# Patient Record
Sex: FEMALE | Race: WHITE | NOT HISPANIC OR LATINO | Employment: FULL TIME | URBAN - METROPOLITAN AREA
[De-identification: names, ages, dates, MRNs, and addresses within clinical notes are randomized per-mention and may not be internally consistent; named-entity substitution may affect disease eponyms.]

---

## 2017-07-19 ENCOUNTER — OFFICE VISIT (OUTPATIENT)
Dept: FAMILY MEDICINE | Facility: CLINIC | Age: 32
End: 2017-07-19
Payer: COMMERCIAL

## 2017-07-19 VITALS
SYSTOLIC BLOOD PRESSURE: 120 MMHG | HEIGHT: 65 IN | WEIGHT: 196.2 LBS | BODY MASS INDEX: 32.69 KG/M2 | DIASTOLIC BLOOD PRESSURE: 72 MMHG | HEART RATE: 72 BPM | TEMPERATURE: 98.9 F

## 2017-07-19 DIAGNOSIS — R87.810 CERVICAL HIGH RISK HPV (HUMAN PAPILLOMAVIRUS) TEST POSITIVE: ICD-10-CM

## 2017-07-19 DIAGNOSIS — R19.7 DIARRHEA, UNSPECIFIED TYPE: ICD-10-CM

## 2017-07-19 DIAGNOSIS — N89.8 VAGINAL DISCHARGE: ICD-10-CM

## 2017-07-19 DIAGNOSIS — F41.8 DEPRESSION WITH ANXIETY: ICD-10-CM

## 2017-07-19 DIAGNOSIS — Z12.4 SCREENING FOR CERVICAL CANCER: ICD-10-CM

## 2017-07-19 DIAGNOSIS — Z00.00 ROUTINE GENERAL MEDICAL EXAMINATION AT A HEALTH CARE FACILITY: Primary | ICD-10-CM

## 2017-07-19 DIAGNOSIS — G44.209 TENSION-TYPE HEADACHE, NOT INTRACTABLE, UNSPECIFIED CHRONICITY PATTERN: ICD-10-CM

## 2017-07-19 DIAGNOSIS — Z23 NEED FOR PNEUMOCOCCAL VACCINE: ICD-10-CM

## 2017-07-19 DIAGNOSIS — Z11.51 SCREENING FOR HUMAN PAPILLOMAVIRUS: ICD-10-CM

## 2017-07-19 DIAGNOSIS — Z72.0 TOBACCO ABUSE: ICD-10-CM

## 2017-07-19 DIAGNOSIS — Z11.3 SCREEN FOR STD (SEXUALLY TRANSMITTED DISEASE): ICD-10-CM

## 2017-07-19 LAB
ANION GAP SERPL CALCULATED.3IONS-SCNC: 7 MMOL/L (ref 3–14)
BASOPHILS # BLD AUTO: 0 10E9/L (ref 0–0.2)
BASOPHILS NFR BLD AUTO: 0.4 %
BETA HCG QUAL IFA URINE: NEGATIVE
BUN SERPL-MCNC: 12 MG/DL (ref 7–30)
CALCIUM SERPL-MCNC: 8.7 MG/DL (ref 8.5–10.1)
CHLORIDE SERPL-SCNC: 105 MMOL/L (ref 94–109)
CO2 SERPL-SCNC: 24 MMOL/L (ref 20–32)
CREAT SERPL-MCNC: 0.57 MG/DL (ref 0.52–1.04)
DIFFERENTIAL METHOD BLD: NORMAL
EOSINOPHIL # BLD AUTO: 0.3 10E9/L (ref 0–0.7)
EOSINOPHIL NFR BLD AUTO: 2.7 %
ERYTHROCYTE [DISTWIDTH] IN BLOOD BY AUTOMATED COUNT: 14.4 % (ref 10–15)
GFR SERPL CREATININE-BSD FRML MDRD: NORMAL ML/MIN/1.7M2
GLUCOSE SERPL-MCNC: 98 MG/DL (ref 70–99)
HCT VFR BLD AUTO: 40.2 % (ref 35–47)
HGB BLD-MCNC: 12.9 G/DL (ref 11.7–15.7)
LYMPHOCYTES # BLD AUTO: 1.9 10E9/L (ref 0.8–5.3)
LYMPHOCYTES NFR BLD AUTO: 18.1 %
MCH RBC QN AUTO: 27.7 PG (ref 26.5–33)
MCHC RBC AUTO-ENTMCNC: 32.1 G/DL (ref 31.5–36.5)
MCV RBC AUTO: 86 FL (ref 78–100)
MICRO REPORT STATUS: NORMAL
MISCELLANEOUS TEST: NORMAL
MONOCYTES # BLD AUTO: 0.8 10E9/L (ref 0–1.3)
MONOCYTES NFR BLD AUTO: 7.4 %
NEUTROPHILS # BLD AUTO: 7.6 10E9/L (ref 1.6–8.3)
NEUTROPHILS NFR BLD AUTO: 71.4 %
PLATELET # BLD AUTO: 340 10E9/L (ref 150–450)
POTASSIUM SERPL-SCNC: 3.8 MMOL/L (ref 3.4–5.3)
RBC # BLD AUTO: 4.66 10E12/L (ref 3.8–5.2)
SODIUM SERPL-SCNC: 136 MMOL/L (ref 133–144)
SPECIMEN SOURCE: NORMAL
TSH SERPL DL<=0.005 MIU/L-ACNC: 1.57 MU/L (ref 0.4–4)
WBC # BLD AUTO: 10.7 10E9/L (ref 4–11)
WET PREP SPEC: NORMAL

## 2017-07-19 PROCEDURE — 36415 COLL VENOUS BLD VENIPUNCTURE: CPT | Performed by: NURSE PRACTITIONER

## 2017-07-19 PROCEDURE — 99395 PREV VISIT EST AGE 18-39: CPT | Mod: 25 | Performed by: NURSE PRACTITIONER

## 2017-07-19 PROCEDURE — 84703 CHORIONIC GONADOTROPIN ASSAY: CPT | Performed by: NURSE PRACTITIONER

## 2017-07-19 PROCEDURE — 83516 IMMUNOASSAY NONANTIBODY: CPT | Mod: 59 | Performed by: NURSE PRACTITIONER

## 2017-07-19 PROCEDURE — 84443 ASSAY THYROID STIM HORMONE: CPT | Performed by: NURSE PRACTITIONER

## 2017-07-19 PROCEDURE — 87624 HPV HI-RISK TYP POOLED RSLT: CPT | Performed by: NURSE PRACTITIONER

## 2017-07-19 PROCEDURE — 87210 SMEAR WET MOUNT SALINE/INK: CPT | Performed by: NURSE PRACTITIONER

## 2017-07-19 PROCEDURE — 87591 N.GONORRHOEAE DNA AMP PROB: CPT | Performed by: NURSE PRACTITIONER

## 2017-07-19 PROCEDURE — 86255 FLUORESCENT ANTIBODY SCREEN: CPT | Mod: 90 | Performed by: NURSE PRACTITIONER

## 2017-07-19 PROCEDURE — 85025 COMPLETE CBC W/AUTO DIFF WBC: CPT | Performed by: NURSE PRACTITIONER

## 2017-07-19 PROCEDURE — 87491 CHLMYD TRACH DNA AMP PROBE: CPT | Performed by: NURSE PRACTITIONER

## 2017-07-19 PROCEDURE — 88175 CYTOPATH C/V AUTO FLUID REDO: CPT | Performed by: NURSE PRACTITIONER

## 2017-07-19 PROCEDURE — 90471 IMMUNIZATION ADMIN: CPT | Performed by: NURSE PRACTITIONER

## 2017-07-19 PROCEDURE — 99213 OFFICE O/P EST LOW 20 MIN: CPT | Mod: 25 | Performed by: NURSE PRACTITIONER

## 2017-07-19 PROCEDURE — 83516 IMMUNOASSAY NONANTIBODY: CPT | Performed by: NURSE PRACTITIONER

## 2017-07-19 PROCEDURE — 99000 SPECIMEN HANDLING OFFICE-LAB: CPT | Performed by: NURSE PRACTITIONER

## 2017-07-19 PROCEDURE — 86038 ANTINUCLEAR ANTIBODIES: CPT | Performed by: NURSE PRACTITIONER

## 2017-07-19 PROCEDURE — 80048 BASIC METABOLIC PNL TOTAL CA: CPT | Performed by: NURSE PRACTITIONER

## 2017-07-19 PROCEDURE — 90732 PPSV23 VACC 2 YRS+ SUBQ/IM: CPT | Performed by: NURSE PRACTITIONER

## 2017-07-19 RX ORDER — CYCLOBENZAPRINE HCL 10 MG
10 TABLET ORAL 3 TIMES DAILY PRN
Qty: 30 TABLET | Refills: 0 | Status: SHIPPED | OUTPATIENT
Start: 2017-07-19 | End: 2018-07-25

## 2017-07-19 ASSESSMENT — ENCOUNTER SYMPTOMS
DYSPHORIC MOOD: 0
SLEEP DISTURBANCE: 0
SORE THROAT: 0
CONSTIPATION: 0
ABDOMINAL PAIN: 0
NUMBNESS: 0
NERVOUS/ANXIOUS: 0
COUGH: 0
ACTIVITY CHANGE: 0
DIFFICULTY URINATING: 0
FREQUENCY: 0
SHORTNESS OF BREATH: 0
POLYDIPSIA: 0
PALPITATIONS: 0
DIZZINESS: 0
VOMITING: 0
ROS SKIN COMMENTS: SELF BREAST EXAM WITHOUT AREA OF CONCERN.
WHEEZING: 0
ARTHRALGIAS: 0
DIARRHEA: 1
ABDOMINAL DISTENTION: 0
HEADACHES: 0
LIGHT-HEADEDNESS: 0
CHEST TIGHTNESS: 0
FATIGUE: 0
POLYPHAGIA: 0
RHINORRHEA: 0
NAUSEA: 0

## 2017-07-19 ASSESSMENT — PATIENT HEALTH QUESTIONNAIRE - PHQ9: 5. POOR APPETITE OR OVEREATING: NEARLY EVERY DAY

## 2017-07-19 ASSESSMENT — ANXIETY QUESTIONNAIRES
5. BEING SO RESTLESS THAT IT IS HARD TO SIT STILL: NEARLY EVERY DAY
1. FEELING NERVOUS, ANXIOUS, OR ON EDGE: NEARLY EVERY DAY
6. BECOMING EASILY ANNOYED OR IRRITABLE: NEARLY EVERY DAY
GAD7 TOTAL SCORE: 20
3. WORRYING TOO MUCH ABOUT DIFFERENT THINGS: NEARLY EVERY DAY
7. FEELING AFRAID AS IF SOMETHING AWFUL MIGHT HAPPEN: MORE THAN HALF THE DAYS
2. NOT BEING ABLE TO STOP OR CONTROL WORRYING: NEARLY EVERY DAY

## 2017-07-19 NOTE — LETTER
My Depression Action Plan  Name: Jennifer Voss   Date of Birth 1985  Date: 7/19/2017    My doctor: Fanny Camahco   My clinic: [address] [id number] [phone]          GREEN    ZONE   Good Control    What it looks like:     Things are going generally well. You have normal up s and down s. You may even feel depressed from time to time, but bad moods usually last less than a day.   What you need to do:  1. Continue to care for yourself (see self care plan)  2. Check your depression survival kit and update it as needed  3. Follow your physician s recommendations including any medication.  4. Do not stop taking medication unless you consult with your physician first.           YELLOW         ZONE Getting Worse    What it looks like:     Depression is starting to interfere with your life.     It may be hard to get out of bed; you may be starting to isolate yourself from others.    Symptoms of depression are starting to last most all day and this has happened for several days.     You may have suicidal thoughts but they are not constant.   What you need to do:     1. Call your care team, your response to treatment will improve if you keep your care team informed of your progress. Yellow periods are signs an adjustment may need to be made.     2. Continue your self-care, even if you have to fake it!    3. Talk to someone in your support network    4. Open up your depression survival kit           RED    ZONE Medical Alert - Get Help    What it looks like:     Depression is seriously interfering with your life.     You may experience these or other symptoms: You can t get out of bed most days, can t work or engage in other necessary activities, you have trouble taking care of basic hygiene, or basic responsibilities, thoughts of suicide or death that will not go away, self-injurious behavior.     What you need to do:  1. Call your care team and  request a same-day appointment. If they are not available (weekends or after hours) call your local crisis line, emergency room or 911.          Depression Self Care Plan / Survival Kit    Self-Care for Depression  Here s the deal. Your body and mind are really not as separate as most people think.  What you do and think affects how you feel and how you feel influences what you do and think. This means if you do things that people who feel good do, it will help you feel better.  Sometimes this is all it takes.  There is also a place for medication and therapy depending on how severe your depression is, so be sure to consult with your medical provider and/ or Behavioral Health Consultant if your symptoms are worsening or not improving.     In order to better manage my stress, I will:    Exercise  Get some form of exercise, every day. This will help reduce pain and release endorphins, the  feel good  chemicals in your brain. This is almost as good as taking antidepressants!  This is not the same as joining a gym and then never going! (they count on that by the way ) It can be as simple as just going for a walk or doing some gardening, anything that will get you moving.      Hygiene   Maintain good hygiene (Get out of bed in the morning, Make your bed, Brush your teeth, Take a shower, and Get dressed like you were going to work, even if you are unemployed).  If your clothes don't fit try to get ones that do.    Diet  I will strive to eat foods that are good for me, drink plenty of water, and avoid excessive sugar, caffeine, alcohol, and other mood-altering substances.  Some foods that are helpful in depression are: complex carbohydrates, B vitamins, flaxseed, fish or fish oil, fresh fruits and vegetables.    Psychotherapy  I agree to participate in Individual Therapy (if recommended).    Medication  If prescribed medications, I agree to take them.  Missing doses can result in serious side effects.  I understand that  drinking alcohol, or other illicit drug use, may cause potential side effects.  I will not stop my medication abruptly without first discussing it with my provider.    Staying Connected With Others  I will stay in touch with my friends, family members, and my primary care provider/team.    Use your imagination  Be creative.  We all have a creative side; it doesn t matter if it s oil painting, sand castles, or mud pies! This will also kick up the endorphins.    Witness Beauty  (AKA stop and smell the roses) Take a look outside, even in mid-winter. Notice colors, textures. Watch the squirrels and birds.     Service to others  Be of service to others.  There is always someone else in need.  By helping others we can  get out of ourselves  and remember the really important things.  This also provides opportunities for practicing all the other parts of the program.    Humor  Laugh and be silly!  Adjust your TV habits for less news and crime-drama and more comedy.    Control your stress  Try breathing deep, massage therapy, biofeedback, and meditation. Find time to relax each day.     My support system    Clinic Contact:  Phone number:    Contact 1:  Phone number:    Contact 2:  Phone number:    Gnosticist/:  Phone number:    Therapist:  Phone number:    Local crisis center:    Phone number:    Other community support:  Phone number:

## 2017-07-19 NOTE — MR AVS SNAPSHOT
After Visit Summary   7/19/2017    Jennifer Voss    MRN: [id number]           Patient Information     Date Of Birth          1985        Visit Information        Provider Department      7/19/2017 9:00 AM Shannon Almeida APRN [facility]        Today's Diagnoses     Routine general medical examination at a health care facility    -  1    Screening for cervical cancer        Screening for human papillomavirus        Depression with anxiety        Diarrhea, unspecified type        Need for pneumococcal vaccine        Tension-type headache, not intractable, unspecified chronicity pattern        Tobacco abuse        Cervical high risk HPV (human papillomavirus) test positive          Care Instructions      Preventive Health Recommendations  Female Ages 26 - 39  Yearly exam:   See your health care provider every year in order to    Review health changes.     Discuss preventive care.      Review your medicines if you your doctor has prescribed any.    Until age 30: Get a Pap test every three years (more often if you have had an abnormal result).    After age 30: Talk to your doctor about whether you should have a Pap test every 3 years or have a Pap test with HPV screening every 5 years.   You do not need a Pap test if your uterus was removed (hysterectomy) and you have not had cancer.  You should be tested each year for STDs (sexually transmitted diseases), if you're at risk.   Talk to your provider about how often to have your cholesterol checked.  If you are at risk for diabetes, you should have a diabetes test (fasting glucose).  Shots: Get a flu shot each year. Get a tetanus shot every 10 years.   Nutrition:     Eat at least 5 servings of fruits and vegetables each day.    Eat whole-grain bread, whole-wheat pasta and brown rice instead of white grains and rice.    Talk to your provider about Calcium and Vitamin D.     Lifestyle    Exercise at least 150 minutes a week  (30 minutes a day, 5 days of the week). This will help you control your weight and prevent disease.    Limit alcohol to one drink per day.    No smoking.     Wear sunscreen to prevent skin cancer.    See your dentist every six months for an exam and cleaning.            Follow-ups after your visit        Additional Services     GASTROENTEROLOGY ADULT REF PROCEDURE ONLY       Last Lab Result: No results found for: CR  Body mass index is 33.16 kg/(m^2).     Needed:  No  Language:  English    Patient will be contacted to schedule procedure.     Please be aware that coverage of these services is subject to the terms and limitations of your health insurance plan.  Call member services at your health plan with any benefit or coverage questions.  Any procedures must be performed at a [place] [facility] facility OR coordinated by your clinic's referral office.    Please bring the following with you to your appointment:    (1) Any X-Rays, CTs or MRIs which have been performed.  Contact the facility where they were done to arrange for  prior to your scheduled appointment.    (2) List of current medications   (3) This referral request   (4) Any documents/labs given to you for this referral                  Who to contact     Normal or non-critical lab and imaging results will be communicated to you by TV TubeXhart, letter or phone within 4 business days after the clinic has received the results. If you do not hear from us within 7 days, please contact the clinic through TV TubeXhart or phone. If you have a critical or abnormal lab result, we will notify you by phone as soon as possible.  Submit refill requests through [facility] or call your pharmacy and they will forward the refill request to us. Please allow 3 business days for your refill to be completed.          If you need to speak with a  for additional information , please call: [phone]           Additional Information About Your Visit        [facility]  "Information     [facility] gives you secure access to your electronic health record. If you see a primary care provider, you can also send messages to your care team and make appointments. If you have questions, please call your primary care clinic.  If you do not have a primary care provider, please call [phone] and they will assist you.        Care EveryWhere ID     This is your Care EveryWhere ID. This could be used by other organizations to access your [facility] medical records  [id number]        Your Vitals Were     Pulse Temperature Height Last Period BMI (Body Mass Index)       72 98.9  F (37.2  C) (Tympanic) 5' 4.5\" (1.638 m) 06/19/2017 33.16 kg/m2        Blood Pressure from Last 3 Encounters:   07/19/17 120/72   08/23/16 129/85   03/28/16 125/71    Weight from Last 3 Encounters:   07/19/17 196 lb 3.2 oz (89 kg)   08/23/16 191 lb 9.6 oz (86.9 kg)   03/28/16 202 lb 12.8 oz (92 kg)              We Performed the Following     anti saccharomyces cerevisiae ab: Laboratory Miscellaneous Order     Antineutrophil cytoplasmic Julio C IgG     Antinuclear antibody screen by EIA     DEPRESSION ACTION PLAN (DAP)     GASTROENTEROLOGY ADULT REF PROCEDURE ONLY     HCG qualitative     HPV High Risk Types DNA Cervical     Pap imaged thin layer diagnostic with HPV (select HPV order below)     Tissue transglutaminase julio c IgA and IgG          Where to get your medicines      These medications were sent to [facility] [address] [place] [address]  [address] [facility] [address]     Phone:  [phone]     cyclobenzaprine 10 MG tablet          Primary Care Provider Office Phone # Fax #    Fanny Camacho [phone]       [facility] [address]  [facility] [address]        Equal Access to Services     HAL ALVARADO AH: Rah Sandreson, michael morales, jeniffer mix. So [facility] [phone].    ATENCIÓN: Si habla " español, tiene a gutiérrez disposición servicios gratuitos de asistencia lingüística. Lemuel nickerson [phone].    We comply with applicable federal civil rights laws and [place] laws. We do not discriminate on the basis of race, color, national origin, age, disability sex, sexual orientation or gender identity.            Thank you!     Thank you for choosing [facility]  for your care. Our goal is always to provide you with excellent care. Hearing back from our patients is one way we can continue to improve our services. Please take a few minutes to complete the written survey that you may receive in the mail after your visit with us. Thank you!             Your Updated Medication List - Protect others around you: Learn how to safely use, store and throw away your medicines at www.disposemymeds.org.          This list is accurate as of: 7/19/17  9:34 AM.  Always use your most recent med list.                   Brand Name Dispense Instructions for use Diagnosis    albuterol 108 (90 BASE) MCG/ACT Inhaler    PROAIR HFA/PROVENTIL HFA/VENTOLIN HFA    1 Inhaler    Inhale 2 puffs into the lungs every 6 hours as needed for shortness of breath / dyspnea or wheezing    Bronchitis       cyclobenzaprine 10 MG tablet    FLEXERIL    30 tablet    Take 1 tablet (10 mg) by mouth 3 times daily as needed for muscle spasms    Tension-type headache, not intractable, unspecified chronicity pattern       metoclopramide 10 MG tablet    REGLAN    30 tablet    Take 1 tablet (10 mg) by mouth 2 times daily as needed Take with ibuprofen or naproxen for headache    Tension-type headache, not intractable, unspecified chronicity pattern       * varenicline 0.5 MG X 11 & 1 MG X 42 tablet    CHANTIX STARTING MONTH FENG    53 tablet    Take 0.5 mg tab daily for 3 days, then 0.5 mg tab twice daily for 4 days, then 1 mg twice daily.    Tobacco abuse       * varenicline 1 MG tablet    CHANTIX    56 tablet    Take 1 tablet (1 mg) by mouth 2  times daily    Tobacco abuse       * Notice:  This list has 2 medication(s) that are the same as other medications prescribed for you. Read the directions carefully, and ask your doctor or other care provider to review them with you.

## 2017-07-19 NOTE — PROGRESS NOTES
SUBJECTIVE:   CC: Jennifer Voss is an 32 year old woman who presents for preventive health visit.     Chief Complaint   Patient presents with     Physical     pt is fasting       Healthy Habits:    Do you get at least three servings of calcium containing foods daily (dairy, green leafy vegetables, etc.)? Yes but not always    Amount of exercise or daily activities, outside of work: 4 day(s) per week    Problems taking medications regularly not applicable    Medication side effects: No    Have you had an eye exam in the past two years? no    Do you see a dentist twice per year? No, once  Do you have sleep apnea, excessive snoring or daytime drowsiness?yes      Today's PHQ-2 Score:   PHQ-2 ( 1999 Pfizer) 7/16/2012   Q1: Little interest or pleasure in doing things 3   Q2: Feeling down, depressed or hopeless 2   PHQ-2 Score 5       Abuse: Current or Past(Physical, Sexual or Emotional)- No  Do you feel safe in your environment - Yes  Social History   Substance Use Topics     Smoking status: Current Every Day Smoker     Packs/day: 0.10     Years: 10.00     Types: Cigarettes     Smokeless tobacco: Never Used      Comment: smoking 10cig/day - down to 1-2 cig/day with pregnancy     Alcohol use 0.0 oz/week     0 Standard drinks or equivalent per week      Comment: No alcohol since may.     The patient does not drink >3 drinks per day nor >7 drinks per week.    Reviewed orders with patient.  Reviewed health maintenance and updated orders accordingly - Yes  Current Outpatient Prescriptions   Medication Sig Dispense Refill     cyclobenzaprine (FLEXERIL) 10 MG tablet Take 1 tablet (10 mg) by mouth 3 times daily as needed for muscle spasms 30 tablet 0     albuterol (PROAIR HFA, PROVENTIL HFA, VENTOLIN HFA) 108 (90 BASE) MCG/ACT inhaler Inhale 2 puffs into the lungs every 6 hours as needed for shortness of breath / dyspnea or wheezing (Patient not taking: Reported on 7/19/2017) 1 Inhaler 0     varenicline (CHANTIX) 1 MG tablet  Take 1 tablet (1 mg) by mouth 2 times daily (Patient not taking: Reported on 7/19/2017) 56 tablet 2     varenicline (CHANTIX STARTING MONTH PAK) 0.5 MG X 11 & 1 MG X 42 tablet Take 0.5 mg tab daily for 3 days, then 0.5 mg tab twice daily for 4 days, then 1 mg twice daily. (Patient not taking: Reported on 7/19/2017) 53 tablet 0     metoclopramide (REGLAN) 10 MG tablet Take 1 tablet (10 mg) by mouth 2 times daily as needed Take with ibuprofen or naproxen for headache (Patient not taking: Reported on 7/19/2017) 30 tablet 1     Allergies   Allergen Reactions     Penicillins Nausea     Percocet [Oxycodone-Acetaminophen] Nausea     Ok with vicodin       Mammogram not appropriate for this patient based on age.    Pertinent mammograms are reviewed under the imaging tab.  History of abnormal Pap smear: YES - updated in Problem List and Health Maintenance accordingly    Reviewed and updated as needed this visit by clinical staff  Tobacco  Allergies  Meds  Med Hx  Surg Hx  Fam Hx  Soc Hx        Reviewed and updated as needed this visit by Provider          Here today for physical. Has had abnormal pap in the past with colposcopy      Thinks may have IBS. Sometimes will eat and have to go to [place] and have diarrhea. Trigger food is salad, sandwiches , grareasy foods, when eats mac and cheese will have diarrhea. No abdomen pain. Will feel gurgling in abdomen. No constipation. No blood in stool. Thinks has been going on for 2+ years. Does have days that will not have diarrhea. Will have firm formed stools that day. No travel out of country in the last few years. No change in meds.     LMP: Regular. 6/19/17. Expecting to get it anytime. Is sexually active. No concern for std. Is currently trying to get pregnant. Is not taking over the counter prenatal, plans to pick one up today      Last Pap: 2015-ASCUS had colp.  Last mammogram: never-Maternal aunt with breast cancer.   Last BMD: Never   Last Colonoscopy: never, no family  "history of colon cancer   Last eye exam: Long time ago  Last dental exam: once per year  Last tetanus vaccine: 2012  Last influenza vaccine: Does not usually get   Last shingles vaccine: Never   Last pneumonia vaccine: Never      ROS:  Review of Systems   Constitutional: Negative for activity change and fatigue.   HENT: Negative for ear pain, rhinorrhea and sore throat.    Respiratory: Negative for cough, chest tightness, shortness of breath and wheezing.    Cardiovascular: Negative for chest pain, palpitations and leg swelling.   Gastrointestinal: Positive for diarrhea. Negative for abdominal distention, abdominal pain, constipation, nausea and vomiting.   Endocrine: Negative for cold intolerance, heat intolerance, polydipsia, polyphagia and polyuria.   Genitourinary: Negative for difficulty urinating, enuresis, frequency and urgency.   Musculoskeletal: Negative for arthralgias.   Skin: Negative for rash.        Self breast exam without area of concern.    Neurological: Negative for dizziness, light-headedness, numbness and headaches.   Psychiatric/Behavioral: Negative for dysphoric mood and sleep disturbance. The patient is not nervous/anxious.          OBJECTIVE:   /72 (BP Location: Left arm, Patient Position: Sitting, Cuff Size: Adult Large)  Pulse 72  Temp 98.9  F (37.2  C) (Tympanic)  Ht 5' 4.5\" (1.638 m)  Wt 196 lb 3.2 oz (89 kg)  LMP 06/19/2017  BMI 33.16 kg/m2  EXAM:  Physical Exam   Constitutional: She appears well-developed and well-nourished.   HENT:   Head: Normocephalic and atraumatic.   Right Ear: Tympanic membrane and external ear normal. No middle ear effusion.   Left Ear: Tympanic membrane and external ear normal.  No middle ear effusion.   Nose: No mucosal edema.   Mouth/Throat: Uvula is midline, oropharynx is clear and moist and mucous membranes are normal.   Eyes: Pupils are equal, round, and reactive to light.   Neck: Normal range of motion. Carotid bruit is not present. No " thyromegaly present.   Cardiovascular: Normal rate, regular rhythm and normal heart sounds.    Pulses:       Femoral pulses are 2+ on the right side, and 2+ on the left side.       Dorsalis pedis pulses are 2+ on the right side, and 2+ on the left side.        Posterior tibial pulses are 2+ on the right side, and 2+ on the left side.   Pulmonary/Chest: Effort normal and breath sounds normal. Right breast exhibits no inverted nipple, no mass, no nipple discharge, no skin change and no tenderness. Left breast exhibits no inverted nipple, no mass, no nipple discharge, no skin change and no tenderness. Breasts are symmetrical.   Abdominal: Soft. Normal appearance and bowel sounds are normal. There is no tenderness.   Genitourinary: Vagina normal. Uterus is not enlarged. Cervix exhibits no motion tenderness and no discharge. Right adnexum displays no mass and no tenderness. Left adnexum displays no mass and no tenderness. No vaginal discharge found.   Musculoskeletal: Normal range of motion.   Neurological: She is alert.   Skin: Skin is warm and dry. No rash noted.   Psychiatric: She has a normal mood and affect. Her behavior is normal.       ASSESSMENT/PLAN:   1. Routine general medical examination at a health care facility  Screening guidelines reviewed.     2. Screening for cervical cancer  - Pap imaged thin layer diagnostic with HPV (select HPV order below)  - Beta HCG qual IFA urine    3. Screening for human papillomavirus  - HPV High Risk Types DNA Cervical    4. Depression with anxiety  Doing well currently   - DEPRESSION ACTION PLAN (DAP)    5. Diarrhea, unspecified type  Check labs today   Set up for colonoscopy, if normal consider starting med for IBS  - GASTROENTEROLOGY ADULT REF PROCEDURE ONLY  - Tissue transglutaminase julio c IgA and IgG  - anti saccharomyces cerevisiae ab: Laboratory Miscellaneous Order  - Antineutrophil cytoplasmic Julio C IgG  - Antinuclear antibody screen by EIA  - CBC with platelets  "differential  - Basic metabolic panel  - TSH with free T4 reflex    6. Need for pneumococcal vaccine  Will check urine today for pregnancy, if negative will administer prevnar 23    7. Tension-type headache, not intractable, unspecified chronicity pattern  Refills of flexaril given   - cyclobenzaprine (FLEXERIL) 10 MG tablet; Take 1 tablet (10 mg) by mouth 3 times daily as needed for muscle spasms  Dispense: 30 tablet; Refill: 0    8. Tobacco abuse  Encouraged to stop smoking     9. Cervical high risk HPV (human papillomavirus) test positive  - Beta HCG qual IFA urine    COUNSELING:   Reviewed preventive health counseling, as reflected in patient instructions       Regular exercise       Healthy diet/nutrition       Vision screening       Immunizations    Vaccinated for: Pneumococcal  If HCG negative          Family planning       Folic Acid Counseling     reports that she has been smoking Cigarettes.  She has a 1.00 pack-year smoking history. She has never used smokeless tobacco.  Tobacco Cessation Action Plan: Information offered: Patient not interested at this time  Estimated body mass index is 33.16 kg/(m^2) as calculated from the following:    Height as of this encounter: 5' 4.5\" (1.638 m).    Weight as of this encounter: 196 lb 3.2 oz (89 kg).   Weight management plan: Discussed healthy diet and exercise guidelines and patient will follow up in 12 months in clinic to re-evaluate.    Counseling Resources:  ATP IV Guidelines  Pooled Cohorts Equation Calculator  Breast Cancer Risk Calculator  FRAX Risk Assessment  ICSI Preventive Guidelines  Dietary Guidelines for Americans, 2010  USDA's MyPlate  ASA Prophylaxis  Lung CA Screening    GISEL [facility]"

## 2017-07-19 NOTE — NURSING NOTE
"Chief Complaint   Patient presents with     Physical     pt is fasting       Initial /72 (BP Location: Left arm, Patient Position: Sitting, Cuff Size: Adult Large)  Pulse 72  Temp 98.9  F (37.2  C) (Tympanic)  Ht 5' 4.5\" (1.638 m)  Wt 196 lb 3.2 oz (89 kg)  LMP 06/19/2017  BMI 33.16 kg/m2 Estimated body mass index is 33.16 kg/(m^2) as calculated from the following:    Height as of this encounter: 5' 4.5\" (1.638 m).    Weight as of this encounter: 196 lb 3.2 oz (89 kg).  Medication Reconciliation: complete     April MAYELA Ornelas      "

## 2017-07-20 LAB
ANA SER QL IA: NORMAL
ANCA IGG TITR SER IF: NORMAL {TITER}
C TRACH DNA SPEC QL NAA+PROBE: NORMAL
N GONORRHOEA DNA SPEC QL NAA+PROBE: NORMAL
RESULT: NORMAL
SEND OUTS MISC TEST CODE: NORMAL
SEND OUTS MISC TEST SPECIMEN: NORMAL
SPECIMEN SOURCE: NORMAL
SPECIMEN SOURCE: NORMAL
TEST NAME: NORMAL
TTG IGA SER-ACNC: NORMAL U/ML
TTG IGG SER-ACNC: NORMAL U/ML

## 2017-07-20 ASSESSMENT — PATIENT HEALTH QUESTIONNAIRE - PHQ9: SUM OF ALL RESPONSES TO PHQ QUESTIONS 1-9: 4

## 2017-07-20 ASSESSMENT — ANXIETY QUESTIONNAIRES: GAD7 TOTAL SCORE: 20

## 2017-07-20 NOTE — PROGRESS NOTES
Jennifer,     You are negative for sexually transmitted diseases. Please use condoms with every sexual encounter.    The test for autoimmune disorder is normal.     Please call or email with any additional questions or concerns  GISEL Chappell CNP

## 2017-07-24 LAB
COPATH REPORT: NORMAL
PAP: NORMAL

## 2017-07-26 LAB
FINAL DIAGNOSIS: NORMAL
HPV HR 12 DNA CVX QL NAA+PROBE: NEGATIVE
HPV16 DNA SPEC QL NAA+PROBE: NEGATIVE
HPV18 DNA SPEC QL NAA+PROBE: NEGATIVE
SPECIMEN DESCRIPTION: NORMAL

## 2017-07-28 ENCOUNTER — ANESTHESIA EVENT (OUTPATIENT)
Dept: GASTROENTEROLOGY | Facility: CLINIC | Age: 32
End: 2017-07-28
Payer: COMMERCIAL

## 2017-07-28 ASSESSMENT — LIFESTYLE VARIABLES: TOBACCO_USE: 1

## 2017-07-28 NOTE — ANESTHESIA PREPROCEDURE EVALUATION
Anesthesia Evaluation     . Pt has had prior anesthetic.     No history of anesthetic complications          ROS/MED HX    ENT/Pulmonary:     (+)tobacco use, Current use , . .    Neurologic:  - neg neurologic ROS     Cardiovascular:  - neg cardiovascular ROS       METS/Exercise Tolerance:  >4 METS   Hematologic:  - neg hematologic  ROS       Musculoskeletal:  - neg musculoskeletal ROS       GI/Hepatic:     (+) GERD Other GI/Hepatic diarrhea      Renal/Genitourinary:  - ROS Renal section negative       Endo:     (+) Obesity, .      Psychiatric:     (+) psychiatric history anxiety and depression      Infectious Disease:  - neg infectious disease ROS       Malignancy:      - no malignancy   Other:    - neg other ROS                 Physical Exam  Normal systems: cardiovascular and pulmonary    Airway   Mallampati: II  TM distance: >3 FB  Neck ROM: full    Dental   (+) upper retainer    Cardiovascular       Pulmonary                     Anesthesia Plan      History & Physical Review  History and physical reviewed and following examination; no interval change.    ASA Status:  2 .    NPO Status:  > 8 hours    Plan for General and MAC with Propofol induction. Maintenance will be Balanced.  Reason for MAC:  Deep or markedly invasive procedure (G8)  PONV prophylaxis:  Ondansetron (or other 5HT-3) and Dexamethasone or Solumedrol       Postoperative Care  Postoperative pain management:  IV analgesics and Oral pain medications.      Consents  Anesthetic plan, risks, benefits and alternatives discussed with:  Patient..                          .

## 2017-08-01 ENCOUNTER — HOSPITAL ENCOUNTER (OUTPATIENT)
Facility: CLINIC | Age: 32
Discharge: HOME | End: 2017-08-01
Attending: SURGERY | Admitting: SURGERY
Payer: COMMERCIAL

## 2017-08-01 ENCOUNTER — SURGERY (OUTPATIENT)
Age: 32
End: 2017-08-01

## 2017-08-01 ENCOUNTER — ANESTHESIA (OUTPATIENT)
Dept: GASTROENTEROLOGY | Facility: CLINIC | Age: 32
End: 2017-08-01
Payer: COMMERCIAL

## 2017-08-01 VITALS
HEART RATE: 78 BPM | WEIGHT: 196.2 LBS | TEMPERATURE: 97 F | OXYGEN SATURATION: 98 % | SYSTOLIC BLOOD PRESSURE: 116 MMHG | RESPIRATION RATE: 16 BRPM | DIASTOLIC BLOOD PRESSURE: 85 MMHG | HEIGHT: 64 IN | BODY MASS INDEX: 33.49 KG/M2

## 2017-08-01 LAB
COLONOSCOPY: NORMAL
HCG UR QL: NEGATIVE

## 2017-08-01 PROCEDURE — 37000008 ZZH ANESTHESIA TECHNICAL FEE, 1ST 30 MIN: Performed by: SURGERY

## 2017-08-01 PROCEDURE — 45380 COLONOSCOPY AND BIOPSY: CPT | Performed by: SURGERY

## 2017-08-01 PROCEDURE — 88305 TISSUE EXAM BY PATHOLOGIST: CPT | Mod: 26 | Performed by: SURGERY

## 2017-08-01 PROCEDURE — 25000128 H RX IP 250 OP 636: Performed by: NURSE ANESTHETIST, CERTIFIED REGISTERED

## 2017-08-01 PROCEDURE — 25000125 ZZHC RX 250: Performed by: SURGERY

## 2017-08-01 PROCEDURE — 25000125 ZZHC RX 250: Performed by: NURSE ANESTHETIST, CERTIFIED REGISTERED

## 2017-08-01 PROCEDURE — 88305 TISSUE EXAM BY PATHOLOGIST: CPT | Performed by: SURGERY

## 2017-08-01 PROCEDURE — 81025 URINE PREGNANCY TEST: CPT | Performed by: NURSE ANESTHETIST, CERTIFIED REGISTERED

## 2017-08-01 RX ORDER — LIDOCAINE 40 MG/G
CREAM TOPICAL
Status: DISCONTINUED | OUTPATIENT
Start: 2017-08-01 | End: 2017-08-01 | Stop reason: HOSPADM

## 2017-08-01 RX ORDER — SODIUM CHLORIDE, SODIUM LACTATE, POTASSIUM CHLORIDE, CALCIUM CHLORIDE 600; 310; 30; 20 MG/100ML; MG/100ML; MG/100ML; MG/100ML
INJECTION, SOLUTION INTRAVENOUS CONTINUOUS
Status: DISCONTINUED | OUTPATIENT
Start: 2017-08-01 | End: 2017-08-01 | Stop reason: HOSPADM

## 2017-08-01 RX ORDER — LIDOCAINE HYDROCHLORIDE 10 MG/ML
INJECTION, SOLUTION INFILTRATION; PERINEURAL PRN
Status: DISCONTINUED | OUTPATIENT
Start: 2017-08-01 | End: 2017-08-01

## 2017-08-01 RX ORDER — PROPOFOL 10 MG/ML
INJECTION, EMULSION INTRAVENOUS CONTINUOUS PRN
Status: DISCONTINUED | OUTPATIENT
Start: 2017-08-01 | End: 2017-08-01

## 2017-08-01 RX ORDER — PROPOFOL 10 MG/ML
INJECTION, EMULSION INTRAVENOUS PRN
Status: DISCONTINUED | OUTPATIENT
Start: 2017-08-01 | End: 2017-08-01

## 2017-08-01 RX ORDER — ONDANSETRON 2 MG/ML
4 INJECTION INTRAMUSCULAR; INTRAVENOUS
Status: DISCONTINUED | OUTPATIENT
Start: 2017-08-01 | End: 2017-08-01 | Stop reason: HOSPADM

## 2017-08-01 RX ADMIN — LIDOCAINE HYDROCHLORIDE 50 MG: 10 INJECTION, SOLUTION INFILTRATION; PERINEURAL at 07:32

## 2017-08-01 RX ADMIN — PROPOFOL 100 MG: 10 INJECTION, EMULSION INTRAVENOUS at 07:32

## 2017-08-01 RX ADMIN — PROPOFOL 200 MCG/KG/MIN: 10 INJECTION, EMULSION INTRAVENOUS at 07:32

## 2017-08-01 RX ADMIN — SODIUM CHLORIDE, POTASSIUM CHLORIDE, SODIUM LACTATE AND CALCIUM CHLORIDE: 600; 310; 30; 20 INJECTION, SOLUTION INTRAVENOUS at 07:01

## 2017-08-01 RX ADMIN — LIDOCAINE HYDROCHLORIDE 1 ML: 10 INJECTION, SOLUTION EPIDURAL; INFILTRATION; INTRACAUDAL; PERINEURAL at 07:01

## 2017-08-01 NOTE — BRIEF OP NOTE
[facility]   Brief Operative Note    Pre-operative diagnosis: diarrhea   Post-operative diagnosis normal appearing colon     Procedure: Procedure(s):  Colonoscopy - Wound Class: II-Clean Contaminated   Surgeon(s): Surgeon(s) and Role:     * Ramakrishna Epstein MD - Primary   Estimated blood loss: * No values recorded between 8/1/2017 12:00 AM and 8/1/2017  7:51 AM *    Specimens:   ID Type Source Tests Collected by Time Destination   A : Right Colon Tissue Colon SURGICAL PATHOLOGY EXAM Ramakrishna Epstein MD 8/1/2017  7:41 AM    B : Left Colon Tissue Colon SURGICAL PATHOLOGY EXAM Ramakrishna Epstein MD 8/1/2017  7:43 AM       Findings: Colon appears normal.  Biopsies taken to rule out microscopic colitis

## 2017-08-01 NOTE — ANESTHESIA CARE TRANSFER NOTE
Patient: Jennifer Voss    Procedure(s):  Colonoscopy - Wound Class: II-Clean Contaminated    Diagnosis: diarrhea  Diagnosis Additional Information: No value filed.    Anesthesia Type:   No value filed.     Note:  Airway :Room Air  Patient transferred to:Phase II        Vitals: (Last set prior to Anesthesia Care Transfer)    CRNA VITALS  8/1/2017 0721 - 8/1/2017 0751      8/1/2017             Pulse: 94    Ht Rate: 82    SpO2: 98 %                Electronically Signed By: GISEL Chance CRNA  August 1, 2017  7:51 AM

## 2017-08-01 NOTE — ANESTHESIA POSTPROCEDURE EVALUATION
Patient: Jennifer Voss    Procedure(s):  Colonoscopy - Wound Class: II-Clean Contaminated    Diagnosis:diarrhea  Diagnosis Additional Information: No value filed.    Anesthesia Type:  No value filed.    Note:  Anesthesia Post Evaluation    Patient location during evaluation: Bedside  Level of consciousness: sleepy but conscious  Pain management: adequate  Airway patency: patent  Cardiovascular status: stable  Respiratory status: room air  Hydration status: stable  PONV: none     Anesthetic complications: None          Last vitals:  Vitals:    08/01/17 0634   BP: 124/85   Pulse: 78   Resp: 18   Temp: 36.5  C (97.7  F)   SpO2: 95%         Electronically Signed By: GISEL Chance CRNA  August 1, 2017  7:52 AM

## 2017-08-01 NOTE — H&P
"32 year old year old female here for colonoscopy for chronic diarrhea.    Patient Active Problem List   Diagnosis     CARDIOVASCULAR SCREENING; LDL GOAL LESS THAN 160     24 HR INFO GIVEN     Tobacco abuse     Depression with anxiety     GERD (gastroesophageal reflux disease)     Cervical high risk HPV (human papillomavirus) test positive       Past Medical History:   Diagnosis Date     Abnormal Pap smear of cervix 08/2003     Cervical high risk HPV (human papillomavirus) test positive 7/24/15    type 16     Chickenpox      Ectopic pregnancy     right     H/O chronic ear infection as a child     H/O colposcopy with cervical biopsy 1/18/16    MARIANO 2, ECC - benign     Tonsillitis        Past Surgical History:   Procedure Laterality Date     LAPAROSCOPIC EVACUATION ECTOPIC PREGNANCY N/A 7/24/2015    LSC right salpingectomy      LEEP TX, CERVICAL  3/2016    benign     MOUTH SURGERY      wisdom teeth     PE TUBES       TONSILLECTOMY         @[facility]@    No current outpatient prescriptions on file.       Allergies   Allergen Reactions     Penicillins Nausea     Percocet [Oxycodone-Acetaminophen] Nausea     Ok with vicodin       Pt reports that she has been smoking Cigarettes.  She has a 1.00 pack-year smoking history. She has never used smokeless tobacco. She reports that she drinks alcohol. She reports that she does not use illicit drugs.    Exam:  /85 (Cuff Size: Adult Large)  Pulse 78  Temp 97.7  F (36.5  C) (Oral)  Resp 18  Ht 1.638 m (5' 4.49\")  Wt 89 kg (196 lb 3.2 oz)  LMP 07/20/2017  SpO2 95%  BMI 33.17 kg/m2    Awake, Alert OX3  Lungs - CTA bilaterally  CV - RRR, no murmurs, distal pulses intact  Abd - soft, non-distended, non-tender, +BS  Extr - No cyanosis or edema    A/P 32 year old year old female in need of colonoscopy for chronic diarrhea. Risks, benefits, alternatives, and complications were discussed including the possibility of perforation and the patient agreed to proceed.    Ramakrishna Epstein, " MD

## 2017-08-02 LAB — COPATH REPORT: NORMAL

## 2017-09-22 ENCOUNTER — OFFICE VISIT (OUTPATIENT)
Dept: FAMILY MEDICINE | Facility: CLINIC | Age: 32
End: 2017-09-22
Payer: COMMERCIAL

## 2017-09-22 VITALS
SYSTOLIC BLOOD PRESSURE: 127 MMHG | OXYGEN SATURATION: 97 % | HEART RATE: 75 BPM | DIASTOLIC BLOOD PRESSURE: 85 MMHG | BODY MASS INDEX: 31.96 KG/M2 | WEIGHT: 187.2 LBS | TEMPERATURE: 98.6 F | HEIGHT: 64 IN

## 2017-09-22 DIAGNOSIS — Z72.0 TOBACCO ABUSE: ICD-10-CM

## 2017-09-22 DIAGNOSIS — J20.9 ACUTE BRONCHITIS, UNSPECIFIED ORGANISM: Primary | ICD-10-CM

## 2017-09-22 PROCEDURE — 99214 OFFICE O/P EST MOD 30 MIN: CPT | Performed by: PHYSICIAN ASSISTANT

## 2017-09-22 RX ORDER — CODEINE PHOSPHATE AND GUAIFENESIN 10; 100 MG/5ML; MG/5ML
1-2 SOLUTION ORAL EVERY 4 HOURS PRN
Qty: 473 ML | Refills: 0 | Status: SHIPPED | OUTPATIENT
Start: 2017-09-22 | End: 2017-12-29

## 2017-09-22 RX ORDER — ALBUTEROL SULFATE 90 UG/1
2 AEROSOL, METERED RESPIRATORY (INHALATION) EVERY 6 HOURS PRN
Qty: 1 INHALER | Refills: 0 | Status: SHIPPED | OUTPATIENT
Start: 2017-09-22 | End: 2017-11-29

## 2017-09-22 NOTE — PATIENT INSTRUCTIONS

## 2017-09-22 NOTE — PROGRESS NOTES
SUBJECTIVE:   Jennifer Voss is a 32 year old female who presents to clinic today for the following health issues:      ENT Symptoms             Symptoms: cc Present Absent Comment   Fever/Chills   x    Fatigue  x     Muscle Aches  x     Eye Irritation   x    Sneezing  x     Nasal Fernando/Drg  x     Sinus Pressure/Pain   x    Loss of smell   x    Dental pain   x    Sore Throat  x  From coughing   Swollen Glands   x    Ear Pain/Fullness   x    Cough  x     Wheeze  x     Chest Pain   x    Shortness of breath  x     Rash   x    Other   x      Symptom duration:  2 days    Symptom severity:  moderate   Treatments tried:  Delsum and cough drops    Contacts:  None      Cough productive of yellow/green mucus.   Cough keeping her up at night.    Smokes less than half pack per day, cut back recently.    Was on chantix in the past and it was effective.            Problem list and histories reviewed & adjusted, as indicated.  Additional history: as documented    Patient Active Problem List   Diagnosis     CARDIOVASCULAR SCREENING; LDL GOAL LESS THAN 160     24 HR INFO GIVEN     Tobacco abuse     Depression with anxiety     GERD (gastroesophageal reflux disease)     Cervical high risk HPV (human papillomavirus) test positive     Past Surgical History:   Procedure Laterality Date     COLONOSCOPY N/A 8/1/2017    Procedure: COMBINED COLONOSCOPY, SINGLE OR MULTIPLE BIOPSY/POLYPECTOMY BY BIOPSY;  Colonoscopy;  Surgeon: Ramakrishna Epstein MD;  Location: [facility]     LAPAROSCOPIC EVACUATION ECTOPIC PREGNANCY N/A 7/24/2015    LSC right salpingectomy      LEEP TX, CERVICAL  3/2016    benign     MOUTH SURGERY      wisdom teeth     PE TUBES       TONSILLECTOMY         Social History   Substance Use Topics     Smoking status: Current Every Day Smoker     Packs/day: 0.10     Years: 10.00     Types: Cigarettes     Smokeless tobacco: Never Used      Comment: smoking 10cig/day - down to 1-2 cig/day with pregnancy     Alcohol use 0.0 oz/week     0  Standard drinks or equivalent per week      Comment: No alcohol since may.     Family History   Problem Relation Age of Onset     DIABETES Father      Hypertension Father      DIABETES Paternal Grandmother      Coronary Artery Disease Paternal Grandmother      MI     Aneurysm Paternal Grandmother      brain     Substance Abuse Brother      recovered drugs     Depression Brother      Anxiety Disorder Brother      Heart Failure Paternal Grandfather      CHF     Depression Mother          Current Outpatient Prescriptions   Medication Sig Dispense Refill     guaiFENesin-codeine (ROBITUSSIN AC) 100-10 MG/5ML SOLN solution Take 5-10 mLs by mouth every 4 hours as needed for cough 473 mL 0     albuterol (PROAIR HFA/PROVENTIL HFA/VENTOLIN HFA) 108 (90 BASE) MCG/ACT Inhaler Inhale 2 puffs into the lungs every 6 hours as needed for shortness of breath / dyspnea or wheezing 1 Inhaler 0     varenicline (CHANTIX STARTING MONTH PAK) 0.5 MG X 11 & 1 MG X 42 tablet Take 0.5 mg tab daily for 3 days, then 0.5 mg tab twice daily for 4 days, then 1 mg twice daily. 53 tablet 0     cyclobenzaprine (FLEXERIL) 10 MG tablet Take 1 tablet (10 mg) by mouth 3 times daily as needed for muscle spasms 30 tablet 0     albuterol (PROAIR HFA, PROVENTIL HFA, VENTOLIN HFA) 108 (90 BASE) MCG/ACT inhaler Inhale 2 puffs into the lungs every 6 hours as needed for shortness of breath / dyspnea or wheezing 1 Inhaler 0     BP Readings from Last 3 Encounters:   09/22/17 127/85   08/01/17 116/85   07/19/17 120/72    Wt Readings from Last 3 Encounters:   09/22/17 187 lb 3.2 oz (84.9 kg)   08/01/17 196 lb 3.2 oz (89 kg)   07/19/17 196 lb 3.2 oz (89 kg)                        Reviewed and updated as needed this visit by clinical staff     Reviewed and updated as needed this visit by Provider         ROS:  Constitutional, HEENT, cardiovascular, pulmonary, gi and gu systems are negative, except as otherwise noted.      OBJECTIVE:   /85  Pulse 75  Temp 98.6  " F (37  C) (Tympanic)  Ht 5' 4.49\" (1.638 m)  Wt 187 lb 3.2 oz (84.9 kg)  LMP 09/19/2017 (Approximate)  SpO2 97%  Breastfeeding? No  BMI 31.65 kg/m2  Body mass index is 31.65 kg/(m^2).  GENERAL: healthy, alert and no distress  EYES: Eyes grossly normal to inspection, PERRL and conjunctivae and sclerae normal  HENT: ear canals and TM's normal, nose and mouth without ulcers or lesions  NECK: no adenopathy, no asymmetry, masses, or scars and thyroid normal to palpation  RESP: lungs clear to auscultation - no rales, rhonchi, slight wheeze noted in posterior lung fields.  CV: regular rate and rhythm, normal S1 S2, no S3 or S4, no murmur, click or rub, no peripheral edema and peripheral pulses strong  ABDOMEN: soft, nontender, no hepatosplenomegaly, no masses and bowel sounds normal  MS: no gross musculoskeletal defects noted, no edema    Diagnostic Test Results:  none     ASSESSMENT/PLAN:         1. Acute bronchitis, unspecified organism  BRONCHITIS    I discussed the pathophysiology of bronchitis and likely viral etiology.  I reviewed the risks and benefits of various over the counter and prescription medications.  Additionally, we reviewed the infectious nature of this condition and techniques to minimize transmission and future infections.    Medication ordered, see .     Patient advised to follow up if symptoms worsen or fail to improve as anticipated.     I warned Jennifer Voss that robitussin ac may cause drowsiness and she is not to drive or operate heavy machinery after taking this medication.       - guaiFENesin-codeine (ROBITUSSIN AC) 100-10 MG/5ML SOLN solution; Take 5-10 mLs by mouth every 4 hours as needed for cough  Dispense: 473 mL; Refill: 0  - albuterol (PROAIR HFA/PROVENTIL HFA/VENTOLIN HFA) 108 (90 BASE) MCG/ACT Inhaler; Inhale 2 puffs into the lungs every 6 hours as needed for shortness of breath / dyspnea or wheezing  Dispense: 1 Inhaler; Refill: 0    2. Tobacco abuse  Discussed " risks of smoking and strongly advised smoking cessation.  Went over various options including patches, meds, and cessation programs available.  Reviewed general cessation measures, including behavioral modifications.   Approximately 3 minutes were spent counseling patient on smoking cessation.     Coding if there is a co-morbidity.  85870 (3-9 minutes) and 09313 (10+ minutes)     Restart chantix      - varenicline (CHANTIX STARTING MONTH FENG) 0.5 MG X 11 & 1 MG X 42 tablet; Take 0.5 mg tab daily for 3 days, then 0.5 mg tab twice daily for 4 days, then 1 mg twice daily.  Dispense: 53 tablet; Refill: 0    FUTURE APPOINTMENTS:       - Follow-up visit if symptoms worsen or fail to improve as anticipated (call or send a [employer] message if no improvement over the next 10 days as anticipated).     Maria Chaves PA-C  [facility]

## 2017-09-22 NOTE — NURSING NOTE
"Chief Complaint   Patient presents with     URI       Initial /85  Pulse 75  Temp 98.6  F (37  C) (Tympanic)  Ht 5' 4.49\" (1.638 m)  Wt 187 lb 3.2 oz (84.9 kg)  LMP 09/19/2017 (Approximate)  SpO2 97%  Breastfeeding? No  BMI 31.65 kg/m2 Estimated body mass index is 31.65 kg/(m^2) as calculated from the following:    Height as of this encounter: 5' 4.49\" (1.638 m).    Weight as of this encounter: 187 lb 3.2 oz (84.9 kg).  Medication Reconciliation: complete     Ca Jennings MA      "

## 2017-11-17 DIAGNOSIS — J20.9 ACUTE BRONCHITIS, UNSPECIFIED ORGANISM: ICD-10-CM

## 2017-11-17 RX ORDER — CODEINE PHOSPHATE AND GUAIFENESIN 10; 100 MG/5ML; MG/5ML
1-2 SOLUTION ORAL EVERY 4 HOURS PRN
Refills: 0 | OUTPATIENT
Start: 2017-11-17

## 2017-11-17 NOTE — TELEPHONE ENCOUNTER
Guaifenesin/Codeine Sol  100/10mg  Last Written Prescription Date:  09/22/2017 #473 x 0  Last filled 09/22/2017  Last office visit: 07/19/2017 FRANCES Almeida       Routing refill request to provider for review/approval because:  Drug not on the [facility], [facility] or [facility] refill protocol or controlled substance

## 2017-11-17 NOTE — TELEPHONE ENCOUNTER
I suggest f/u visit if she is still having symptoms or if she would like refills of this medication.   Maria Chaves PA-C

## 2017-11-22 NOTE — TELEPHONE ENCOUNTER
Patient called back. She states she does not need another prescription at this time. The Pharmacy was only filling half prescriptions for her, so she has enough for now. Patient knows she needs to make an appointment for any future refills.    Suzie Sunbright   [facility] [place]

## 2017-11-29 ENCOUNTER — OFFICE VISIT (OUTPATIENT)
Dept: FAMILY MEDICINE | Facility: CLINIC | Age: 32
End: 2017-11-29
Payer: COMMERCIAL

## 2017-11-29 VITALS
OXYGEN SATURATION: 97 % | SYSTOLIC BLOOD PRESSURE: 120 MMHG | WEIGHT: 181.6 LBS | BODY MASS INDEX: 30.26 KG/M2 | HEIGHT: 65 IN | DIASTOLIC BLOOD PRESSURE: 76 MMHG | TEMPERATURE: 97.5 F | HEART RATE: 72 BPM

## 2017-11-29 DIAGNOSIS — J20.9 ACUTE BRONCHITIS, UNSPECIFIED ORGANISM: ICD-10-CM

## 2017-11-29 DIAGNOSIS — Z72.0 TOBACCO ABUSE: ICD-10-CM

## 2017-11-29 DIAGNOSIS — R07.0 THROAT PAIN: Primary | ICD-10-CM

## 2017-11-29 LAB
DEPRECATED S PYO AG THROAT QL EIA: NORMAL
SPECIMEN SOURCE: NORMAL

## 2017-11-29 PROCEDURE — 87880 STREP A ASSAY W/OPTIC: CPT | Performed by: FAMILY MEDICINE

## 2017-11-29 PROCEDURE — 99213 OFFICE O/P EST LOW 20 MIN: CPT | Performed by: FAMILY MEDICINE

## 2017-11-29 PROCEDURE — 87081 CULTURE SCREEN ONLY: CPT | Performed by: FAMILY MEDICINE

## 2017-11-29 RX ORDER — CODEINE PHOSPHATE AND GUAIFENESIN 10; 100 MG/5ML; MG/5ML
1 SOLUTION ORAL EVERY 4 HOURS PRN
Qty: 120 ML | Refills: 0 | Status: SHIPPED | OUTPATIENT
Start: 2017-11-29 | End: 2017-12-29

## 2017-11-29 RX ORDER — ALBUTEROL SULFATE 90 UG/1
2 AEROSOL, METERED RESPIRATORY (INHALATION) EVERY 6 HOURS PRN
Qty: 1 INHALER | Refills: 0 | Status: SHIPPED | OUTPATIENT
Start: 2017-11-29 | End: 2018-07-25

## 2017-11-29 NOTE — PROGRESS NOTES
"  SUBJECTIVE:   Jennifer Voss is a 32 year old female who presents to clinic today for the following health issues:    ENT Symptoms             Symptoms: cc Present Absent Comment   Fever/Chills   x    Fatigue  x     Muscle Aches   x    Eye Irritation  x     Sneezing  x     Nasal Fernando/Drg  x     Sinus Pressure/Pain  x     Loss of smell   x    Dental pain   x    Sore Throat x x     Swollen Glands  x     Ear Pain/Fullness  x     Cough  x     Wheeze  x     Chest Pain   x    Shortness of breath  x     Rash   x    Other   x      Symptom duration:  6 days   Symptom severity:  moderate.   Treatments tried:  nyquil, salt water gargle, robitussin w/ codeine   Contacts:  none               Problem list and histories reviewed & adjusted, as indicated.  Additional history: as documented        Reviewed and updated as needed this visit by clinical staffTobacco  Allergies  Meds  Med Hx  Surg Hx  Fam Hx  Soc Hx      Reviewed and updated as needed this visit by Provider         1. Throat pain    2. Acute bronchitis, unspecified organism    3. Tobacco abuse        PMH: Updated and/or reviewed in chart.    PSH: Updated and/or reviewed in chart.    Family History: Updated and/or reviewed in chart.     ROS:  Constitutional, HEENT, cardiovascular, pulmonary, gi and gu systems are otherwise negative.    OBJECTIVE:                                                    /76  Pulse 72  Temp 97.5  F (36.4  C) (Tympanic)  Ht 5' 4.5\" (1.638 m)  Wt 181 lb 9.6 oz (82.4 kg)  LMP 11/13/2017  SpO2 97%  Breastfeeding? No  BMI 30.69 kg/m2  GENERAL: Pleasant and interactive.  Alert and oriented x 3.  No acute distress.  HEENT: Normocephalic, atraumatic. PEERRLA, EOMI.  Scleras, lids and conjunctivae normal. Pinnas, canals and TM's clear.  Nose and oropharynx moist and clear.  CHEST:  clear, no wheezing or rales. Normal symmetric air entry throughout both lung fields. No chest wall deformities or tenderness.  HEART:  S1 and S2 normal, " no murmurs, clicks, gallops or rubs. Regular rate and rhythm.  SKIN:  Only benign skin findings. No unusual rashes or suspicious skin lesions noted. Nails appear normal.    Results for orders placed or performed in visit on 11/29/17   Strep, Rapid Screen   Result Value Ref Range    Specimen Description Throat     Rapid Strep A Screen       NEGATIVE: No Group A streptococcal antigen detected by immunoassay, await culture report.      ASSESSMENT/PLAN:                                                        ICD-10-CM    1. Throat pain R07.0 Strep, Rapid Screen     Beta strep group A culture   2. Acute bronchitis, unspecified organism J20.9 albuterol (PROAIR HFA/PROVENTIL HFA/VENTOLIN HFA) 108 (90 BASE) MCG/ACT Inhaler     guaiFENesin-codeine (ROBITUSSIN AC) 100-10 MG/5ML SOLN solution   3. Tobacco abuse Z72.0          Patient Instructions   Think hard about quitting smoking, Is now the time?  :-)    Bronchitis is an inflammation in the air passages in your lungs.  It is similar to the processes that occur in people with asthma.  Bronchitis is usually caused by viruses and, sometimes certain bacteria.  Antibiotics don't help the vast majority of people who have bronchitis recover any quicker even when it is caused by a bacteria.      For cough, dextromethorphan/guaifenesin combinations help loosen secretions and suppress cough safely without significant risk of sedation. Often an albuterol inhaler (2 puffs four times daily) will help with bronchitis. This is a prescription medicine.  If the cough is severe and interfering with sleep or function, prescription cough suppressants can be prescribed but these are often sedating.    The body needs to be treated well in order to help heal itself.  Rest as needed.  It is ok to reduce food intake if appetite is poor but it is quite important to maintain/increase fluid intake.    For nasal congestion and sinus pressure, pseudoephedrine (Sudafed) is often helpful but it can cause  elevations in blood pressure and insomnia.  Short courses of a nasal decongestant spray (Afrin or Neosinephrine) can be appropriate but their use should be restricted to 3 days due to the high risk of nasal addiction.    For pain and fevers, acetaminophen (Tylenol) is most appropriate.  Ibuprofen (Advil) or naproxen (Aleve) are useful too and last longer but they can cause elevation of blood pressure or stomach problems.    Antihistamines (Benadryl, Dimetapp, etc.) cause sedation, confusion, bowel and urinary abnormalities and are of little use for infectious causes of cough and nasal congestion.  Their use should be reserved for allergic symptoms.         Orders Placed This Encounter     albuterol (PROAIR HFA/PROVENTIL HFA/VENTOLIN HFA) 108 (90 BASE) MCG/ACT Inhaler     guaiFENesin-codeine (ROBITUSSIN AC) 100-10 MG/5ML SOLN solution      BP Readings from Last 1 Encounters:   11/29/17 120/76      Wt Readings from Last 1 Encounters:   11/29/17 181 lb 9.6 oz (82.4 kg)           See Patient Instructions    Brian Waters MD

## 2017-11-29 NOTE — NURSING NOTE
"Initial /76  Pulse 72  Temp 97.5  F (36.4  C) (Tympanic)  Ht 5' 4.5\" (1.638 m)  Wt 181 lb 9.6 oz (82.4 kg)  LMP 11/13/2017  SpO2 97%  Breastfeeding? No  BMI 30.69 kg/m2 Estimated body mass index is 30.69 kg/(m^2) as calculated from the following:    Height as of this encounter: 5' 4.5\" (1.638 m).    Weight as of this encounter: 181 lb 9.6 oz (82.4 kg). .      "

## 2017-11-29 NOTE — PATIENT INSTRUCTIONS
Think hard about quitting smoking, Is now the time?  :-)    Bronchitis is an inflammation in the air passages in your lungs.  It is similar to the processes that occur in people with asthma.  Bronchitis is usually caused by viruses and, sometimes certain bacteria.  Antibiotics don't help the vast majority of people who have bronchitis recover any quicker even when it is caused by a bacteria.      For cough, dextromethorphan/guaifenesin combinations help loosen secretions and suppress cough safely without significant risk of sedation. Often an albuterol inhaler (2 puffs four times daily) will help with bronchitis. This is a prescription medicine.  If the cough is severe and interfering with sleep or function, prescription cough suppressants can be prescribed but these are often sedating.    The body needs to be treated well in order to help heal itself.  Rest as needed.  It is ok to reduce food intake if appetite is poor but it is quite important to maintain/increase fluid intake.    For nasal congestion and sinus pressure, pseudoephedrine (Sudafed) is often helpful but it can cause elevations in blood pressure and insomnia.  Short courses of a nasal decongestant spray (Afrin or Neosinephrine) can be appropriate but their use should be restricted to 3 days due to the high risk of nasal addiction.    For pain and fevers, acetaminophen (Tylenol) is most appropriate.  Ibuprofen (Advil) or naproxen (Aleve) are useful too and last longer but they can cause elevation of blood pressure or stomach problems.    Antihistamines (Benadryl, Dimetapp, etc.) cause sedation, confusion, bowel and urinary abnormalities and are of little use for infectious causes of cough and nasal congestion.  Their use should be reserved for allergic symptoms.

## 2017-11-29 NOTE — MR AVS SNAPSHOT
After Visit Summary   11/29/2017    Jennifer Voss    MRN: [id number]           Patient Information     Date Of Birth          1985        Visit Information        Provider Department      11/29/2017 10:30 AM Brian Waters MD [facility]        Today's Diagnoses     Throat pain    -  1    Acute bronchitis, unspecified organism        Tobacco abuse          Care Instructions    Think hard about quitting smoking, Is now the time?  :-)    Bronchitis is an inflammation in the air passages in your lungs.  It is similar to the processes that occur in people with asthma.  Bronchitis is usually caused by viruses and, sometimes certain bacteria.  Antibiotics don't help the vast majority of people who have bronchitis recover any quicker even when it is caused by a bacteria.      For cough, dextromethorphan/guaifenesin combinations help loosen secretions and suppress cough safely without significant risk of sedation. Often an albuterol inhaler (2 puffs four times daily) will help with bronchitis. This is a prescription medicine.  If the cough is severe and interfering with sleep or function, prescription cough suppressants can be prescribed but these are often sedating.    The body needs to be treated well in order to help heal itself.  Rest as needed.  It is ok to reduce food intake if appetite is poor but it is quite important to maintain/increase fluid intake.    For nasal congestion and sinus pressure, pseudoephedrine (Sudafed) is often helpful but it can cause elevations in blood pressure and insomnia.  Short courses of a nasal decongestant spray (Afrin or Neosinephrine) can be appropriate but their use should be restricted to 3 days due to the high risk of nasal addiction.    For pain and fevers, acetaminophen (Tylenol) is most appropriate.  Ibuprofen (Advil) or naproxen (Aleve) are useful too and last longer but they can cause elevation of blood pressure or stomach  "problems.    Antihistamines (Benadryl, Dimetapp, etc.) cause sedation, confusion, bowel and urinary abnormalities and are of little use for infectious causes of cough and nasal congestion.  Their use should be reserved for allergic symptoms.              Follow-ups after your visit        Follow-up notes from your care team     Return if symptoms worsen or fail to improve.      Who to contact     Normal or non-critical lab and imaging results will be communicated to you by Webliohart, letter or phone within 4 business days after the clinic has received the results. If you do not hear from us within 7 days, please contact the clinic through HouseFixt or phone. If you have a critical or abnormal lab result, we will notify you by phone as soon as possible.  Submit refill requests through Pepperdata or call your pharmacy and they will forward the refill request to us. Please allow 3 business days for your refill to be completed.          If you need to speak with a  for additional information , please call: [phone]           Additional Information About Your Visit        Pepperdata Information     Pepperdata gives you secure access to your electronic health record. If you see a primary care provider, you can also send messages to your care team and make appointments. If you have questions, please call your primary care clinic.  If you do not have a primary care provider, please call [phone] and they will assist you.        Care EveryWhere ID     This is your Care EveryWhere ID. This could be used by other organizations to access your [facility] medical records  [id number]        Your Vitals Were     Pulse Temperature Height Last Period Pulse Oximetry Breastfeeding?    72 97.5  F (36.4  C) (Tympanic) 5' 4.5\" (1.638 m) 11/13/2017 97% No    BMI (Body Mass Index)                   30.69 kg/m2            Blood Pressure from Last 3 Encounters:   11/29/17 120/76   09/22/17 127/85   08/01/17 116/85    Weight from " Last 3 Encounters:   11/29/17 181 lb 9.6 oz (82.4 kg)   09/22/17 187 lb 3.2 oz (84.9 kg)   08/01/17 196 lb 3.2 oz (89 kg)              We Performed the Following     Beta strep group A culture     Strep, Rapid Screen          Today's Medication Changes          These changes are accurate as of: 11/29/17 10:52 AM.  If you have any questions, ask your nurse or doctor.               These medicines have changed or have updated prescriptions.        Dose/Directions    * guaiFENesin-codeine 100-10 MG/5ML Soln solution   Commonly known as:  ROBITUSSIN AC   This may have changed:  Another medication with the same name was added. Make sure you understand how and when to take each.   Used for:  Acute bronchitis, unspecified organism   Changed by:  Maria Chaves PA-C        Dose:  1-2 tsp.   Take 5-10 mLs by mouth every 4 hours as needed for cough   Quantity:  473 mL   Refills:  0       * guaiFENesin-codeine 100-10 MG/5ML Soln solution   Commonly known as:  ROBITUSSIN AC   This may have changed:  You were already taking a medication with the same name, and this prescription was added. Make sure you understand how and when to take each.   Used for:  Acute bronchitis, unspecified organism   Changed by:  Brian Waters MD        Dose:  1 tsp.   Take 5 mLs by mouth every 4 hours as needed for cough   Quantity:  120 mL   Refills:  0       * Notice:  This list has 2 medication(s) that are the same as other medications prescribed for you. Read the directions carefully, and ask your doctor or other care provider to review them with you.         Where to get your medicines      These medications were sent to [facility] [address] [place] [address] [address] [facility]  [address] [facility] [address]     Phone:  [phone]     albuterol 108 (90 BASE) MCG/ACT Inhaler         Some of these will need a paper prescription and others can be bought over the counter.  Ask your nurse if you have questions.     Bring a paper  prescription for each of these medications     guaiFENesin-codeine 100-10 MG/5ML Soln solution                Primary Care Provider Office Phone # Fax #    Fanny Camacho [phone] [address] [facility] [address]        Equal Access to Services     HAL ALVARADO : Hadii aad ku hadasho Soomaali, waaxda luqadaha, qaybta kaalmada adeegyada, waxay crispinin hayaan milka dexterhalinicholas allen. So [facility] [phone].    ATENCIÓN: Si habla español, tiene a gutiérrez disposición servicios gratuitos de asistencia lingüística. [facility] [phone].    We comply with applicable federal civil rights laws and [place] laws. We do not discriminate on the basis of race, color, national origin, age, disability, sex, sexual orientation, or gender identity.            Thank you!     Thank you for choosing [facility]  for your care. Our goal is always to provide you with excellent care. Hearing back from our patients is one way we can continue to improve our services. Please take a few minutes to complete the written survey that you may receive in the mail after your visit with us. Thank you!             Your Updated Medication List - Protect others around you: Learn how to safely use, store and throw away your medicines at www.disposemymeds.org.          This list is accurate as of: 11/29/17 10:52 AM.  Always use your most recent med list.                   Brand Name Dispense Instructions for use Diagnosis    albuterol 108 (90 BASE) MCG/ACT Inhaler    PROAIR HFA/PROVENTIL HFA/VENTOLIN HFA    1 Inhaler    Inhale 2 puffs into the lungs every 6 hours as needed for shortness of breath / dyspnea or wheezing    Acute bronchitis, unspecified organism       cyclobenzaprine 10 MG tablet    FLEXERIL    30 tablet    Take 1 tablet (10 mg) by mouth 3 times daily as needed for muscle spasms    Tension-type headache, not intractable, unspecified chronicity pattern       * guaiFENesin-codeine 100-10 MG/5ML Soln  solution    ROBITUSSIN AC    473 mL    Take 5-10 mLs by mouth every 4 hours as needed for cough    Acute bronchitis, unspecified organism       * guaiFENesin-codeine 100-10 MG/5ML Soln solution    ROBITUSSIN AC    120 mL    Take 5 mLs by mouth every 4 hours as needed for cough    Acute bronchitis, unspecified organism       * Notice:  This list has 2 medication(s) that are the same as other medications prescribed for you. Read the directions carefully, and ask your doctor or other care provider to review them with you.

## 2017-11-30 LAB
BACTERIA SPEC CULT: NORMAL
SPECIMEN SOURCE: NORMAL

## 2017-12-27 ENCOUNTER — APPOINTMENT (OUTPATIENT)
Dept: ULTRASOUND IMAGING | Facility: CLINIC | Age: 32
End: 2017-12-27
Attending: EMERGENCY MEDICINE
Payer: COMMERCIAL

## 2017-12-27 ENCOUNTER — TELEPHONE (OUTPATIENT)
Dept: OBGYN | Facility: CLINIC | Age: 32
End: 2017-12-27

## 2017-12-27 ENCOUNTER — HOSPITAL ENCOUNTER (EMERGENCY)
Facility: CLINIC | Age: 32
Discharge: HOME | End: 2017-12-27
Attending: FAMILY MEDICINE | Admitting: FAMILY MEDICINE
Payer: COMMERCIAL

## 2017-12-27 VITALS
HEIGHT: 64 IN | OXYGEN SATURATION: 98 % | RESPIRATION RATE: 16 BRPM | TEMPERATURE: 98.3 F | DIASTOLIC BLOOD PRESSURE: 87 MMHG | SYSTOLIC BLOOD PRESSURE: 149 MMHG | HEART RATE: 99 BPM | WEIGHT: 176 LBS | BODY MASS INDEX: 30.05 KG/M2

## 2017-12-27 DIAGNOSIS — O46.90 VAGINAL BLEEDING IN PREGNANCY: ICD-10-CM

## 2017-12-27 LAB
ALBUMIN UR-MCNC: NEGATIVE MG/DL
APPEARANCE UR: CLEAR
B-HCG SERPL-ACNC: 1209 IU/L (ref 0–5)
BASOPHILS # BLD AUTO: 0 10E9/L (ref 0–0.2)
BASOPHILS NFR BLD AUTO: 0.2 %
BILIRUB UR QL STRIP: NEGATIVE
COLOR UR AUTO: YELLOW
DIFFERENTIAL METHOD BLD: ABNORMAL
EOSINOPHIL # BLD AUTO: 0.2 10E9/L (ref 0–0.7)
EOSINOPHIL NFR BLD AUTO: 1.2 %
ERYTHROCYTE [DISTWIDTH] IN BLOOD BY AUTOMATED COUNT: 13.4 % (ref 10–15)
GLUCOSE UR STRIP-MCNC: NEGATIVE MG/DL
HCT VFR BLD AUTO: 43.5 % (ref 35–47)
HGB BLD-MCNC: 14.4 G/DL (ref 11.7–15.7)
HGB UR QL STRIP: ABNORMAL
IMM GRANULOCYTES # BLD: 0.1 10E9/L (ref 0–0.4)
IMM GRANULOCYTES NFR BLD: 0.6 %
KETONES UR STRIP-MCNC: NEGATIVE MG/DL
LEUKOCYTE ESTERASE UR QL STRIP: NEGATIVE
LYMPHOCYTES # BLD AUTO: 2.4 10E9/L (ref 0.8–5.3)
LYMPHOCYTES NFR BLD AUTO: 16.5 %
MCH RBC QN AUTO: 28 PG (ref 26.5–33)
MCHC RBC AUTO-ENTMCNC: 33.1 G/DL (ref 31.5–36.5)
MCV RBC AUTO: 85 FL (ref 78–100)
MONOCYTES # BLD AUTO: 1.2 10E9/L (ref 0–1.3)
MONOCYTES NFR BLD AUTO: 8 %
MUCOUS THREADS #/AREA URNS LPF: PRESENT /LPF
NEUTROPHILS # BLD AUTO: 10.8 10E9/L (ref 1.6–8.3)
NEUTROPHILS NFR BLD AUTO: 73.5 %
NITRATE UR QL: NEGATIVE
PH UR STRIP: 5.5 PH (ref 5–7)
PLATELET # BLD AUTO: 404 10E9/L (ref 150–450)
RBC # BLD AUTO: 5.15 10E12/L (ref 3.8–5.2)
RBC #/AREA URNS AUTO: 0 /HPF (ref 0–2)
SOURCE: ABNORMAL
SP GR UR STRIP: 1.01 (ref 1–1.03)
SQUAMOUS #/AREA URNS AUTO: <1 /HPF (ref 0–1)
UROBILINOGEN UR STRIP-MCNC: NORMAL MG/DL (ref 0–2)
WBC # BLD AUTO: 14.8 10E9/L (ref 4–11)
WBC #/AREA URNS AUTO: <1 /HPF (ref 0–2)

## 2017-12-27 PROCEDURE — 85025 COMPLETE CBC W/AUTO DIFF WBC: CPT | Performed by: EMERGENCY MEDICINE

## 2017-12-27 PROCEDURE — 84702 CHORIONIC GONADOTROPIN TEST: CPT | Performed by: EMERGENCY MEDICINE

## 2017-12-27 PROCEDURE — 99284 EMERGENCY DEPT VISIT MOD MDM: CPT | Mod: 25 | Performed by: FAMILY MEDICINE

## 2017-12-27 PROCEDURE — 99284 EMERGENCY DEPT VISIT MOD MDM: CPT | Mod: Z6 | Performed by: FAMILY MEDICINE

## 2017-12-27 PROCEDURE — 76801 OB US < 14 WKS SINGLE FETUS: CPT

## 2017-12-27 PROCEDURE — 81001 URINALYSIS AUTO W/SCOPE: CPT | Performed by: FAMILY MEDICINE

## 2017-12-27 NOTE — ED AVS SNAPSHOT
[facility] Emergency Department    [address] [facility] [address]    Phone:  [phone]    Fax:  [phone]                                       Jennifer Voss   MRN: [id number]    Department:  [facility] Emergency Department   Date of Visit:  12/27/2017           Patient Information     Date Of Birth          1985        Your diagnoses for this visit were:     Vaginal bleeding in pregnancy        You were seen by Abdiel Smith MD.        Discharge Instructions       Return to the Emergency Room if the following occurs:     Worsened pain, fever >101, or for any concern at anytime.    Or, follow-up with the following provider as we discussed:     Return to [facility] for a repeat blood test (quantitative hCG) on Friday.  Follow up in the clinic as scheduled afterward.    Medications discussed:    None new.  No changes.    If you received pain-relieving or sedating medication during your time in the ER, avoid alcohol, driving automobiles, or working with machinery.  Also, a responsible adult must stay with you.        Call the Nurse Advice Line at [phone] or [phone] for any concern at anytime.      Future Appointments        Provider Department Dept Phone Center    12/29/2017 8:40 AM Es Jean DO [facility] [phone] [facility]    1/15/2018 6:00 PM OB NURSE EDUCATION - [facility] [phone] [facility]    1/16/2018 2:00 PM Fanny Camacho MD [facility] [phone] [facility]      24 Hour Appointment Hotline       To make an appointment at any [facility], call [phone] ([phone]). If you don't have a family doctor or clinic, we will help you find one. [facility] are conveniently located to serve the needs of you and your family.          ED Discharge Orders     HCG quantitative pregnancy                    Review of your medicines      Our records show that you are taking the medicines  listed below. If these are incorrect, please call your family doctor or clinic.        Dose / Directions Last dose taken    albuterol 108 (90 BASE) MCG/ACT Inhaler   Commonly known as:  PROAIR HFA/PROVENTIL HFA/VENTOLIN HFA   Dose:  2 puff   Quantity:  1 Inhaler        Inhale 2 puffs into the lungs every 6 hours as needed for shortness of breath / dyspnea or wheezing   Refills:  0        cyclobenzaprine 10 MG tablet   Commonly known as:  FLEXERIL   Dose:  10 mg   Quantity:  30 tablet        Take 1 tablet (10 mg) by mouth 3 times daily as needed for muscle spasms   Refills:  0        * guaiFENesin-codeine 100-10 MG/5ML Soln solution   Commonly known as:  ROBITUSSIN AC   Dose:  1-2 tsp.   Quantity:  473 mL        Take 5-10 mLs by mouth every 4 hours as needed for cough   Refills:  0        * guaiFENesin-codeine 100-10 MG/5ML Soln solution   Commonly known as:  ROBITUSSIN AC   Dose:  1 tsp.   Quantity:  120 mL        Take 5 mLs by mouth every 4 hours as needed for cough   Refills:  0        * Notice:  This list has 2 medication(s) that are the same as other medications prescribed for you. Read the directions carefully, and ask your doctor or other care provider to review them with you.            Procedures and tests performed during your visit     CBC with platelets differential    HCG quantitative pregnancy    UA with Microscopic reflex to Culture    US OB < 14 Weeks w Transvaginal      Orders Needing Specimen Collection     None      Pending Results     Date and Time Order Name Status Description    12/27/2017 1647 US OB < 14 Weeks w Transvaginal Preliminary             Pending Culture Results     No orders found from 12/25/2017 to 12/28/2017.            Pending Results Instructions     If you had any lab results that were not finalized at the time of your Discharge, you can call the ED Lab Result RN at [phone]. You will be contacted by this team for any positive Lab results or changes in treatment. The nurses  are available 7 days a week from 10A to 6:30P.  You can leave a message 24 hours per day and they will return your call.        Test Results From Your Hospital Stay        12/27/2017  4:47 PM      Component Results     Component Value Ref Range & Units Status    WBC 14.8 (H) 4.0 - 11.0 10e9/L Final    RBC Count 5.15 3.8 - 5.2 10e12/L Final    Hemoglobin 14.4 11.7 - 15.7 g/dL Final    Hematocrit 43.5 35.0 - 47.0 % Final    MCV 85 78 - 100 fl Final    MCH 28.0 26.5 - 33.0 pg Final    MCHC 33.1 31.5 - 36.5 g/dL Final    RDW 13.4 10.0 - 15.0 % Final    Platelet Count 404 150 - 450 10e9/L Final    Diff Method Automated Method  Final    % Neutrophils 73.5 % Final    % Lymphocytes 16.5 % Final    % Monocytes 8.0 % Final    % Eosinophils 1.2 % Final    % Basophils 0.2 % Final    % Immature Granulocytes 0.6 % Final    Absolute Neutrophil 10.8 (H) 1.6 - 8.3 10e9/L Final    Absolute Lymphocytes 2.4 0.8 - 5.3 10e9/L Final    Absolute Monocytes 1.2 0.0 - 1.3 10e9/L Final    Absolute Eosinophils 0.2 0.0 - 0.7 10e9/L Final    Absolute Basophils 0.0 0.0 - 0.2 10e9/L Final    Abs Immature Granulocytes 0.1 0 - 0.4 10e9/L Final         12/27/2017  6:08 PM      Component Results     Component Value Ref Range & Units Status    HCG Quantitative Serum 1209 (H) 0 - 5 IU/L Final               12/27/2017  6:23 PM      Component Results     Component Value Ref Range & Units Status    Color Urine Yellow  Final    Appearance Urine Clear  Final    Glucose Urine Negative NEG^Negative mg/dL Final    Bilirubin Urine Negative NEG^Negative Final    Ketones Urine Negative NEG^Negative mg/dL Final    Specific Gravity Urine 1.012 1.003 - 1.035 Final    Blood Urine Moderate (A) NEG^Negative Final    pH Urine 5.5 5.0 - 7.0 pH Final    Protein Albumin Urine Negative NEG^Negative mg/dL Final    Urobilinogen mg/dL Normal 0.0 - 2.0 mg/dL Final    Nitrite Urine Negative NEG^Negative Final    Leukocyte Esterase Urine Negative NEG^Negative Final    Source  Midstream Urine  Final    WBC Urine <1 0 - 2 /HPF Final    RBC Urine 0 0 - 2 /HPF Final    Squamous Epithelial /HPF Urine <1 0 - 1 /HPF Final    Mucous Urine Present (A) NEG^Negative /LPF Final         2017  5:41 PM      Narrative     US OBSTETRIC <14 WEEKS WITH TRANSVAGINAL SINGLE 2017 5:36 PM     HISTORY: Bleeding early pregnancy, history of ectopic and multiple  miscarriages.     TECHNIQUE: Transabdominal and transvaginal imaging were performed.  Transvaginal imaging was performed to better evaluate the uterus and  adnexa.    COMPARISON:  None.    FINDINGS: No gestational sac is visible within the uterus. Endometrial  thickness measured 0.8 cm. Posterior wall 2.3 cm and left fundal 2.4  cm probable fibroids are noted. Within the left ovary, there was a 1.8  cm corpus luteal cyst. There was no other evidence of adnexal mass. No  free pelvic fluid was demonstrated.        Impression     IMPRESSION: No sonographically visible intrauterine gestation.  Possibilities include missed spontaneous  or ectopic  gestation. Correlation with serial quantitative beta-hCG is  recommended.                Thank you for choosing [facility]       Thank you for choosing [facility] for your care. Our goal is always to provide you with excellent care. Hearing back from our patients is one way we can continue to improve our services. Please take a few minutes to complete the written survey that you may receive in the mail after you visit with us. Thank you!        Oxynadehart Information     Home Delivery Service (HDS) gives you secure access to your electronic health record. If you see a primary care provider, you can also send messages to your care team and make appointments. If you have questions, please call your primary care clinic.  If you do not have a primary care provider, please call [phone] and they will assist you.        Care EveryWhere ID     This is your Care EveryWhere ID. This could be used by other organizations to access  your [facility] medical records  [id number]        Equal Access to Services     HAL ALVARADO : Rah Sanderson, michael morales, jeniffer mix. So [facility] [phone].    ATENCIÓN: Si habla español, tiene a gutiérrez disposición servicios gratuitos de asistencia lingüística. Llame al [phone].    We comply with applicable federal civil rights laws and [place] laws. We do not discriminate on the basis of race, color, national origin, age, disability, sex, sexual orientation, or gender identity.            After Visit Summary       This is your record. Keep this with you and show to your community pharmacist(s) and doctor(s) at your next visit.

## 2017-12-27 NOTE — ED AVS SNAPSHOT
[facility] Emergency Department    [address] [facility] [address]    Phone:  [phone]    Fax:  [phone]                                       Jennifer Voss   MRN: [id number]    Department:  [facility] Emergency Department   Date of Visit:  12/27/2017           After Visit Summary Signature Page     I have received my discharge instructions, and my questions have been answered. I have discussed any challenges I see with this plan with the nurse or doctor.    ..........................................................................................................................................  Patient/Patient Representative Signature      ..........................................................................................................................................  Patient Representative Print Name and Relationship to Patient    ..................................................               ................................................  Date                                            Time    ..........................................................................................................................................  Reviewed by Signature/Title    ...................................................              ..............................................  Date                                                            Time

## 2017-12-27 NOTE — ED PROVIDER NOTES
HPI  Patient is a 32-year-old female presenting with vaginal spotting and positive pregnancy test at home.  She is known to be  with 2 abortions and 1 ectopic tubal pregnancy on the right side.  She has HPV high risk.  She reports her last menstrual period starting on .  She smokes.  No drugs.  No alcohol.    The patient has had some vaginal spotting that comes and goes ever since her last menstruation.  She describes her last menstruation in November as light compared to her usual baseline.  She does typically have regular menstrual periods.  She denies any abdominal or pelvic pain.  There is been no lightheadedness or fainting.  She denies passage of clot or tissue.  There is been no dysuria, urgency, or frequency.  No hematuria.  No hematochezia or melena.  No diarrhea or constipation.  No fever.  No lightheadedness or fainting.  No chest pain.  No shortness of breath.    ROS: All other review of systems are negative other than that noted above.     Past Medical History:   Diagnosis Date     Abnormal Pap smear of cervix 2003     Cervical high risk HPV (human papillomavirus) test positive 7/24/15    type 16     Chickenpox      Ectopic pregnancy     right     H/O chronic ear infection as a child     H/O colposcopy with cervical biopsy 16    MARIANO 2, ECC - benign     Tonsillitis      Past Surgical History:   Procedure Laterality Date     COLONOSCOPY N/A 2017    Procedure: COMBINED COLONOSCOPY, SINGLE OR MULTIPLE BIOPSY/POLYPECTOMY BY BIOPSY;  Colonoscopy;  Surgeon: Ramakrishna Epstein MD;  Location: [facility]     LAPAROSCOPIC EVACUATION ECTOPIC PREGNANCY N/A 2015    LSC right salpingectomy      LEEP TX, CERVICAL  3/2016    benign     MOUTH SURGERY      wisdom teeth     PE TUBES       TONSILLECTOMY       Family History   Problem Relation Age of Onset     DIABETES Father      Hypertension Father      DIABETES Paternal Grandmother      Coronary Artery Disease Paternal Grandmother      MI     Aneurysm  "Paternal Grandmother      brain     Substance Abuse Brother      recovered drugs     Depression Brother      Anxiety Disorder Brother      Heart Failure Paternal Grandfather      CHF     Depression Mother      Social History   Substance Use Topics     Smoking status: Current Every Day Smoker     Packs/day: 0.10     Years: 10.00     Types: Cigarettes     Smokeless tobacco: Never Used      Comment: smoking 10cig/day - down to 1-2 cig/day with pregnancy     Alcohol use 0.0 oz/week     0 Standard drinks or equivalent per week      Comment: No alcohol since may.         PHYSICAL  /87  Pulse 99  Temp 98.3  F (36.8  C) (Oral)  Resp 16  Ht 1.626 m (5' 4\")  Wt 79.8 kg (176 lb)  LMP 11/13/2017  SpO2 98%  BMI 30.21 kg/m2  General: Patient is alert and in no distress.  Somewhat anxious.  Neurological: Alert.  Moving upper and lower extremities equally, bilaterally.  Head / Neck: Atraumatic.  Ears: Not done.  Eyes: Pupils are equal, round, and reactive.  Normal conjunctiva.  Nose: Midline.  No epistaxis.  Mouth / Throat: No ulcerations or lesions.  Upper pharynx is not erythematous.  Moist.  Respiratory: No respiratory distress. CTA B.  Cardiovascular: Regular rhythm.  Peripheral extremities are warm.  No edema.  No calf tenderness.  Abdomen / Pelvis: Not tender.  No distention.  Soft throughout.  Genitalia: Not done.  Musculoskeletal: No tenderness over major muscles and joints.  Skin: No evidence of rash or trauma.        PHYSICIAN  1734.  Patient has a positive urine pregnancy test at home with vaginal spotting since November 16.  No abdominal pain or cramping.  No abdominal tenderness.  Ultrasound pending.  UPT, quantitative hCG pending here.  She is known to be a positive Rh type.    Labs Ordered and Resulted from Time of ED Arrival Up to the Time of Departure from the ED   CBC WITH PLATELETS DIFFERENTIAL - Abnormal; Notable for the following:        Result Value    WBC 14.8 (*)     Absolute Neutrophil 10.8 " (*)     All other components within normal limits   HCG QUANTITATIVE PREGNANCY - Abnormal; Notable for the following:     HCG Quantitative Serum 1209 (*)     All other components within normal limits   ROUTINE UA WITH MICROSCOPIC REFLEX TO CULTURE - Abnormal; Notable for the following:     Blood Urine Moderate (*)     Mucous Urine Present (*)     All other components within normal limits     IMAGING  Images reviewed by me.  Radiology report also reviewed.  US OB < 14 Weeks w Transvaginal   Preliminary Result   IMPRESSION: No sonographically visible intrauterine gestation.   Possibilities include missed spontaneous  or ectopic   gestation. Correlation with serial quantitative beta-hCG is   recommended.        190.  Patient has no new symptoms.  Her quantitative hCG is elevated.  Her pelvic ultrasound shows no evidence for intrauterine or ectopic pregnancy.  I reviewed this information with the patient.  She understands that she may be pregnant with a normal intrauterine pregnancy.  She may have an ectopic pregnancy.  She may have had a miscarriage.  She will have a repeat quantitative hCG on Friday.  She will follow-up in the clinic as scheduled here in the ED.  The patient agrees with this plan.      IMPRESSION    ICD-10-CM    1. Vaginal bleeding in pregnancy O46.90 HCG quantitative pregnancy           Critical Care time:  none                    Abdiel Smith MD  17 1903

## 2017-12-27 NOTE — TELEPHONE ENCOUNTER
"Return call to pt.  Spoke with pt on the phone.    S-(situation): +UPT. LMP 11/17/17. 5W4D. Patient reports bleeding on 12/16/17 that lasted a few days, patient did not feel it was her period as it \" was very different than my normal period.\" Patient reports the bleeding was lighter and the smell was different. Patient reports + breast tenderness and nausea. Patient has a hx of ectopic pregnancy about 2 years ago. Patient denies any abdominal pain or discomfort today. Patient reports some spotting on and off throughout the past few days.     B-(background): ectopic pregnancy 7/2015    A-(assessment): threatened AB    R-(recommendations): Early OB appointment vs trending quant. Patient desires answers today, planning to go to ER. Patient reassured that it is good she is not having abdominal pain. Spotting in early pregnancy can happen and pregnancy can continue on. Encouraged patient to monitor. Reviewed symptoms of ectopic and when to be see in the ER.    Pt in agreement and reports understanding.    Mindi Peck   [facility]  RN    "

## 2017-12-27 NOTE — TELEPHONE ENCOUNTER
Pt called stating that she is pregnant and her last period was 11/17/17. Pt states that she has an appointment with Dr. Camacho on 01/16/18, however, due to a previous ectopic pregnancy 2 years ago pt feels like she should be seen sooner and have an ultrasound. Please advise.    Sonal Santos  Clinic Station

## 2017-12-27 NOTE — ED NOTES
Pt found out she was pregnant today, pt was advised by OB to come to ED d/t hx of miscarriages and an ectopic pregnancy.  Pt last period was November 16th (approx).  Pt started spotting x1 week ago.  Denies abd cramping.  Pt denies fevers.  +nausea.

## 2017-12-28 NOTE — ED NOTES
Discussion with patient and mom regarding f/u care and repeating HCG in 48 hrs. Pt d/c instructions reviewed and received. There are no unanswered questions at the time of discharge. Pt escorted to lobby for discharge.

## 2017-12-28 NOTE — DISCHARGE INSTRUCTIONS
Return to the Emergency Room if the following occurs:     Worsened pain, fever >101, or for any concern at anytime.    Or, follow-up with the following provider as we discussed:     Return to [facility] for a repeat blood test (quantitative hCG) on Friday.  Follow up in the clinic as scheduled afterward.    Medications discussed:    None new.  No changes.    If you received pain-relieving or sedating medication during your time in the ER, avoid alcohol, driving automobiles, or working with machinery.  Also, a responsible adult must stay with you.        Call the Nurse Advice Line at [phone] or [phone] for any concern at anytime.

## 2017-12-29 ENCOUNTER — OFFICE VISIT (OUTPATIENT)
Dept: FAMILY MEDICINE | Facility: CLINIC | Age: 32
End: 2017-12-29
Payer: COMMERCIAL

## 2017-12-29 VITALS
DIASTOLIC BLOOD PRESSURE: 86 MMHG | WEIGHT: 176.9 LBS | BODY MASS INDEX: 30.36 KG/M2 | SYSTOLIC BLOOD PRESSURE: 132 MMHG | HEART RATE: 85 BPM | TEMPERATURE: 98.8 F

## 2017-12-29 DIAGNOSIS — O46.90 VAGINAL BLEEDING IN PREGNANCY: Primary | ICD-10-CM

## 2017-12-29 DIAGNOSIS — O46.90 VAGINAL BLEEDING IN PREGNANCY: ICD-10-CM

## 2017-12-29 LAB — B-HCG SERPL-ACNC: 1460 IU/L (ref 0–5)

## 2017-12-29 PROCEDURE — 36415 COLL VENOUS BLD VENIPUNCTURE: CPT | Performed by: FAMILY MEDICINE

## 2017-12-29 PROCEDURE — 84702 CHORIONIC GONADOTROPIN TEST: CPT | Performed by: FAMILY MEDICINE

## 2017-12-29 PROCEDURE — 99213 OFFICE O/P EST LOW 20 MIN: CPT | Performed by: PHYSICIAN ASSISTANT

## 2017-12-29 NOTE — NURSING NOTE
"Chief Complaint   Patient presents with     ER F/U       Initial /86 (BP Location: Right arm, Patient Position: Sitting, Cuff Size: Adult Regular)  Pulse 85  Temp 98.8  F (37.1  C) (Tympanic)  Wt 176 lb 14.4 oz (80.2 kg)  LMP 11/13/2017  BMI 30.36 kg/m2 Estimated body mass index is 30.36 kg/(m^2) as calculated from the following:    Height as of 12/27/17: 5' 4\" (1.626 m).    Weight as of this encounter: 176 lb 14.4 oz (80.2 kg).  Medication Reconciliation: complete   Judi Hooker CMA  "

## 2017-12-29 NOTE — MR AVS SNAPSHOT
After Visit Summary   12/29/2017    Jennifer Voss    MRN: [id number]           Patient Information     Date Of Birth          1985        Visit Information        Provider Department      12/29/2017 1:20 PM Stacia Ferrer PA-C [facility]        Today's Diagnoses     Vaginal bleeding in pregnancy    -  1       Follow-ups after your visit        Your next 10 appointments already scheduled     Cali 15, 2018  6:00 PM CST   Nurse Only with OB NURSE EDUCATION - [facility] ([facility])    [address] [address]   [phone]            Jan 16, 2018  2:00 PM CST   New Prenatal with Fanny Camacho MD   [facility] ([facility])    [address] [address] [phone]              Who to contact     Normal or non-critical lab and imaging results will be communicated to you by [employer], letter or phone within 4 business days after the clinic has received the results. If you do not hear from us within 7 days, please contact the clinic through [employer] or phone. If you have a critical or abnormal lab result, we will notify you by phone as soon as possible.  Submit refill requests through [email or web address] or call your pharmacy and they will forward the refill request to us. Please allow 3 business days for your refill to be completed.          If you need to speak with a  for additional information , please call: [phone]             Additional Information About Your Visit        [email or web address] Information     [email or web address] gives you secure access to your electronic health record. If you see a primary care provider, you can also send messages to your care team and make appointments. If you have questions, please call your primary care clinic.  If you do not have a primary care provider, please call [phone] and they will assist you.        Care EveryWhere ID     This is your Care  EveryWhere ID. This could be used by other organizations to access your [facility] medical records  [id number]        Your Vitals Were     Pulse Temperature Last Period BMI (Body Mass Index)          85 98.8  F (37.1  C) (Tympanic) 11/13/2017 30.36 kg/m2         Blood Pressure from Last 3 Encounters:   01/05/18 131/79   12/29/17 132/86   12/27/17 149/87    Weight from Last 3 Encounters:   01/05/18 176 lb 6.4 oz (80 kg)   01/05/18 176 lb 6.4 oz (80 kg)   12/29/17 176 lb 14.4 oz (80.2 kg)               Primary Care Provider Office Phone # Fax #    Fanny Camacho [phone] [address] [facility] [address]        Equal Access to Services     HAL ALVARADO : Hadii aad ku hadashhay Soолег, waaxda luqadaha, qaybta kaalmada malinda, jeniffer mahan . So [facility] [phone].    ATENCIÓN: Si habla español, tiene a gutiérrez disposición servicios gratuitos de asistencia lingüística. Denishaame al [phone].    We comply with applicable federal civil rights laws and [place] laws. We do not discriminate on the basis of race, color, national origin, age, disability, sex, sexual orientation, or gender identity.            Thank you!     Thank you for choosing [facility]  for your care. Our goal is always to provide you with excellent care. Hearing back from our patients is one way we can continue to improve our services. Please take a few minutes to complete the written survey that you may receive in the mail after your visit with us. Thank you!             Your Updated Medication List - Protect others around you: Learn how to safely use, store and throw away your medicines at www.disposemymeds.org.          This list is accurate as of: 12/29/17 11:59 PM.  Always use your most recent med list.                   Brand Name Dispense Instructions for use Diagnosis    albuterol 108 (90 BASE) MCG/ACT Inhaler    PROAIR HFA/PROVENTIL HFA/VENTOLIN HFA    1 Inhaler    Inhale 2  puffs into the lungs every 6 hours as needed for shortness of breath / dyspnea or wheezing    Acute bronchitis, unspecified organism       cyclobenzaprine 10 MG tablet    FLEXERIL    30 tablet    Take 1 tablet (10 mg) by mouth 3 times daily as needed for muscle spasms    Tension-type headache, not intractable, unspecified chronicity pattern

## 2018-01-02 ENCOUNTER — TELEPHONE (OUTPATIENT)
Dept: FAMILY MEDICINE | Facility: CLINIC | Age: 33
End: 2018-01-02

## 2018-01-02 DIAGNOSIS — O46.90 VAGINAL BLEEDING IN PREGNANCY: ICD-10-CM

## 2018-01-02 DIAGNOSIS — N93.9 VAGINAL BLEEDING: Primary | ICD-10-CM

## 2018-01-02 LAB — B-HCG SERPL-ACNC: 1754 IU/L (ref 0–5)

## 2018-01-02 PROCEDURE — 36415 COLL VENOUS BLD VENIPUNCTURE: CPT | Performed by: PHYSICIAN ASSISTANT

## 2018-01-02 PROCEDURE — 84702 CHORIONIC GONADOTROPIN TEST: CPT | Performed by: PHYSICIAN ASSISTANT

## 2018-01-02 NOTE — TELEPHONE ENCOUNTER
At 5 weeks, her levels should be higher but it is possible that her dates are wrong.  It should also be rising faster than it is in an early pregnancy    I'd be inclined to check in with ob and see what they think.     You could also recheck ultrasound as the level is >1500 and you should be able to see a gestational sac at this hcg level in a viable pregnancy.     UZAIR Maier MD         Component      Latest Ref Rng & Units 8/1/2017 12/27/2017 12/29/2017 1/2/2018   HCG Qual Urine      NEG Negative      HCG Quantitative Serum      0 - 5 IU/L  1209 (H) 1460 (H) 1754 (H)

## 2018-01-02 NOTE — TELEPHONE ENCOUNTER
Dr. Maier: HCG has increased since 12/29/17. What should she be told? Thank you.  Trent Huertas RN

## 2018-01-02 NOTE — TELEPHONE ENCOUNTER
Reason for Call:  Request for results:    Name of test or procedure: Jennifer LEFT MESSAGE:  She has HCG labs drawn this morning and was hoping to get results.  She knows Stacia is not in until tomorrow.  Could you please call and assess. Thank you..Katya Riley    Date of test of procedure: 1/2/18    Location of the test or procedure: blood draw    OK to leave the result message on voice mail or with a family member? not known    Phone number Patient can be reached at:  Home number on file [phone] (home)    Additional comments: none    Call taken on 1/2/2018 at 1:09 PM by Katya Riley

## 2018-01-02 NOTE — TELEPHONE ENCOUNTER
"Message handled by Nurse Triage with [employer] - provider name: Nabil Valladares is out of the office  but she called OB on call; Patient was advised to have OB ultrasound now and to have HCG in 2 days. Patient reports that she has had \"Rare bleeding\" since being seen. \"Just some light pink. Reports that her breasts are tender. Denies abdominal pain. Advised her to consult with OB/GYN Clinic with questions.  Trent Huertas RN      "

## 2018-01-03 ENCOUNTER — TELEPHONE (OUTPATIENT)
Dept: OBGYN | Facility: CLINIC | Age: 33
End: 2018-01-03

## 2018-01-03 ENCOUNTER — HOSPITAL ENCOUNTER (OUTPATIENT)
Dept: ULTRASOUND IMAGING | Facility: CLINIC | Age: 33
Discharge: HOME | End: 2018-01-03
Attending: PHYSICIAN ASSISTANT | Admitting: PHYSICIAN ASSISTANT
Payer: COMMERCIAL

## 2018-01-03 DIAGNOSIS — N93.9 VAGINAL BLEEDING: ICD-10-CM

## 2018-01-03 DIAGNOSIS — O46.90 VAGINAL BLEEDING IN PREGNANCY: Primary | ICD-10-CM

## 2018-01-03 PROCEDURE — 76801 OB US < 14 WKS SINGLE FETUS: CPT

## 2018-01-03 NOTE — TELEPHONE ENCOUNTER
"Reason for call:  Patient reporting a symptom    Symptom or request: Pt states she just had U/S at  [place] wants Dr. Camacho to call her back directly \"about the pregnancy and shit\" wondering if Ectopic or not.    Duration (how long have symptoms been present):     Have you been treated for this before? No    Additional comments: looking for U/S results.    Phone Number patient can be reached at:  Home number on file [phone] (home)    Best Time:  any    Can we leave a detailed message on this number:  YES    Call taken on 1/3/2018 at 1:57 PM by Nella Parikh    "

## 2018-01-03 NOTE — TELEPHONE ENCOUNTER
Yesterday I spoke to OBGYN on call.   Said that at this level (0824-5418 HCG) should be able to see on ultrasound. Likely not normal pregnancy - likely miscarriage possible ectopic. Recommended u/s at this point.   Recheck 48hr HCG and check CMP at that point as well to check liver function in case methotrexate is needed down the road. Patient is Rh+.  Stacia Ferrer PA-C

## 2018-01-03 NOTE — TELEPHONE ENCOUNTER
I spoke to OBGYN Dr. Camacho (pt's PCP)  Likely ectopic at this point.  Emergency precautions - needs to be seen urgently.  Repeat labs and ultrasound in 2 days. Did put under Dr. Camacho's name.

## 2018-01-03 NOTE — TELEPHONE ENCOUNTER
Spoke with patient at length regarding the conflicting findings.  By LMP, she should be 6.6 weeks, but by assumed date of conception, she should be with 7.0 or 7.3 weeks.  Either way, her hcg levels are not rising appropriately increasing the concern that she has an abnormal pregnancy event.  Patient is currently asymptomatic and desirous of this pregnancy.  She is amenable to continued watchful monitoring with strict ER precautions.  We discussed that currently, it appears that she may have an ectopic, but it is not definitive.    Will reassess her hcg level and Ultrasound on Friday 1/5/18    Fanny Camacho M.D.

## 2018-01-05 ENCOUNTER — OFFICE VISIT (OUTPATIENT)
Dept: OBGYN | Facility: CLINIC | Age: 33
End: 2018-01-05
Payer: COMMERCIAL

## 2018-01-05 ENCOUNTER — HOSPITAL ENCOUNTER (OUTPATIENT)
Dept: ULTRASOUND IMAGING | Facility: CLINIC | Age: 33
Discharge: HOME | End: 2018-01-05
Attending: OBSTETRICS & GYNECOLOGY | Admitting: OBSTETRICS & GYNECOLOGY
Payer: COMMERCIAL

## 2018-01-05 ENCOUNTER — INFUSION THERAPY VISIT (OUTPATIENT)
Dept: INFUSION THERAPY | Facility: CLINIC | Age: 33
End: 2018-01-05
Attending: OBSTETRICS & GYNECOLOGY
Payer: COMMERCIAL

## 2018-01-05 VITALS
SYSTOLIC BLOOD PRESSURE: 131 MMHG | BODY MASS INDEX: 30.11 KG/M2 | HEIGHT: 64 IN | RESPIRATION RATE: 17 BRPM | DIASTOLIC BLOOD PRESSURE: 79 MMHG | TEMPERATURE: 98.3 F | WEIGHT: 176.4 LBS | HEART RATE: 80 BPM

## 2018-01-05 VITALS — WEIGHT: 176.4 LBS | HEIGHT: 64 IN | BODY MASS INDEX: 30.11 KG/M2

## 2018-01-05 DIAGNOSIS — O46.90 VAGINAL BLEEDING IN PREGNANCY: ICD-10-CM

## 2018-01-05 DIAGNOSIS — O00.102 LEFT TUBAL PREGNANCY WITHOUT INTRAUTERINE PREGNANCY: Primary | ICD-10-CM

## 2018-01-05 LAB
ALBUMIN SERPL-MCNC: 3.6 G/DL (ref 3.4–5)
ALP SERPL-CCNC: 75 U/L (ref 40–150)
ALT SERPL W P-5'-P-CCNC: 17 U/L (ref 0–50)
ANION GAP SERPL CALCULATED.3IONS-SCNC: 7 MMOL/L (ref 3–14)
AST SERPL W P-5'-P-CCNC: 9 U/L (ref 0–45)
B-HCG SERPL-ACNC: 1861 IU/L (ref 0–5)
BILIRUB SERPL-MCNC: 0.2 MG/DL (ref 0.2–1.3)
BUN SERPL-MCNC: 13 MG/DL (ref 7–30)
CALCIUM SERPL-MCNC: 8.5 MG/DL (ref 8.5–10.1)
CHLORIDE SERPL-SCNC: 105 MMOL/L (ref 94–109)
CO2 SERPL-SCNC: 28 MMOL/L (ref 20–32)
CREAT SERPL-MCNC: 0.55 MG/DL (ref 0.52–1.04)
GFR SERPL CREATININE-BSD FRML MDRD: >90 ML/MIN/1.7M2
GLUCOSE SERPL-MCNC: 98 MG/DL (ref 70–99)
POTASSIUM SERPL-SCNC: 3.9 MMOL/L (ref 3.4–5.3)
PROT SERPL-MCNC: 7.4 G/DL (ref 6.8–8.8)
RADIOLOGIST FLAGS: ABNORMAL
SODIUM SERPL-SCNC: 140 MMOL/L (ref 133–144)

## 2018-01-05 PROCEDURE — 96401 CHEMO ANTI-NEOPL SQ/IM: CPT

## 2018-01-05 PROCEDURE — 36415 COLL VENOUS BLD VENIPUNCTURE: CPT | Performed by: OBSTETRICS & GYNECOLOGY

## 2018-01-05 PROCEDURE — 80053 COMPREHEN METABOLIC PANEL: CPT | Performed by: OBSTETRICS & GYNECOLOGY

## 2018-01-05 PROCEDURE — 99215 OFFICE O/P EST HI 40 MIN: CPT | Mod: 25 | Performed by: OBSTETRICS & GYNECOLOGY

## 2018-01-05 PROCEDURE — 25000128 H RX IP 250 OP 636: Performed by: OBSTETRICS & GYNECOLOGY

## 2018-01-05 PROCEDURE — 76801 OB US < 14 WKS SINGLE FETUS: CPT

## 2018-01-05 PROCEDURE — 84702 CHORIONIC GONADOTROPIN TEST: CPT | Performed by: OBSTETRICS & GYNECOLOGY

## 2018-01-05 RX ORDER — METHOTREXATE 25 MG/ML
50 INJECTION, SOLUTION INTRA-ARTERIAL; INTRAMUSCULAR; INTRATHECAL; INTRAVENOUS ONCE
Status: COMPLETED | OUTPATIENT
Start: 2018-01-05 | End: 2018-01-05

## 2018-01-05 RX ORDER — METHOTREXATE 25 MG/ML
50 INJECTION, SOLUTION INTRA-ARTERIAL; INTRAMUSCULAR; INTRATHECAL; INTRAVENOUS ONCE
Status: CANCELLED | OUTPATIENT
Start: 2018-01-05 | End: 2018-01-05

## 2018-01-05 RX ADMIN — METHOTREXATE 95 MG: 25 INJECTION, SOLUTION INTRA-ARTERIAL; INTRAMUSCULAR; INTRATHECAL; INTRAVENOUS at 13:46

## 2018-01-05 NOTE — PROGRESS NOTES
Methotrexate injection given as ordered. Pt denies any questions or concerns.  Encourage her to call Dr. Garcia office with further questions or concerns. Very emotional at this visit. Stacia Alves RN

## 2018-01-05 NOTE — MR AVS SNAPSHOT
After Visit Summary   1/5/2018    Jennifer Voss    MRN: [id number]           Patient Information     Date Of Birth          1985        Visit Information        Provider Department      1/5/2018 1:00 PM ROOM 10 [facility]        Today's Diagnoses     Left tubal pregnancy without intrauterine pregnancy    -  1       Follow-ups after your visit        Your next 10 appointments already scheduled     Cali 15, 2018  6:00 PM CST   Nurse Only with OB NURSE EDUCATION - [facility] ([facility])    [address] [address]   [phone]            Jan 16, 2018  2:00 PM CST   New Prenatal with Fanny Camacho MD   [facility]    [address] [address]   [phone]              Future tests that were ordered for you today     Open Standing Orders        Priority Remaining Interval Expires Ordered    HCG quantitative pregnancy Routine 12/12 1/5/2019 1/5/2018          Open Future Orders        Priority Expected Expires Ordered    **Comprehensive metabolic panel FUTURE anytime Routine 1/12/2018 1/5/2019 1/5/2018    CBC with platelets Routine 1/12/2018 1/5/2019 1/5/2018    US OB <14 Weeks w Transvaginal Single STAT  1/5/2019 1/5/2018            Who to contact     If you have questions or need follow up information about today's clinic visit or your schedule please contact [facility] directly at [phone].  Normal or non-critical lab and imaging results will be communicated to you by MyChart, letter or phone within 4 business days after the clinic has received the results. If you do not hear from us within 7 days, please contact the clinic through MyChart or phone. If you have a critical or abnormal lab result, we will notify you by phone as soon as possible.  Submit refill requests through [employer] or call your pharmacy and they will forward the refill request  "to us. Please allow 3 business days for your refill to be completed.          Additional Information About Your Visit        [employer] Information     [employer] gives you secure access to your electronic health record. If you see a primary care provider, you can also send messages to your care team and make appointments. If you have questions, please call your primary care clinic.  If you do not have a primary care provider, please call [phone] and they will assist you.        Care EveryWhere ID     This is your Care EveryWhere ID. This could be used by other organizations to access your [place] medical records  [id number]        Your Vitals Were     Height Last Period BMI (Body Mass Index)             1.626 m (5' 4.02\") 11/16/2017 30.26 kg/m2          Blood Pressure from Last 3 Encounters:   01/05/18 131/79   12/29/17 132/86   12/27/17 149/87    Weight from Last 3 Encounters:   01/05/18 80 kg (176 lb 6.4 oz)   01/05/18 80 kg (176 lb 6.4 oz)   12/29/17 80.2 kg (176 lb 14.4 oz)              Today, you had the following     No orders found for display       Primary Care Provider Office Phone # Fax #    Fanny Camacho [phone] [address] [address]        Equal Access to Services     HAL ALVARADO AH: Hadii celena rubi hadasho Sojackieali, waaxda luqadaha, qaybta kaalmada adeegyada, jeniffer mahan . So [facility] [phone].    ATENCIÓN: Si habla español, tiene a gutiérrez disposición servicios gratuitos de asistencia lingüística. Llame al [phone].    We comply with applicable federal civil rights laws and [place] laws. We do not discriminate on the basis of race, color, national origin, age, disability, sex, sexual orientation, or gender identity.            Thank you!     Thank you for choosing [facility]  for your care. Our goal is always to provide you with excellent care. Hearing back from our patients is one way we can continue to improve our " services. Please take a few minutes to complete the written survey that you may receive in the mail after your visit with us. Thank you!             Your Updated Medication List - Protect others around you: Learn how to safely use, store and throw away your medicines at www.disposemymeds.org.          This list is accurate as of: 1/5/18  1:59 PM.  Always use your most recent med list.                   Brand Name Dispense Instructions for use Diagnosis    albuterol 108 (90 BASE) MCG/ACT Inhaler    PROAIR HFA/PROVENTIL HFA/VENTOLIN HFA    1 Inhaler    Inhale 2 puffs into the lungs every 6 hours as needed for shortness of breath / dyspnea or wheezing    Acute bronchitis, unspecified organism       cyclobenzaprine 10 MG tablet    FLEXERIL    30 tablet    Take 1 tablet (10 mg) by mouth 3 times daily as needed for muscle spasms    Tension-type headache, not intractable, unspecified chronicity pattern       PRENATAL PO

## 2018-01-05 NOTE — PROGRESS NOTES
"Jennifer is a 32 year old  @ ~ 7 weeks here for follow up of abnormally rising quants.  No longer spotting.  Prior hx of SAB x 2 followed by an ectopic in  treated with laparoscopic right salpingectomy.     Component      Latest Ref Rng & Units 2017   HCG Quantitative Serum      0 - 5 IU/L 1209 (H) 1460 (H) 1754 (H)       ROS: Ten point review of systems was reviewed and negative except the above.    Gyne: + abn pap (last pap ), hx LEEP    PMH: Her past medical, surgical, and obstetric histories were reviewed and are documented in their appropriate chart areas.    ALL/Meds: Her medication and allergy histories were reviewed and are documented in their appropriate chart areas.    Social History   Substance Use Topics     Smoking status: Current Every Day Smoker     Packs/day: 0.10     Years: 10.00     Types: Cigarettes     Smokeless tobacco: Never Used      Comment: smoking 10cig/day - down to 1-2 cig/day with pregnancy     Alcohol use 0.0 oz/week     0 Standard drinks or equivalent per week      Comment: No alcohol since may.      PE: /79 (BP Location: Left arm, Patient Position: Chair, Cuff Size: Adult Regular)  Pulse 80  Temp 98.3  F (36.8  C) (Tympanic)  Resp 17  Ht 5' 4\" (1.626 m)  Wt 176 lb 6.4 oz (80 kg)  LMP 2017  Breastfeeding? No  BMI 30.28 kg/m2    General Appearance:  healthy, alert, active, no distress  HEENT: NCAT  Abdomen: Soft, nontender.  Normal bowel sounds.  No masses    Bedside transvaginal pelvic ultrasound reveals empty uterus, no free fluid, left sided adnexal mass.  No clear pregnancy on the left.     hcg 1861    Formal U/S:  FINDINGS: No intrauterine pregnancy is demonstrated. Endometrial  stripe thickness is 0.3 cm. Both ovaries are of normal size. In the  left adnexa there is a ring shaped cystic structure between the ovary  and uterus. This is suspicious for an ectopic pregnancy. It measures 5  weeks and 2 days gestation. There is " a small echogenic structure  within which is difficult to visualize. For an embryo it measures 6  weeks and 1 day gestation. There is no free pelvic fluid.         IMPRESSION: Left adnexal findings are of concern for an ectopic  pregnancy.    A/P 32 year old  here for     ICD-10-CM    1. Left tubal pregnancy without intrauterine pregnancy O00.102 HCG quantitative pregnancy     **Comprehensive metabolic panel FUTURE anytime     CBC with platelets   2. Abnormal human chorionic gonadotropin (hCG) R79.9 US OB <14 Weeks w Transvaginal Single   3. Vaginal bleeding in pregnancy O46.90 **Comprehensive metabolic panel FUTURE 1yr        1. Discussed with patient options for treating ectopic pregnancy.  Discussed methotrexate versus surgery.  Patient most amenable to methotrexate.  Reviewed consent form and placed orders for infusion therapy.  Strongly emphasized the need for her to follow up for hcg levels.      Fanny Camacho M.D.     40 minutes was spent face to face with the patient today discussing her history, diagnosis, and follow-up plan as noted above.  Over 50% of the visit was spent in counseling and coordination of care.

## 2018-01-05 NOTE — NURSING NOTE
"Chief Complaint   Patient presents with     Prenatal Care     HCG results       Initial /79 (BP Location: Left arm, Patient Position: Chair, Cuff Size: Adult Regular)  Pulse 80  Temp 98.3  F (36.8  C) (Tympanic)  Resp 17  Ht 5' 4\" (1.626 m)  Wt 176 lb 6.4 oz (80 kg)  LMP 11/16/2017  Breastfeeding? No  BMI 30.28 kg/m2 Estimated body mass index is 30.28 kg/(m^2) as calculated from the following:    Height as of this encounter: 5' 4\" (1.626 m).    Weight as of this encounter: 176 lb 6.4 oz (80 kg).  Medication Reconciliation: complete   Ashley Santos, MAYELA      "

## 2018-01-05 NOTE — MR AVS SNAPSHOT
After Visit Summary   1/5/2018    Jennifer Voss    MRN: [id number]           Patient Information     Date Of Birth          1985        Visit Information        Provider Department      1/5/2018 10:30 AM Fanny Camacho MD; [facility] 1 [facility]        Today's Diagnoses     Left tubal pregnancy without intrauterine pregnancy    -  1    Abnormal human chorionic gonadotropin (hCG)        Vaginal bleeding in pregnancy           Follow-ups after your visit        Your next 10 appointments already scheduled     Cali 15, 2018  6:00 PM CST   Nurse Only with OB NURSE EDUCATION - [facility] ([facility])    [address] [address]   [phone]            Jan 16, 2018  2:00 PM CST   New Prenatal with Fanny Camacho MD   [facility]    [address] [address]   [phone]              Future tests that were ordered for you today     Open Standing Orders        Priority Remaining Interval Expires Ordered    HCG quantitative pregnancy Routine 12/12 1/5/2019 1/5/2018          Open Future Orders        Priority Expected Expires Ordered    **Comprehensive metabolic panel FUTURE anytime Routine 1/12/2018 1/5/2019 1/5/2018    CBC with platelets Routine 1/12/2018 1/5/2019 1/5/2018    US OB <14 Weeks w Transvaginal Single STAT  1/5/2019 1/5/2018            Who to contact     If you have questions or need follow up information about today's clinic visit or your schedule please contact [facility] directly at [phone].  Normal or non-critical lab and imaging results will be communicated to you by MyChart, letter or phone within 4 business days after the clinic has received the results. If you do not hear from us within 7 days, please contact the clinic through MyChart or phone. If you have a critical or abnormal lab result, we will notify you by phone  "as soon as possible.  Submit refill requests through [employer] or call your pharmacy and they will forward the refill request to us. Please allow 3 business days for your refill to be completed.          Additional Information About Your Visit        SOLARBRUSHhart Information     [employer] gives you secure access to your electronic health record. If you see a primary care provider, you can also send messages to your care team and make appointments. If you have questions, please call your primary care clinic.  If you do not have a primary care provider, please call [phone] and they will assist you.        Care EveryWhere ID     This is your Care EveryWhere ID. This could be used by other organizations to access your [facility] medical records  [id number]        Your Vitals Were     Pulse Temperature Respirations Height Last Period Breastfeeding?    80 98.3  F (36.8  C) (Tympanic) 17 5' 4\" (1.626 m) 11/16/2017 No    BMI (Body Mass Index)                   30.28 kg/m2            Blood Pressure from Last 3 Encounters:   01/05/18 131/79   12/29/17 132/86   12/27/17 149/87    Weight from Last 3 Encounters:   01/05/18 176 lb 6.4 oz (80 kg)   12/29/17 176 lb 14.4 oz (80.2 kg)   12/27/17 176 lb (79.8 kg)              We Performed the Following     **Comprehensive metabolic panel FUTURE 1yr        Primary Care Provider Office Phone # Fax #    Fanny Camacho [phone] [address] [address]        Equal Access to Services     HAL ALVARADO : Hadii aad ku hadasho Soomaali, waaxda luqadaha, qaybta kaalmada adeegyada, jeniffer mahan . So [facility] [phone].    ATENCIÓN: Si habla español, tiene a gutiérrez disposición servicios gratuitos de asistencia lingüística. Llame al [phone].    We comply with applicable federal civil rights laws and [place] laws. We do not discriminate on the basis of race, color, national origin, age, disability, sex, sexual orientation, or gender " identity.            Thank you!     Thank you for choosing [facility]  for your care. Our goal is always to provide you with excellent care. Hearing back from our patients is one way we can continue to improve our services. Please take a few minutes to complete the written survey that you may receive in the mail after your visit with us. Thank you!             Your Updated Medication List - Protect others around you: Learn how to safely use, store and throw away your medicines at www.disposemymeds.org.          This list is accurate as of: 1/5/18  1:04 PM.  Always use your most recent med list.                   Brand Name Dispense Instructions for use Diagnosis    albuterol 108 (90 BASE) MCG/ACT Inhaler    PROAIR HFA/PROVENTIL HFA/VENTOLIN HFA    1 Inhaler    Inhale 2 puffs into the lungs every 6 hours as needed for shortness of breath / dyspnea or wheezing    Acute bronchitis, unspecified organism       cyclobenzaprine 10 MG tablet    FLEXERIL    30 tablet    Take 1 tablet (10 mg) by mouth 3 times daily as needed for muscle spasms    Tension-type headache, not intractable, unspecified chronicity pattern       PRENATAL PO

## 2018-01-09 DIAGNOSIS — O00.102 LEFT TUBAL PREGNANCY WITHOUT INTRAUTERINE PREGNANCY: ICD-10-CM

## 2018-01-09 LAB — B-HCG SERPL-ACNC: 1317 IU/L (ref 0–5)

## 2018-01-09 PROCEDURE — 36415 COLL VENOUS BLD VENIPUNCTURE: CPT | Performed by: OBSTETRICS & GYNECOLOGY

## 2018-01-09 PROCEDURE — 84702 CHORIONIC GONADOTROPIN TEST: CPT | Performed by: OBSTETRICS & GYNECOLOGY

## 2018-01-12 DIAGNOSIS — O00.102 LEFT TUBAL PREGNANCY WITHOUT INTRAUTERINE PREGNANCY: ICD-10-CM

## 2018-01-12 LAB
ALBUMIN SERPL-MCNC: 3.7 G/DL (ref 3.4–5)
ALP SERPL-CCNC: 63 U/L (ref 40–150)
ALT SERPL W P-5'-P-CCNC: 14 U/L (ref 0–50)
ANION GAP SERPL CALCULATED.3IONS-SCNC: 7 MMOL/L (ref 3–14)
AST SERPL W P-5'-P-CCNC: 10 U/L (ref 0–45)
B-HCG SERPL-ACNC: 877 IU/L (ref 0–5)
BASOPHILS # BLD AUTO: 0 10E9/L (ref 0–0.2)
BASOPHILS NFR BLD AUTO: 0.3 %
BILIRUB SERPL-MCNC: 0.3 MG/DL (ref 0.2–1.3)
BUN SERPL-MCNC: 13 MG/DL (ref 7–30)
CALCIUM SERPL-MCNC: 8.8 MG/DL (ref 8.5–10.1)
CHLORIDE SERPL-SCNC: 104 MMOL/L (ref 94–109)
CO2 SERPL-SCNC: 26 MMOL/L (ref 20–32)
CREAT SERPL-MCNC: 0.52 MG/DL (ref 0.52–1.04)
DIFFERENTIAL METHOD BLD: ABNORMAL
EOSINOPHIL # BLD AUTO: 0.2 10E9/L (ref 0–0.7)
EOSINOPHIL NFR BLD AUTO: 1.7 %
ERYTHROCYTE [DISTWIDTH] IN BLOOD BY AUTOMATED COUNT: 14.2 % (ref 10–15)
GFR SERPL CREATININE-BSD FRML MDRD: >90 ML/MIN/1.7M2
GLUCOSE SERPL-MCNC: 76 MG/DL (ref 70–99)
HCT VFR BLD AUTO: 38.5 % (ref 35–47)
HGB BLD-MCNC: 12.6 G/DL (ref 11.7–15.7)
LYMPHOCYTES # BLD AUTO: 3.7 10E9/L (ref 0.8–5.3)
LYMPHOCYTES NFR BLD AUTO: 26.4 %
MCH RBC QN AUTO: 28.3 PG (ref 26.5–33)
MCHC RBC AUTO-ENTMCNC: 32.7 G/DL (ref 31.5–36.5)
MCV RBC AUTO: 87 FL (ref 78–100)
MONOCYTES # BLD AUTO: 1 10E9/L (ref 0–1.3)
MONOCYTES NFR BLD AUTO: 7.1 %
NEUTROPHILS # BLD AUTO: 9.1 10E9/L (ref 1.6–8.3)
NEUTROPHILS NFR BLD AUTO: 64.5 %
PLATELET # BLD AUTO: 332 10E9/L (ref 150–450)
POTASSIUM SERPL-SCNC: 4 MMOL/L (ref 3.4–5.3)
PROT SERPL-MCNC: 7.1 G/DL (ref 6.8–8.8)
RBC # BLD AUTO: 4.45 10E12/L (ref 3.8–5.2)
SODIUM SERPL-SCNC: 137 MMOL/L (ref 133–144)
WBC # BLD AUTO: 14.1 10E9/L (ref 4–11)

## 2018-01-12 PROCEDURE — 84702 CHORIONIC GONADOTROPIN TEST: CPT | Performed by: OBSTETRICS & GYNECOLOGY

## 2018-01-12 PROCEDURE — 36415 COLL VENOUS BLD VENIPUNCTURE: CPT | Performed by: OBSTETRICS & GYNECOLOGY

## 2018-01-12 PROCEDURE — 80053 COMPREHEN METABOLIC PANEL: CPT | Performed by: OBSTETRICS & GYNECOLOGY

## 2018-01-12 PROCEDURE — 85025 COMPLETE CBC W/AUTO DIFF WBC: CPT | Performed by: OBSTETRICS & GYNECOLOGY

## 2018-01-16 ENCOUNTER — OFFICE VISIT (OUTPATIENT)
Dept: OBGYN | Facility: CLINIC | Age: 33
End: 2018-01-16
Payer: COMMERCIAL

## 2018-01-16 VITALS
WEIGHT: 178 LBS | HEART RATE: 84 BPM | HEIGHT: 64 IN | BODY MASS INDEX: 30.39 KG/M2 | RESPIRATION RATE: 15 BRPM | TEMPERATURE: 97.7 F | SYSTOLIC BLOOD PRESSURE: 121 MMHG | DIASTOLIC BLOOD PRESSURE: 71 MMHG

## 2018-01-16 DIAGNOSIS — Z87.59 HISTORY OF POOR PREGNANCY OUTCOME: Primary | ICD-10-CM

## 2018-01-16 PROCEDURE — 99214 OFFICE O/P EST MOD 30 MIN: CPT | Performed by: OBSTETRICS & GYNECOLOGY

## 2018-01-16 NOTE — PROGRESS NOTES
"Jennifer is a 32 year old  here for follow up of ectopic pregnancy treated with methotrexate.  Tolerated medication without side effects.  Patient feeling good without any pain.  Had vaginal bleeding last week like a normal period.    Is concerned about her future fertility.  3 SAB with 1 partner followed by ectopic requiring salpingectomy followed by this current ectopic.    Component      Latest Ref Rng & Units 2018   HCG Quantitative Serum      0 - 5 IU/L 1861 (H) 1317 (H) 877 (H)     Cbc and chem panel stable    ROS: Ten point review of systems was reviewed and negative except the above.    PMH: Her past medical, surgical, and obstetric histories were reviewed and are documented in their appropriate chart areas.    ALL/Meds: Her medication and allergy histories were reviewed and are documented in their appropriate chart areas.    Social History   Substance Use Topics     Smoking status: Current Every Day Smoker     Packs/day: 0.10     Years: 10.00     Types: Cigarettes     Smokeless tobacco: Never Used      Comment: smoking 10cig/day - down to 1-2 cig/day with pregnancy     Alcohol use 0.0 oz/week     0 Standard drinks or equivalent per week      Comment: No alcohol since may.        PE: /71 (BP Location: Right arm, Patient Position: Chair, Cuff Size: Adult Regular)  Pulse 84  Temp 97.7  F (36.5  C) (Tympanic)  Resp 15  Ht 5' 4\" (1.626 m)  Wt 178 lb (80.7 kg)  LMP 2017  Breastfeeding? No  BMI 30.55 kg/m2    General Appearance:  healthy, alert, active, no distress  HEENT: NCAT    A/P 32 year old  here for     ICD-10-CM    1. History of poor pregnancy outcome Z87.42         1. Doing well from most recent treatment with methotrexate.  Reviewed continued precautions.      We discussed that her 3 SAB's with prior partner may be difficult to assess now that she is no longer with him.  We discussed consideration for work up of endocrine disorders or thrombophilia, " but that my recommendations for next pregnancy would be likely to be unaffected.  Could consider baby aspirin.  In the meantime, we would have to wait for her pregnancy to resolve before performing blood work for antiphospholipid antibody syndrome or other thrombophilias.  Consider progesterone supplementation in her next pregnancy as well.     Discussed plan for HSG after this pregnancy resolves to assess what the remaining tube is like regarding patency.  Discussed that she is very high risk for another ectopic event.  If her tubes are highly diseased, she could consider IVF to bypass the tubes altogether.  Patient feels better knowing that it is still possible to have a baby if both tubes are absent.     Continue serial hcg levels for now.    Fanny Camacho M.D.      30 minutes was spent face to face with the patient today discussing her history, diagnosis, and follow-up plan as noted above.  Over 50% of the visit was spent in counseling and coordination of care.

## 2018-01-16 NOTE — NURSING NOTE
"Chief Complaint   Patient presents with     RECHECK     ectopic       Initial /71 (BP Location: Right arm, Patient Position: Chair, Cuff Size: Adult Regular)  Pulse 84  Temp 97.7  F (36.5  C) (Tympanic)  Resp 15  Ht 5' 4\" (1.626 m)  Wt 178 lb (80.7 kg)  LMP 11/16/2017  Breastfeeding? No  BMI 30.55 kg/m2 Estimated body mass index is 30.55 kg/(m^2) as calculated from the following:    Height as of this encounter: 5' 4\" (1.626 m).    Weight as of this encounter: 178 lb (80.7 kg).  Medication Reconciliation: complete   Ashley Santos CMA      "

## 2018-01-16 NOTE — MR AVS SNAPSHOT
After Visit Summary   1/16/2018    Jennifer Voss    MRN: [id number]           Patient Information     Date Of Birth          1985        Visit Information        Provider Department      1/16/2018 2:00 PM Fanny Camacho MD [facility]        Today's Diagnoses     History of poor pregnancy outcome    -  1       Follow-ups after your visit        Your next 10 appointments already scheduled     Jan 23, 2018  8:15 AM CST   LAB with [facility] ([facility])    [address] [address]   [phone]           Please do not eat 10-12 hours before your appointment if you are coming in fasting for labs on lipids, cholesterol, or glucose (sugar). This does not apply to pregnant women. Water, hot tea and black coffee (with nothing added) are okay. Do not drink other fluids, diet soda or chew gum.            Jan 26, 2018  2:30 PM CST   LAB with [facility] ([facility])    [address] [address]   [phone]           Please do not eat 10-12 hours before your appointment if you are coming in fasting for labs on lipids, cholesterol, or glucose (sugar). This does not apply to pregnant women. Water, hot tea and black coffee (with nothing added) are okay. Do not drink other fluids, diet soda or chew gum.            Feb 02, 2018  2:30 PM CST   LAB with [facility] ([facility])    [address] [address]   [phone]           Please do not eat 10-12 hours before your appointment if you are coming in fasting for labs on lipids, cholesterol, or glucose (sugar). This does not apply to pregnant women. Water, hot tea and black coffee (with nothing added) are okay. Do not drink other fluids, diet soda or chew gum.              Who to contact     If you have questions or need follow up information about today's clinic visit  "or your schedule please contact [facility] directly at [phone].  Normal or non-critical lab and imaging results will be communicated to you by MyChart, letter or phone within 4 business days after the clinic has received the results. If you do not hear from us within 7 days, please contact the clinic through mobiTerishart or phone. If you have a critical or abnormal lab result, we will notify you by phone as soon as possible.  Submit refill requests through [employer] or call your pharmacy and they will forward the refill request to us. Please allow 3 business days for your refill to be completed.          Additional Information About Your Visit        mobiTerisharMethylGene Information     [employer] gives you secure access to your electronic health record. If you see a primary care provider, you can also send messages to your care team and make appointments. If you have questions, please call your primary care clinic.  If you do not have a primary care provider, please call [phone] and they will assist you.        Care EveryWhere ID     This is your Care EveryWhere ID. This could be used by other organizations to access your [facility] medical records  [id number]        Your Vitals Were     Pulse Temperature Respirations Height Last Period Breastfeeding?    84 97.7  F (36.5  C) (Tympanic) 15 5' 4\" (1.626 m) 11/16/2017 No    BMI (Body Mass Index)                   30.55 kg/m2            Blood Pressure from Last 3 Encounters:   01/16/18 121/71   01/05/18 131/79   12/29/17 132/86    Weight from Last 3 Encounters:   01/16/18 178 lb (80.7 kg)   01/05/18 176 lb 6.4 oz (80 kg)   01/05/18 176 lb 6.4 oz (80 kg)              Today, you had the following     No orders found for display       Primary Care Provider Office Phone # Fax #    Fanny Camacho [phone] [address] [address]        Equal Access to Services     HAL ALVARADO AH: Rah Sanderson, michael morales, qaybta " jeniffer briggsrocky mahan ah. So [facility] [phone].    ATENCIÓN: Si adarsh ziegler, tiene a gutiérrez disposición servicios gratuitos de asistencia lingüística. Lemuel al [phone].    We comply with applicable federal civil rights laws and [place] laws. We do not discriminate on the basis of race, color, national origin, age, disability, sex, sexual orientation, or gender identity.            Thank you!     Thank you for choosing [facility]  for your care. Our goal is always to provide you with excellent care. Hearing back from our patients is one way we can continue to improve our services. Please take a few minutes to complete the written survey that you may receive in the mail after your visit with us. Thank you!             Your Updated Medication List - Protect others around you: Learn how to safely use, store and throw away your medicines at www.disposemymeds.org.          This list is accurate as of: 1/16/18  2:40 PM.  Always use your most recent med list.                   Brand Name Dispense Instructions for use Diagnosis    albuterol 108 (90 BASE) MCG/ACT Inhaler    PROAIR HFA/PROVENTIL HFA/VENTOLIN HFA    1 Inhaler    Inhale 2 puffs into the lungs every 6 hours as needed for shortness of breath / dyspnea or wheezing    Acute bronchitis, unspecified organism       cyclobenzaprine 10 MG tablet    FLEXERIL    30 tablet    Take 1 tablet (10 mg) by mouth 3 times daily as needed for muscle spasms    Tension-type headache, not intractable, unspecified chronicity pattern       PRENATAL PO

## 2018-01-26 DIAGNOSIS — O00.102 LEFT TUBAL PREGNANCY WITHOUT INTRAUTERINE PREGNANCY: ICD-10-CM

## 2018-01-26 LAB — B-HCG SERPL-ACNC: 44 IU/L (ref 0–5)

## 2018-01-26 PROCEDURE — 36415 COLL VENOUS BLD VENIPUNCTURE: CPT | Performed by: OBSTETRICS & GYNECOLOGY

## 2018-01-26 PROCEDURE — 84702 CHORIONIC GONADOTROPIN TEST: CPT | Performed by: OBSTETRICS & GYNECOLOGY

## 2018-02-02 DIAGNOSIS — O00.102 LEFT TUBAL PREGNANCY WITHOUT INTRAUTERINE PREGNANCY: ICD-10-CM

## 2018-02-02 LAB — B-HCG SERPL-ACNC: 10 IU/L (ref 0–5)

## 2018-02-02 PROCEDURE — 36415 COLL VENOUS BLD VENIPUNCTURE: CPT | Performed by: OBSTETRICS & GYNECOLOGY

## 2018-02-02 PROCEDURE — 84702 CHORIONIC GONADOTROPIN TEST: CPT | Performed by: OBSTETRICS & GYNECOLOGY

## 2018-02-09 DIAGNOSIS — O00.102 LEFT TUBAL PREGNANCY WITHOUT INTRAUTERINE PREGNANCY: ICD-10-CM

## 2018-02-09 LAB — B-HCG SERPL-ACNC: 3 IU/L (ref 0–5)

## 2018-02-09 PROCEDURE — 84702 CHORIONIC GONADOTROPIN TEST: CPT | Performed by: OBSTETRICS & GYNECOLOGY

## 2018-02-09 PROCEDURE — 36415 COLL VENOUS BLD VENIPUNCTURE: CPT | Performed by: OBSTETRICS & GYNECOLOGY

## 2018-03-21 ENCOUNTER — HOSPITAL ENCOUNTER (OUTPATIENT)
Dept: GENERAL RADIOLOGY | Facility: CLINIC | Age: 33
Discharge: HOME | End: 2018-03-21
Attending: OBSTETRICS & GYNECOLOGY | Admitting: OBSTETRICS & GYNECOLOGY
Payer: COMMERCIAL

## 2018-03-21 DIAGNOSIS — Z87.59 HISTORY OF ECTOPIC PREGNANCY: ICD-10-CM

## 2018-03-21 PROCEDURE — 58340 CATHETER FOR HYSTEROGRAPHY: CPT

## 2018-03-21 PROCEDURE — 25500064 ZZH RX 255 OP 636: Performed by: RADIOLOGY

## 2018-03-21 RX ORDER — IOPAMIDOL 510 MG/ML
50 INJECTION, SOLUTION INTRAVASCULAR ONCE
Status: COMPLETED | OUTPATIENT
Start: 2018-03-21 | End: 2018-03-21

## 2018-03-21 RX ADMIN — IOPAMIDOL 12 ML: 510 INJECTION, SOLUTION INTRAVASCULAR at 14:24

## 2018-04-01 ENCOUNTER — APPOINTMENT (OUTPATIENT)
Dept: CT IMAGING | Facility: CLINIC | Age: 33
End: 2018-04-01
Attending: EMERGENCY MEDICINE
Payer: COMMERCIAL

## 2018-04-01 ENCOUNTER — APPOINTMENT (OUTPATIENT)
Dept: ULTRASOUND IMAGING | Facility: CLINIC | Age: 33
End: 2018-04-01
Attending: EMERGENCY MEDICINE
Payer: COMMERCIAL

## 2018-04-01 ENCOUNTER — HOSPITAL ENCOUNTER (EMERGENCY)
Facility: CLINIC | Age: 33
Discharge: HOME | End: 2018-04-01
Attending: EMERGENCY MEDICINE | Admitting: EMERGENCY MEDICINE
Payer: COMMERCIAL

## 2018-04-01 VITALS
HEART RATE: 108 BPM | TEMPERATURE: 99.1 F | DIASTOLIC BLOOD PRESSURE: 78 MMHG | OXYGEN SATURATION: 99 % | RESPIRATION RATE: 16 BRPM | SYSTOLIC BLOOD PRESSURE: 120 MMHG | WEIGHT: 180 LBS | BODY MASS INDEX: 30.9 KG/M2

## 2018-04-01 DIAGNOSIS — R10.31 ABDOMINAL PAIN, RIGHT LOWER QUADRANT: ICD-10-CM

## 2018-04-01 DIAGNOSIS — R10.32 ABDOMINAL PAIN, LEFT LOWER QUADRANT: ICD-10-CM

## 2018-04-01 LAB
ALBUMIN SERPL-MCNC: 3.1 G/DL (ref 3.4–5)
ALBUMIN UR-MCNC: 30 MG/DL
ALP SERPL-CCNC: 59 U/L (ref 40–150)
ALT SERPL W P-5'-P-CCNC: 9 U/L (ref 0–50)
ANION GAP SERPL CALCULATED.3IONS-SCNC: 9 MMOL/L (ref 3–14)
APPEARANCE UR: ABNORMAL
AST SERPL W P-5'-P-CCNC: 6 U/L (ref 0–45)
BACTERIA #/AREA URNS HPF: ABNORMAL /HPF
BASOPHILS # BLD AUTO: 0 10E9/L (ref 0–0.2)
BASOPHILS NFR BLD AUTO: 0.2 %
BILIRUB SERPL-MCNC: 0.5 MG/DL (ref 0.2–1.3)
BILIRUB UR QL STRIP: NEGATIVE
BUN SERPL-MCNC: 13 MG/DL (ref 7–30)
CALCIUM SERPL-MCNC: 7.9 MG/DL (ref 8.5–10.1)
CHLORIDE SERPL-SCNC: 106 MMOL/L (ref 94–109)
CO2 SERPL-SCNC: 24 MMOL/L (ref 20–32)
COLOR UR AUTO: YELLOW
CREAT SERPL-MCNC: 0.66 MG/DL (ref 0.52–1.04)
DIFFERENTIAL METHOD BLD: ABNORMAL
EOSINOPHIL # BLD AUTO: 0.2 10E9/L (ref 0–0.7)
EOSINOPHIL NFR BLD AUTO: 0.9 %
ERYTHROCYTE [DISTWIDTH] IN BLOOD BY AUTOMATED COUNT: 13.4 % (ref 10–15)
GFR SERPL CREATININE-BSD FRML MDRD: >90 ML/MIN/1.7M2
GLUCOSE SERPL-MCNC: 134 MG/DL (ref 70–99)
GLUCOSE UR STRIP-MCNC: NEGATIVE MG/DL
HCG UR QL: NEGATIVE
HCT VFR BLD AUTO: 38.2 % (ref 35–47)
HGB BLD-MCNC: 12.7 G/DL (ref 11.7–15.7)
HGB UR QL STRIP: NEGATIVE
IMM GRANULOCYTES # BLD: 0.1 10E9/L (ref 0–0.4)
IMM GRANULOCYTES NFR BLD: 0.4 %
KETONES UR STRIP-MCNC: NEGATIVE MG/DL
LEUKOCYTE ESTERASE UR QL STRIP: ABNORMAL
LYMPHOCYTES # BLD AUTO: 2.3 10E9/L (ref 0.8–5.3)
LYMPHOCYTES NFR BLD AUTO: 13.2 %
MCH RBC QN AUTO: 28.7 PG (ref 26.5–33)
MCHC RBC AUTO-ENTMCNC: 33.2 G/DL (ref 31.5–36.5)
MCV RBC AUTO: 86 FL (ref 78–100)
MONOCYTES # BLD AUTO: 1.2 10E9/L (ref 0–1.3)
MONOCYTES NFR BLD AUTO: 7.1 %
MUCOUS THREADS #/AREA URNS LPF: PRESENT /LPF
NEUTROPHILS # BLD AUTO: 13.5 10E9/L (ref 1.6–8.3)
NEUTROPHILS NFR BLD AUTO: 78.2 %
NITRATE UR QL: NEGATIVE
PH UR STRIP: 5 PH (ref 5–7)
PLATELET # BLD AUTO: 329 10E9/L (ref 150–450)
POTASSIUM SERPL-SCNC: 3.6 MMOL/L (ref 3.4–5.3)
PROT SERPL-MCNC: 6.3 G/DL (ref 6.8–8.8)
RBC # BLD AUTO: 4.43 10E12/L (ref 3.8–5.2)
RBC #/AREA URNS AUTO: 5 /HPF (ref 0–2)
SODIUM SERPL-SCNC: 139 MMOL/L (ref 133–144)
SOURCE: ABNORMAL
SP GR UR STRIP: 1.03 (ref 1–1.03)
SQUAMOUS #/AREA URNS AUTO: 36 /HPF (ref 0–1)
UROBILINOGEN UR STRIP-MCNC: 2 MG/DL (ref 0–2)
WBC # BLD AUTO: 17.3 10E9/L (ref 4–11)
WBC #/AREA URNS AUTO: 26 /HPF (ref 0–5)

## 2018-04-01 PROCEDURE — 81025 URINE PREGNANCY TEST: CPT | Performed by: EMERGENCY MEDICINE

## 2018-04-01 PROCEDURE — 96360 HYDRATION IV INFUSION INIT: CPT | Mod: 59 | Performed by: EMERGENCY MEDICINE

## 2018-04-01 PROCEDURE — 81001 URINALYSIS AUTO W/SCOPE: CPT | Performed by: EMERGENCY MEDICINE

## 2018-04-01 PROCEDURE — 99285 EMERGENCY DEPT VISIT HI MDM: CPT | Mod: 25 | Performed by: EMERGENCY MEDICINE

## 2018-04-01 PROCEDURE — 80053 COMPREHEN METABOLIC PANEL: CPT | Performed by: EMERGENCY MEDICINE

## 2018-04-01 PROCEDURE — 25000125 ZZHC RX 250: Performed by: EMERGENCY MEDICINE

## 2018-04-01 PROCEDURE — 25000132 ZZH RX MED GY IP 250 OP 250 PS 637: Performed by: EMERGENCY MEDICINE

## 2018-04-01 PROCEDURE — 99285 EMERGENCY DEPT VISIT HI MDM: CPT | Mod: Z6 | Performed by: EMERGENCY MEDICINE

## 2018-04-01 PROCEDURE — 74177 CT ABD & PELVIS W/CONTRAST: CPT

## 2018-04-01 PROCEDURE — 25000128 H RX IP 250 OP 636: Performed by: EMERGENCY MEDICINE

## 2018-04-01 PROCEDURE — 85025 COMPLETE CBC W/AUTO DIFF WBC: CPT | Performed by: EMERGENCY MEDICINE

## 2018-04-01 PROCEDURE — 93976 VASCULAR STUDY: CPT

## 2018-04-01 PROCEDURE — 87086 URINE CULTURE/COLONY COUNT: CPT | Performed by: EMERGENCY MEDICINE

## 2018-04-01 RX ORDER — LORAZEPAM 1 MG/1
1 TABLET ORAL ONCE
Status: COMPLETED | OUTPATIENT
Start: 2018-04-01 | End: 2018-04-01

## 2018-04-01 RX ORDER — IOPAMIDOL 755 MG/ML
100 INJECTION, SOLUTION INTRAVASCULAR ONCE
Status: DISCONTINUED | OUTPATIENT
Start: 2018-04-01 | End: 2018-04-01

## 2018-04-01 RX ORDER — IOPAMIDOL 755 MG/ML
88 INJECTION, SOLUTION INTRAVASCULAR ONCE
Status: COMPLETED | OUTPATIENT
Start: 2018-04-01 | End: 2018-04-01

## 2018-04-01 RX ADMIN — IOPAMIDOL 88 ML: 755 INJECTION, SOLUTION INTRAVENOUS at 20:36

## 2018-04-01 RX ADMIN — SODIUM CHLORIDE 500 ML: 9 INJECTION, SOLUTION INTRAVENOUS at 20:12

## 2018-04-01 RX ADMIN — SODIUM CHLORIDE 75 ML: 9 INJECTION, SOLUTION INTRAVENOUS at 20:37

## 2018-04-01 RX ADMIN — LORAZEPAM 1 MG: 1 TABLET ORAL at 22:21

## 2018-04-01 ASSESSMENT — ENCOUNTER SYMPTOMS
CARDIOVASCULAR NEGATIVE: 1
CONSTITUTIONAL NEGATIVE: 1
ALLERGIC/IMMUNOLOGIC NEGATIVE: 1
PSYCHIATRIC NEGATIVE: 1
EYES NEGATIVE: 1
ABDOMINAL PAIN: 1
NEUROLOGICAL NEGATIVE: 1
MUSCULOSKELETAL NEGATIVE: 1
RESPIRATORY NEGATIVE: 1
ENDOCRINE NEGATIVE: 1
HEMATOLOGIC/LYMPHATIC NEGATIVE: 1

## 2018-04-01 NOTE — ED AVS SNAPSHOT
[facility] Emergency Department    [address] [address]    Phone:  [phone]    Fax:  [phone]                                       Jennifer Voss   MRN: [id number]    Department:  [facility] Emergency Department   Date of Visit:  4/1/2018           After Visit Summary Signature Page     I have received my discharge instructions, and my questions have been answered. I have discussed any challenges I see with this plan with the nurse or doctor.    ..........................................................................................................................................  Patient/Patient Representative Signature      ..........................................................................................................................................  Patient Representative Print Name and Relationship to Patient    ..................................................               ................................................  Date                                            Time    ..........................................................................................................................................  Reviewed by Signature/Title    ...................................................              ..............................................  Date                                                            Time

## 2018-04-01 NOTE — ED AVS SNAPSHOT
[facility] Emergency Department    [address] [facility] [address]    Phone:  [phone]    Fax:  [phone]                                       Jennifer Voss   MRN: [id number]    Department:  [facility] Emergency Department   Date of Visit:  4/1/2018           Patient Information     Date Of Birth          1985        Your diagnoses for this visit were:     Abdominal pain, left lower quadrant     Abdominal pain, right lower quadrant        You were seen by Stone Selby MD.      Follow-up Information     Follow up with Fanny Camacho MD.    Specialty:  OB/Gyn    Why:  As needed, If symptoms worsen    Contact information:    [address] [facility] [place] [address]  [phone]        Discharge References/Attachments     ABDOMINAL PAIN, UNKNOWN CAUSE, (FEMALE) (ENGLISH)    CYST, OVARIAN, TREATING A RUPTURED  (ENGLISH)    OVARIAN CYSTS, UNDERSTANDING (ENGLISH)    OVARIAN CYSTS, TREATMENT FOR  (ENGLISH)    OVARIAN CYST (ENGLISH)      24 Hour Appointment Hotline       To make an appointment at any [place] clinic, call [phone]). If you don't have a family doctor or clinic, we will help you find one. [place] clinics are conveniently located to serve the needs of you and your family.             Review of your medicines      Our records show that you are taking the medicines listed below. If these are incorrect, please call your family doctor or clinic.        Dose / Directions Last dose taken    albuterol 108 (90 BASE) MCG/ACT Inhaler   Commonly known as:  PROAIR HFA/PROVENTIL HFA/VENTOLIN HFA   Dose:  2 puff   Quantity:  1 Inhaler        Inhale 2 puffs into the lungs every 6 hours as needed for shortness of breath / dyspnea or wheezing   Refills:  0        cyclobenzaprine 10 MG tablet   Commonly known as:  FLEXERIL   Dose:  10 mg   Quantity:  30 tablet        Take 1 tablet (10 mg) by mouth 3 times daily as needed for muscle spasms   Refills:  0         PRENATAL PO        Refills:  0                Procedures and tests performed during your visit     CBC with platelets differential    CT Abdomen Pelvis w Contrast    Comprehensive metabolic panel    HCG qualitative urine    Pelvic Ultrasound (US) with doppler imaging (r/o ovarian torsion)    UA reflex to Microscopic      Orders Needing Specimen Collection     None      Pending Results     No orders found from 3/30/2018 to 4/2/2018.            Pending Culture Results     No orders found from 3/30/2018 to 4/2/2018.            Pending Results Instructions     If you had any lab results that were not finalized at the time of your Discharge, you can call the ED Lab Result RN at [phone]. You will be contacted by this team for any positive Lab results or changes in treatment. The nurses are available 7 days a week from 10A to 6:30P.  You can leave a message 24 hours per day and they will return your call.        Test Results From Your Hospital Stay        4/1/2018  8:19 PM      Component Results     Component Value Ref Range & Units Status    Color Urine Yellow  Final    Appearance Urine Cloudy  Final    Glucose Urine Negative NEG^Negative mg/dL Final    Bilirubin Urine Negative NEG^Negative Final    Ketones Urine Negative NEG^Negative mg/dL Final    Specific Gravity Urine 1.034 1.003 - 1.035 Final    Blood Urine Negative NEG^Negative Final    pH Urine 5.0 5.0 - 7.0 pH Final    Protein Albumin Urine 30 (A) NEG^Negative mg/dL Final    Urobilinogen mg/dL 2.0 0.0 - 2.0 mg/dL Final    Nitrite Urine Negative NEG^Negative Final    Leukocyte Esterase Urine Moderate (A) NEG^Negative Final    Source Midstream Urine  Final    RBC Urine 5 (H) 0 - 2 /HPF Final    WBC Urine 26 (H) 0 - 5 /HPF Final    Bacteria Urine Few (A) NEG^Negative /HPF Final    Squamous Epithelial /HPF Urine 36 (H) 0 - 1 /HPF Final    Mucous Urine Present (A) NEG^Negative /LPF Final         4/1/2018  8:33 PM      Component Results     Component Value Ref  Range & Units Status    HCG Qual Urine Negative NEG^Negative Final    This test is for screening purposes.  Results should be interpreted along with   the clinical picture.  Confirmation testing is available if warranted by   ordering [id number], HCG Quantitative Pregnancy.           4/1/2018  8:26 PM      Component Results     Component Value Ref Range & Units Status    WBC 17.3 (H) 4.0 - 11.0 10e9/L Final    RBC Count 4.43 3.8 - 5.2 10e12/L Final    Hemoglobin 12.7 11.7 - 15.7 g/dL Final    Hematocrit 38.2 35.0 - 47.0 % Final    MCV 86 78 - 100 fl Final    MCH 28.7 26.5 - 33.0 pg Final    MCHC 33.2 31.5 - 36.5 g/dL Final    RDW 13.4 10.0 - 15.0 % Final    Platelet Count 329 150 - 450 10e9/L Final    Diff Method Automated Method  Final    % Neutrophils 78.2 % Final    % Lymphocytes 13.2 % Final    % Monocytes 7.1 % Final    % Eosinophils 0.9 % Final    % Basophils 0.2 % Final    % Immature Granulocytes 0.4 % Final    Absolute Neutrophil 13.5 (H) 1.6 - 8.3 10e9/L Final    Absolute Lymphocytes 2.3 0.8 - 5.3 10e9/L Final    Absolute Monocytes 1.2 0.0 - 1.3 10e9/L Final    Absolute Eosinophils 0.2 0.0 - 0.7 10e9/L Final    Absolute Basophils 0.0 0.0 - 0.2 10e9/L Final    Abs Immature Granulocytes 0.1 0 - 0.4 10e9/L Final         4/1/2018  8:48 PM      Component Results     Component Value Ref Range & Units Status    Sodium 139 133 - 144 mmol/L Final    Potassium 3.6 3.4 - 5.3 mmol/L Final    Chloride 106 94 - 109 mmol/L Final    Carbon Dioxide 24 20 - 32 mmol/L Final    Anion Gap 9 3 - 14 mmol/L Final    Glucose 134 (H) 70 - 99 mg/dL Final    Urea Nitrogen 13 7 - 30 mg/dL Final    Creatinine 0.66 0.52 - 1.04 mg/dL Final    GFR Estimate >90 >60 mL/min/1.7m2 Final    Non  GFR Calc    GFR Estimate If Black >90 >60 mL/min/1.7m2 Final    African American GFR Calc    Calcium 7.9 (L) 8.5 - 10.1 mg/dL Final    Bilirubin Total 0.5 0.2 - 1.3 mg/dL Final    Albumin 3.1 (L) 3.4 - 5.0 g/dL Final    Protein Total 6.3 (L)  6.8 - 8.8 g/dL Final    Alkaline Phosphatase 59 40 - 150 U/L Final    ALT 9 0 - 50 U/L Final    AST 6 0 - 45 U/L Final         4/1/2018  9:02 PM      Narrative     CT ABDOMEN PELVIS W CONTRAST 4/1/2018 8:44 PM    HISTORY: Right lower abdominal pain.    CONTRAST:  88 ml's isovue 370.    TECHNIQUE: CT of the abdomen and pelvis is performed with IV contrast.    Routine assessed structures include the liver, spleen, pancreas,  adrenal glands, and kidneys. Other assessed structures include the  retroperitoneum, abdominal aorta, visualized gastrointestinal tract,  and abdominal wall. Intrapelvic anatomy is also assessed.    Radiation dose for this scan is reduced using automated exposure  control, adjustment of the mA and/or kV according to patient size, or  iterative reconstruction technique.    COMPARISON: None.    FINDINGS:    Abdomen: No dilated bowel loops is seen. The appendix is partially  visualized and within normal limits.    Pelvis:  Left ovary is enlarged measuring 7.2 x 3.8 x 6.0 cm. It  contains a 3.6 cm cyst as well as a few other smaller complex cystic  structures, including a suspected collapsed 2.8 cm cyst. The right  ovary is normal. There is some mild inflammatory change in the pelvis  centered in the region of the left adnexa. There is trace free pelvic  fluid. A probable 2.2 cm fibroid is present.        Impression     IMPRESSION:  The left ovary is enlarged and contains at least one 3.6  cm cyst. There is some mild inflammatory change in the pelvis,  especially the left adnexa. This is nonspecific. It could relate to  partial cyst rupture. Ovarian torsion is not completely excluded, in  the appropriate clinical setting.    YOSELIN GIBBONS MD         4/1/2018 11:04 PM      Narrative     ULTRASOUND PELVIS DOPPLER   WITH TRANSVAGINAL IMAGING  4/1/2018 10:59  PM     HISTORY: lower abdominal pain. intermittent x 1 week. R> L. CT imaging  cannot exclude torsion vs cyst rupture. Eval for torsion.;      COMPARISON: CT dated 4/1/2018    TECHNIQUE: Transvaginal images were performed to better evaluate the  patient's uterus, ovaries and endometrial stripe. Grayscale, color  Doppler, and Doppler spectral waveform analysis performed.    FINDINGS:  The uterus measures 7.5 x 4.4 x 5 cm in size. There is a  2.4 cm intramural myoma in the right lower uterine body. Endometrium  measures 0.5 cm in thickness. The right ovary measures 3.5 x 2.4 x 2.2  cm. Arterial and venous blood flow is identified. Left ovary measures  5.9 x 4.9 x 5.8 cm. There are complex areas in the left ovary. 1  measures 3.9 x 2.9 x 3.4. A second measures 2 x 0.9 x 1.5. A third  measures 2.4 x 1.3 x 1.8 cm. Blood flow is identified in the left  ovary. Arterial and venous blood flow is seen. No free fluid.        Impression     IMPRESSION:   1. No ovarian torsion is identified. Blood flow is seen in both  ovaries.  2. Complex areas are seen in the left ovary. These are nonspecific.  These could represent hemorrhagic follicles but they could also be due  to endometriomas.  3. Small uterine fibroid.    CHANDLER ZAFAR MD                Thank you for choosing [facility]       Thank you for choosing [facility] for your care. Our goal is always to provide you with excellent care. Hearing back from our patients is one way we can continue to improve our services. Please take a few minutes to complete the written survey that you may receive in the mail after you visit with us. Thank you!        [email or web address] Information     [employer] gives you secure access to your electronic health record. If you see a primary care provider, you can also send messages to your care team and make appointments. If you have questions, please call your primary care clinic.  If you do not have a primary care provider, please call [phone] and they will assist you.        Care EveryWhere ID     This is your Care EveryWhere ID. This could be used by other organizations to access your [facility]  medical records  [id number]        Equal Access to Services     HAL ALVARADO : Rah Sanderson, michael morales, jeniffer mix. So [facility] [phone].    ATENCIÓN: Si habla español, tiene a gutiérrez disposición servicios gratuitos de asistencia lingüística. Llame al [phone].    We comply with applicable federal civil rights laws and [place] laws. We do not discriminate on the basis of race, color, national origin, age, disability, sex, sexual orientation, or gender identity.            After Visit Summary       This is your record. Keep this with you and show to your community pharmacist(s) and doctor(s) at your next visit.

## 2018-04-02 NOTE — ED PROVIDER NOTES
History   No chief complaint on file.    HPI  Jennifer Voss is a 33 year old female who has a history of depression and anxiety presents to the ED for evaluation of abdominal pain. Patient states her pain began 1 week ago intermittenly, although this morning she awoke with more severe pain.  After using the restroom this evening her pain became sharp shooting from the front of her abdomen to the back.  She rates her pain 3/10 at this time in the ED.  Her pain is located in the right lower quadrant.  Coughing and movements make the pain more severe.   Patient felt flushed and sweaty on her way here.    Patient denies any chance of pregnancy, as she is 18 days into her cycle.  Her last bowel movement was this morning, small in size.  Patient had a laparoscopic surgery on 3/21/18 for evaluation of ectopic pregnancy.    Social History: Patient is here with her mother.  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       Problem List:    Patient Active Problem List    Diagnosis Date Noted     History of poor pregnancy outcome 01/16/2018     Priority: Medium     Left tubal pregnancy without intrauterine pregnancy 01/05/2018     Priority: Medium     Abnormal human chorionic gonadotropin (hCG) 01/05/2018     Priority: Medium     Cervical high risk HPV (human papillomavirus) test positive 07/24/2015     Priority: Medium     7/24/15: Pap - NIL, + HR HPV 16. Plan colp  1/18/16: [place] Bx - MARIANO 2. ECC - benign. Plan LEEP  3/28/16: LEEP - benign. Plan cotest in 1 year.   7/19/17: Pap - NIL/neg HPV. Pap+HPV in 1 year.         24 HR INFO GIVEN 07/16/2012     Priority: Medium     EMERGENCY CARE PLAN  Presenting Problem Signs and Symptoms Treatment Plan    Questions or conerns during clinic hours    I will call the clinic directly     Questions or conerns outside clinic hours    I will call the 24 hour nurse line at [phone]    Patient needs to schedule an appointment    I will call the 24 hour scheduling team  at [phone] or clinic directly    Same day treatment     I will call the clinic first, nurse line if after hours, urgent care and express care if needed                                 Tobacco abuse 07/16/2012     Priority: Medium     Depression with anxiety 07/16/2012     Priority: Medium     Has been on paxil and lorazepam.  Discontinued medication 2013       GERD (gastroesophageal reflux disease) 07/16/2012     Priority: Medium     CARDIOVASCULAR SCREENING; LDL GOAL LESS THAN 160 10/31/2010     Priority: Medium        Past Medical History:    Past Medical History:   Diagnosis Date     Abnormal Pap smear of cervix 08/2003     Cervical high risk HPV (human papillomavirus) test positive 7/24/15     Chickenpox      Ectopic pregnancy      H/O chronic ear infection as a child     H/O colposcopy with cervical biopsy 1/18/16     Tonsillitis        Past Surgical History:    Past Surgical History:   Procedure Laterality Date     COLONOSCOPY N/A 8/1/2017    Procedure: COMBINED COLONOSCOPY, SINGLE OR MULTIPLE BIOPSY/POLYPECTOMY BY BIOPSY;  Colonoscopy;  Surgeon: Ramakrishna Epstein MD;  Location: [facility]     LAPAROSCOPIC EVACUATION ECTOPIC PREGNANCY N/A 7/24/2015    LSC right salpingectomy      LEEP TX, CERVICAL  3/2016    benign     MOUTH SURGERY      wisdom teeth     PE TUBES       TONSILLECTOMY         Family History:    Family History   Problem Relation Age of Onset     DIABETES Father      Hypertension Father      DIABETES Paternal Grandmother      Coronary Artery Disease Paternal Grandmother      MI     Aneurysm Paternal Grandmother      brain     Substance Abuse Brother      recovered drugs     Depression Brother      Anxiety Disorder Brother      Heart Failure Paternal Grandfather      CHF     Depression Mother        Social History:  Marital Status:  Single [1]  Social History   Substance Use Topics     Smoking status: Current Every Day Smoker     Packs/day: 0.10     Years: 10.00     Types: Cigarettes     Smokeless  "tobacco: Never Used      Comment: smoking 10cig/day - down to 1-2 cig/day with pregnancy     Alcohol use 0.0 oz/week     0 Standard drinks or equivalent per week      Comment: No alcohol since may.        Medications:      Prenatal Vit-Fe Fumarate-FA (PRENATAL PO)   albuterol (PROAIR HFA/PROVENTIL HFA/VENTOLIN HFA) 108 (90 BASE) MCG/ACT Inhaler   cyclobenzaprine (FLEXERIL) 10 MG tablet         Review of Systems    Physical Exam          Physical Exam    ED Course     ED Course     Procedures          {EKG done?:455672::\" \"}    Critical Care time:  {none or minutes:436157::\"none\"}  {Trauma Activation or Fall?:035736::\" \"}  {Sepsis/Septic Shock/Stemi/Stroke:383267::\" \"}         No results found for this or any previous visit (from the past 24 hour(s)).    Medications - No data to display  7:55 PM Patient Assessed.  Assessments & Plan (with Medical Decision Making)   Clinical Impression:    ED Course and Plan:       Disclaimer: This note consists of symbols derived from keyboarding, dictation and/or voice recognition software. As a result, there may be errors in the script that have gone undetected. Please consider this when interpreting information found in this chart.      I have reviewed the nursing notes.    I have reviewed the findings, diagnosis, plan and need for follow up with the patient.  {ED Addendum:662965::\" \"}    New Prescriptions    No medications on file       Final diagnoses:   None     This document serves as a record of the services and decisions personally performed and made by Stone Selby, *. It was created on his behalf by Gretel Prescott, a trained medical scribe. The creation of this document is based the provider's statements to the medical scribe.  Gretel Prescott 8:32 PM 4/1/2018    Provider:   The information in this document, created by the medical scribe for me, accurately reflects the services I personally performed and the decisions made by me. I have reviewed and approved this " document for accuracy prior to leaving the patient care area.  Stone Selby, * 8:32 PM 4/1/2018 4/1/2018   [facility] EMERGENCY DEPARTMENT

## 2018-04-02 NOTE — ED NOTES
Pt arrives ambulatory from triage with mom for RUQ pain and abdominal muscle pain worse today. Last BM today small and hard. Had lap evaluation of ovaries post ectopic pregnancy X2. Ibuprofen at 1830, didn't touch pain. Denies n/v/d. No fevers.

## 2018-04-02 NOTE — ED PROVIDER NOTES
History     Chief Complaint   Patient presents with     Abdominal Pain     RUQ pain and abdominal muscle pain X1 week, worse today.      HPI  Jennifer Voss is a 33 year old female with a history of poor pregnancy outcome- (d 2 spontaneous abortions with an ectopic pregnancy in 2015 with laparoscopic salpingectomy who presents for evaluation for abdominal pain.  She reports over the course of the week she has had intermittent abdominal pain and discomfort this afternoon before ED arrival she reported sharp pain in the lower abdomen more pronounced over the left lower abdomen.  She reports no vaginal bleeding or discharge.  Last menstrual period was about 2 3 weeks ago.  No back pain no fever.    Problem List:    Patient Active Problem List    Diagnosis Date Noted     History of poor pregnancy outcome 01/16/2018     Priority: Medium     Left tubal pregnancy without intrauterine pregnancy 01/05/2018     Priority: Medium     Abnormal human chorionic gonadotropin (hCG) 01/05/2018     Priority: Medium     Cervical high risk HPV (human papillomavirus) test positive 07/24/2015     Priority: Medium     7/24/15: Pap - NIL, + HR HPV 16. Plan colp  1/18/16: [place] Bx - MARIANO 2. ECC - benign. Plan LEEP  3/28/16: LEEP - benign. Plan cotest in 1 year.   7/19/17: Pap - NIL/neg HPV. Pap+HPV in 1 year.         24 HR INFO GIVEN 07/16/2012     Priority: Medium     EMERGENCY CARE PLAN  Presenting Problem Signs and Symptoms Treatment Plan    Questions or conerns during clinic hours    I will call the clinic directly     Questions or conerns outside clinic hours    I will call the 24 hour nurse line at [phone]    Patient needs to schedule an appointment    I will call the 24 hour scheduling team at [phone] or clinic directly    Same day treatment     I will call the clinic first, nurse line if after hours, urgent care and express care if needed                                 Tobacco abuse 07/16/2012     Priority: Medium      Depression with anxiety 07/16/2012     Priority: Medium     Has been on paxil and lorazepam.  Discontinued medication 2013       GERD (gastroesophageal reflux disease) 07/16/2012     Priority: Medium     CARDIOVASCULAR SCREENING; LDL GOAL LESS THAN 160 10/31/2010     Priority: Medium        Past Medical History:    Past Medical History:   Diagnosis Date     Abnormal Pap smear of cervix 08/2003     Cervical high risk HPV (human papillomavirus) test positive 7/24/15     Chickenpox      Ectopic pregnancy      H/O chronic ear infection as a child     H/O colposcopy with cervical biopsy 1/18/16     Tonsillitis        Past Surgical History:    Past Surgical History:   Procedure Laterality Date     COLONOSCOPY N/A 8/1/2017    Procedure: COMBINED COLONOSCOPY, SINGLE OR MULTIPLE BIOPSY/POLYPECTOMY BY BIOPSY;  Colonoscopy;  Surgeon: Ramakrishna Epstein MD;  Location: [facility]     LAPAROSCOPIC EVACUATION ECTOPIC PREGNANCY N/A 7/24/2015    LSC right salpingectomy      LEEP TX, CERVICAL  3/2016    benign     MOUTH SURGERY      wisdom teeth     PE TUBES       TONSILLECTOMY         Family History:    Family History   Problem Relation Age of Onset     DIABETES Father      Hypertension Father      DIABETES Paternal Grandmother      Coronary Artery Disease Paternal Grandmother      MI     Aneurysm Paternal Grandmother      brain     Substance Abuse Brother      recovered drugs     Depression Brother      Anxiety Disorder Brother      Heart Failure Paternal Grandfather      CHF     Depression Mother        Social History:  Marital Status:  Single [1]  Social History   Substance Use Topics     Smoking status: Current Every Day Smoker     Packs/day: 0.10     Years: 10.00     Types: Cigarettes     Smokeless tobacco: Never Used      Comment: smoking 10cig/day - down to 1-2 cig/day with pregnancy     Alcohol use 0.0 oz/week     0 Standard drinks or equivalent per week      Comment: No alcohol since may.        Medications:      Prenatal Vit-Fe  Fumarate-FA (PRENATAL PO)   albuterol (PROAIR HFA/PROVENTIL HFA/VENTOLIN HFA) 108 (90 BASE) MCG/ACT Inhaler   cyclobenzaprine (FLEXERIL) 10 MG tablet         Review of Systems   Constitutional: Negative.    HENT: Negative.    Eyes: Negative.    Respiratory: Negative.    Cardiovascular: Negative.    Gastrointestinal: Positive for abdominal pain.   Endocrine: Negative.    Genitourinary: Negative.    Musculoskeletal: Negative.    Skin: Negative.    Allergic/Immunologic: Negative.    Neurological: Negative.    Hematological: Negative.    Psychiatric/Behavioral: Negative.    All other systems reviewed and are negative.      Physical Exam   BP: 146/87  Pulse: 108  Temp: 99.1  F (37.3  C)  Resp: 16  Weight: 81.6 kg (180 lb)  SpO2: 99 %      Physical Exam   Constitutional: She is oriented to person, place, and time. She appears well-developed and well-nourished. No distress.   HENT:   Head: Normocephalic and atraumatic.   Eyes: Conjunctivae and EOM are normal. Pupils are equal, round, and reactive to light. Right eye exhibits no discharge. Left eye exhibits no discharge. No scleral icterus.   Neck: Normal range of motion. Neck supple. No JVD present. No tracheal deviation present. No thyromegaly present.   Cardiovascular: Normal rate and regular rhythm.  Exam reveals no gallop and no friction rub.    No murmur heard.  Pulmonary/Chest: Effort normal and breath sounds normal. No stridor. No respiratory distress. She has no wheezes. She has no rales. She exhibits no tenderness.   Abdominal: Soft. She exhibits no distension and no mass. There is tenderness in the right lower quadrant. There is no rebound and no guarding.       Lymphadenopathy:     She has no cervical adenopathy.   Neurological: She is alert and oriented to person, place, and time.   Skin: No rash noted. She is not diaphoretic. No erythema. No pallor.   Psychiatric: She has a normal mood and affect. Her behavior is normal. Judgment and thought content normal.        ED Course     ED Course     Procedures               Critical Care time:  none               Results for orders placed or performed during the hospital encounter of 04/01/18 (from the past 24 hour(s))   UA reflex to Microscopic   Result Value Ref Range    Color Urine Yellow     Appearance Urine Cloudy     Glucose Urine Negative NEG^Negative mg/dL    Bilirubin Urine Negative NEG^Negative    Ketones Urine Negative NEG^Negative mg/dL    Specific Gravity Urine 1.034 1.003 - 1.035    Blood Urine Negative NEG^Negative    pH Urine 5.0 5.0 - 7.0 pH    Protein Albumin Urine 30 (A) NEG^Negative mg/dL    Urobilinogen mg/dL 2.0 0.0 - 2.0 mg/dL    Nitrite Urine Negative NEG^Negative    Leukocyte Esterase Urine Moderate (A) NEG^Negative    Source Midstream Urine     RBC Urine 5 (H) 0 - 2 /HPF    WBC Urine 26 (H) 0 - 5 /HPF    Bacteria Urine Few (A) NEG^Negative /HPF    Squamous Epithelial /HPF Urine 36 (H) 0 - 1 /HPF    Mucous Urine Present (A) NEG^Negative /LPF   HCG qualitative urine   Result Value Ref Range    HCG Qual Urine Negative NEG^Negative   CBC with platelets differential   Result Value Ref Range    WBC 17.3 (H) 4.0 - 11.0 10e9/L    RBC Count 4.43 3.8 - 5.2 10e12/L    Hemoglobin 12.7 11.7 - 15.7 g/dL    Hematocrit 38.2 35.0 - 47.0 %    MCV 86 78 - 100 fl    MCH 28.7 26.5 - 33.0 pg    MCHC 33.2 31.5 - 36.5 g/dL    RDW 13.4 10.0 - 15.0 %    Platelet Count 329 150 - 450 10e9/L    Diff Method Automated Method     % Neutrophils 78.2 %    % Lymphocytes 13.2 %    % Monocytes 7.1 %    % Eosinophils 0.9 %    % Basophils 0.2 %    % Immature Granulocytes 0.4 %    Absolute Neutrophil 13.5 (H) 1.6 - 8.3 10e9/L    Absolute Lymphocytes 2.3 0.8 - 5.3 10e9/L    Absolute Monocytes 1.2 0.0 - 1.3 10e9/L    Absolute Eosinophils 0.2 0.0 - 0.7 10e9/L    Absolute Basophils 0.0 0.0 - 0.2 10e9/L    Abs Immature Granulocytes 0.1 0 - 0.4 10e9/L   Comprehensive metabolic panel   Result Value Ref Range    Sodium 139 133 - 144 mmol/L     Potassium 3.6 3.4 - 5.3 mmol/L    Chloride 106 94 - 109 mmol/L    Carbon Dioxide 24 20 - 32 mmol/L    Anion Gap 9 3 - 14 mmol/L    Glucose 134 (H) 70 - 99 mg/dL    Urea Nitrogen 13 7 - 30 mg/dL    Creatinine 0.66 0.52 - 1.04 mg/dL    GFR Estimate >90 >60 mL/min/1.7m2    GFR Estimate If Black >90 >60 mL/min/1.7m2    Calcium 7.9 (L) 8.5 - 10.1 mg/dL    Bilirubin Total 0.5 0.2 - 1.3 mg/dL    Albumin 3.1 (L) 3.4 - 5.0 g/dL    Protein Total 6.3 (L) 6.8 - 8.8 g/dL    Alkaline Phosphatase 59 40 - 150 U/L    ALT 9 0 - 50 U/L    AST 6 0 - 45 U/L   CT Abdomen Pelvis w Contrast    Narrative    CT ABDOMEN PELVIS W CONTRAST 4/1/2018 8:44 PM    HISTORY: Right lower abdominal pain.    CONTRAST:  88 ml's isovue 370.    TECHNIQUE: CT of the abdomen and pelvis is performed with IV contrast.    Routine assessed structures include the liver, spleen, pancreas,  adrenal glands, and kidneys. Other assessed structures include the  retroperitoneum, abdominal aorta, visualized gastrointestinal tract,  and abdominal wall. Intrapelvic anatomy is also assessed.    Radiation dose for this scan is reduced using automated exposure  control, adjustment of the mA and/or kV according to patient size, or  iterative reconstruction technique.    COMPARISON: None.    FINDINGS:    Abdomen: No dilated bowel loops is seen. The appendix is partially  visualized and within normal limits.    Pelvis:  Left ovary is enlarged measuring 7.2 x 3.8 x 6.0 cm. It  contains a 3.6 cm cyst as well as a few other smaller complex cystic  structures, including a suspected collapsed 2.8 cm cyst. The right  ovary is normal. There is some mild inflammatory change in the pelvis  centered in the region of the left adnexa. There is trace free pelvic  fluid. A probable 2.2 cm fibroid is present.      Impression    IMPRESSION:  The left ovary is enlarged and contains at least one 3.6  cm cyst. There is some mild inflammatory change in the pelvis,  especially the left adnexa. This  is nonspecific. It could relate to  partial cyst rupture. Ovarian torsion is not completely excluded, in  the appropriate clinical setting.    YOSELIN GIBBONS MD   Pelvic Ultrasound (US) with doppler imaging (r/o ovarian torsion)    Narrative    ULTRASOUND PELVIS DOPPLER   WITH TRANSVAGINAL IMAGING  4/1/2018 10:59  PM     HISTORY: lower abdominal pain. intermittent x 1 week. R> L. CT imaging  cannot exclude torsion vs cyst rupture. Eval for torsion.;     COMPARISON: CT dated 4/1/2018    TECHNIQUE: Transvaginal images were performed to better evaluate the  patient's uterus, ovaries and endometrial stripe. Grayscale, color  Doppler, and Doppler spectral waveform analysis performed.    FINDINGS:  The uterus measures 7.5 x 4.4 x 5 cm in size. There is a  2.4 cm intramural myoma in the right lower uterine body. Endometrium  measures 0.5 cm in thickness. The right ovary measures 3.5 x 2.4 x 2.2  cm. Arterial and venous blood flow is identified. Left ovary measures  5.9 x 4.9 x 5.8 cm. There are complex areas in the left ovary. 1  measures 3.9 x 2.9 x 3.4. A second measures 2 x 0.9 x 1.5. A third  measures 2.4 x 1.3 x 1.8 cm. Blood flow is identified in the left  ovary. Arterial and venous blood flow is seen. No free fluid.      Impression    IMPRESSION:   1. No ovarian torsion is identified. Blood flow is seen in both  ovaries.  2. Complex areas are seen in the left ovary. These are nonspecific.  These could represent hemorrhagic follicles but they could also be due  to endometriomas.  3. Small uterine fibroid.    CHANDLER ZAFAR MD       Medications   0.9% sodium chloride BOLUS (0 mLs Intravenous Stopped 4/1/18 2047)   sodium chloride 0.9 % bag 500mL for CT scan flush use (75 mLs As instructed Given 4/1/18 2037)   iopamidol (ISOVUE-370) solution 88 mL (88 mLs Intravenous Given 4/1/18 2036)   LORazepam (ATIVAN) tablet 1 mg (1 mg Oral Given 4/1/18 2221)     ED medications:  Medications   0.9% sodium chloride BOLUS (0 mLs  Intravenous Stopped 4/1/18 2047)   sodium chloride 0.9 % bag 500mL for CT scan flush use (75 mLs As instructed Given 4/1/18 2037)   iopamidol (ISOVUE-370) solution 88 mL (88 mLs Intravenous Given 4/1/18 2036)   LORazepam (ATIVAN) tablet 1 mg (1 mg Oral Given 4/1/18 2221)         ED labs and imaging:  Results for orders placed or performed during the hospital encounter of 04/01/18   CT Abdomen Pelvis w Contrast    Narrative    CT ABDOMEN PELVIS W CONTRAST 4/1/2018 8:44 PM    HISTORY: Right lower abdominal pain.    CONTRAST:  88 ml's isovue 370.    TECHNIQUE: CT of the abdomen and pelvis is performed with IV contrast.    Routine assessed structures include the liver, spleen, pancreas,  adrenal glands, and kidneys. Other assessed structures include the  retroperitoneum, abdominal aorta, visualized gastrointestinal tract,  and abdominal wall. Intrapelvic anatomy is also assessed.    Radiation dose for this scan is reduced using automated exposure  control, adjustment of the mA and/or kV according to patient size, or  iterative reconstruction technique.    COMPARISON: None.    FINDINGS:    Abdomen: No dilated bowel loops is seen. The appendix is partially  visualized and within normal limits.    Pelvis:  Left ovary is enlarged measuring 7.2 x 3.8 x 6.0 cm. It  contains a 3.6 cm cyst as well as a few other smaller complex cystic  structures, including a suspected collapsed 2.8 cm cyst. The right  ovary is normal. There is some mild inflammatory change in the pelvis  centered in the region of the left adnexa. There is trace free pelvic  fluid. A probable 2.2 cm fibroid is present.      Impression    IMPRESSION:  The left ovary is enlarged and contains at least one 3.6  cm cyst. There is some mild inflammatory change in the pelvis,  especially the left adnexa. This is nonspecific. It could relate to  partial cyst rupture. Ovarian torsion is not completely excluded, in  the appropriate clinical setting.    YOSELIN GIBBONS MD    Pelvic Ultrasound (US) with doppler imaging (r/o ovarian torsion)    Narrative    ULTRASOUND PELVIS DOPPLER   WITH TRANSVAGINAL IMAGING  4/1/2018 10:59  PM     HISTORY: lower abdominal pain. intermittent x 1 week. R> L. CT imaging  cannot exclude torsion vs cyst rupture. Eval for torsion.;     COMPARISON: CT dated 4/1/2018    TECHNIQUE: Transvaginal images were performed to better evaluate the  patient's uterus, ovaries and endometrial stripe. Grayscale, color  Doppler, and Doppler spectral waveform analysis performed.    FINDINGS:  The uterus measures 7.5 x 4.4 x 5 cm in size. There is a  2.4 cm intramural myoma in the right lower uterine body. Endometrium  measures 0.5 cm in thickness. The right ovary measures 3.5 x 2.4 x 2.2  cm. Arterial and venous blood flow is identified. Left ovary measures  5.9 x 4.9 x 5.8 cm. There are complex areas in the left ovary. 1  measures 3.9 x 2.9 x 3.4. A second measures 2 x 0.9 x 1.5. A third  measures 2.4 x 1.3 x 1.8 cm. Blood flow is identified in the left  ovary. Arterial and venous blood flow is seen. No free fluid.      Impression    IMPRESSION:   1. No ovarian torsion is identified. Blood flow is seen in both  ovaries.  2. Complex areas are seen in the left ovary. These are nonspecific.  These could represent hemorrhagic follicles but they could also be due  to endometriomas.  3. Small uterine fibroid.    CHANDLER ZAFAR MD   UA reflex to Microscopic   Result Value Ref Range    Color Urine Yellow     Appearance Urine Cloudy     Glucose Urine Negative NEG^Negative mg/dL    Bilirubin Urine Negative NEG^Negative    Ketones Urine Negative NEG^Negative mg/dL    Specific Gravity Urine 1.034 1.003 - 1.035    Blood Urine Negative NEG^Negative    pH Urine 5.0 5.0 - 7.0 pH    Protein Albumin Urine 30 (A) NEG^Negative mg/dL    Urobilinogen mg/dL 2.0 0.0 - 2.0 mg/dL    Nitrite Urine Negative NEG^Negative    Leukocyte Esterase Urine Moderate (A) NEG^Negative    Source Midstream Urine      RBC Urine 5 (H) 0 - 2 /HPF    WBC Urine 26 (H) 0 - 5 /HPF    Bacteria Urine Few (A) NEG^Negative /HPF    Squamous Epithelial /HPF Urine 36 (H) 0 - 1 /HPF    Mucous Urine Present (A) NEG^Negative /LPF   HCG qualitative urine   Result Value Ref Range    HCG Qual Urine Negative NEG^Negative   CBC with platelets differential   Result Value Ref Range    WBC 17.3 (H) 4.0 - 11.0 10e9/L    RBC Count 4.43 3.8 - 5.2 10e12/L    Hemoglobin 12.7 11.7 - 15.7 g/dL    Hematocrit 38.2 35.0 - 47.0 %    MCV 86 78 - 100 fl    MCH 28.7 26.5 - 33.0 pg    MCHC 33.2 31.5 - 36.5 g/dL    RDW 13.4 10.0 - 15.0 %    Platelet Count 329 150 - 450 10e9/L    Diff Method Automated Method     % Neutrophils 78.2 %    % Lymphocytes 13.2 %    % Monocytes 7.1 %    % Eosinophils 0.9 %    % Basophils 0.2 %    % Immature Granulocytes 0.4 %    Absolute Neutrophil 13.5 (H) 1.6 - 8.3 10e9/L    Absolute Lymphocytes 2.3 0.8 - 5.3 10e9/L    Absolute Monocytes 1.2 0.0 - 1.3 10e9/L    Absolute Eosinophils 0.2 0.0 - 0.7 10e9/L    Absolute Basophils 0.0 0.0 - 0.2 10e9/L    Abs Immature Granulocytes 0.1 0 - 0.4 10e9/L   Comprehensive metabolic panel   Result Value Ref Range    Sodium 139 133 - 144 mmol/L    Potassium 3.6 3.4 - 5.3 mmol/L    Chloride 106 94 - 109 mmol/L    Carbon Dioxide 24 20 - 32 mmol/L    Anion Gap 9 3 - 14 mmol/L    Glucose 134 (H) 70 - 99 mg/dL    Urea Nitrogen 13 7 - 30 mg/dL    Creatinine 0.66 0.52 - 1.04 mg/dL    GFR Estimate >90 >60 mL/min/1.7m2    GFR Estimate If Black >90 >60 mL/min/1.7m2    Calcium 7.9 (L) 8.5 - 10.1 mg/dL    Bilirubin Total 0.5 0.2 - 1.3 mg/dL    Albumin 3.1 (L) 3.4 - 5.0 g/dL    Protein Total 6.3 (L) 6.8 - 8.8 g/dL    Alkaline Phosphatase 59 40 - 150 U/L    ALT 9 0 - 50 U/L    AST 6 0 - 45 U/L       ED Vitals:  Vitals:    04/01/18 2122 04/01/18 2123 04/01/18 2145 04/01/18 2156   BP: 120/78      Pulse:       Resp:       Temp:       TempSrc:       SpO2:  99% 97% 99%   Weight:         Assessments & Plan (with Medical  Decision Making)   Clinical impression: 33-year-old female with a history of spontaneous portion with 2 ectopic pregnancies with a right salpingectomy without oophorectomy who presented with 1 week history of intermittent right lower quadrant abdominal pain.  The exact cause of her pain and discomfort is unclear.   She had some sharp shooting pain and crampiness over the lower abdomen and both sides tonight and elected to come in by private car with her mother.  She has no fever she reports no back pain no urinary symptoms.  She reports no vaginal bleeding or discharge.  She reports she takes active medications she smokes half a pack per day.  On my exam she is in no acute distress.  She is an obese female with normal bowel sounds without abdominal distention no rebound or guarding she has some mild tenderness in the right lower quadrant      ED course and plan:  I reviewed her medical records including her last visit in the OB clinic on 2018.  With her history of ectopic pregnancy and spontaneous  in a female of childbearing age her urine pregnancy and urinalysis was obtained.  We discussed differential diagnosis.  Because she has had a salpingectomy and has pain in the right lower quadrant a CT of the abdomen was obtained to exclude acute appendicitis, abdominal obstruction any intra-abdominal catastrophe process.  Today she has what appears to be a ruptured left ovarian cyst measuring 3.6 cm with some mild inflammatory changes noted in the pelvis around the left adnexa.  It was felt to be nonspecific per the interpreting radiologist however there is concern that ovarian torsion cannot be excluded.  There is no acute findings noted in the right side of the abdomen the appendix is visualized partially and reported to be normal where visualized by the radiologist.  She became emotional when I mentioned to her that she may have a ruptured ovarian cyst and torsion was suggested by the interpreting  radiologist based on CT imaging although her exam is not concerning for ovarian torsion however she is presenting with lower abdominal pain that initially began in the right side now involve the left side.  She became very emotional and tearful.  She requested to go out to smoke.  She was told it is against hospital policy.  I offered her something to help her relax given her concern that she has ovarian torsion I was worried she would need surgery.  After period of reviewing plan of care she was willing to proceed with pelvic ultrasound.  She was given oral lorazepam.  I elected to culture her urine because she does not have any urinary symptoms but had some pyuria and urinalysis moderate leukocyte esterase, 26 white cells per high-power field squamous cells were also present 5 red cells per high-power field negative urine pregnancy.  I did not discharge patient home with empiric antibiotics should be called by the ED follow-up nurses if her urine culture is positive and need for antibiotic therapy.  The exact cause of her lower abdominal pain is unclear. Follow-up in clinic in 1-3 days as needed.      Disclaimer: This note consists of symbols derived from keyboarding, dictation and/or voice recognition software. As a result, there may be errors in the script that have gone undetected. Please consider this when interpreting information found in this chart.  I have reviewed the nursing notes.    I have reviewed the findings, diagnosis, plan and need for follow up with the patient.       New Prescriptions    No medications on file       Final diagnoses:   Abdominal pain, left lower quadrant   Abdominal pain, right lower quadrant       4/1/2018   [facility] EMERGENCY DEPARTMENT     Stone Selby MD  04/01/18 [id number]

## 2018-04-02 NOTE — ED NOTES
Back from US, much more calm and relaxed. No more tears. Awaiting RAD read, and pt is aware. IV discontinued per request.

## 2018-04-03 LAB
BACTERIA SPEC CULT: NORMAL
Lab: NORMAL
SPECIMEN SOURCE: NORMAL

## 2018-04-30 ENCOUNTER — OFFICE VISIT (OUTPATIENT)
Dept: FAMILY MEDICINE | Facility: CLINIC | Age: 33
End: 2018-04-30
Payer: COMMERCIAL

## 2018-04-30 VITALS
BODY MASS INDEX: 30.31 KG/M2 | TEMPERATURE: 98.6 F | WEIGHT: 176.6 LBS | RESPIRATION RATE: 12 BRPM | HEART RATE: 80 BPM | SYSTOLIC BLOOD PRESSURE: 122 MMHG | DIASTOLIC BLOOD PRESSURE: 70 MMHG

## 2018-04-30 DIAGNOSIS — F41.9 ANXIETY: Primary | ICD-10-CM

## 2018-04-30 DIAGNOSIS — F34.1 DYSTHYMIA: ICD-10-CM

## 2018-04-30 LAB
BASOPHILS # BLD AUTO: 0 10E9/L (ref 0–0.2)
BASOPHILS NFR BLD AUTO: 0.3 %
DIFFERENTIAL METHOD BLD: ABNORMAL
EOSINOPHIL # BLD AUTO: 0.2 10E9/L (ref 0–0.7)
EOSINOPHIL NFR BLD AUTO: 1.6 %
ERYTHROCYTE [DISTWIDTH] IN BLOOD BY AUTOMATED COUNT: 13.9 % (ref 10–15)
HCT VFR BLD AUTO: 42.6 % (ref 35–47)
HGB BLD-MCNC: 13.9 G/DL (ref 11.7–15.7)
LYMPHOCYTES # BLD AUTO: 2.8 10E9/L (ref 0.8–5.3)
LYMPHOCYTES NFR BLD AUTO: 24.4 %
MCH RBC QN AUTO: 28.2 PG (ref 26.5–33)
MCHC RBC AUTO-ENTMCNC: 32.6 G/DL (ref 31.5–36.5)
MCV RBC AUTO: 86 FL (ref 78–100)
MONOCYTES # BLD AUTO: 1 10E9/L (ref 0–1.3)
MONOCYTES NFR BLD AUTO: 8.4 %
NEUTROPHILS # BLD AUTO: 7.5 10E9/L (ref 1.6–8.3)
NEUTROPHILS NFR BLD AUTO: 65.3 %
PLATELET # BLD AUTO: 305 10E9/L (ref 150–450)
RBC # BLD AUTO: 4.93 10E12/L (ref 3.8–5.2)
WBC # BLD AUTO: 11.5 10E9/L (ref 4–11)

## 2018-04-30 PROCEDURE — 99213 OFFICE O/P EST LOW 20 MIN: CPT | Performed by: NURSE PRACTITIONER

## 2018-04-30 PROCEDURE — 85025 COMPLETE CBC W/AUTO DIFF WBC: CPT | Performed by: NURSE PRACTITIONER

## 2018-04-30 PROCEDURE — 80053 COMPREHEN METABOLIC PANEL: CPT | Performed by: NURSE PRACTITIONER

## 2018-04-30 PROCEDURE — 82306 VITAMIN D 25 HYDROXY: CPT | Performed by: NURSE PRACTITIONER

## 2018-04-30 PROCEDURE — 84443 ASSAY THYROID STIM HORMONE: CPT | Performed by: NURSE PRACTITIONER

## 2018-04-30 PROCEDURE — 36415 COLL VENOUS BLD VENIPUNCTURE: CPT | Performed by: NURSE PRACTITIONER

## 2018-04-30 RX ORDER — PAROXETINE 10 MG/1
TABLET, FILM COATED ORAL
Qty: 30 TABLET | Refills: 1 | Status: SHIPPED | OUTPATIENT
Start: 2018-04-30 | End: 2018-06-26

## 2018-04-30 RX ORDER — LORAZEPAM 0.5 MG/1
0.5 TABLET ORAL EVERY 8 HOURS PRN
Qty: 20 TABLET | Refills: 0 | Status: SHIPPED | OUTPATIENT
Start: 2018-04-30 | End: 2018-11-08

## 2018-04-30 ASSESSMENT — ENCOUNTER SYMPTOMS
SLEEP DISTURBANCE: 1
CONSTIPATION: 0
CHILLS: 0
LIGHT-HEADEDNESS: 0
NERVOUS/ANXIOUS: 1
EYE DISCHARGE: 0
SHORTNESS OF BREATH: 1
FEVER: 0
CHEST TIGHTNESS: 1
SINUS PRESSURE: 0
EYE ITCHING: 0
RHINORRHEA: 0
DIZZINESS: 0
COUGH: 0
NAUSEA: 0
SORE THROAT: 0
DIARRHEA: 0
HEADACHES: 0
DIAPHORESIS: 1
FATIGUE: 0
WHEEZING: 1

## 2018-04-30 ASSESSMENT — ANXIETY QUESTIONNAIRES
GAD7 TOTAL SCORE: 21
7. FEELING AFRAID AS IF SOMETHING AWFUL MIGHT HAPPEN: NEARLY EVERY DAY
2. NOT BEING ABLE TO STOP OR CONTROL WORRYING: NEARLY EVERY DAY
5. BEING SO RESTLESS THAT IT IS HARD TO SIT STILL: NEARLY EVERY DAY
1. FEELING NERVOUS, ANXIOUS, OR ON EDGE: NEARLY EVERY DAY
6. BECOMING EASILY ANNOYED OR IRRITABLE: NEARLY EVERY DAY
3. WORRYING TOO MUCH ABOUT DIFFERENT THINGS: NEARLY EVERY DAY

## 2018-04-30 ASSESSMENT — PAIN SCALES - GENERAL: PAINLEVEL: NO PAIN (0)

## 2018-04-30 ASSESSMENT — PATIENT HEALTH QUESTIONNAIRE - PHQ9: 5. POOR APPETITE OR OVEREATING: NEARLY EVERY DAY

## 2018-04-30 NOTE — PROGRESS NOTES
SUBJECTIVE:   Jennifer Voss is a 33 year old female who presents to clinic today for the following health issues:      Depression and Anxiety Follow-Up    Status since last visit: Worsened anxiety    Other associated symptoms:panic attacks, unable to stop crying, unable to go to work    Complicating factors:     Significant life event: Ectopic pregnancy in January    Current substance abuse: None    PHQ-9 8/23/2016 7/19/2017 4/30/2018   Total Score 10 4 13   Q9: Suicide Ideation Not at all Not at all Not at all     RACH-7 SCORE 8/23/2016 7/19/2017 4/30/2018   Total Score - - -   Total Score 11 20 21     PHQ-9  English  PHQ-9   Any Language  RACH-7  Suicide Assessment Five-step Evaluation and Treatment (SAFE-T)    Amount of exercise or physical activity: 4-5 days/week for an average of 45-60 minutes    Problems taking medications regularly: No    Medication side effects: none    Diet: avoids fast food      Problem list and histories reviewed & adjusted, as indicated.  Additional history: as documented    Current Outpatient Prescriptions   Medication Sig Dispense Refill     albuterol (PROAIR HFA/PROVENTIL HFA/VENTOLIN HFA) 108 (90 BASE) MCG/ACT Inhaler Inhale 2 puffs into the lungs every 6 hours as needed for shortness of breath / dyspnea or wheezing (Patient not taking: Reported on 1/5/2018) 1 Inhaler 0     cyclobenzaprine (FLEXERIL) 10 MG tablet Take 1 tablet (10 mg) by mouth 3 times daily as needed for muscle spasms 30 tablet 0     LORazepam (ATIVAN) 0.5 MG tablet Take 1 tablet (0.5 mg) by mouth every 8 hours as needed for anxiety 20 tablet 0     PARoxetine (PAXIL) 10 MG tablet Take 1 tab daily for 2 weeks and then increase to 2 tabs daily. 30 tablet 1     Prenatal Vit-Fe Fumarate-FA (PRENATAL PO)        Allergies   Allergen Reactions     Penicillins Nausea     Percocet [Oxycodone-Acetaminophen] Nausea     Ok with vicodin       Reviewed and updated as needed this visit by clinical staff  Tobacco  Allergies   Meds  Med Hx  Surg Hx  Fam Hx  Soc Hx      Reviewed and updated as needed this visit by Provider         Has had anxiety since HS. Has been on meds in the past. Has never been to couseling in the past. Depression in HS too. Unsure of any specific incident. Does sleep ok 3-4 nights. Other nights will wake often or toss and turn. No over the counter to help with sleep. Has history of sleep walking and nightmares. Unsure what meds was on in the past. Chart review reveals has been on paxil and lorazepam. Will feel fluttering in chest, sob, sweaty will get tightness in chest. Does work out 4-5 times per week. No thoughts of harming self or others.     Is not currently sexually active.     ROS:  Review of Systems   Constitutional: Positive for diaphoresis (with anxiety ). Negative for chills, fatigue and fever.   HENT: Negative for ear discharge, ear pain, hearing loss, rhinorrhea, sinus pressure and sore throat.    Eyes: Negative for discharge and itching.   Respiratory: Positive for chest tightness (with anxiety ), shortness of breath (with anxeity) and wheezing (with anxiety). Negative for cough.    Gastrointestinal: Negative for constipation, diarrhea and nausea.   Skin: Negative for rash.   Neurological: Negative for dizziness, light-headedness and headaches.   Psychiatric/Behavioral: Positive for sleep disturbance. The patient is nervous/anxious (with anxiety ).          OBJECTIVE:     /70 (BP Location: Right arm, Patient Position: Sitting, Cuff Size: Adult Regular)  Pulse 80  Temp 98.6  F (37  C) (Tympanic)  Resp 12  Wt 176 lb 9.6 oz (80.1 kg)  LMP 04/11/2018  BMI 30.31 kg/m2  Body mass index is 30.31 kg/(m^2).  Physical Exam  No PE completed, visit was 100% discussion   Good eye contact. Interacts with conversation. No thoughts of harming self or others.    ASSESSMENT/PLAN:   1. Anxiety  Treatment plan and medications reviewed and understood by the patient   Risks, benefits and potential side  effects (including sexual dysfunction and suicidal thoughs) of antidepressant/antianxiety medication reviewed with patient  Reviewed the importance of medication compliance  Instructed to call or return with:  Worsening symptoms  Suicidal/homicidial ideation  Hallucinations or Delusions  Medication side effects   Plan to follow up in 2 months  - PARoxetine (PAXIL) 10 MG tablet; Take 1 tab daily for 2 weeks and then increase to 2 tabs daily.  Dispense: 30 tablet; Refill: 1  - LORazepam (ATIVAN) 0.5 MG tablet; Take 1 tablet (0.5 mg) by mouth every 8 hours as needed for anxiety  Dispense: 20 tablet; Refill: 0  - MENTAL HEALTH REFERRAL  - Adult; Outpatient Treatment; Individual/Couples/Family/Group Therapy/Health Psychology; [facility] [phone]; We will contact you to schedule the appointment or please call with any questions  - CBC with platelets differential  - Comprehensive metabolic panel  - TSH with free T4 reflex  - Vitamin D Deficiency    2. Dysthymia  Treatment plan and medications reviewed and understood by the patient   Risks, benefits and potential side effects (including sexual dysfunction and suicidal thoughs) of antidepressant/antianxiety medication reviewed with patient  Reviewed the importance of medication compliance  Instructed to call or return with:  Worsening symptoms  Suicidal/homicidial ideation  Hallucinations or Delusions  Medication side effects   Plan to follow up in 2 months  - PARoxetine (PAXIL) 10 MG tablet; Take 1 tab daily for 2 weeks and then increase to 2 tabs daily.  Dispense: 30 tablet; Refill: 1  - MENTAL HEALTH REFERRAL  - Adult; Outpatient Treatment; Individual/Couples/Family/Group Therapy/Health Psychology; [facility] [phone]; We will contact you to schedule the appointment or please call with any questions  - CBC with platelets differential  - Comprehensive metabolic panel  - TSH with free T4 reflex  - Vitamin D  Deficiency  GISEL Johnston [facility]    During this visit greater than 100 % of the 18 minutes for this appointment was spent in counseling and/or coordinating care of above stated issues.

## 2018-04-30 NOTE — MR AVS SNAPSHOT
After Visit Summary   4/30/2018    Jennifer Voss    MRN: [id number]           Patient Information     Date Of Birth          1985        Visit Information        Provider Department      4/30/2018 2:20 PM Shannon Almeida APRN [facility]        Today's Diagnoses     Anxiety    -  1      Care Instructions    Plan to follow up in 2 months or sooner if needed           Follow-ups after your visit        Additional Services     MENTAL HEALTH REFERRAL  - Adult; Outpatient Treatment; Individual/Couples/Family/Group Therapy/Health Psychology; [facility] [phone]; We will contact you to schedule the appointment or please call with any questions       All scheduling is subject to the client's specific insurance plan & benefits, provider/location availability, and provider clinical specialities.  Please arrive 15 minutes early for your first appointment and bring your completed paperwork.    Please be aware that coverage of these services is subject to the terms and limitations of your health insurance plan.  Call member services at your health plan with any benefit or coverage questions.                            Who to contact     Normal or non-critical lab and imaging results will be communicated to you by [employer], letter or phone within 4 business days after the clinic has received the results. If you do not hear from us within 7 days, please contact the clinic through [employer] or phone. If you have a critical or abnormal lab result, we will notify you by phone as soon as possible.  Submit refill requests through [employer] or call your pharmacy and they will forward the refill request to us. Please allow 3 business days for your refill to be completed.          If you need to speak with a  for additional information , please call: [phone]           Additional Information About Your Visit        MyChart Information     ePrep  gives you secure access to your electronic health record. If you see a primary care provider, you can also send messages to your care team and make appointments. If you have questions, please call your primary care clinic.  If you do not have a primary care provider, please call [phone] and they will assist you.        Care EveryWhere ID     This is your Care EveryWhere ID. This could be used by other organizations to access your [facility] medical records  [id number]        Your Vitals Were     Pulse Temperature Respirations Last Period BMI (Body Mass Index)       80 98.6  F (37  C) (Tympanic) 12 04/11/2018 30.31 kg/m2        Blood Pressure from Last 3 Encounters:   04/30/18 122/70   04/01/18 120/78   01/16/18 121/71    Weight from Last 3 Encounters:   04/30/18 176 lb 9.6 oz (80.1 kg)   04/01/18 180 lb (81.6 kg)   01/16/18 178 lb (80.7 kg)              We Performed the Following     CBC with platelets differential     Comprehensive metabolic panel     MENTAL HEALTH REFERRAL  - Adult; Outpatient Treatment; Individual/Couples/Family/Group Therapy/Health Psychology; [facility]: [facility] [phone]; We will contact you to schedule the appointment or please call with any questions     TSH with free T4 reflex     Vitamin D Deficiency          Today's Medication Changes          These changes are accurate as of 4/30/18  2:47 PM.  If you have any questions, ask your nurse or doctor.               Start taking these medicines.        Dose/Directions    LORazepam 0.5 MG tablet   Commonly known as:  ATIVAN   Used for:  Anxiety   Started by:  Shannon Almeida APRN CNP        Dose:  0.5 mg   Take 1 tablet (0.5 mg) by mouth every 8 hours as needed for anxiety   Quantity:  20 tablet   Refills:  0       PARoxetine 10 MG tablet   Commonly known as:  PAXIL   Used for:  Anxiety   Started by:  Shannon Almeida APRN CNP        Take 1 tab daily for 2 weeks and then increase to 2 tabs daily.   Quantity:   30 tablet   Refills:  1            Where to get your medicines      These medications were sent to [facility] [address] [place], [place] - [address]  [address] [facility] [address]     Phone:  [phone]     PARoxetine 10 MG tablet         Some of these will need a paper prescription and others can be bought over the counter.  Ask your nurse if you have questions.     Bring a paper prescription for each of these medications     LORazepam 0.5 MG tablet                Primary Care Provider Office Phone # Fax #    Fanny Camacho [phone] [address] [address]        Equal Access to Services     [facility]: Hadii aad ku hadasho Soomaali, waaxda luqadaha, qaybta kaalmada adeegyada, jeniffer mahan . So [facility] [phone].    ATENCIÓN: Si habla español, tiene a gutiérrez disposición servicios gratuitos de asistencia lingüística. [facility] [phone].    We comply with applicable federal civil rights laws and [place] laws. We do not discriminate on the basis of race, color, national origin, age, disability, sex, sexual orientation, or gender identity.            Thank you!     Thank you for choosing [facility]  for your care. Our goal is always to provide you with excellent care. Hearing back from our patients is one way we can continue to improve our services. Please take a few minutes to complete the written survey that you may receive in the mail after your visit with us. Thank you!             Your Updated Medication List - Protect others around you: Learn how to safely use, store and throw away your medicines at www.disposemymeds.org.          This list is accurate as of 4/30/18  2:47 PM.  Always use your most recent med list.                   Brand Name Dispense Instructions for use Diagnosis    albuterol 108 (90 Base) MCG/ACT Inhaler    PROAIR HFA/PROVENTIL HFA/VENTOLIN HFA    1 Inhaler    Inhale 2 puffs into the lungs  every 6 hours as needed for shortness of breath / dyspnea or wheezing    Acute bronchitis, unspecified organism       cyclobenzaprine 10 MG tablet    FLEXERIL    30 tablet    Take 1 tablet (10 mg) by mouth 3 times daily as needed for muscle spasms    Tension-type headache, not intractable, unspecified chronicity pattern       LORazepam 0.5 MG tablet    ATIVAN    20 tablet    Take 1 tablet (0.5 mg) by mouth every 8 hours as needed for anxiety    Anxiety       PARoxetine 10 MG tablet    PAXIL    30 tablet    Take 1 tab daily for 2 weeks and then increase to 2 tabs daily.    Anxiety       PRENATAL PO

## 2018-04-30 NOTE — NURSING NOTE
"Chief Complaint   Patient presents with     Anxiety       Initial /70 (BP Location: Right arm, Patient Position: Sitting, Cuff Size: Adult Regular)  Pulse 80  Temp 98.6  F (37  C) (Tympanic)  Resp 12  Wt 176 lb 9.6 oz (80.1 kg)  LMP 04/11/2018  BMI 30.31 kg/m2 Estimated body mass index is 30.31 kg/(m^2) as calculated from the following:    Height as of 1/16/18: 5' 4\" (1.626 m).    Weight as of this encounter: 176 lb 9.6 oz (80.1 kg).  Medication Reconciliation: complete     April MAYELA Ornelas      "

## 2018-05-01 LAB
ALBUMIN SERPL-MCNC: 3.2 G/DL (ref 3.4–5)
ALP SERPL-CCNC: 54 U/L (ref 40–150)
ALT SERPL W P-5'-P-CCNC: 13 U/L (ref 0–50)
ANION GAP SERPL CALCULATED.3IONS-SCNC: 5 MMOL/L (ref 3–14)
AST SERPL W P-5'-P-CCNC: 13 U/L (ref 0–45)
BILIRUB SERPL-MCNC: 0.2 MG/DL (ref 0.2–1.3)
BUN SERPL-MCNC: 11 MG/DL (ref 7–30)
CALCIUM SERPL-MCNC: 8.5 MG/DL (ref 8.5–10.1)
CHLORIDE SERPL-SCNC: 107 MMOL/L (ref 94–109)
CO2 SERPL-SCNC: 25 MMOL/L (ref 20–32)
CREAT SERPL-MCNC: 0.61 MG/DL (ref 0.52–1.04)
GFR SERPL CREATININE-BSD FRML MDRD: >90 ML/MIN/1.7M2
GLUCOSE SERPL-MCNC: 105 MG/DL (ref 70–99)
POTASSIUM SERPL-SCNC: 4 MMOL/L (ref 3.4–5.3)
PROT SERPL-MCNC: 6.3 G/DL (ref 6.8–8.8)
SODIUM SERPL-SCNC: 137 MMOL/L (ref 133–144)
TSH SERPL DL<=0.005 MIU/L-ACNC: 1.37 MU/L (ref 0.4–4)

## 2018-05-01 ASSESSMENT — ANXIETY QUESTIONNAIRES: GAD7 TOTAL SCORE: 21

## 2018-05-01 ASSESSMENT — PATIENT HEALTH QUESTIONNAIRE - PHQ9: SUM OF ALL RESPONSES TO PHQ QUESTIONS 1-9: 13

## 2018-05-02 LAB — DEPRECATED CALCIDIOL+CALCIFEROL SERPL-MC: 19 UG/L (ref 20–75)

## 2018-05-02 NOTE — PROGRESS NOTES
You have Vitamin D deficiency. You need to start taking Vitamin D3 2000 units daily and plan to recheck your Vitamin D level again in 3 months.    Please call or email with any additional questions or concerns  GISEL Chappell CNP

## 2018-05-04 PROBLEM — F41.9 ANXIETY: Status: ACTIVE | Noted: 2018-05-04

## 2018-06-08 PROBLEM — F34.1 DYSTHYMIA: Status: ACTIVE | Noted: 2018-06-08

## 2018-06-26 ENCOUNTER — MYC MEDICAL ADVICE (OUTPATIENT)
Dept: FAMILY MEDICINE | Facility: CLINIC | Age: 33
End: 2018-06-26

## 2018-06-26 DIAGNOSIS — F34.1 DYSTHYMIA: ICD-10-CM

## 2018-06-26 DIAGNOSIS — F41.9 ANXIETY: ICD-10-CM

## 2018-06-26 RX ORDER — PAROXETINE 10 MG/1
10 TABLET, FILM COATED ORAL EVERY MORNING
Qty: 30 TABLET | Refills: 0 | Status: SHIPPED | OUTPATIENT
Start: 2018-06-26 | End: 2018-07-25

## 2018-07-25 ENCOUNTER — MYC MEDICAL ADVICE (OUTPATIENT)
Dept: FAMILY MEDICINE | Facility: CLINIC | Age: 33
End: 2018-07-25

## 2018-07-25 ENCOUNTER — E-VISIT (OUTPATIENT)
Dept: FAMILY MEDICINE | Facility: CLINIC | Age: 33
End: 2018-07-25
Payer: COMMERCIAL

## 2018-07-25 DIAGNOSIS — F41.9 ANXIETY: ICD-10-CM

## 2018-07-25 DIAGNOSIS — G44.209 TENSION-TYPE HEADACHE, NOT INTRACTABLE, UNSPECIFIED CHRONICITY PATTERN: ICD-10-CM

## 2018-07-25 DIAGNOSIS — J20.9 ACUTE BRONCHITIS, UNSPECIFIED ORGANISM: ICD-10-CM

## 2018-07-25 DIAGNOSIS — F34.1 DYSTHYMIA: ICD-10-CM

## 2018-07-25 PROCEDURE — 99444 ZZC PHYSICIAN ONLINE EVALUATION & MANAGEMENT SERVICE: CPT | Performed by: NURSE PRACTITIONER

## 2018-07-25 RX ORDER — CYCLOBENZAPRINE HCL 10 MG
10 TABLET ORAL 3 TIMES DAILY PRN
Qty: 30 TABLET | Refills: 0 | Status: SHIPPED | OUTPATIENT
Start: 2018-07-25 | End: 2019-01-09

## 2018-07-25 RX ORDER — ALBUTEROL SULFATE 90 UG/1
2 AEROSOL, METERED RESPIRATORY (INHALATION) EVERY 6 HOURS PRN
Qty: 1 INHALER | Refills: 0 | Status: SHIPPED | OUTPATIENT
Start: 2018-07-25 | End: 2018-10-08

## 2018-07-25 RX ORDER — PAROXETINE 10 MG/1
10 TABLET, FILM COATED ORAL EVERY MORNING
Qty: 90 TABLET | Refills: 1 | Status: SHIPPED | OUTPATIENT
Start: 2018-07-25 | End: 2018-11-08

## 2018-07-25 ASSESSMENT — ANXIETY QUESTIONNAIRES
3. WORRYING TOO MUCH ABOUT DIFFERENT THINGS: SEVERAL DAYS
6. BECOMING EASILY ANNOYED OR IRRITABLE: NOT AT ALL
1. FEELING NERVOUS, ANXIOUS, OR ON EDGE: SEVERAL DAYS
GAD7 TOTAL SCORE: 5
5. BEING SO RESTLESS THAT IT IS HARD TO SIT STILL: SEVERAL DAYS
7. FEELING AFRAID AS IF SOMETHING AWFUL MIGHT HAPPEN: NOT AT ALL
2. NOT BEING ABLE TO STOP OR CONTROL WORRYING: SEVERAL DAYS
7. FEELING AFRAID AS IF SOMETHING AWFUL MIGHT HAPPEN: NOT AT ALL
GAD7 TOTAL SCORE: 5
4. TROUBLE RELAXING: SEVERAL DAYS

## 2018-07-26 ASSESSMENT — ANXIETY QUESTIONNAIRES: GAD7 TOTAL SCORE: 5

## 2018-08-19 ENCOUNTER — HEALTH MAINTENANCE LETTER (OUTPATIENT)
Age: 33
End: 2018-08-19

## 2018-09-18 ENCOUNTER — TELEPHONE (OUTPATIENT)
Dept: FAMILY MEDICINE | Facility: CLINIC | Age: 33
End: 2018-09-18

## 2018-09-18 DIAGNOSIS — F34.1 DYSTHYMIA: Primary | ICD-10-CM

## 2018-09-18 NOTE — LETTER
My Depression Action Plan  Name: Jennifer Voss   Date of Birth 1985  Date: 9/18/2018    My doctor: Fanny Camacho   My clinic: [address] [id number] [phone]          GREEN    ZONE   Good Control    What it looks like:     Things are going generally well. You have normal up s and down s. You may even feel depressed from time to time, but bad moods usually last less than a day.   What you need to do:  1. Continue to care for yourself (see self care plan)  2. Check your depression survival kit and update it as needed  3. Follow your physician s recommendations including any medication.  4. Do not stop taking medication unless you consult with your physician first.           YELLOW         ZONE Getting Worse    What it looks like:     Depression is starting to interfere with your life.     It may be hard to get out of bed; you may be starting to isolate yourself from others.    Symptoms of depression are starting to last most all day and this has happened for several days.     You may have suicidal thoughts but they are not constant.   What you need to do:     1. Call your care team, your response to treatment will improve if you keep your care team informed of your progress. Yellow periods are signs an adjustment may need to be made.     2. Continue your self-care, even if you have to fake it!    3. Talk to someone in your support network    4. Open up your depression survival kit           RED    ZONE Medical Alert - Get Help    What it looks like:     Depression is seriously interfering with your life.     You may experience these or other symptoms: You can t get out of bed most days, can t work or engage in other necessary activities, you have trouble taking care of basic hygiene, or basic responsibilities, thoughts of suicide or death that will not go away, self-injurious behavior.     What you need to do:  1. Call your care team and  request a same-day appointment. If they are not available (weekends or after hours) call your local crisis line, emergency room or 911.            Depression Self Care Plan / Survival Kit    Self-Care for Depression  Here s the deal. Your body and mind are really not as separate as most people think.  What you do and think affects how you feel and how you feel influences what you do and think. This means if you do things that people who feel good do, it will help you feel better.  Sometimes this is all it takes.  There is also a place for medication and therapy depending on how severe your depression is, so be sure to consult with your medical provider and/ or Behavioral Health Consultant if your symptoms are worsening or not improving.     In order to better manage my stress, I will:    Exercise  Get some form of exercise, every day. This will help reduce pain and release endorphins, the  feel good  chemicals in your brain. This is almost as good as taking antidepressants!  This is not the same as joining a gym and then never going! (they count on that by the way ) It can be as simple as just going for a walk or doing some gardening, anything that will get you moving.      Hygiene   Maintain good hygiene (Get out of bed in the morning, Make your bed, Brush your teeth, Take a shower, and Get dressed like you were going to work, even if you are unemployed).  If your clothes don't fit try to get ones that do.    Diet  I will strive to eat foods that are good for me, drink plenty of water, and avoid excessive sugar, caffeine, alcohol, and other mood-altering substances.  Some foods that are helpful in depression are: complex carbohydrates, B vitamins, flaxseed, fish or fish oil, fresh fruits and vegetables.    Psychotherapy  I agree to participate in Individual Therapy (if recommended).    Medication  If prescribed medications, I agree to take them.  Missing doses can result in serious side effects.  I understand that  drinking alcohol, or other illicit drug use, may cause potential side effects.  I will not stop my medication abruptly without first discussing it with my provider.    Staying Connected With Others  I will stay in touch with my friends, family members, and my primary care provider/team.    Use your imagination  Be creative.  We all have a creative side; it doesn t matter if it s oil painting, sand castles, or mud pies! This will also kick up the endorphins.    Witness Beauty  (AKA stop and smell the roses) Take a look outside, even in mid-winter. Notice colors, textures. Watch the squirrels and birds.     Service to others  Be of service to others.  There is always someone else in need.  By helping others we can  get out of ourselves  and remember the really important things.  This also provides opportunities for practicing all the other parts of the program.    Humor  Laugh and be silly!  Adjust your TV habits for less news and crime-drama and more comedy.    Control your stress  Try breathing deep, massage therapy, biofeedback, and meditation. Find time to relax each day.     My support system    Clinic Contact:  Phone number:    Contact 1:  Phone number:    Contact 2:  Phone number:    Mu-ism/:  Phone number:    Therapist:  Phone number:    Local crisis center:    Phone number:    Other community support:  Phone number:

## 2018-09-23 ENCOUNTER — HEALTH MAINTENANCE LETTER (OUTPATIENT)
Age: 33
End: 2018-09-23

## 2018-09-24 ASSESSMENT — ENCOUNTER SYMPTOMS
HEMOPTYSIS: 0
SNORES LOUDLY: 0
COUGH: 1
WEIGHT GAIN: 0
ALTERED TEMPERATURE REGULATION: 1
COUGH DISTURBING SLEEP: 1
PANIC: 1
FATIGUE: 1
HOT FLASHES: 1
DECREASED CONCENTRATION: 1
NIGHT SWEATS: 1
WHEEZING: 1
SPUTUM PRODUCTION: 1
DYSPNEA ON EXERTION: 1
CHILLS: 0
DECREASED APPETITE: 1
HALLUCINATIONS: 0
SHORTNESS OF BREATH: 1
WEIGHT LOSS: 0
DECREASED LIBIDO: 0
NERVOUS/ANXIOUS: 1
DEPRESSION: 1
POLYPHAGIA: 0
POLYDIPSIA: 1
INSOMNIA: 1
FEVER: 0
POSTURAL DYSPNEA: 0
INCREASED ENERGY: 0

## 2018-09-25 ENCOUNTER — OFFICE VISIT (OUTPATIENT)
Dept: OBGYN | Facility: CLINIC | Age: 33
End: 2018-09-25
Payer: COMMERCIAL

## 2018-09-25 VITALS
RESPIRATION RATE: 16 BRPM | BODY MASS INDEX: 30.8 KG/M2 | DIASTOLIC BLOOD PRESSURE: 79 MMHG | HEART RATE: 81 BPM | SYSTOLIC BLOOD PRESSURE: 124 MMHG | WEIGHT: 180.4 LBS | HEIGHT: 64 IN | TEMPERATURE: 97.7 F

## 2018-09-25 DIAGNOSIS — Z01.419 ENCOUNTER FOR GYNECOLOGICAL EXAMINATION WITHOUT ABNORMAL FINDING: Primary | ICD-10-CM

## 2018-09-25 DIAGNOSIS — N94.0 MITTELSCHMERZ: ICD-10-CM

## 2018-09-25 DIAGNOSIS — Z11.3 SCREEN FOR STD (SEXUALLY TRANSMITTED DISEASE): ICD-10-CM

## 2018-09-25 DIAGNOSIS — R87.810 CERVICAL HIGH RISK HPV (HUMAN PAPILLOMAVIRUS) TEST POSITIVE: ICD-10-CM

## 2018-09-25 PROBLEM — O00.102 LEFT TUBAL PREGNANCY WITHOUT INTRAUTERINE PREGNANCY: Status: RESOLVED | Noted: 2018-01-05 | Resolved: 2018-09-25

## 2018-09-25 PROCEDURE — 87491 CHLMYD TRACH DNA AMP PROBE: CPT | Performed by: OBSTETRICS & GYNECOLOGY

## 2018-09-25 PROCEDURE — 88175 CYTOPATH C/V AUTO FLUID REDO: CPT | Performed by: OBSTETRICS & GYNECOLOGY

## 2018-09-25 PROCEDURE — 87591 N.GONORRHOEAE DNA AMP PROB: CPT | Performed by: OBSTETRICS & GYNECOLOGY

## 2018-09-25 PROCEDURE — 99395 PREV VISIT EST AGE 18-39: CPT | Performed by: OBSTETRICS & GYNECOLOGY

## 2018-09-25 PROCEDURE — 87624 HPV HI-RISK TYP POOLED RSLT: CPT | Performed by: OBSTETRICS & GYNECOLOGY

## 2018-09-25 PROCEDURE — 99213 OFFICE O/P EST LOW 20 MIN: CPT | Mod: 25 | Performed by: OBSTETRICS & GYNECOLOGY

## 2018-09-25 RX ORDER — DROSPIRENONE AND ETHINYL ESTRADIOL 0.02-3(28)
1 KIT ORAL DAILY
Qty: 84 TABLET | Refills: 0 | Status: SHIPPED | OUTPATIENT
Start: 2018-09-25 | End: 2019-04-15

## 2018-09-25 NOTE — NURSING NOTE
"Initial /79 (BP Location: Right arm, Patient Position: Chair, Cuff Size: Adult Regular)  Pulse 81  Temp 97.7  F (36.5  C) (Tympanic)  Resp 16  Ht 5' 4\" (1.626 m)  Wt 180 lb 6.4 oz (81.8 kg)  LMP 09/02/2018  Breastfeeding? No  BMI 30.97 kg/m2 Estimated body mass index is 30.97 kg/(m^2) as calculated from the following:    Height as of this encounter: 5' 4\" (1.626 m).    Weight as of this encounter: 180 lb 6.4 oz (81.8 kg). .    Ashley Santos, MAYELA    "

## 2018-09-25 NOTE — MR AVS SNAPSHOT
After Visit Summary   9/25/2018    Jennifer Voss    MRN: [id number]           Patient Information     Date Of Birth          1985        Visit Information        Provider Department      9/25/2018 2:30 PM Fanny Camacho MD [facility]        Today's Diagnoses     Encounter for gynecological examination without abnormal finding    -  1    Cervical high risk HPV (human papillomavirus) test positive        Gini        Screen for STD (sexually transmitted disease)          Care Instructions      Preventive Health Recommendations  Female Ages 26 - 39  Yearly exam:   See your health care provider every year in order to    Review health changes.     Discuss preventive care.      Review your medicines if you your doctor has prescribed any.    Until age 30: Get a Pap test every three years (more often if you have had an abnormal result).    After age 30: Talk to your doctor about whether you should have a Pap test every 3 years or have a Pap test with HPV screening every 5 years.   You do not need a Pap test if your uterus was removed (hysterectomy) and you have not had cancer.  You should be tested each year for STDs (sexually transmitted diseases), if you're at risk.   Talk to your provider about how often to have your cholesterol checked.  If you are at risk for diabetes, you should have a diabetes test (fasting glucose).  Shots: Get a flu shot each year. Get a tetanus shot every 10 years.   Nutrition:     Eat at least 5 servings of fruits and vegetables each day.    Eat whole-grain bread, whole-wheat pasta and brown rice instead of white grains and rice.    Get adequate Calcium and Vitamin D.     Lifestyle    Exercise at least 150 minutes a week (30 minutes a day, 5 days of the week). This will help you control your weight and prevent disease.    Limit alcohol to one drink per day.    No smoking.     Wear sunscreen to prevent skin cancer.    See your dentist every six months  "for an exam and cleaning.            Follow-ups after your visit        Who to contact     If you have questions or need follow up information about today's clinic visit or your schedule please contact [facility] directly at [phone].  Normal or non-critical lab and imaging results will be communicated to you by Webliohart, letter or phone within 4 business days after the clinic has received the results. If you do not hear from us within 7 days, please contact the clinic through Webliohart or phone. If you have a critical or abnormal lab result, we will notify you by phone as soon as possible.  Submit refill requests through "[employer]" or call your pharmacy and they will forward the refill request to us. Please allow 3 business days for your refill to be completed.          Additional Information About Your Visit        Webliohart Information     "[employer]" gives you secure access to your electronic health record. If you see a primary care provider, you can also send messages to your care team and make appointments. If you have questions, please call your primary care clinic.  If you do not have a primary care provider, please call [phone] and they will assist you.        Care EveryWhere ID     This is your Care EveryWhere ID. This could be used by other organizations to access your [place] medical records  [id number]        Your Vitals Were     Pulse Temperature Respirations Height Last Period Breastfeeding?    81 97.7  F (36.5  C) (Tympanic) 16 5' 4\" (1.626 m) 09/02/2018 No    BMI (Body Mass Index)                   30.97 kg/m2            Blood Pressure from Last 3 Encounters:   09/25/18 124/79   04/30/18 122/70   04/01/18 120/78    Weight from Last 3 Encounters:   09/25/18 180 lb 6.4 oz (81.8 kg)   04/30/18 176 lb 9.6 oz (80.1 kg)   04/01/18 180 lb (81.6 kg)              We Performed the Following     Chlamydia trachomatis PCR     HPV High Risk Types DNA Cervical     Neisseria gonorrhoeae PCR     Pap " imaged thin layer diagnostic with HPV (select HPV order below)          Today's Medication Changes          These changes are accurate as of 9/25/18  3:16 PM.  If you have any questions, ask your nurse or doctor.               Start taking these medicines.        Dose/Directions    drospirenone-ethinyl estradiol 3-0.02 MG per tablet   Commonly known as:  MELVIN   Used for:  Mittelschmerz   Started by:  Fanny Camacho MD        Dose:  1 tablet   Take 1 tablet by mouth daily   Quantity:  84 tablet   Refills:  0            Where to get your medicines      These medications were sent to [facility] [address] [place] [address] [address] [facility]  [address] [address] [address]     Phone:  [phone]     drospirenone-ethinyl estradiol 3-0.02 MG per tablet                Primary Care Provider Office Phone # Fax #    Fanny Camacho [phone] [address] [facility] [address]        Equal Access to Services     DEIRDRE ALVARADO AH: Hadii aad ku hadasho Soomaali, waaxda luqadaha, qaybta kaalmada adeegyada, waxay idiin hayaan milka mahan . So [facility] [phone].    ATENCIÓN: Si habla español, tiene a gutiérrez disposición servicios gratuitos de asistencia lingüística. Llame al [phone].    We comply with applicable federal civil rights laws and [place] laws. We do not discriminate on the basis of race, color, national origin, age, disability, sex, sexual orientation, or gender identity.            Thank you!     Thank you for choosing [facility]  for your care. Our goal is always to provide you with excellent care. Hearing back from our patients is one way we can continue to improve our services. Please take a few minutes to complete the written survey that you may receive in the mail after your visit with us. Thank you!             Your Updated Medication List - Protect others around you: Learn how to safely use, store and throw away your medicines at www.disposemymeds.org.           This list is accurate as of 9/25/18  3:16 PM.  Always use your most recent med list.                   Brand Name Dispense Instructions for use Diagnosis    albuterol 108 (90 Base) MCG/ACT inhaler    PROAIR HFA/PROVENTIL HFA/VENTOLIN HFA    1 Inhaler    Inhale 2 puffs into the lungs every 6 hours as needed for shortness of breath / dyspnea or wheezing    Acute bronchitis, unspecified organism       cyclobenzaprine 10 MG tablet    FLEXERIL    30 tablet    Take 1 tablet (10 mg) by mouth 3 times daily as needed for muscle spasms    Tension-type headache, not intractable, unspecified chronicity pattern       drospirenone-ethinyl estradiol 3-0.02 MG per tablet    MELVIN    84 tablet    Take 1 tablet by mouth daily    Mittelschmerz       LORazepam 0.5 MG tablet    ATIVAN    20 tablet    Take 1 tablet (0.5 mg) by mouth every 8 hours as needed for anxiety    Anxiety       PARoxetine 10 MG tablet    PAXIL    90 tablet    Take 1 tablet (10 mg) by mouth every morning    Anxiety, Dysthymia       PRENATAL PO

## 2018-09-25 NOTE — PROGRESS NOTES
SUBJECTIVE:   CC: Jennifer Voss is an 33 year old woman who presents for preventive health visit.     Menses generally monthly.  Having a lot of pains on her right side. Thinks is may be her ovaries.  Was seen in ED for similar pain in April and found to have cysts.       Healthy Habits:    Do you get at least three servings of calcium containing foods daily (dairy, green leafy vegetables, etc.)? no, taking calcium and/or vitamin D supplement: no    Amount of exercise or daily activities, outside of work: 3-4 day(s) per week    Problems taking medications regularly No    Medication side effects: No    Have you had an eye exam in the past two years? no    Do you see a dentist twice per year? no    Do you have sleep apnea, excessive snoring or daytime drowsiness?no      -------------------------------------    Today's PHQ-2 Score:   PHQ-2 ( 1999 [employer]) 9/25/2018 9/24/2018   Q1: Little interest or pleasure in doing things 1 1   Q2: Feeling down, depressed or hopeless 1 1   PHQ-2 Score 2 2   Q1: Little interest or pleasure in doing things - Several days   Q2: Feeling down, depressed or hopeless - Several days   PHQ-2 Score - 2       Abuse: Current or Past(Physical, Sexual or Emotional)- No  Do you feel safe in your environment - Yes    Social History   Substance Use Topics     Smoking status: Current Every Day Smoker     Packs/day: 0.10     Years: 10.00     Types: Cigarettes     Smokeless tobacco: Never Used      Comment: smoking 10cig/day - down to 1-2 cig/day with pregnancy     Alcohol use 0.0 oz/week     0 Standard drinks or equivalent per week     If you drink alcohol do you typically have >3 drinks per day or >7 drinks per week? Yes - AUDIT SCORE:  5  AUDIT - Alcohol Use Disorders Identification Test - Reproduced from the World Health Organization Audit 2001 (Second Edition) 9/25/2018   1.  How often do you have a drink containing alcohol? 2 to 4 times a month   2.  How many drinks containing alcohol do you  have on a typical day when you are drinking? 5 or 6   3.  How often do you have five or more drinks on one occasion? Less than monthly   4.  How often during the last year have you found that you were not able to stop drinking once you had started? Never   5.  How often during the last year have you failed to do what was normally expected of you because of drinking? Never   6.  How often during the last year have you needed a first drink in the morning to get yourself going after a heavy drinking session? Never   7.  How often during the last year have you had a feeling of guilt or remorse after drinking? Never   8.  How often during the last year have you been unable to remember what happened the night before because of your drinking? Never   9.  Have you or someone else been injured because of your drinking? No   10. Has a relative, friend, doctor or other health care worker been concerned about your drinking or suggested you cut down? No   TOTAL SCORE 5                        Reviewed orders with patient.  Reviewed health maintenance and updated orders accordingly - Yes  BP Readings from Last 3 Encounters:   09/25/18 124/79   04/30/18 122/70   04/01/18 120/78    Wt Readings from Last 3 Encounters:   09/25/18 180 lb 6.4 oz (81.8 kg)   04/30/18 176 lb 9.6 oz (80.1 kg)   04/01/18 180 lb (81.6 kg)                    Mammogram not appropriate for this patient based on age.    Pertinent mammograms are reviewed under the imaging tab.  History of abnormal Pap smear:   YES - updated in Problem List and Health Maintenance accordingly  Last 3 Pap and HPV Results:   PAP / HPV Latest Ref Rng & Units 7/19/2017 7/24/2015 10/19/2005   PAP - NIL NIL NIL   HPV 16 DNA NEG Negative Positive(A) -   HPV 18 DNA NEG Negative Negative -   OTHER HR HPV NEG Negative Negative -     PAP / HPV Latest Ref Rng & Units 7/19/2017 7/24/2015 10/19/2005   PAP - NIL NIL NIL   HPV 16 DNA NEG Negative Positive(A) -   HPV 18 DNA NEG Negative Negative -  "  OTHER HR HPV NEG Negative Negative -     Reviewed and updated as needed this visit by clinical staff  Tobacco  Allergies  Meds  Med Hx  Surg Hx  Fam Hx  Soc Hx        Reviewed and updated as needed this visit by Provider            ROS:  CONSTITUTIONAL: NEGATIVE for fever, chills, change in weight  INTEGUMENTARU/SKIN: NEGATIVE for worrisome rashes, moles or lesions  EYES: NEGATIVE for vision changes or irritation  ENT: NEGATIVE for ear, mouth and throat problems  RESP: NEGATIVE for significant cough or SOB  BREAST: NEGATIVE for masses, tenderness or discharge  CV: NEGATIVE for chest pain, palpitations or peripheral edema  GI: NEGATIVE for nausea, abdominal pain, heartburn, or change in bowel habits  : NEGATIVE for unusual urinary or vaginal symptoms. Periods are regular.  MUSCULOSKELETAL: NEGATIVE for significant arthralgias or myalgia  NEURO: NEGATIVE for weakness, dizziness or paresthesias  PSYCHIATRIC: NEGATIVE for changes in mood or affect    OBJECTIVE:   /79 (BP Location: Right arm, Patient Position: Chair, Cuff Size: Adult Regular)  Pulse 81  Temp 97.7  F (36.5  C) (Tympanic)  Resp 16  Ht 5' 4\" (1.626 m)  Wt 180 lb 6.4 oz (81.8 kg)  LMP 09/02/2018  Breastfeeding? No  BMI 30.97 kg/m2  EXAM:  GENERAL: healthy, alert and no distress  EYES: Eyes grossly normal to inspection  NECK: no adenopathy, no asymmetry, masses, or scars and thyroid normal to palpation  RESP: lungs clear to auscultation - no rales, rhonchi or wheezes  BREAST: normal without masses, tenderness or nipple discharge and no palpable axillary masses or adenopathy  CV: regular rate and rhythm, normal S1 S2, no S3 or S4, no murmur, click or rub, no peripheral edema and peripheral pulses strong  ABDOMEN: soft, nontender, no hepatosplenomegaly, no masses and bowel sounds normal   (female): normal female external genitalia, normal urethral meatus, vaginal mucosa pink, moist, well rugated, and normal cervix/adnexa/uterus without " masses or discharge  MS: no gross musculoskeletal defects noted, no edema  SKIN: no suspicious lesions or rashes  NEURO: Normal strength and tone, mentation intact and speech normal  PSYCH: mentation appears normal, affect normal/bright    Diagnostic Test Results:  none     ASSESSMENT/PLAN:   1. Encounter for gynecological examination without abnormal finding  Routine health maintenance reviewed  - Pap imaged thin layer diagnostic with HPV (select HPV order below)  - HPV High Risk Types DNA Cervical    2. Cervical high risk HPV (human papillomavirus) test positive  Diagnostic pap done due to history of MARIANO 2 and LEEP follow up  - Pap imaged thin layer diagnostic with HPV (select HPV order below)  - HPV High Risk Types DNA Cervical    3. Gini  Discussed option of ovarian suppression for a few months to help alleviate ovarian related pain.  Her partner has had a vasectomy, so although she would like a pregnancy, she is amenable to using oral contraceptive pills for a few months.  We discussed her increased risk of VTE due to her smoking status.     - drospirenone-ethinyl estradiol (MELVIN) 3-0.02 MG per tablet; Take 1 tablet by mouth daily  Dispense: 84 tablet; Refill: 0    After physical was completed I spent and additional 10 minutes with the pt discussing her history, diagnosis, and follow-up plan as noted above.   Over 50% of the visit was spent in counseling and coordination of care     4. Screen of STD: genprobe obtained.     COUNSELING:   Reviewed preventive health counseling, as reflected in patient instructions  Special attention given to:        Regular exercise       Healthy diet/nutrition       Vision screening       Alcohol Use       Contraception       Family planning       Folic Acid Counseling       Osteoporosis Prevention/Bone Health       Safe sex practices/STD prevention    BP Readings from Last 1 Encounters:   09/25/18 124/79     Estimated body mass index is 30.97 kg/(m^2) as calculated from  "the following:    Height as of this encounter: 5' 4\" (1.626 m).    Weight as of this encounter: 180 lb 6.4 oz (81.8 kg).      Weight management plan: Discussed healthy diet and exercise guidelines and patient will follow up in 12 months in clinic to re-evaluate.     reports that she has been smoking Cigarettes.  She has a 1.00 pack-year smoking history. She has never used smokeless tobacco.  Tobacco Cessation Action Plan: Information offered: Patient not interested at this time    Counseling Resources:  ATP IV Guidelines  Pooled Cohorts Equation Calculator  Breast Cancer Risk Calculator  FRAX Risk Assessment  ICSI Preventive Guidelines  Dietary Guidelines for Americans, 2010  [employer]'s MyPlate  ASA Prophylaxis  Lung CA Screening    Fanny Camacho MD  [facility]  Answers for HPI/ROS submitted by the patient on 9/24/2018   PHQ-2 Score: 2  General Symptoms: Yes  Skin Symptoms: No  HENT Symptoms: No  EYE SYMPTOMS: No  HEART SYMPTOMS: No  LUNG SYMPTOMS: Yes  INTESTINAL SYMPTOMS: No  URINARY SYMPTOMS: No  GYNECOLOGIC SYMPTOMS: Yes  BREAST SYMPTOMS: No  SKELETAL SYMPTOMS: No  BLOOD SYMPTOMS: No  NERVOUS SYSTEM SYMPTOMS: No  MENTAL HEALTH SYMPTOMS: Yes  Fever: No  Loss of appetite: Yes  Weight loss: No  Weight gain: No  Fatigue: Yes  Night sweats: Yes  Chills: No  Increased stress: Yes  Excessive hunger: No  Excessive thirst: Yes  Feeling hot or cold when others believe the temperature is normal: Yes  Loss of height: No  Post-operative complications: No  Surgical site pain: No  Hallucinations: No  Change in or Loss of Energy: No  Hyperactivity: No  Confusion: No  Cough: Yes  Sputum or phlegm: Yes  Coughing up blood: No  Difficulty breating or shortness of breath: Yes  Snoring: No  Wheezing: Yes  Difficulty breathing on exertion: Yes  Nighttime Cough: Yes  Difficulty breathing when lying flat: No  Bleeding or spotting between periods: Yes  Heavy or painful periods: Yes  Irregular periods: No  Vaginal discharge: " No  Hot flashes: Yes  Genital ulcers: No  Reduced libido: No  Painful intercourse: No  Difficulty with sexual arousal: No  Post-menopausal bleeding: No  Nervous or Anxious: Yes  Depression: Yes  Trouble sleeping: Yes  Trouble thinking or concentrating: Yes  Mood changes: Yes  Panic attacks: Yes

## 2018-09-26 LAB
C TRACH DNA SPEC QL NAA+PROBE: NEGATIVE
N GONORRHOEA DNA SPEC QL NAA+PROBE: NEGATIVE
SPECIMEN SOURCE: NORMAL
SPECIMEN SOURCE: NORMAL

## 2018-09-27 LAB
COPATH REPORT: NORMAL
PAP: NORMAL

## 2018-09-28 LAB
FINAL DIAGNOSIS: NORMAL
HPV HR 12 DNA CVX QL NAA+PROBE: NEGATIVE
HPV16 DNA SPEC QL NAA+PROBE: NEGATIVE
HPV18 DNA SPEC QL NAA+PROBE: NEGATIVE
SPECIMEN DESCRIPTION: NORMAL
SPECIMEN SOURCE CVX/VAG CYTO: NORMAL

## 2018-11-08 ENCOUNTER — E-VISIT (OUTPATIENT)
Dept: FAMILY MEDICINE | Facility: CLINIC | Age: 33
End: 2018-11-08
Payer: COMMERCIAL

## 2018-11-08 DIAGNOSIS — F34.1 DYSTHYMIA: ICD-10-CM

## 2018-11-08 DIAGNOSIS — F41.9 ANXIETY: ICD-10-CM

## 2018-11-08 PROCEDURE — 98969 ZZC NONPHYSICIAN ONLINE ASSESSMENT AND MANAGEMENT: CPT | Performed by: NURSE PRACTITIONER

## 2018-11-08 RX ORDER — ALPRAZOLAM 0.5 MG
0.5 TABLET ORAL 2 TIMES DAILY
Qty: 20 TABLET | Refills: 0 | Status: SHIPPED | OUTPATIENT
Start: 2018-11-08 | End: 2019-04-15

## 2018-11-08 RX ORDER — PAROXETINE 20 MG/1
20 TABLET, FILM COATED ORAL EVERY MORNING
Qty: 90 TABLET | Refills: 0 | Status: SHIPPED | OUTPATIENT
Start: 2018-11-08 | End: 2019-03-13

## 2018-11-08 ASSESSMENT — ANXIETY QUESTIONNAIRES
2. NOT BEING ABLE TO STOP OR CONTROL WORRYING: NEARLY EVERY DAY
GAD7 TOTAL SCORE: 20
4. TROUBLE RELAXING: NEARLY EVERY DAY
1. FEELING NERVOUS, ANXIOUS, OR ON EDGE: NEARLY EVERY DAY
7. FEELING AFRAID AS IF SOMETHING AWFUL MIGHT HAPPEN: MORE THAN HALF THE DAYS
3. WORRYING TOO MUCH ABOUT DIFFERENT THINGS: NEARLY EVERY DAY
GAD7 TOTAL SCORE: 20
GAD7 TOTAL SCORE: 20
5. BEING SO RESTLESS THAT IT IS HARD TO SIT STILL: NEARLY EVERY DAY
6. BECOMING EASILY ANNOYED OR IRRITABLE: NEARLY EVERY DAY
7. FEELING AFRAID AS IF SOMETHING AWFUL MIGHT HAPPEN: MORE THAN HALF THE DAYS

## 2018-11-08 ASSESSMENT — PATIENT HEALTH QUESTIONNAIRE - PHQ9
SUM OF ALL RESPONSES TO PHQ QUESTIONS 1-9: 22
10. IF YOU CHECKED OFF ANY PROBLEMS, HOW DIFFICULT HAVE THESE PROBLEMS MADE IT FOR YOU TO DO YOUR WORK, TAKE CARE OF THINGS AT HOME, OR GET ALONG WITH OTHER PEOPLE: VERY DIFFICULT
SUM OF ALL RESPONSES TO PHQ QUESTIONS 1-9: 22

## 2018-11-09 ASSESSMENT — PATIENT HEALTH QUESTIONNAIRE - PHQ9: SUM OF ALL RESPONSES TO PHQ QUESTIONS 1-9: 22

## 2018-11-09 ASSESSMENT — ANXIETY QUESTIONNAIRES: GAD7 TOTAL SCORE: 20

## 2019-01-09 ENCOUNTER — OFFICE VISIT (OUTPATIENT)
Dept: FAMILY MEDICINE | Facility: CLINIC | Age: 34
End: 2019-01-09
Payer: COMMERCIAL

## 2019-01-09 VITALS
WEIGHT: 179.6 LBS | TEMPERATURE: 98 F | HEIGHT: 64 IN | RESPIRATION RATE: 16 BRPM | HEART RATE: 84 BPM | DIASTOLIC BLOOD PRESSURE: 70 MMHG | BODY MASS INDEX: 30.66 KG/M2 | SYSTOLIC BLOOD PRESSURE: 126 MMHG

## 2019-01-09 DIAGNOSIS — Z23 NEED FOR PROPHYLACTIC VACCINATION AND INOCULATION AGAINST INFLUENZA: Primary | ICD-10-CM

## 2019-01-09 DIAGNOSIS — G44.209 TENSION-TYPE HEADACHE, NOT INTRACTABLE, UNSPECIFIED CHRONICITY PATTERN: ICD-10-CM

## 2019-01-09 DIAGNOSIS — F41.9 ANXIETY: ICD-10-CM

## 2019-01-09 DIAGNOSIS — F34.1 DYSTHYMIA: ICD-10-CM

## 2019-01-09 PROCEDURE — 99214 OFFICE O/P EST MOD 30 MIN: CPT | Mod: 25 | Performed by: NURSE PRACTITIONER

## 2019-01-09 PROCEDURE — 90686 IIV4 VACC NO PRSV 0.5 ML IM: CPT | Performed by: NURSE PRACTITIONER

## 2019-01-09 PROCEDURE — 90471 IMMUNIZATION ADMIN: CPT | Performed by: NURSE PRACTITIONER

## 2019-01-09 RX ORDER — PAROXETINE 30 MG/1
30 TABLET, FILM COATED ORAL EVERY MORNING
Qty: 90 TABLET | Refills: 0 | Status: SHIPPED | OUTPATIENT
Start: 2019-01-09 | End: 2019-04-15

## 2019-01-09 RX ORDER — CYCLOBENZAPRINE HCL 10 MG
10 TABLET ORAL 3 TIMES DAILY PRN
Qty: 30 TABLET | Refills: 0 | Status: SHIPPED | OUTPATIENT
Start: 2019-01-09 | End: 2019-09-12

## 2019-01-09 ASSESSMENT — ENCOUNTER SYMPTOMS
DIZZINESS: 0
LIGHT-HEADEDNESS: 0
COUGH: 0
NAUSEA: 0
MYALGIAS: 0
CONSTIPATION: 0
HEADACHES: 0
WHEEZING: 0
ABDOMINAL DISTENTION: 0
ARTHRALGIAS: 0
RHINORRHEA: 0
SLEEP DISTURBANCE: 0
VOMITING: 0
PALPITATIONS: 0
DYSPHORIC MOOD: 0
DECREASED CONCENTRATION: 1
FATIGUE: 0
SHORTNESS OF BREATH: 0
NERVOUS/ANXIOUS: 1
DIARRHEA: 0
CHEST TIGHTNESS: 0
SORE THROAT: 0
ABDOMINAL PAIN: 0
NUMBNESS: 0

## 2019-01-09 ASSESSMENT — PAIN SCALES - GENERAL: PAINLEVEL: NO PAIN (0)

## 2019-01-09 ASSESSMENT — ANXIETY QUESTIONNAIRES
6. BECOMING EASILY ANNOYED OR IRRITABLE: MORE THAN HALF THE DAYS
2. NOT BEING ABLE TO STOP OR CONTROL WORRYING: NEARLY EVERY DAY
3. WORRYING TOO MUCH ABOUT DIFFERENT THINGS: NEARLY EVERY DAY
1. FEELING NERVOUS, ANXIOUS, OR ON EDGE: NEARLY EVERY DAY
GAD7 TOTAL SCORE: 19
7. FEELING AFRAID AS IF SOMETHING AWFUL MIGHT HAPPEN: MORE THAN HALF THE DAYS
5. BEING SO RESTLESS THAT IT IS HARD TO SIT STILL: NEARLY EVERY DAY

## 2019-01-09 ASSESSMENT — PATIENT HEALTH QUESTIONNAIRE - PHQ9
SUM OF ALL RESPONSES TO PHQ QUESTIONS 1-9: 21
5. POOR APPETITE OR OVEREATING: NEARLY EVERY DAY

## 2019-01-09 ASSESSMENT — MIFFLIN-ST. JEOR: SCORE: 1495.69

## 2019-01-09 NOTE — PATIENT INSTRUCTIONS
[facility] [phone]    Take paxil 1.5 tabs until current prescription is gone then start new prescription for 30 mg tabs.

## 2019-01-09 NOTE — PROGRESS NOTES
SUBJECTIVE:   Jennifer Voss is a 33 year old female who presents to clinic today for the following health issues:      Depression and Anxiety Follow-Up    Status since last visit: Improved now that holidays are over, panic attacks less often    Other associated symptoms:itching, hides in bedroom, doesn't want to be a part of anything, pacing    Complicating factors:     Significant life event: No     Current substance abuse: None    PHQ 9/18/2018 11/8/2018 1/9/2019   PHQ-9 Total Score 15 22 21   Q9: Suicide Ideation Not at all Not at all Not at all     RACH-7 SCORE 7/25/2018 11/8/2018 1/9/2019   Total Score - - -   Total Score 5 (mild anxiety) 20 (severe anxiety) -   Total Score 5 20 19     PHQ-9  English  PHQ-9   Any Language  RACH-7  Suicide Assessment Five-step Evaluation and Treatment (SAFE-T)    Amount of exercise or physical activity: None    Problems taking medications regularly: No    Medication side effects: none    Diet: regular (no restrictions)        Problem list and histories reviewed & adjusted, as indicated.  Additional history: as documented    Current Outpatient Medications   Medication Sig Dispense Refill     ALPRAZolam (XANAX) 0.5 MG tablet Take 1 tablet (0.5 mg) by mouth 2 times daily 20 tablet 0     cyclobenzaprine (FLEXERIL) 10 MG tablet Take 1 tablet (10 mg) by mouth 3 times daily as needed for muscle spasms 30 tablet 0     PARoxetine (PAXIL) 20 MG tablet Take 1 tablet (20 mg) by mouth every morning 90 tablet 0     PARoxetine (PAXIL) 30 MG tablet Take 1 tablet (30 mg) by mouth every morning 90 tablet 0     PROAIR  (90 Base) MCG/ACT inhaler INHALE 2 PUFFS BY MOUTH EVERY 6 HOURS AS NEEDED FOR SHORTNESS OF BREATH/DYSPNEA OR WHEEZING 1 Inhaler 3     drospirenone-ethinyl estradiol (MELVIN) 3-0.02 MG per tablet Take 1 tablet by mouth daily (Patient not taking: Reported on 1/9/2019) 84 tablet 0     Prenatal Vit-Fe Fumarate-FA (PRENATAL PO)        Allergies   Allergen Reactions     Penicillins  "Nausea     Percocet [Oxycodone-Acetaminophen] Nausea     Ok with vicodin       Reviewed and updated as needed this visit by clinical staff  Tobacco  Allergies  Meds  Med Hx  Surg Hx  Fam Hx  Soc Hx      Reviewed and updated as needed this visit by Provider          Had been doing well. In Oct broke up with boyfriend. Has been using music therapy to help decrease anxiety.  Has been pacing more.  Started biting fingernails again.  Overall, feels like is actually doing well.  Has used alprazolam twice since has gotten prescription.  Is going to be traveling to [place] and also [place]    Has been taking over the counter Vit D.     Needs to have a refill of Flexeril.  Has been taking as needed and helps to decrease headaches.    ROS:  Review of Systems   Constitutional: Negative for fatigue.   HENT: Negative for ear pain, rhinorrhea and sore throat.    Eyes: Negative for visual disturbance.   Respiratory: Negative for cough, chest tightness, shortness of breath and wheezing.    Cardiovascular: Negative for chest pain, palpitations and leg swelling.   Gastrointestinal: Negative for abdominal distention, abdominal pain, constipation, diarrhea, nausea and vomiting.   Endocrine: Negative for cold intolerance and heat intolerance.   Musculoskeletal: Negative for arthralgias and myalgias.   Skin: Negative for rash.   Neurological: Negative for dizziness, light-headedness, numbness and headaches.   Psychiatric/Behavioral: Positive for decreased concentration. Negative for dysphoric mood and sleep disturbance. The patient is nervous/anxious.          OBJECTIVE:     /70 (BP Location: Right arm, Patient Position: Sitting, Cuff Size: Adult Regular)   Pulse 84   Temp 98  F (36.7  C) (Tympanic)   Resp 16   Ht 1.619 m (5' 3.75\")   Wt 81.5 kg (179 lb 9.6 oz)   LMP 12/17/2018   BMI 31.07 kg/m    Body mass index is 31.07 kg/m .  Physical Exam  No PE completed, visit was 100% discussion       ASSESSMENT/PLAN:   1. " Anxiety  We will plan to increase Paxil from 20 mg to 30 mg orally daily.  Plan to follow-up in clinic in 3 months.  Encouraged to call and get set up for therapy.  - PARoxetine (PAXIL) 30 MG tablet; Take 1 tablet (30 mg) by mouth every morning  Dispense: 90 tablet; Refill: 0    2. Dysthymia  We will plan to increase Paxil from 20 mg to 30 mg orally daily.  Plan to follow-up in clinic in 3 months.  Encouraged to call and get set up for therapy.- PARoxetine (PAXIL) 30 MG tablet; Take 1 tablet (30 mg) by mouth every morning  Dispense: 90 tablet; Refill: 0    3. Tension-type headache, not intractable, unspecified chronicity pattern  Using appropriately.  Will refill  - cyclobenzaprine (FLEXERIL) 10 MG tablet; Take 1 tablet (10 mg) by mouth 3 times daily as needed for muscle spasms  Dispense: 30 tablet; Refill: 0    4. Need for prophylactic vaccination and inoculation against influenza  Administer today in clinic   - FLU VACCINE, SPLIT VIRUS, IM (QUADRIVALENT) [85599]- >3 YRS  - Vaccine Administration, Initial [06462]    During this visit greater than 100% of the 18 minutes for this appointment was spent in counseling and/or coordinating care of above stated issues.     GISEL [facility]

## 2019-01-09 NOTE — PROGRESS NOTES

## 2019-01-10 ASSESSMENT — ANXIETY QUESTIONNAIRES: GAD7 TOTAL SCORE: 19

## 2019-03-13 ENCOUNTER — OFFICE VISIT (OUTPATIENT)
Dept: FAMILY MEDICINE | Facility: CLINIC | Age: 34
End: 2019-03-13
Payer: COMMERCIAL

## 2019-03-13 VITALS
DIASTOLIC BLOOD PRESSURE: 88 MMHG | HEIGHT: 64 IN | HEART RATE: 100 BPM | TEMPERATURE: 98.8 F | OXYGEN SATURATION: 98 % | SYSTOLIC BLOOD PRESSURE: 126 MMHG | BODY MASS INDEX: 30.73 KG/M2 | WEIGHT: 180 LBS

## 2019-03-13 DIAGNOSIS — J20.9 ACUTE BRONCHITIS WITH SYMPTOMS > 10 DAYS: Primary | ICD-10-CM

## 2019-03-13 DIAGNOSIS — J01.90 ACUTE SINUSITIS WITH SYMPTOMS > 10 DAYS: ICD-10-CM

## 2019-03-13 PROCEDURE — 99213 OFFICE O/P EST LOW 20 MIN: CPT | Performed by: NURSE PRACTITIONER

## 2019-03-13 RX ORDER — DOXYCYCLINE HYCLATE 100 MG
100 TABLET ORAL 2 TIMES DAILY
Qty: 20 TABLET | Refills: 0 | Status: SHIPPED | OUTPATIENT
Start: 2019-03-13 | End: 2019-04-15

## 2019-03-13 RX ORDER — CODEINE PHOSPHATE AND GUAIFENESIN 10; 100 MG/5ML; MG/5ML
1-2 SOLUTION ORAL EVERY 6 HOURS PRN
Qty: 236 ML | Refills: 0 | Status: SHIPPED | OUTPATIENT
Start: 2019-03-13 | End: 2019-04-15

## 2019-03-13 ASSESSMENT — MIFFLIN-ST. JEOR: SCORE: 1496.47

## 2019-03-13 NOTE — PROGRESS NOTES
SUBJECTIVE:   Jennifer Voss is a 34 year old female who presents to clinic today for the following health issues:    ENT Symptoms             Symptoms: cc Present Absent Comment   Fever/Chills  x  Feels feverish with sweats with face feeling cold.  No temperatures   Fatigue  x     Muscle Aches  x     Eye Irritation   x    Sneezing  x     Nasal Fernando/Drg  x  Green in morning and now clear   Sinus Pressure/Pain   x    Loss of smell  x     Dental pain   x    Sore Throat  x  Dry from coughing so much    Swollen Glands  x     Ear Pain/Fullness  x  itchy   Cough  x  Productive with green tinge more in the morning   Wheeze  x     Chest Pain  x     Shortness of breath x x  Troubles breathing all the time started yesterday   Rash   x    Other   x      Symptom duration:  2 week    Symptom severity:  moderate    Treatments tried:  Nebulizer, mucinex, inhaler - help a little   Contacts:  none     Vomiting Nyquil with cough.  Unable to sleep the last couple nights.  Patient has horse voice today also.    Problem list and histories reviewed & adjusted, as indicated.  Additional history: as documented    Patient Active Problem List   Diagnosis     CARDIOVASCULAR SCREENING; LDL GOAL LESS THAN 160     24 HR INFO GIVEN     Tobacco abuse     Depression with anxiety     GERD (gastroesophageal reflux disease)     Cervical high risk HPV (human papillomavirus) test positive     History of poor pregnancy outcome     Dysthymia     Past Surgical History:   Procedure Laterality Date     COLONOSCOPY N/A 8/1/2017    Procedure: COMBINED COLONOSCOPY, SINGLE OR MULTIPLE BIOPSY/POLYPECTOMY BY BIOPSY;  Colonoscopy;  Surgeon: Ramakrishna Epstein MD;  Location: [facility]     LAPAROSCOPIC EVACUATION ECTOPIC PREGNANCY N/A 7/24/2015    LSC right salpingectomy      LEEP TX, CERVICAL  3/2016    benign     MOUTH SURGERY      wisdom teeth     PE TUBES       TONSILLECTOMY         Social History     Tobacco Use     Smoking status: Current Every Day Smoker      Packs/day: 0.10     Years: 10.00     Pack years: 1.00     Types: Cigarettes     Smokeless tobacco: Never Used     Tobacco comment: smoking 10cig/day - down to 1-2 cig/day with pregnancy   Substance Use Topics     Alcohol use: Yes     Alcohol/week: 0.0 oz     Family History   Problem Relation Age of Onset     Diabetes Father      Hypertension Father      Diabetes Paternal Grandmother      Coronary Artery Disease Paternal Grandmother         MI     Aneurysm Paternal Grandmother         brain     Substance Abuse Brother         recovered drugs     Depression Brother      Anxiety Disorder Brother      Heart Failure Paternal Grandfather         CHF     Depression Mother          Current Outpatient Medications   Medication Sig Dispense Refill     ALPRAZolam (XANAX) 0.5 MG tablet Take 1 tablet (0.5 mg) by mouth 2 times daily 20 tablet 0     cyclobenzaprine (FLEXERIL) 10 MG tablet Take 1 tablet (10 mg) by mouth 3 times daily as needed for muscle spasms 30 tablet 0     doxycycline hyclate (VIBRA-TABS) 100 MG tablet Take 1 tablet (100 mg) by mouth 2 times daily for 10 days 20 tablet 0     drospirenone-ethinyl estradiol (MELVIN) 3-0.02 MG per tablet Take 1 tablet by mouth daily 84 tablet 0     guaiFENesin-codeine (ROBITUSSIN AC) 100-10 MG/5ML solution Take 5-10 mLs by mouth every 6 hours as needed for cough 236 mL 0     PARoxetine (PAXIL) 30 MG tablet Take 1 tablet (30 mg) by mouth every morning 90 tablet 0     PROAIR  (90 Base) MCG/ACT inhaler INHALE 2 PUFFS BY MOUTH EVERY 6 HOURS AS NEEDED FOR SHORTNESS OF BREATH/DYSPNEA OR WHEEZING 1 Inhaler 3     Allergies   Allergen Reactions     Penicillins Nausea     Percocet [Oxycodone-Acetaminophen] Nausea     Ok with vicodin       Reviewed and updated as needed this visit by clinical staff  Tobacco  Allergies  Meds  Problems  Med Hx  Surg Hx  Fam Hx  Soc Hx        Reviewed and updated as needed this visit by Provider  Tobacco  Allergies  Meds  Problems  Med Hx  Surg  "Hx  Fam Hx         ROS:  CONSTITUTIONAL:POSITIVE  for chills, fatigue, myalgias and sweats  ENT/MOUTH: POSITIVE for ear pain bilateral, hoarse voice, nasal congestion, postnasal drainage and sinus pressure  RESP:POSITIVE for cough-productive  CV: NEGATIVE for chest pain, palpitations or peripheral edema  PSYCHIATRIC: NEGATIVE for changes in mood or affect  ROS otherwise negative    OBJECTIVE:     /88   Pulse 100   Temp 98.8  F (37.1  C) (Tympanic)   Ht 1.626 m (5' 4\")   Wt 81.6 kg (180 lb)   SpO2 98%   BMI 30.90 kg/m    Body mass index is 30.9 kg/m .  GENERAL: healthy, alert and no distress  EYES: Eyes grossly normal to inspection, PERRL and conjunctivae and sclerae normal  HENT: normal cephalic/atraumatic, ear canals and TM's normal, nasal mucosa edematous , oropharynx clear, oral mucous membranes moist and sinuses: maxillary, frontal tenderness on bilateral  NECK: no adenopathy and no asymmetry, masses, or scars  RESP: lungs clear to auscultation - no rales, rhonchi or wheezes  CV: regular rate and rhythm, normal S1 S2, no S3 or S4, no murmur, click or rub, no peripheral edema and peripheral pulses strong  PSYCH: mentation appears normal, affect normal/bright    Diagnostic Test Results:  none     ASSESSMENT/PLAN:     1. Acute bronchitis with symptoms > 10 days  Due to longevity of symptoms treating with Doxycycline to cover both lung and sinus symptoms for infection.  Also ordered Robitussin AC for cough.  Information given on symptom management and reviewed.  Recommend f/u in clinic if any persistent or worsening symptoms.  - doxycycline hyclate (VIBRA-TABS) 100 MG tablet; Take 1 tablet (100 mg) by mouth 2 times daily for 10 days  Dispense: 20 tablet; Refill: 0  - guaiFENesin-codeine (ROBITUSSIN AC) 100-10 MG/5ML solution; Take 5-10 mLs by mouth every 6 hours as needed for cough  Dispense: 236 mL; Refill: 0    2. Acute sinusitis with symptoms > 10 days  See note above.  - doxycycline hyclate " (VIBRA-TABS) 100 MG tablet; Take 1 tablet (100 mg) by mouth 2 times daily for 10 days  Dispense: 20 tablet; Refill: 0    See Patient Instructions    Sue Kenny NP  [facility]

## 2019-03-13 NOTE — PATIENT INSTRUCTIONS
1.  Rest, push fluids, and keep humidifier going.  2.  Use your proair inhaler every 4 hours as need for shortness of breath, wheezing, or cough.  3.  Take antibiotic as directed.  4.  Follow-up in clinic if any worsening or persistent symptoms despite treatment.    Bronchitis, Antibiotic Treatment (Adult)    Bronchitis is an infection of the air passages (bronchial tubes) in your lungs. It often occurs when you have a cold. This illness is contagious during the first few days and is spread through the air by coughing and sneezing, or by direct contact (touching the sick person and then touching your own eyes, nose, or mouth).  Symptoms of bronchitis include cough with mucus (phlegm) and low-grade fever. Bronchitis usually lasts 7 to 14 days. Mild cases can be treated with simple home remedies. More severe infection is treated with an antibiotic.  Home care  Follow these guidelines when caring for yourself at home:    If your symptoms are severe, rest at home for the first 2 to 3 days. When you go back to your usual activities, don't let yourself get too tired.    Don't smoke. Also stay away from secondhand smoke.    You may use over-the-counter medicines to control fever or pain, unless another medicine was prescribed. If you have chronic liver or kidney disease or have ever had a stomach ulcer or gastrointestinal bleeding, talk with your healthcare provider before using these medicines. Also talk to your provider if you are taking medicine to prevent blood clots. Aspirin should never be given to anyone younger than 18 who is ill with a viral infection or fever. It may cause severe liver or brain damage.    Your appetite may be low, so a light diet is fine. Stay well hydrated by drinking 6 to 8 glasses of fluids per day. This includes water, soft drinks, sports drinks, juices, tea, or soup. Extra fluids will help loosen mucus in your nose and lungs.    Over-the-counter cough, cold, and sore-throat medicines will  not shorten the length of the illness, but they may be helpful to reduce your symptoms. Don't use decongestants if you have high blood pressure.    Finish all antibiotic medicine. Do this even if you are feeling better after only a few days.  Follow-up care  Follow up with your healthcare provider, or as advised. If you had an X-ray or ECG (electrocardiogram), a specialist will review it. You will be told of any new test results that may affect your care.  If you are age 65 or older, if you smoke, or if you have a chronic lung disease or condition that affects your immune system, ask your healthcare provider about getting a pneumococcal vaccine and a yearly flu shot (influenza vaccine).  When to seek medical advice  Call your healthcare provider right away if any of these occur:    Fever of 100.4 F (38 C) or higher, or as directed by your healthcare provider    Coughing up more sputum    Weakness, drowsiness, headache, facial pain, ear pain, or a stiff neck     Call 911  Call 911 if any of these occur.    Coughing up blood    Weakness, drowsiness, headache, or stiff neck that get worse    Trouble breathing, wheezing, or pain with breathing   Date Last Reviewed: 6/1/2018 2000-2018 [employer]. [address] [place] [address]. All rights reserved. This information is not intended as a substitute for professional medical care. Always follow your healthcare professional's instructions.           Patient Education     Sinusitis (Antibiotic Treatment)    The sinuses are air-filled spaces within the bones of the face. They connect to the inside of the nose. Sinusitis is an inflammation of the tissue that lines the sinuses. Sinusitis can occur during a cold. It can also happen due to allergies to pollens and other particles in the air. Sinusitis can cause symptoms of sinus congestion and a feeling of fullness. A sinus infection causes fever, headache, and facial pain. There is often green or  yellow fluid draining from the nose or into the back of the throat (post-nasal drip). You have been given antibiotics to treat this condition.  Home care    Take the full course of antibiotics as instructed. Do not stop taking them, even when you feel better.    Drink plenty of water, hot tea, and other liquids. This may help thin nasal mucus. It also may help your sinuses drain fluids.    Heat may help soothe painful areas of your face. Use a towel soaked in hot water. Or,  the shower and direct the warm spray onto your face. Using a vaporizer along with a menthol rub at night may also help soothe symptoms.     An expectorant with guaifenesin may help thin nasal mucus and help your sinuses drain fluids.    You can use an over-the-counter decongestant, unless a similar medicine was prescribed to you. Nasal sprays work the fastest. Use one that contains phenylephrine or oxymetazoline. First blow your nose gently. Then use the spray. Do not use these medicines more often than directed on the label. If you do, your symptoms may get worse. You may also take pills that contain pseudoephedrine. Don t use products that combine multiple medicines. This is because side effects may be increased. Read labels. You can also ask the pharmacist for help. (People with high blood pressure should not use decongestants. They can raise blood pressure.)    Over-the-counter antihistamines may help if allergies contributed to your sinusitis.      Do not use nasal rinses or irrigation during an acute sinus infection, unless your healthcare provider tells you to. Rinsing may spread the infection to other areas in your sinuses.    Use acetaminophen or ibuprofen to control pain, unless another pain medicine was prescribed to you. If you have chronic liver or kidney disease or ever had a stomach ulcer, talk with your healthcare provider before using these medicines. (Aspirin should never be taken by anyone under age 18 who is ill with  a fever. It may cause severe liver damage.)    Don't smoke. This can make symptoms worse.  Follow-up care  Follow up with your healthcare provider or our staff if you are better in 1 week.  When to seek medical advice  Call your healthcare provider if any of these occur:    Facial pain or headache that gets worse    Stiff neck    Unusual drowsiness or confusion    Swelling of your forehead or eyelids    Vision problems, such as blurred or double vision    Fever of 100.4 F (38 C) or higher, or as directed by your healthcare provider    Seizure    Breathing problems    Symptoms don't go away in 10 days  Prevention  Here are steps you can take to help prevent an infection:    Keep good hand washing habits.    Don t have close contact with people who have sore throats, colds, or other upper respiratory infections.    Don t smoke, and stay away from secondhand smoke.    Stay up to date with of your vaccines.  Date Last Reviewed: 11/1/2017 2000-2018 [employer]. [address] [place] [address]. All rights reserved. This information is not intended as a substitute for professional medical care. Always follow your healthcare professional's instructions.

## 2019-03-13 NOTE — LETTER
[facility]  [address] [address]  Phone: [phone]    March 13, 2019        Jennifer Voss  [address] [address]          To whom it may concern:    RE: Jennifer Voss    Patient was seen and treated today at our clinic and missed work.  She can return to work on Friday 3/15/19    Please contact me for questions or concerns.      Sincerely,        Sue Kenny NP

## 2019-04-01 ENCOUNTER — E-VISIT (OUTPATIENT)
Dept: FAMILY MEDICINE | Facility: CLINIC | Age: 34
End: 2019-04-01
Payer: COMMERCIAL

## 2019-04-01 DIAGNOSIS — Z72.0 TOBACCO ABUSE: Primary | ICD-10-CM

## 2019-04-01 PROCEDURE — 99444 ZZC PHYSICIAN ONLINE EVALUATION & MANAGEMENT SERVICE: CPT | Performed by: NURSE PRACTITIONER

## 2019-04-01 RX ORDER — VARENICLINE TARTRATE 1 MG/1
1 TABLET, FILM COATED ORAL 2 TIMES DAILY
Qty: 60 TABLET | Refills: 1 | Status: SHIPPED | OUTPATIENT
Start: 2019-04-01 | End: 2019-04-15 | Stop reason: SINTOL

## 2019-04-15 ENCOUNTER — OFFICE VISIT (OUTPATIENT)
Dept: FAMILY MEDICINE | Facility: CLINIC | Age: 34
End: 2019-04-15
Payer: COMMERCIAL

## 2019-04-15 VITALS
WEIGHT: 184.8 LBS | RESPIRATION RATE: 16 BRPM | HEART RATE: 76 BPM | SYSTOLIC BLOOD PRESSURE: 120 MMHG | DIASTOLIC BLOOD PRESSURE: 70 MMHG | BODY MASS INDEX: 31.72 KG/M2 | TEMPERATURE: 97.8 F

## 2019-04-15 DIAGNOSIS — F41.9 ANXIETY: ICD-10-CM

## 2019-04-15 DIAGNOSIS — F34.1 DYSTHYMIA: ICD-10-CM

## 2019-04-15 PROCEDURE — 99213 OFFICE O/P EST LOW 20 MIN: CPT | Performed by: NURSE PRACTITIONER

## 2019-04-15 RX ORDER — PAROXETINE 30 MG/1
30 TABLET, FILM COATED ORAL EVERY MORNING
Qty: 90 TABLET | Refills: 1 | Status: SHIPPED | OUTPATIENT
Start: 2019-04-15 | End: 2019-09-12

## 2019-04-15 RX ORDER — ALPRAZOLAM 0.5 MG
0.5 TABLET ORAL 2 TIMES DAILY
Qty: 20 TABLET | Refills: 0 | Status: SHIPPED | OUTPATIENT
Start: 2019-04-15 | End: 2020-02-04

## 2019-04-15 ASSESSMENT — ANXIETY QUESTIONNAIRES
3. WORRYING TOO MUCH ABOUT DIFFERENT THINGS: MORE THAN HALF THE DAYS
5. BEING SO RESTLESS THAT IT IS HARD TO SIT STILL: SEVERAL DAYS
6. BECOMING EASILY ANNOYED OR IRRITABLE: SEVERAL DAYS
7. FEELING AFRAID AS IF SOMETHING AWFUL MIGHT HAPPEN: SEVERAL DAYS
1. FEELING NERVOUS, ANXIOUS, OR ON EDGE: SEVERAL DAYS
2. NOT BEING ABLE TO STOP OR CONTROL WORRYING: MORE THAN HALF THE DAYS
GAD7 TOTAL SCORE: 9

## 2019-04-15 ASSESSMENT — ENCOUNTER SYMPTOMS
LIGHT-HEADEDNESS: 0
CHILLS: 0
NAUSEA: 0
HEADACHES: 0
EYE ITCHING: 0
DIARRHEA: 0
DIAPHORESIS: 0
COUGH: 0
DYSPHORIC MOOD: 1
SHORTNESS OF BREATH: 0
FEVER: 0
EYE DISCHARGE: 0
WHEEZING: 0
SORE THROAT: 0
DIZZINESS: 0
FATIGUE: 0
CONSTIPATION: 0
RHINORRHEA: 0
SINUS PRESSURE: 0
NERVOUS/ANXIOUS: 1

## 2019-04-15 ASSESSMENT — PATIENT HEALTH QUESTIONNAIRE - PHQ9
SUM OF ALL RESPONSES TO PHQ QUESTIONS 1-9: 8
5. POOR APPETITE OR OVEREATING: SEVERAL DAYS

## 2019-04-15 ASSESSMENT — PAIN SCALES - GENERAL: PAINLEVEL: NO PAIN (0)

## 2019-04-15 NOTE — PROGRESS NOTES
SUBJECTIVE:   Jennifer Voss is a 34 year old female who presents to clinic today for the following   health issues:      Depression and Anxiety Follow-Up    Status since last visit: Improved     Other associated symptoms:depression and sleep walking while taking Chantix so she stopped taking it    Complicating factors:     Significant life event: Yes- trying to quit smoking, is trying e-cig while cutting back on number of cigarettes     Current substance abuse: None    PHQ 11/8/2018 1/9/2019 4/15/2019   PHQ-9 Total Score 22 21 8   Q9: Thoughts of better off dead/self-harm past 2 weeks Not at all Not at all Not at all     RACH-7 SCORE 11/8/2018 1/9/2019 4/15/2019   Total Score - - -   Total Score 20 (severe anxiety) - -   Total Score 20 19 9     PHQ-9  English  PHQ-9   Any Language  RACH-7  Suicide Assessment Five-step Evaluation and Treatment (SAFE-T)    Amount of exercise or physical activity: 4-5 days/week for an average of 30-45 minutes    Problems taking medications regularly: Yes,  side effects    Medication side effects: sleep walking and depression from Chantix    Diet: regular (no restrictions)        Additional history: as documented    Reviewed  and updated as needed this visit by clinical staff  Tobacco  Allergies  Meds  Med Hx  Surg Hx  Fam Hx  Soc Hx        Reviewed and updated as needed this visit by Provider         Current Outpatient Medications   Medication Sig Dispense Refill     ALPRAZolam (XANAX) 0.5 MG tablet Take 1 tablet (0.5 mg) by mouth 2 times daily 20 tablet 0     cyclobenzaprine (FLEXERIL) 10 MG tablet Take 1 tablet (10 mg) by mouth 3 times daily as needed for muscle spasms 30 tablet 0     PARoxetine (PAXIL) 30 MG tablet Take 1 tablet (30 mg) by mouth every morning 90 tablet 1     PROAIR  (90 Base) MCG/ACT inhaler INHALE 2 PUFFS BY MOUTH EVERY 6 HOURS AS NEEDED FOR SHORTNESS OF BREATH/DYSPNEA OR WHEEZING 1 Inhaler 3     Allergies   Allergen Reactions     Penicillins Nausea      Percocet [Oxycodone-Acetaminophen] Nausea     Ok with vicodin       Has been taking paxil does seem to feel like it is working. No side effects that is aware of. Has about 5 alprazolam left. Needs to have refills today.  Wondering if Paxil is causing to gain weight.  Feels like has gained about 5 pounds since last visit.  Chart review completed.  Has been going to [place].  Has been trying to watch portions.    Took Chantix and started feeling more depressed and was sleepwalking to gain while on it.  Did not take the full course due to this.  Was able to decrease smoking, and is using e-cig and plans to eventually quit that too. Has not needed to use inhaler in the last 2 weeks.  Overall, is feeling better.    ROS:  Review of Systems   Constitutional: Negative for chills, diaphoresis, fatigue and fever.   HENT: Negative for ear discharge, ear pain, hearing loss, rhinorrhea, sinus pressure and sore throat.    Eyes: Negative for discharge and itching.   Respiratory: Negative for cough, shortness of breath and wheezing.    Gastrointestinal: Negative for constipation, diarrhea and nausea.   Skin: Negative for rash.   Neurological: Negative for dizziness, light-headedness and headaches.   Psychiatric/Behavioral: Positive for dysphoric mood. The patient is nervous/anxious.          OBJECTIVE:     /70 (BP Location: Left arm, Patient Position: Sitting, Cuff Size: Adult Regular)   Pulse 76   Temp 97.8  F (36.6  C) (Tympanic)   Resp 16   Wt 83.8 kg (184 lb 12.8 oz)   LMP 04/13/2019   BMI 31.72 kg/m    Body mass index is 31.72 kg/m .  Physical Exam  No PE completed, visit was 100% discussion   Good eye contact. Interacts with conversation. No thoughts of harming self or others.    ASSESSMENT/PLAN:   1. Anxiety  Doing well on current.  Refills given.  We will plan to follow-up in clinic in 6 months or sooner.  - ALPRAZolam (XANAX) 0.5 MG tablet; Take 1 tablet (0.5 mg) by mouth 2 times daily   Dispense: 20 tablet; Refill: 0  - PARoxetine (PAXIL) 30 MG tablet; Take 1 tablet (30 mg) by mouth every morning  Dispense: 90 tablet; Refill: 1    2. Dysthymia  Doing well on current.  Refills given.  We will plan to follow-up in clinic in 6 months or sooner.  - PARoxetine (PAXIL) 30 MG tablet; Take 1 tablet (30 mg) by mouth every morning  Dispense: 90 tablet; Refill: 1    During this visit greater than 100% of the 16 minutes for this appointment was spent in counseling and/or coordinating care of above stated issues.     GISEL Johnston [facility]

## 2019-04-16 ASSESSMENT — ANXIETY QUESTIONNAIRES: GAD7 TOTAL SCORE: 9

## 2019-10-07 ENCOUNTER — E-VISIT (OUTPATIENT)
Dept: FAMILY MEDICINE | Facility: CLINIC | Age: 34
End: 2019-10-07
Payer: COMMERCIAL

## 2019-10-07 DIAGNOSIS — L02.419 CUTANEOUS ABSCESS OF LOWER EXTREMITY, UNSPECIFIED LATERALITY: Primary | ICD-10-CM

## 2019-10-07 PROCEDURE — 99444 ZZC PHYSICIAN ONLINE EVALUATION & MANAGEMENT SERVICE: CPT | Performed by: NURSE PRACTITIONER

## 2019-10-07 RX ORDER — SULFAMETHOXAZOLE/TRIMETHOPRIM 800-160 MG
1 TABLET ORAL 2 TIMES DAILY
Qty: 20 TABLET | Refills: 0 | Status: SHIPPED | OUTPATIENT
Start: 2019-10-07 | End: 2019-12-10

## 2019-10-14 ENCOUNTER — OFFICE VISIT (OUTPATIENT)
Dept: FAMILY MEDICINE | Facility: CLINIC | Age: 34
End: 2019-10-14
Payer: COMMERCIAL

## 2019-10-14 VITALS
DIASTOLIC BLOOD PRESSURE: 70 MMHG | BODY MASS INDEX: 32 KG/M2 | TEMPERATURE: 97.8 F | RESPIRATION RATE: 16 BRPM | HEART RATE: 92 BPM | WEIGHT: 186.4 LBS | SYSTOLIC BLOOD PRESSURE: 122 MMHG

## 2019-10-14 DIAGNOSIS — F34.1 DYSTHYMIA: ICD-10-CM

## 2019-10-14 DIAGNOSIS — F41.9 ANXIETY: ICD-10-CM

## 2019-10-14 PROCEDURE — 99213 OFFICE O/P EST LOW 20 MIN: CPT | Performed by: NURSE PRACTITIONER

## 2019-10-14 RX ORDER — PAROXETINE 30 MG/1
30 TABLET, FILM COATED ORAL EVERY MORNING
Qty: 90 TABLET | Refills: 1 | Status: SHIPPED | OUTPATIENT
Start: 2019-10-14 | End: 2020-04-21

## 2019-10-14 ASSESSMENT — PAIN SCALES - GENERAL: PAINLEVEL: NO PAIN (0)

## 2019-10-14 NOTE — PROGRESS NOTES
Subjective     Jennifer Voss is a 34 year old female who presents to clinic today for the following health issues:    HPI     Depression and Anxiety Follow-Up    How are you doing with your depression since your last visit? Improved     How are you doing with your anxiety since your last visit?  Improved     Are you having other symptoms that might be associated with depression or anxiety? No    Have you had a significant life event? OTHER: bought a new house, new kitten yesterday     Do you have any concerns with your use of alcohol or other drugs? No    Social History     Tobacco Use     Smoking status: Current Every Day Smoker     Packs/day: 0.10     Years: 10.00     Pack years: 1.00     Types: Cigarettes     Smokeless tobacco: Never Used   Substance Use Topics     Alcohol use: Yes     Alcohol/week: 0.0 standard drinks     Drug use: No     PHQ 11/8/2018 1/9/2019 4/15/2019   PHQ-9 Total Score 22 21 8   Q9: Thoughts of better off dead/self-harm past 2 weeks Not at all Not at all Not at all     RACH-7 SCORE 11/8/2018 1/9/2019 4/15/2019   Total Score - - -   Total Score 20 (severe anxiety) - -   Total Score 20 19 9     PHQ-9 ([employer]) 4/15/2019   No Interest In Doing Things    Feeling Depressed    Trouble Sleeping    Tired / No Energy    No appetite or Over-Eating    Feeling Bad about Self    Trouble Concentrating    Moving Slow or Restless    Suicidal Thoughts    Total Score    1.  Little interest or pleasure in doing things 1   2.  Feeling down, depressed, or hopeless 1   3.  Trouble falling or staying asleep, or sleeping too much 1   4.  Feeling tired or having little energy 1   5.  Poor appetite or overeating 1   6.  Feeling bad about yourself 1   7.  Trouble concentrating 1   8.  Moving slowly or restless 1   9.  Suicidal or self-harm thoughts 0   PHQ-9 Total Score 8   Difficulty at work, home, or with people    1.  Little interest or pleasure in doing things    2.  Feeling down, depressed, or hopeless    3.   Trouble falling or staying asleep, or sleeping too much    4.  Feeling tired or having little energy    5.  Poor appetite or overeating    6.  Feeling bad about yourself    7.  Trouble concentrating    8.  Moving slowly or restless    9.  Suicidal or self-harm thoughts    PHQ-9 via [employer] TOTAL SCORE----->    Difficulty at work, home, or with people    RACH-7   [employer], 2002; Used with Permission) 4/15/2019   Over the last 2 weeks, how often have you been bothered by feeling nervous, anxious or on edge?    Over the last 2 weeks, how often have you been bothered by not being able to stop or control worrying?    Over the last 2 weeks, how often have you been bothered by worrying too much about different things?    Over the last 2 weeks, how often have you been bothered by trouble relaxing?    Over the last 2 weeks, how often have you been bothered by being so restless that it is hard to sit still?    Over the last 2 weeks, how often have you been bothered by becoming easily annoyed or irritable?    Over the last 2 weeks, how often have you been bothered by feeling afraid as if something awful might happen?    RACH-7 Total Score =     1. Feeling nervous, anxious, or on edge    2. Not being able to stop or control worrying    3. Worrying too much about different things    4. Trouble relaxing    5. Being so restless that it is hard to sit still    6. Becoming easily annoyed or irritable    7. Feeling afraid, as if something awful might happen    RACH 7 TOTAL SCORE    1. Feeling nervous, anxious, or on edge 1   2. Not being able to stop or control worrying 2   3. Worrying too much about different things 2   4. Trouble relaxing 1   5. Being so restless that it is hard to sit still 1   6. Becoming easily annoyed or irritable 1   7. Feeling afraid, as if something awful might happen 1   RACH-7 Total Score 9   If you checked any problems, how difficult have they made it for you to do your work, take care of things at home, or  get along with other people?        Suicide Assessment Five-step Evaluation and Treatment (SAFE-T)      How many servings of fruits and vegetables do you eat daily?  2-3    On average, how many sweetened beverages do you drink each day (soda, juice, sweet tea, etc)?   0  How many days per week do you miss taking your medication? 1    What makes it hard for you to take your medications?  remembering to take      Current Outpatient Medications   Medication Sig Dispense Refill     albuterol (PROAIR HFA) 108 (90 Base) MCG/ACT inhaler Inhale 2 puffs into the lungs every 6 hours as needed for shortness of breath / dyspnea or wheezing 1 Inhaler 1     ALPRAZolam (XANAX) 0.5 MG tablet Take 1 tablet (0.5 mg) by mouth 2 times daily 20 tablet 0     cyclobenzaprine (FLEXERIL) 10 MG tablet Take 1 tablet (10 mg) by mouth 3 times daily as needed for muscle spasms 30 tablet 0     PARoxetine (PAXIL) 30 MG tablet Take 1 tablet (30 mg) by mouth every morning 90 tablet 1     sulfamethoxazole-trimethoprim (BACTRIM DS/SEPTRA DS) 800-160 MG tablet Take 1 tablet by mouth 2 times daily 20 tablet 0     Allergies   Allergen Reactions     Penicillins Nausea     Percocet [Oxycodone-Acetaminophen] Nausea     Ok with vicodin       Reviewed and updated as needed this visit by Provider  Problems         Had boil to right inner thigh, did E visit and was started on Bactrim and it burst and now pain is much less. Has 2 days left of antibiotic.  Feels like it is nearly resolved.    Is taking paxil, feels like it is working well. Just bought a new house. Brother is doing renovations for her. Got a kitten yesterday. Epi is cats name. No side effects from paxil, if forgeets to take it will get emotional. Is not using xanax much. Has about 10 of them left.  Feels like is where wants to be.        Objective    /70 (BP Location: Right arm, Patient Position: Sitting, Cuff Size: Adult Regular)   Pulse 92   Temp 97.8  F (36.6  C) (Tympanic)   Resp  16   Wt 84.6 kg (186 lb 6.4 oz)   LMP 10/04/2019   BMI 32.00 kg/m    Body mass index is 32 kg/m .          Assessment & Plan     Review of Systems   Constitutional: Negative for chills, diaphoresis, fatigue and fever.   HENT: Negative for ear discharge, ear pain, hearing loss, rhinorrhea, sinus pressure and sore throat.    Eyes: Negative for discharge and itching.   Respiratory: Negative for cough, shortness of breath and wheezing.    Gastrointestinal: Negative for constipation, diarrhea and nausea.   Skin: Negative for rash.        Boil right inner thigh     Neurological: Negative for dizziness, light-headedness and headaches.       Physical Exam   No PE completed, visit was 100% discussion   Reports boil is nearly resolved.     1. Anxiety  Doing well on current.  Refills given.  We will plan to follow-up in 6 months.  May do ED visit in 6 months-will need to be seen in clinic yearly.  - PARoxetine (PAXIL) 30 MG tablet; Take 1 tablet (30 mg) by mouth every morning  Dispense: 90 tablet; Refill: 1    2. Dysthymia  Doing well on current.  Refills given.  We will plan to follow-up in 6 months.  May do ED visit in 6 months-will need to be seen in clinic yearly.- PARoxetine (PAXIL) 30 MG tablet; Take 1 tablet (30 mg) by mouth every morning  Dispense: 90 tablet; Refill: 1    Is going to get influenza vaccine at work.     No follow-ups on file.    GISEL Johnston [facility]

## 2019-10-15 ASSESSMENT — PATIENT HEALTH QUESTIONNAIRE - PHQ9
SUM OF ALL RESPONSES TO PHQ QUESTIONS 1-9: 9
5. POOR APPETITE OR OVEREATING: SEVERAL DAYS

## 2019-10-15 ASSESSMENT — ENCOUNTER SYMPTOMS
SHORTNESS OF BREATH: 0
HEADACHES: 0
CHILLS: 0
COUGH: 0
RHINORRHEA: 0
WHEEZING: 0
EYE DISCHARGE: 0
DIZZINESS: 0
EYE ITCHING: 0
DIAPHORESIS: 0
CONSTIPATION: 0
NAUSEA: 0
SORE THROAT: 0
FEVER: 0
SINUS PRESSURE: 0
DIARRHEA: 0
FATIGUE: 0
LIGHT-HEADEDNESS: 0

## 2019-10-15 ASSESSMENT — ANXIETY QUESTIONNAIRES
2. NOT BEING ABLE TO STOP OR CONTROL WORRYING: SEVERAL DAYS
3. WORRYING TOO MUCH ABOUT DIFFERENT THINGS: MORE THAN HALF THE DAYS
GAD7 TOTAL SCORE: 9
6. BECOMING EASILY ANNOYED OR IRRITABLE: SEVERAL DAYS
5. BEING SO RESTLESS THAT IT IS HARD TO SIT STILL: SEVERAL DAYS
7. FEELING AFRAID AS IF SOMETHING AWFUL MIGHT HAPPEN: MORE THAN HALF THE DAYS
1. FEELING NERVOUS, ANXIOUS, OR ON EDGE: SEVERAL DAYS

## 2019-10-16 ASSESSMENT — ANXIETY QUESTIONNAIRES: GAD7 TOTAL SCORE: 9

## 2019-11-04 ENCOUNTER — HEALTH MAINTENANCE LETTER (OUTPATIENT)
Age: 34
End: 2019-11-04

## 2019-12-10 ENCOUNTER — OFFICE VISIT (OUTPATIENT)
Dept: FAMILY MEDICINE | Facility: CLINIC | Age: 34
End: 2019-12-10
Payer: COMMERCIAL

## 2019-12-10 VITALS
TEMPERATURE: 98.2 F | HEART RATE: 86 BPM | WEIGHT: 190.8 LBS | OXYGEN SATURATION: 97 % | BODY MASS INDEX: 32.75 KG/M2 | SYSTOLIC BLOOD PRESSURE: 130 MMHG | RESPIRATION RATE: 18 BRPM | DIASTOLIC BLOOD PRESSURE: 88 MMHG

## 2019-12-10 DIAGNOSIS — H69.91 ACUTE DYSFUNCTION OF RIGHT EUSTACHIAN TUBE: Primary | ICD-10-CM

## 2019-12-10 PROCEDURE — 99213 OFFICE O/P EST LOW 20 MIN: CPT | Performed by: NURSE PRACTITIONER

## 2019-12-10 RX ORDER — METHYLPREDNISOLONE 4 MG
TABLET, DOSE PACK ORAL
Qty: 21 TABLET | Refills: 0 | Status: SHIPPED | OUTPATIENT
Start: 2019-12-10 | End: 2021-05-13

## 2019-12-10 ASSESSMENT — ENCOUNTER SYMPTOMS
SINUS PAIN: 1
COUGH: 0
WHEEZING: 0
FEVER: 0
SORE THROAT: 0
DIARRHEA: 0
CONSTIPATION: 0
EYE ITCHING: 0
DIZZINESS: 0
SHORTNESS OF BREATH: 1
DIAPHORESIS: 0
LIGHT-HEADEDNESS: 0
SINUS PRESSURE: 1
HEADACHES: 1
FATIGUE: 1
CHEST TIGHTNESS: 1
RHINORRHEA: 0
EYE DISCHARGE: 0
CHILLS: 0
NAUSEA: 0

## 2019-12-10 ASSESSMENT — PAIN SCALES - GENERAL: PAINLEVEL: SEVERE PAIN (6)

## 2019-12-10 NOTE — PATIENT INSTRUCTIONS
These are general instructions and may not be specific to you. Please call, email or follow up if you have any questions or concerns.       Patient Education     Earache, No Infection (Adult)  Earaches can happen without an infection. This occurs when air and fluid build up behind the eardrum causing a feeling of fullness and discomfort and reduced hearing. This is called otitis media with effusion (OME) or serous otitis media. It means there is fluid in the middle ear. It is not the same as acute otitis media, which is typically from infection.  OME can happen when you have a cold if congestion blocks the passage that drains the middle ear. This passage is called the eustachian tube. OME may also occur with nasal allergies or after a bacterial middle ear infection.    The pain or discomfort may come and go. You may hear clicking or popping sounds when you chew or swallow. You may feel that your balance is off. Or you may hear ringing in the ear.  It often takes from several weeks up to 3 months for the fluid to clear on its own. Oral pain relievers and ear drops help if there is pain. Decongestants and antihistamines sometimes help. Antibiotics don't help since there is no infection. Your doctor may prescribe a nasal spray to help reduce swelling in the nose and eustachian tube. This can allow the ear to drain.  If your OME doesn't improve after 3 months, surgery may be used to drain the fluid and insert a small tube in the eardrum to allow continued drainage.  Because the middle ear fluid can become infected, it is important to watch for signs of an ear infection which may develop later. These signs include increased ear pain, fever, or drainage from the ear.  Home care  The following guidelines will help you care for yourself at home:    You may use over-the-counter medicine as directed to control pain, unless another medicine was prescribed. If you have chronic liver or kidney disease or ever had a stomach ulcer  or GI bleeding, talk with your doctor before using these medicines. Aspirin should never be used in anyone under 18 years of age who is ill with a fever. It may cause severe liver damage.    You may use over-the-counter decongestants such as phenylephrine or pseudoephedrine. But they are not always helpful. Don't use nasal spray decongestants more than 3 days. Longer use can make congestion worse. Prescription nasal sprays from your doctor don't typically have those restrictions.    Antihistamines may help if you are also having allergy symptoms.    You may use medicines such as guaifenesin to thin mucus and promote drainage.  Follow-up care  Follow up with your healthcare provider or as advised if you are not feeling better after 3 days.  When to seek medical advice  Call your healthcare provider right away if any of the following occur:    Your ear pain gets worse or does not start to improve     Fever of 100.4 F (38 C) or higher, or as directed by your healthcare provider    Fluid or blood draining from the ear    Headache or sinus pain    Stiff neck    Unusual drowsiness or confusion  Date Last Reviewed: 10/1/2016    9540-6576 [employer]. [address] [place] [address]. All rights reserved. This information is not intended as a substitute for professional medical care. Always follow your healthcare professional's instructions.

## 2019-12-10 NOTE — PROGRESS NOTES
Subjective     Jennifer Voss is a 34 year old female who presents to clinic today for the following health issues:    HPI     ENT Symptoms             Symptoms: cc Present Absent Comment   Fever/Chills   x    Fatigue  x     Muscle Aches   x    Eye Irritation   x    Sneezing  x     Nasal Fernando/Drg  x     Sinus Pressure/Pain  x     Loss of smell   x    Dental pain   x    Sore Throat   x    Swollen Glands  x     Ear Pain/Fullness x x  Right ear pain. Feels like it is draining but there is no fluid coming out.    Cough   x    Wheeze   x    Chest Pain  x  Chest feels tight   Shortness of breath  x     Rash   x    Other  x  headache     Symptom duration:  3 days   Symptom severity:  mild   Treatments tried:  Tylenol, Ibuprofen   Contacts:  friend at work with pneumonia     Right ear pain for the last 3 days. Can hear heart beat in ear. Does feel some dizzy. Headache since ear started hurting. Does feel some sinus pressure and congestion.       Current Outpatient Medications   Medication Sig Dispense Refill     albuterol (PROAIR HFA) 108 (90 Base) MCG/ACT inhaler Inhale 2 puffs into the lungs every 6 hours as needed for shortness of breath / dyspnea or wheezing 1 Inhaler 1     ALPRAZolam (XANAX) 0.5 MG tablet Take 1 tablet (0.5 mg) by mouth 2 times daily 20 tablet 0     cyclobenzaprine (FLEXERIL) 10 MG tablet Take 1 tablet (10 mg) by mouth 3 times daily as needed for muscle spasms 30 tablet 0     methylPREDNISolone (MEDROL DOSEPAK) 4 MG tablet therapy pack Follow Package Directions 21 tablet 0     PARoxetine (PAXIL) 30 MG tablet Take 1 tablet (30 mg) by mouth every morning 90 tablet 1     Allergies   Allergen Reactions     Penicillins Nausea     Percocet [Oxycodone-Acetaminophen] Nausea     Ok with vicodin       Reviewed and updated as needed this visit by Provider             Objective    /88 (BP Location: Left arm, Patient Position: Sitting, Cuff Size: Adult Regular)   Pulse 86   Temp 98.2  F (36.8  C)  (Tympanic)   Resp 18   Wt 86.5 kg (190 lb 12.8 oz)   LMP 12/04/2019   SpO2 97%   BMI 32.75 kg/m    Body mass index is 32.75 kg/m .          Assessment & Plan      Review of Systems   Constitutional: Positive for fatigue. Negative for chills, diaphoresis and fever.   HENT: Positive for congestion, ear pain (right), sinus pressure, sinus pain and sneezing. Negative for ear discharge, hearing loss, rhinorrhea and sore throat.    Eyes: Negative for discharge and itching.   Respiratory: Positive for chest tightness and shortness of breath. Negative for cough and wheezing.    Gastrointestinal: Negative for constipation, diarrhea and nausea.   Skin: Negative for rash.   Neurological: Positive for headaches. Negative for dizziness and light-headedness.       Physical Exam  Constitutional:       Appearance: She is well-developed.   HENT:      Right Ear: External ear normal. A middle ear effusion is present. Tympanic membrane is bulging. Tympanic membrane is not erythematous.      Left Ear: Tympanic membrane and external ear normal.  No middle ear effusion. Tympanic membrane is not erythematous.      Nose: No mucosal edema or rhinorrhea.   Cardiovascular:      Rate and Rhythm: Normal rate and regular rhythm.      Heart sounds: Normal heart sounds.   Pulmonary:      Effort: Pulmonary effort is normal.      Breath sounds: Normal breath sounds.   Abdominal:      General: Bowel sounds are normal.      Palpations: Abdomen is soft.   Skin:     General: Skin is warm and dry.   Neurological:      Mental Status: She is alert.       1. Acute dysfunction of right eustachian tube  Educated on use of steroid.  Encouraged over-the-counter Flonase.  Notify if no improvement after steroid is completed.  - methylPREDNISolone (MEDROL DOSEPAK) 4 MG tablet therapy pack; Follow Package Directions  Dispense: 21 tablet; Refill: 0      Return in about 1 week (around 12/17/2019), or if symptoms worsen or fail to improve.    Shannon Almeida,  GISEL [facility]

## 2020-04-21 ENCOUNTER — E-VISIT (OUTPATIENT)
Dept: FAMILY MEDICINE | Facility: CLINIC | Age: 35
End: 2020-04-21
Payer: COMMERCIAL

## 2020-04-21 DIAGNOSIS — F41.9 ANXIETY: ICD-10-CM

## 2020-04-21 DIAGNOSIS — F34.1 DYSTHYMIA: ICD-10-CM

## 2020-04-21 PROCEDURE — 99421 OL DIG E/M SVC 5-10 MIN: CPT | Performed by: NURSE PRACTITIONER

## 2020-04-21 RX ORDER — PAROXETINE 30 MG/1
30 TABLET, FILM COATED ORAL EVERY MORNING
Qty: 90 TABLET | Refills: 1 | Status: SHIPPED | OUTPATIENT
Start: 2020-04-21 | End: 2020-10-28

## 2020-04-21 ASSESSMENT — PATIENT HEALTH QUESTIONNAIRE - PHQ9
SUM OF ALL RESPONSES TO PHQ QUESTIONS 1-9: 3
10. IF YOU CHECKED OFF ANY PROBLEMS, HOW DIFFICULT HAVE THESE PROBLEMS MADE IT FOR YOU TO DO YOUR WORK, TAKE CARE OF THINGS AT HOME, OR GET ALONG WITH OTHER PEOPLE: SOMEWHAT DIFFICULT
SUM OF ALL RESPONSES TO PHQ QUESTIONS 1-9: 3

## 2020-04-21 ASSESSMENT — ANXIETY QUESTIONNAIRES
6. BECOMING EASILY ANNOYED OR IRRITABLE: SEVERAL DAYS
1. FEELING NERVOUS, ANXIOUS, OR ON EDGE: SEVERAL DAYS
GAD7 TOTAL SCORE: 4
7. FEELING AFRAID AS IF SOMETHING AWFUL MIGHT HAPPEN: NOT AT ALL
GAD7 TOTAL SCORE: 4
GAD7 TOTAL SCORE: 4
2. NOT BEING ABLE TO STOP OR CONTROL WORRYING: SEVERAL DAYS
5. BEING SO RESTLESS THAT IT IS HARD TO SIT STILL: NOT AT ALL
7. FEELING AFRAID AS IF SOMETHING AWFUL MIGHT HAPPEN: NOT AT ALL
3. WORRYING TOO MUCH ABOUT DIFFERENT THINGS: SEVERAL DAYS
4. TROUBLE RELAXING: NOT AT ALL

## 2020-04-22 ASSESSMENT — ANXIETY QUESTIONNAIRES: GAD7 TOTAL SCORE: 4

## 2020-04-22 ASSESSMENT — PATIENT HEALTH QUESTIONNAIRE - PHQ9: SUM OF ALL RESPONSES TO PHQ QUESTIONS 1-9: 3

## 2020-04-26 DIAGNOSIS — J20.9 ACUTE BRONCHITIS, UNSPECIFIED ORGANISM: ICD-10-CM

## 2020-04-29 RX ORDER — ALBUTEROL SULFATE 90 UG/1
AEROSOL, METERED RESPIRATORY (INHALATION)
Qty: 9 G | Refills: 0 | Status: SHIPPED | OUTPATIENT
Start: 2020-04-29 | End: 2020-07-28

## 2020-10-28 ENCOUNTER — E-VISIT (OUTPATIENT)
Dept: FAMILY MEDICINE | Facility: CLINIC | Age: 35
End: 2020-10-28
Payer: COMMERCIAL

## 2020-10-28 DIAGNOSIS — F41.8 DEPRESSION WITH ANXIETY: Primary | ICD-10-CM

## 2020-10-28 PROCEDURE — 99421 OL DIG E/M SVC 5-10 MIN: CPT | Performed by: NURSE PRACTITIONER

## 2020-10-28 ASSESSMENT — ANXIETY QUESTIONNAIRES
GAD7 TOTAL SCORE: 8
5. BEING SO RESTLESS THAT IT IS HARD TO SIT STILL: SEVERAL DAYS
GAD7 TOTAL SCORE: 8
7. FEELING AFRAID AS IF SOMETHING AWFUL MIGHT HAPPEN: SEVERAL DAYS
4. TROUBLE RELAXING: SEVERAL DAYS
GAD7 TOTAL SCORE: 8
7. FEELING AFRAID AS IF SOMETHING AWFUL MIGHT HAPPEN: SEVERAL DAYS
2. NOT BEING ABLE TO STOP OR CONTROL WORRYING: MORE THAN HALF THE DAYS
6. BECOMING EASILY ANNOYED OR IRRITABLE: SEVERAL DAYS
3. WORRYING TOO MUCH ABOUT DIFFERENT THINGS: SEVERAL DAYS
1. FEELING NERVOUS, ANXIOUS, OR ON EDGE: SEVERAL DAYS

## 2020-10-28 ASSESSMENT — PATIENT HEALTH QUESTIONNAIRE - PHQ9
SUM OF ALL RESPONSES TO PHQ QUESTIONS 1-9: 5
10. IF YOU CHECKED OFF ANY PROBLEMS, HOW DIFFICULT HAVE THESE PROBLEMS MADE IT FOR YOU TO DO YOUR WORK, TAKE CARE OF THINGS AT HOME, OR GET ALONG WITH OTHER PEOPLE: SOMEWHAT DIFFICULT
SUM OF ALL RESPONSES TO PHQ QUESTIONS 1-9: 5

## 2020-10-29 ASSESSMENT — ANXIETY QUESTIONNAIRES: GAD7 TOTAL SCORE: 8

## 2020-10-29 ASSESSMENT — PATIENT HEALTH QUESTIONNAIRE - PHQ9: SUM OF ALL RESPONSES TO PHQ QUESTIONS 1-9: 5

## 2020-11-22 ENCOUNTER — HEALTH MAINTENANCE LETTER (OUTPATIENT)
Age: 35
End: 2020-11-22

## 2021-05-10 ENCOUNTER — E-VISIT (OUTPATIENT)
Dept: FAMILY MEDICINE | Facility: CLINIC | Age: 36
End: 2021-05-10
Payer: COMMERCIAL

## 2021-05-10 ENCOUNTER — MYC REFILL (OUTPATIENT)
Dept: FAMILY MEDICINE | Facility: CLINIC | Age: 36
End: 2021-05-10

## 2021-05-10 DIAGNOSIS — J20.9 ACUTE BRONCHITIS, UNSPECIFIED ORGANISM: ICD-10-CM

## 2021-05-10 DIAGNOSIS — G44.209 TENSION-TYPE HEADACHE, NOT INTRACTABLE, UNSPECIFIED CHRONICITY PATTERN: ICD-10-CM

## 2021-05-10 PROCEDURE — 99421 OL DIG E/M SVC 5-10 MIN: CPT | Performed by: NURSE PRACTITIONER

## 2021-05-10 RX ORDER — ALBUTEROL SULFATE 90 UG/1
AEROSOL, METERED RESPIRATORY (INHALATION)
Qty: 9 G | Refills: 2 | Status: SHIPPED | OUTPATIENT
Start: 2021-05-10 | End: 2021-09-20

## 2021-05-10 RX ORDER — CYCLOBENZAPRINE HCL 10 MG
10 TABLET ORAL 3 TIMES DAILY PRN
Qty: 30 TABLET | Refills: 0 | Status: SHIPPED | OUTPATIENT
Start: 2021-05-10 | End: 2021-09-20

## 2021-05-10 RX ORDER — ALBUTEROL SULFATE 90 UG/1
AEROSOL, METERED RESPIRATORY (INHALATION)
Qty: 9 G | Refills: 0 | Status: CANCELLED | OUTPATIENT
Start: 2021-05-10

## 2021-05-10 ASSESSMENT — ANXIETY QUESTIONNAIRES
GAD7 TOTAL SCORE: 4
5. BEING SO RESTLESS THAT IT IS HARD TO SIT STILL: NOT AT ALL
2. NOT BEING ABLE TO STOP OR CONTROL WORRYING: SEVERAL DAYS
GAD7 TOTAL SCORE: 4
3. WORRYING TOO MUCH ABOUT DIFFERENT THINGS: SEVERAL DAYS
6. BECOMING EASILY ANNOYED OR IRRITABLE: NOT AT ALL
7. FEELING AFRAID AS IF SOMETHING AWFUL MIGHT HAPPEN: NOT AT ALL
GAD7 TOTAL SCORE: 4
4. TROUBLE RELAXING: SEVERAL DAYS
1. FEELING NERVOUS, ANXIOUS, OR ON EDGE: SEVERAL DAYS
7. FEELING AFRAID AS IF SOMETHING AWFUL MIGHT HAPPEN: NOT AT ALL

## 2021-05-10 ASSESSMENT — PATIENT HEALTH QUESTIONNAIRE - PHQ9
SUM OF ALL RESPONSES TO PHQ QUESTIONS 1-9: 9
SUM OF ALL RESPONSES TO PHQ QUESTIONS 1-9: 9
10. IF YOU CHECKED OFF ANY PROBLEMS, HOW DIFFICULT HAVE THESE PROBLEMS MADE IT FOR YOU TO DO YOUR WORK, TAKE CARE OF THINGS AT HOME, OR GET ALONG WITH OTHER PEOPLE: SOMEWHAT DIFFICULT

## 2021-05-10 NOTE — TELEPHONE ENCOUNTER
Requested Prescriptions   Pending Prescriptions Disp Refills    cyclobenzaprine (FLEXERIL) 10 MG tablet 30 tablet 0     Sig: Take 1 tablet (10 mg) by mouth 3 times daily as needed for muscle spasms       There is no refill protocol information for this order        Routing refill request to provider for review/approval because:  Failed protocol.  Bette WATKINSN-RN  [facility]

## 2021-05-11 ASSESSMENT — PATIENT HEALTH QUESTIONNAIRE - PHQ9: SUM OF ALL RESPONSES TO PHQ QUESTIONS 1-9: 9

## 2021-05-11 ASSESSMENT — ANXIETY QUESTIONNAIRES: GAD7 TOTAL SCORE: 4

## 2021-05-13 ENCOUNTER — OFFICE VISIT (OUTPATIENT)
Dept: FAMILY MEDICINE | Facility: CLINIC | Age: 36
End: 2021-05-13
Payer: COMMERCIAL

## 2021-05-13 VITALS
HEIGHT: 64 IN | BODY MASS INDEX: 32.54 KG/M2 | SYSTOLIC BLOOD PRESSURE: 120 MMHG | WEIGHT: 190.6 LBS | TEMPERATURE: 97.7 F | RESPIRATION RATE: 14 BRPM | DIASTOLIC BLOOD PRESSURE: 78 MMHG | HEART RATE: 78 BPM

## 2021-05-13 DIAGNOSIS — F41.9 ANXIETY: ICD-10-CM

## 2021-05-13 DIAGNOSIS — F34.1 DYSTHYMIA: ICD-10-CM

## 2021-05-13 DIAGNOSIS — Z13.1 SCREENING FOR DIABETES MELLITUS: ICD-10-CM

## 2021-05-13 DIAGNOSIS — E66.9 OBESITY (BMI 30-39.9): ICD-10-CM

## 2021-05-13 DIAGNOSIS — G47.9 SLEEP DISTURBANCE: ICD-10-CM

## 2021-05-13 DIAGNOSIS — Z13.220 SCREENING FOR LIPOID DISORDERS: Primary | ICD-10-CM

## 2021-05-13 LAB
ALBUMIN SERPL-MCNC: 3.6 G/DL (ref 3.4–5)
ALP SERPL-CCNC: 48 U/L (ref 40–150)
ALT SERPL W P-5'-P-CCNC: 15 U/L (ref 0–50)
ANION GAP SERPL CALCULATED.3IONS-SCNC: 6 MMOL/L (ref 3–14)
AST SERPL W P-5'-P-CCNC: 8 U/L (ref 0–45)
BILIRUB SERPL-MCNC: 0.3 MG/DL (ref 0.2–1.3)
BUN SERPL-MCNC: 9 MG/DL (ref 7–30)
CALCIUM SERPL-MCNC: 8.5 MG/DL (ref 8.5–10.1)
CHLORIDE SERPL-SCNC: 109 MMOL/L (ref 94–109)
CHOLEST SERPL-MCNC: 138 MG/DL
CO2 SERPL-SCNC: 24 MMOL/L (ref 20–32)
CREAT SERPL-MCNC: 0.61 MG/DL (ref 0.52–1.04)
DEPRECATED CALCIDIOL+CALCIFEROL SERPL-MC: 20 UG/L (ref 20–75)
GFR SERPL CREATININE-BSD FRML MDRD: >90 ML/MIN/{1.73_M2}
GLUCOSE SERPL-MCNC: 100 MG/DL (ref 70–99)
HDLC SERPL-MCNC: 30 MG/DL
LDLC SERPL CALC-MCNC: 73 MG/DL
NONHDLC SERPL-MCNC: 108 MG/DL
POTASSIUM SERPL-SCNC: 4.1 MMOL/L (ref 3.4–5.3)
PROT SERPL-MCNC: 6.6 G/DL (ref 6.8–8.8)
SODIUM SERPL-SCNC: 139 MMOL/L (ref 133–144)
TRIGL SERPL-MCNC: 175 MG/DL
TSH SERPL DL<=0.005 MIU/L-ACNC: 1.42 MU/L (ref 0.4–4)

## 2021-05-13 PROCEDURE — 80061 LIPID PANEL: CPT | Performed by: NURSE PRACTITIONER

## 2021-05-13 PROCEDURE — 36415 COLL VENOUS BLD VENIPUNCTURE: CPT | Performed by: NURSE PRACTITIONER

## 2021-05-13 PROCEDURE — 96127 BRIEF EMOTIONAL/BEHAV ASSMT: CPT | Performed by: NURSE PRACTITIONER

## 2021-05-13 PROCEDURE — 84443 ASSAY THYROID STIM HORMONE: CPT | Performed by: NURSE PRACTITIONER

## 2021-05-13 PROCEDURE — 80053 COMPREHEN METABOLIC PANEL: CPT | Performed by: NURSE PRACTITIONER

## 2021-05-13 PROCEDURE — 99214 OFFICE O/P EST MOD 30 MIN: CPT | Performed by: NURSE PRACTITIONER

## 2021-05-13 PROCEDURE — 82306 VITAMIN D 25 HYDROXY: CPT | Performed by: NURSE PRACTITIONER

## 2021-05-13 RX ORDER — TRAZODONE HYDROCHLORIDE 50 MG/1
TABLET, FILM COATED ORAL
Qty: 90 TABLET | Refills: 0 | Status: SHIPPED | OUTPATIENT
Start: 2021-05-13 | End: 2021-09-05

## 2021-05-13 RX ORDER — PAROXETINE 20 MG/1
20 TABLET, FILM COATED ORAL EVERY MORNING
Qty: 90 TABLET | Refills: 0 | Status: SHIPPED | OUTPATIENT
Start: 2021-05-13 | End: 2021-09-05

## 2021-05-13 ASSESSMENT — ENCOUNTER SYMPTOMS
LIGHT-HEADEDNESS: 0
DIARRHEA: 0
SORE THROAT: 0
FATIGUE: 0
SHORTNESS OF BREATH: 0
ABDOMINAL PAIN: 0
NUMBNESS: 0
DIZZINESS: 0
SLEEP DISTURBANCE: 1
CONSTIPATION: 0
NAUSEA: 0
VOMITING: 0
PALPITATIONS: 0
NERVOUS/ANXIOUS: 0
RHINORRHEA: 0
WHEEZING: 0
ABDOMINAL DISTENTION: 0
CHEST TIGHTNESS: 0
ARTHRALGIAS: 0
DYSPHORIC MOOD: 1
MYALGIAS: 0
COUGH: 0
HEADACHES: 0

## 2021-05-13 ASSESSMENT — ANXIETY QUESTIONNAIRES
2. NOT BEING ABLE TO STOP OR CONTROL WORRYING: SEVERAL DAYS
5. BEING SO RESTLESS THAT IT IS HARD TO SIT STILL: SEVERAL DAYS
IF YOU CHECKED OFF ANY PROBLEMS ON THIS QUESTIONNAIRE, HOW DIFFICULT HAVE THESE PROBLEMS MADE IT FOR YOU TO DO YOUR WORK, TAKE CARE OF THINGS AT HOME, OR GET ALONG WITH OTHER PEOPLE: SOMEWHAT DIFFICULT
1. FEELING NERVOUS, ANXIOUS, OR ON EDGE: SEVERAL DAYS
6. BECOMING EASILY ANNOYED OR IRRITABLE: SEVERAL DAYS
3. WORRYING TOO MUCH ABOUT DIFFERENT THINGS: SEVERAL DAYS
GAD7 TOTAL SCORE: 7
7. FEELING AFRAID AS IF SOMETHING AWFUL MIGHT HAPPEN: SEVERAL DAYS

## 2021-05-13 ASSESSMENT — PATIENT HEALTH QUESTIONNAIRE - PHQ9
5. POOR APPETITE OR OVEREATING: SEVERAL DAYS
SUM OF ALL RESPONSES TO PHQ QUESTIONS 1-9: 9

## 2021-05-13 ASSESSMENT — PAIN SCALES - GENERAL: PAINLEVEL: NO PAIN (0)

## 2021-05-13 ASSESSMENT — MIFFLIN-ST. JEOR: SCORE: 1534.56

## 2021-05-13 NOTE — PROGRESS NOTES
Assessment & Plan     Anxiety  Stable-doing well on current dose of Paxil.  Would like to gradually decrease dose and wean off.  We will plan to decrease dose of Paxil to 20 mg orally daily.  New prescription sent.  Plan to follow-up in 3 months.  May do virtual visit or E-visit  - Vitamin D Deficiency  - TSH with free T4 reflex  - PARoxetine (PAXIL) 20 MG tablet; Take 1 tablet (20 mg) by mouth every morning    Dysthymia  Stable-doing well on current dose of Paxil.  Would like to gradually decrease dose and wean off.  We will plan to decrease dose of Paxil to 20 mg orally daily.  New prescription sent.  Plan to follow-up in 3 months.  May do virtual visit or E-visit  - Vitamin D Deficiency  - TSH with free T4 reflex  - PARoxetine (PAXIL) 20 MG tablet; Take 1 tablet (20 mg) by mouth every morning  My previous notes reviewed.    Screening for lipoid disorders  We will check fasting labs here today  - Lipid panel reflex to direct LDL Fasting    Screening for diabetes mellitus  We will check fasting labs here today  - Comprehensive metabolic panel    Sleep disturbance  Educated on use of trazodone.  Encouraged to dedicate 7 to 8 hours to sleep.  Start at half tab nightly, may increase to 1 tab nightly as needed.  Plan to follow-up in 3 months.  - traZODone (DESYREL) 50 MG tablet; Take 1/2-1 tab nightly as needed for sleep.    Obesity (BMI 30-39.9)  Encouraged to increase activity to 3-4 times per week.  Decrease calorie intake to 1600 tyesha orally daily.  We will check labs here today.  Notify if continues to struggle and consider referral to comprehensive weight loss management program.      Review of external notes as documented elsewhere in note  Ordering of each unique test  Prescription drug management  33 minutes spent on the date of the encounter doing chart review, history and exam, documentation and further activities per the note       Tobacco Cessation:   reports that she has been smoking cigarettes. She  "has a 1.00 pack-year smoking history. She has never used smokeless tobacco.      BMI:   Estimated body mass index is 32.72 kg/m  as calculated from the following:    Height as of this encounter: 1.626 m (5' 4\").    Weight as of this encounter: 86.5 kg (190 lb 9.6 oz).   Weight management plan: Discussed healthy diet and exercise guidelines      Return in about 3 months (around 8/13/2021) for A Mood Check.    GISEL Johnston CNP  [facility] HARVINDER Rodriguez is a 36 year old who presents for the following health issues     HPI     Chief Complaint   Patient presents with     Recheck Medication     pt is fasting       Depression and Anxiety Follow-Up    How are you doing with your depression since your last visit? No change    How are you doing with your anxiety since your last visit?  No change    Are you having other symptoms that might be associated with depression or anxiety? Yes:  hides in house    Have you had a significant life event? No     Do you have any concerns with your use of alcohol or other drugs? No    Social History     Tobacco Use     Smoking status: Current Every Day Smoker     Packs/day: 0.10     Years: 10.00     Pack years: 1.00     Types: Cigarettes     Smokeless tobacco: Never Used   Substance Use Topics     Alcohol use: Yes     Alcohol/week: 0.0 standard drinks     Drug use: No     PHQ 10/28/2020 5/10/2021 5/13/2021   PHQ-9 Total Score 5 9 9   Q9: Thoughts of better off dead/self-harm past 2 weeks Not at all Not at all Not at all     RACH-7 SCORE 10/28/2020 5/10/2021 5/13/2021   Total Score - - -   Total Score 8 (mild anxiety) 4 (minimal anxiety) -   Total Score 8 4 7       Suicide Assessment Five-step Evaluation and Treatment (SAFE-T)      How many servings of fruits and vegetables do you eat daily?  2-3    On average, how many sweetened beverages do you drink each day (Examples: soda, juice, sweet tea, etc.  Do NOT count diet or artificially sweetened " beverages)?   0    How many days per week do you exercise enough to make your heart beat faster? 4    How many minutes a day do you exercise enough to make your heart beat faster? 30 - 60  How many days per week do you miss taking your medication? 1    What makes it hard for you to take your medications?  remembering to take      Is not sleeping well. Chili pad to help regulate body temp. Over the counter melatonin. Cant fall asleep or if is does will wake often. Gets up at 5 AM. Unsure what is waking her    Changed diet and is working out more. Not able to lose any weight. Watching carbs, trying to keep it under 75. Eating a lot of veggies and lean meats. Not eating out much. Does not keep snacks in house. Eating string cheese for snacks. Drinking about 1 gallon of water per day. 1 cup of coffee and creamer. Occasional pop on weekend. Joined a gym, will go on walks, 20-30 minutes of intentional movement per week. Working a lot outside in yard. Is usually eating about 1800 calories per day. Would like to lose about 20-30 pounds.     Would like to gradually wan off of paxil. Has needed the xanax occasionally. Has used it a couple of times to help sleep. Thinks takes 2-3 times per month. Last prescription in Oct, has about 10 left.     Donates plasma, get checked for HIV and Hep C regularly     Not taking any Vit D      Review of Systems   Constitutional: Negative for fatigue.        Hard time losing weight   HENT: Negative for ear pain, rhinorrhea and sore throat.    Eyes: Negative for visual disturbance.   Respiratory: Negative for cough, chest tightness, shortness of breath and wheezing.    Cardiovascular: Negative for chest pain and palpitations.   Gastrointestinal: Negative for abdominal distention, abdominal pain, constipation, diarrhea, nausea and vomiting.   Endocrine: Negative for cold intolerance and heat intolerance.   Musculoskeletal: Negative for arthralgias and myalgias.   Skin: Negative for rash.  "  Neurological: Negative for dizziness, light-headedness, numbness and headaches.   Psychiatric/Behavioral: Positive for dysphoric mood and sleep disturbance. Negative for suicidal ideas. The patient is not nervous/anxious.           Objective    /78 (BP Location: Left arm, Patient Position: Sitting, Cuff Size: Adult Regular)   Pulse 78   Temp 97.7  F (36.5  C) (Tympanic)   Resp 14   Ht 1.626 m (5' 4\")   Wt 86.5 kg (190 lb 9.6 oz)   LMP 05/11/2021   BMI 32.72 kg/m    Body mass index is 32.72 kg/m .  Physical Exam  Constitutional:       Appearance: She is well-developed.   Cardiovascular:      Rate and Rhythm: Normal rate and regular rhythm.   Pulmonary:      Effort: Pulmonary effort is normal.      Breath sounds: Normal breath sounds.   Skin:     General: Skin is warm.   Neurological:      Mental Status: She is alert.              "

## 2021-05-14 ASSESSMENT — ANXIETY QUESTIONNAIRES: GAD7 TOTAL SCORE: 7

## 2021-07-11 ENCOUNTER — MYC REFILL (OUTPATIENT)
Dept: FAMILY MEDICINE | Facility: CLINIC | Age: 36
End: 2021-07-11

## 2021-07-11 DIAGNOSIS — F41.9 ANXIETY: ICD-10-CM

## 2021-07-12 RX ORDER — ALPRAZOLAM 0.5 MG
0.5 TABLET ORAL 2 TIMES DAILY
Qty: 30 TABLET | Refills: 0 | Status: SHIPPED | OUTPATIENT
Start: 2021-07-12 | End: 2021-09-20

## 2021-07-12 NOTE — TELEPHONE ENCOUNTER
Requested Prescriptions   Pending Prescriptions Disp Refills     ALPRAZolam (XANAX) 0.5 MG tablet 30 tablet 0     Sig: Take 1 tablet (0.5 mg) by mouth 2 times daily       There is no refill protocol information for this order        Routing refill request to provider for review/approval because:  Drug not on the [facility] refill protocol

## 2021-09-02 DIAGNOSIS — F41.9 ANXIETY: ICD-10-CM

## 2021-09-02 DIAGNOSIS — F34.1 DYSTHYMIA: ICD-10-CM

## 2021-09-02 DIAGNOSIS — G47.9 SLEEP DISTURBANCE: ICD-10-CM

## 2021-09-02 NOTE — TELEPHONE ENCOUNTER
Patient requesting refill of Trazodone 50 mg and Paroxetine 20 mg sent to [facility] in [place] off [address]. She has appt scheduled for 9/20/21. She will run out of meds before then.

## 2021-09-05 NOTE — TELEPHONE ENCOUNTER
Routing refill request to provider for review/approval because:  Labs out of range:  phq9    Pt has follow up appt in 2 weeks    Medication pended for approval, 30 day supply with reminder.

## 2021-09-07 ENCOUNTER — MYC REFILL (OUTPATIENT)
Dept: FAMILY MEDICINE | Facility: CLINIC | Age: 36
End: 2021-09-07

## 2021-09-07 DIAGNOSIS — F41.9 ANXIETY: ICD-10-CM

## 2021-09-07 DIAGNOSIS — G47.9 SLEEP DISTURBANCE: ICD-10-CM

## 2021-09-07 DIAGNOSIS — F34.1 DYSTHYMIA: ICD-10-CM

## 2021-09-07 RX ORDER — TRAZODONE HYDROCHLORIDE 50 MG/1
TABLET, FILM COATED ORAL
Qty: 30 TABLET | Refills: 0 | Status: SHIPPED | OUTPATIENT
Start: 2021-09-07 | End: 2021-09-20

## 2021-09-07 RX ORDER — PAROXETINE 20 MG/1
20 TABLET, FILM COATED ORAL EVERY MORNING
Qty: 30 TABLET | Refills: 0 | Status: SHIPPED | OUTPATIENT
Start: 2021-09-07 | End: 2021-09-20

## 2021-09-09 RX ORDER — TRAZODONE HYDROCHLORIDE 50 MG/1
TABLET, FILM COATED ORAL
Qty: 30 TABLET | Refills: 0 | OUTPATIENT
Start: 2021-09-09

## 2021-09-09 RX ORDER — PAROXETINE 20 MG/1
20 TABLET, FILM COATED ORAL EVERY MORNING
Qty: 30 TABLET | Refills: 0 | OUTPATIENT
Start: 2021-09-09

## 2021-09-18 ENCOUNTER — HEALTH MAINTENANCE LETTER (OUTPATIENT)
Age: 36
End: 2021-09-18

## 2021-09-20 ENCOUNTER — OFFICE VISIT (OUTPATIENT)
Dept: FAMILY MEDICINE | Facility: CLINIC | Age: 36
End: 2021-09-20
Payer: COMMERCIAL

## 2021-09-20 VITALS
HEART RATE: 80 BPM | SYSTOLIC BLOOD PRESSURE: 132 MMHG | TEMPERATURE: 98.8 F | BODY MASS INDEX: 29.9 KG/M2 | RESPIRATION RATE: 20 BRPM | DIASTOLIC BLOOD PRESSURE: 82 MMHG | WEIGHT: 174.2 LBS

## 2021-09-20 DIAGNOSIS — G89.29 CHRONIC BILATERAL LOW BACK PAIN WITHOUT SCIATICA: ICD-10-CM

## 2021-09-20 DIAGNOSIS — R06.02 SOB (SHORTNESS OF BREATH): ICD-10-CM

## 2021-09-20 DIAGNOSIS — G47.9 SLEEP DISTURBANCE: ICD-10-CM

## 2021-09-20 DIAGNOSIS — M54.50 CHRONIC BILATERAL LOW BACK PAIN WITHOUT SCIATICA: ICD-10-CM

## 2021-09-20 DIAGNOSIS — R10.2 PELVIC PAIN IN FEMALE: ICD-10-CM

## 2021-09-20 DIAGNOSIS — F34.1 DYSTHYMIA: Primary | ICD-10-CM

## 2021-09-20 DIAGNOSIS — E66.3 OVERWEIGHT (BMI 25.0-29.9): ICD-10-CM

## 2021-09-20 DIAGNOSIS — R10.32 LLQ ABDOMINAL PAIN: ICD-10-CM

## 2021-09-20 DIAGNOSIS — F41.9 ANXIETY: ICD-10-CM

## 2021-09-20 PROBLEM — E66.9 OBESITY (BMI 30-39.9): Status: RESOLVED | Noted: 2021-05-13 | Resolved: 2021-09-20

## 2021-09-20 LAB
ALBUMIN UR-MCNC: NEGATIVE MG/DL
APPEARANCE UR: CLEAR
BILIRUB UR QL STRIP: NEGATIVE
COLOR UR AUTO: YELLOW
GLUCOSE UR STRIP-MCNC: NEGATIVE MG/DL
HGB UR QL STRIP: NEGATIVE
KETONES UR STRIP-MCNC: NEGATIVE MG/DL
LEUKOCYTE ESTERASE UR QL STRIP: NEGATIVE
NITRATE UR QL: NEGATIVE
PH UR STRIP: 5.5 [PH] (ref 5–7)
SP GR UR STRIP: >=1.03 (ref 1–1.03)
UROBILINOGEN UR STRIP-ACNC: 0.2 E.U./DL

## 2021-09-20 PROCEDURE — 99214 OFFICE O/P EST MOD 30 MIN: CPT | Performed by: NURSE PRACTITIONER

## 2021-09-20 PROCEDURE — 81003 URINALYSIS AUTO W/O SCOPE: CPT | Performed by: NURSE PRACTITIONER

## 2021-09-20 RX ORDER — PAROXETINE 20 MG/1
20 TABLET, FILM COATED ORAL EVERY MORNING
Qty: 90 TABLET | Refills: 1 | Status: SHIPPED | OUTPATIENT
Start: 2021-09-20 | End: 2022-06-06

## 2021-09-20 RX ORDER — TRAZODONE HYDROCHLORIDE 50 MG/1
TABLET, FILM COATED ORAL
Qty: 135 TABLET | Refills: 1 | Status: SHIPPED | OUTPATIENT
Start: 2021-09-20 | End: 2022-06-06

## 2021-09-20 RX ORDER — CYCLOBENZAPRINE HCL 10 MG
10 TABLET ORAL 3 TIMES DAILY PRN
Qty: 30 TABLET | Refills: 0 | Status: SHIPPED | OUTPATIENT
Start: 2021-09-20 | End: 2022-02-01

## 2021-09-20 RX ORDER — ALPRAZOLAM 0.5 MG
0.5 TABLET ORAL 2 TIMES DAILY PRN
Qty: 30 TABLET | Refills: 0 | Status: SHIPPED | OUTPATIENT
Start: 2021-09-20 | End: 2022-03-08

## 2021-09-20 RX ORDER — ALBUTEROL SULFATE 90 UG/1
2 AEROSOL, METERED RESPIRATORY (INHALATION) EVERY 6 HOURS PRN
Qty: 18 G | Refills: 1 | Status: SHIPPED | OUTPATIENT
Start: 2021-09-20 | End: 2021-12-07

## 2021-09-20 ASSESSMENT — ANXIETY QUESTIONNAIRES
IF YOU CHECKED OFF ANY PROBLEMS ON THIS QUESTIONNAIRE, HOW DIFFICULT HAVE THESE PROBLEMS MADE IT FOR YOU TO DO YOUR WORK, TAKE CARE OF THINGS AT HOME, OR GET ALONG WITH OTHER PEOPLE: SOMEWHAT DIFFICULT
6. BECOMING EASILY ANNOYED OR IRRITABLE: SEVERAL DAYS
GAD7 TOTAL SCORE: 6
5. BEING SO RESTLESS THAT IT IS HARD TO SIT STILL: SEVERAL DAYS
2. NOT BEING ABLE TO STOP OR CONTROL WORRYING: SEVERAL DAYS
1. FEELING NERVOUS, ANXIOUS, OR ON EDGE: SEVERAL DAYS
3. WORRYING TOO MUCH ABOUT DIFFERENT THINGS: SEVERAL DAYS
7. FEELING AFRAID AS IF SOMETHING AWFUL MIGHT HAPPEN: NOT AT ALL

## 2021-09-20 ASSESSMENT — ENCOUNTER SYMPTOMS
NERVOUS/ANXIOUS: 1
FATIGUE: 0
FEVER: 0
CHEST TIGHTNESS: 0
ABDOMINAL PAIN: 1
HEADACHES: 0
DIZZINESS: 0
SHORTNESS OF BREATH: 0
CHILLS: 0

## 2021-09-20 ASSESSMENT — PATIENT HEALTH QUESTIONNAIRE - PHQ9
5. POOR APPETITE OR OVEREATING: SEVERAL DAYS
SUM OF ALL RESPONSES TO PHQ QUESTIONS 1-9: 7

## 2021-09-20 ASSESSMENT — PAIN SCALES - GENERAL: PAINLEVEL: NO PAIN (0)

## 2021-09-20 NOTE — PROGRESS NOTES
Assessment & Plan     Dysthymia  Patient stable on Paxil 20 mg and Xanax 0.5 mg BID PRN.  Increased depression and anxiety likely situational  We will set up for counseling/therapy.  Plans to check with her work as well to see if they offer any free counseling.   Plan to follow-up in 6 months or sooner if needed.  - MENTAL HEALTH REFERRAL  - Adult; Outpatient Treatment; Individual/Couples/Family/Group Therapy/Health Psychology; [facility] [phone]; We will contact you to schedule the appointment or please call with any questions  - PARoxetine (PAXIL) 20 MG tablet  Dispense: 90 tablet; Refill: 1    Anxiety  Patient stable on Paxil 20 mg and Xanax 0.5 mg BID PRN.  Increased depression and anxiety likely situational  We will set up for counseling/therapy.  Plans to check with her work as well to see if they offer any free counseling.   Plan to follow-up in 6 months or sooner if needed.  - REVIEW OF HEALTH MAINTENANCE PROTOCOL ORDERS  - MENTAL HEALTH REFERRAL  - Adult; Outpatient Treatment; Individual/Couples/Family/Group Therapy/Health Psychology; [facility] [phone]; We will contact you to schedule the appointment or please call with any questions  - PARoxetine (PAXIL) 20 MG tablet  Dispense: 90 tablet; Refill: 1  - ALPRAZolam (XANAX) 0.5 MG tablet  Dispense: 30 tablet; Refill: 0    LLQ abdominal pain    Pelvic pain in female  Hx of ectopic pregnancy in 2018. Will check urine and get pelvic US to further evaluate cause of pain.  Full plan will depend on outcome  - US Pelvic Complete with Transvaginal  - UA reflex to Microscopic and Culture    Sleep disturbance  Trazodone working well.  Refills given.  Plan to follow-up in 6 months or sooner if needed.  - traZODone (DESYREL) 50 MG tablet  Dispense: 135 tablet; Refill: 1    Chronic bilateral low back pain without sciatica  Stable. No acute concerns.   - cyclobenzaprine (FLEXERIL) 10 MG tablet  Dispense: 30 tablet; Refill:  "0    SOB (shortness of breath)  Stable. No acute concerns.   - albuterol (VENTOLIN HFA) 108 (90 Base) MCG/ACT inhaler  Dispense: 18 g; Refill: 1    Overweight (BMI 25.0-29.9)  No interventions at this time. Discussed healthy diet and exercise.       ----- Services Performed by a MEDICAL STUDENT in Presence of RESIDENT/FELLOW Physician-------    Prescription drug management        45 minutes spent on the date of the encounter doing chart review, history and exam, documentation and further activities per the note       Tobacco Cessation:   reports that she has been smoking cigarettes. She has a 10.00 pack-year smoking history. She has never used smokeless tobacco.  Tobacco Cessation Action Plan: Information offered: Patient not interested at this time    Follow-up:  Return in about 6 months (around 3/20/2022) for A Mood Check.     HIMANSHU Jacobs, RN, FNP DNP student     GISEL Johnston CNP  [facility] HARVINDER Rodriguez is a 36 year old who presents for the following health issues     HPI     Patient states that the last few weeks have been rough as she has been having issues with her boyfriend, declines to go into detail at this time but says she came home and found his things gone. At this time she had some shortness of breath, palpitations, and sweating. She took her Xanax at this time and that provided her some relief.     Her appetite has been poor, normally is an emotional eater but when it comes to her relationhip she is the opposite. Feels like this has contributed to weight loss. Eating once a day generally. Drinking a lot of water. Has not done outpatient therapy but is interested in trying. She feels safe in her relationship. No abuse.     Additional concerns:  About a month ago got a \"gaggy\" feeling in her throat. Boyfriend had a vasectomy so she knows she is not pregnant. Happens randomly throughout the day. Not triggered by anything specific.     She has also noticed pain " on her left lower abdomen and pelvis that radiates to her side. Pain is sharp and occurs intermittently. Not exactly sure when the pain started but thinks it has been going on for a few months. Nothing that makes it worse or better. Does not seem to be associated with food. Regular periods. LMP 9/9/21. Not on birth control due to boyfriend having vasectomy. Does have history of ectopic pregnancy in 2018.     Declines flu vaccination at this time.     Needs to have refill of inhaler.    Has questions and concerns about the COVID-19 vaccine that she would like to discuss as her work is going to be requiring it. She is scared and unsure if she wants it.     Depression and Anxiety Follow-Up    How are you doing with your depression since your last visit? Improved     How are you doing with your anxiety since your last visit?  Varies from day to day    Are you having other symptoms that might be associated with depression or anxiety? Yes:  lack of appetite    Have you had a significant life event? Relationship Concerns     Do you have any concerns with your use of alcohol or other drugs? No    Social History     Tobacco Use     Smoking status: Current Every Day Smoker     Packs/day: 1.00     Years: 10.00     Pack years: 10.00     Types: Cigarettes     Smokeless tobacco: Never Used   Vaping Use     Vaping Use: Never used   Substance Use Topics     Alcohol use: Yes     Alcohol/week: 0.0 standard drinks     Drug use: No     PHQ 10/28/2020 5/10/2021 5/13/2021   PHQ-9 Total Score 5 9 9   Q9: Thoughts of better off dead/self-harm past 2 weeks Not at all Not at all Not at all     RACH-7 SCORE 10/28/2020 5/10/2021 5/13/2021   Total Score - - -   Total Score 8 (mild anxiety) 4 (minimal anxiety) -   Total Score 8 4 7       Review of Systems   Constitutional: Negative for chills, fatigue and fever.   Respiratory: Negative for chest tightness and shortness of breath.    Cardiovascular: Negative for chest pain.   Gastrointestinal:  Positive for abdominal pain.        Intermittent LLQ pain radiating to side   Neurological: Negative for dizziness and headaches.   Psychiatric/Behavioral: Positive for mood changes. The patient is nervous/anxious.         Been sad lately due to problems in her relationship          Objective    /82 (BP Location: Left arm, Patient Position: Sitting, Cuff Size: Adult Regular)   Pulse 80   Temp 98.8  F (37.1  C) (Tympanic)   Resp 20   Wt 79 kg (174 lb 3.2 oz)   LMP 09/09/2021   BMI 29.90 kg/m    Body mass index is 29.9 kg/m .  Physical Exam  Constitutional:       Appearance: Normal appearance.   Cardiovascular:      Rate and Rhythm: Normal rate and regular rhythm.      Heart sounds: Normal heart sounds.   Pulmonary:      Effort: Pulmonary effort is normal.      Breath sounds: Normal breath sounds.   Abdominal:      General: Bowel sounds are normal.      Palpations: Abdomen is soft.      Tenderness: There is abdominal tenderness.      Comments: Tenderness with palpation of LLQ/pelvis   Skin:     General: Skin is warm and dry.   Neurological:      Mental Status: She is alert and oriented to person, place, and time.   Psychiatric:      Comments: Tearful

## 2021-09-20 NOTE — PATIENT INSTRUCTIONS
You can call imaging scheduling to set up appointment date, time, and location that works best for you to have your ultrasound. [phone]

## 2021-09-21 ASSESSMENT — ANXIETY QUESTIONNAIRES: GAD7 TOTAL SCORE: 6

## 2021-09-23 ENCOUNTER — HOSPITAL ENCOUNTER (OUTPATIENT)
Dept: ULTRASOUND IMAGING | Facility: CLINIC | Age: 36
Discharge: HOME | End: 2021-09-23
Attending: NURSE PRACTITIONER | Admitting: NURSE PRACTITIONER
Payer: COMMERCIAL

## 2021-09-23 DIAGNOSIS — R10.2 PELVIC PAIN IN FEMALE: ICD-10-CM

## 2021-09-23 DIAGNOSIS — R10.32 LLQ ABDOMINAL PAIN: ICD-10-CM

## 2021-09-23 PROCEDURE — 76830 TRANSVAGINAL US NON-OB: CPT

## 2021-09-24 DIAGNOSIS — D25.9 UTERINE LEIOMYOMA, UNSPECIFIED LOCATION: ICD-10-CM

## 2021-09-24 DIAGNOSIS — N83.209 HEMORRHAGIC CYST OF OVARY: Primary | ICD-10-CM

## 2021-09-24 DIAGNOSIS — N84.0 ENDOMETRIAL POLYP: ICD-10-CM

## 2021-09-27 ENCOUNTER — MYC MEDICAL ADVICE (OUTPATIENT)
Dept: FAMILY MEDICINE | Facility: CLINIC | Age: 36
End: 2021-09-27

## 2021-10-01 ENCOUNTER — PREP FOR PROCEDURE (OUTPATIENT)
Dept: OBGYN | Facility: CLINIC | Age: 36
End: 2021-10-01

## 2021-10-01 ENCOUNTER — TELEPHONE (OUTPATIENT)
Dept: OBGYN | Facility: CLINIC | Age: 36
End: 2021-10-01

## 2021-10-01 ENCOUNTER — OFFICE VISIT (OUTPATIENT)
Dept: OBGYN | Facility: CLINIC | Age: 36
End: 2021-10-01
Payer: COMMERCIAL

## 2021-10-01 VITALS
BODY MASS INDEX: 29.23 KG/M2 | TEMPERATURE: 98.1 F | HEART RATE: 88 BPM | HEIGHT: 64 IN | DIASTOLIC BLOOD PRESSURE: 86 MMHG | WEIGHT: 171.2 LBS | SYSTOLIC BLOOD PRESSURE: 134 MMHG | RESPIRATION RATE: 18 BRPM

## 2021-10-01 DIAGNOSIS — Z11.59 ENCOUNTER FOR SCREENING FOR OTHER VIRAL DISEASES: ICD-10-CM

## 2021-10-01 DIAGNOSIS — N94.6 DYSMENORRHEA: ICD-10-CM

## 2021-10-01 DIAGNOSIS — D25.1 INTRAMURAL LEIOMYOMA OF UTERUS: ICD-10-CM

## 2021-10-01 DIAGNOSIS — N92.4 EXCESSIVE BLEEDING IN PREMENOPAUSAL PERIOD: Primary | ICD-10-CM

## 2021-10-01 PROBLEM — F41.9 ANXIETY: Status: RESOLVED | Noted: 2021-09-20 | Resolved: 2021-10-01

## 2021-10-01 PROBLEM — F34.1 DYSTHYMIA: Status: RESOLVED | Noted: 2018-06-08 | Resolved: 2021-10-01

## 2021-10-01 PROCEDURE — 99205 OFFICE O/P NEW HI 60 MIN: CPT | Performed by: OBSTETRICS & GYNECOLOGY

## 2021-10-01 RX ORDER — ACETAMINOPHEN 325 MG/1
975 TABLET ORAL ONCE
Status: CANCELLED | OUTPATIENT
Start: 2021-10-01 | End: 2021-10-01

## 2021-10-01 RX ORDER — ACETAMINOPHEN AND CODEINE PHOSPHATE 120; 12 MG/5ML; MG/5ML
0.35 SOLUTION ORAL DAILY
Qty: 90 TABLET | Refills: 0 | Status: SHIPPED | OUTPATIENT
Start: 2021-10-01 | End: 2023-09-20

## 2021-10-01 ASSESSMENT — MIFFLIN-ST. JEOR: SCORE: 1446.56

## 2021-10-01 ASSESSMENT — PAIN SCALES - GENERAL: PAINLEVEL: EXTREME PAIN (8)

## 2021-10-01 NOTE — TELEPHONE ENCOUNTER
LM for pt to call back to confirm date and time of scheduled surgery.  All instructions given before pt left clinic.    -Nella ROPER [facility]  Clinic Station

## 2021-10-01 NOTE — TELEPHONE ENCOUNTER
"[id number]  Jennifer Voss    You are now scheduled for surgery at [facility].  Below are the details for your surgery.  Please read the \"Preparing for Your Surgery\" instructions and let us know if you have any questions.    Type of surgery: dilation and curettage, hysteroscopy   Diagnostic laparoscopy    Surgeon:  Fanny Camacho MD  Location of surgery: [facility] OR    Date of surgery:  10-6-21        Time:  12:30pm          Arrival Time:    11:30am      Time can change, to be confirmed a couple of days prior by pre-op surgery nurse.    Pre-Op Appt Date: Patient to schedule with a PCP or Family Practice Provider within 30 days to the surgery.  Post-Op Appt Date: To be determined by provider     COVID test 2-4 days prior at [facility] Pt will have PCR test outside [place] and bring results with her.    Packet sent out: Yes  Pre-cert/Authorization completed:  TBD by Financial Securing Office.   MA Sterilization/Hysterectomy Acknowledgment Consent signed: Not Applicable    [facility]  [phone]    Fax: [phone]  Same Day Surgery [phone]  Fax: [phone]  [facility] [phone]    "

## 2021-10-01 NOTE — PROGRESS NOTES
Source: patient and chart  CC: pelvic pain  Jennifer is a 36 year old  here for consultation at the request of Shannon Almeida for uterine fibroids.  She was sent for pelvic U/S by PCP due to painful periods, pelvic pain, and heavy periods.  Menses generally monthly.  This has been worsening over the past few months, but this has been an ongoing chronic issue.  Not currently on birth control.  Just broke up with her current partner, so she is not actively trying to conceive.  She is open to conceiving.  In the past she has tried oral contraceptive pills, patch (had a hard time remembering to use), and depo (had irregular bleeding)    Patient previously seen in 2018 for recurrent SAB/ectopic pregnancy.  Her imaging studies (HSG 2018, pelvic U/S , CT scan , and pelvic U/S ) since that time have been reviewed and have confirmed a patent left tube on her HSG and left ovarian cysts documented in both 2018 and now .  Fibroids were noted in both ultrasounds as well--generally stable in size.     2021 ultrasound:  UTERUS: Measures 8.0 x 4.7 x 3.8 cm. Multiple uterine fibroids including 1.1 x 0.9 x 1.0 cm predominantly intramural right upper uterine body fibroid, 0.8 x 0.8 x 0.7 cm predominantly intramural right mid uterine body fibroid, 2.6 x 2.8 x 2.6 cm predominantly subserosal right lower uterine body fibroid and 0.8 x 0.7 x 0.6 cm predominantly subserosal right lower uterine body fibroid.  ENDOMETRIUM: 9 mm. Mildly heterogenous with evidence of internal flow.  RIGHT OVARY: Measures 3.4 x 3.2 x 2.3 cm. It demonstrates normal follicular structure and color flow.  LEFT OVARY: Measures 3.0 x 2.5 x 2.0 cm. It demonstrates normal follicular structure and color flow. There is 3.3 x 2.7 x 2.5 cm complex hypoechoic area in the adnexa, indeterminate, could represent hemorrhagic cyst versus less likely solid adnexal mass.  No significant free fluid.                                                                IMPRESSION:  1.  There is a 3.3 cm complex hypoechoic area in the left adnexa, indeterminate, could represent hemorrhagic cyst versus solid adnexal mass. Follow-up pelvic ultrasound in 6 weeks to confirm resolution. Other evaluation options including pelvic MRI.  2.  Fibroid uterus.  3.  Mildly heterogenous endometrium complex with evidence of internal flow, could be due to underlying endometrial polyp, which can be further evaluated with hysterosonography.      ROS: Ten point review of systems was reviewed and negative except the above.    Patient Active Problem List   Diagnosis     Tobacco abuse     Depression with anxiety     Cervical high risk HPV (human papillomavirus) test positive     History of poor pregnancy outcome     Overweight (BMI 25.0-29.9)     Chronic bilateral low back pain without sciatica     Past Medical History:   Diagnosis Date     Abnormal Pap smear of cervix 08/2003     Cervical high risk HPV (human papillomavirus) test positive 07/24/2015    type 16     Chickenpox      Ectopic pregnancy     right     GERD (gastroesophageal reflux disease) 7/16/2012     H/O chronic ear infection as a child     H/O colposcopy with cervical biopsy 01/18/2016    MARIANO 2, ECC - benign     Left tubal pregnancy without intrauterine pregnancy 01/05/2018     Tonsillitis      Past Surgical History:   Procedure Laterality Date     COLONOSCOPY N/A 8/1/2017    Procedure: COMBINED COLONOSCOPY, SINGLE OR MULTIPLE BIOPSY/POLYPECTOMY BY BIOPSY;  Colonoscopy;  Surgeon: Ramakrishna Epstein MD;  Location: [facility]     LAPAROSCOPIC EVACUATION ECTOPIC PREGNANCY N/A 7/24/2015    LSC right salpingectomy      LEEP TX, CERVICAL  3/2016    benign     MOUTH SURGERY      wisdom teeth     PE TUBES       TONSILLECTOMY         ALL/Meds: Her medication and allergy histories were reviewed and are documented in their appropriate chart areas.    Social History     Tobacco Use     Smoking status: Current Every Day Smoker     Packs/day: 1.00  "    Years: 10.00     Pack years: 10.00     Types: Cigarettes     Smokeless tobacco: Never Used   Vaping Use     Vaping Use: Never used   Substance Use Topics     Alcohol use: Yes     Alcohol/week: 0.0 standard drinks     Drug use: No       FH: Her family history was reviewed and documented in its appropriate chart area.    PE: /86 (BP Location: Right arm, Cuff Size: Adult Regular)   Pulse 88   Temp 98.1  F (36.7  C)   Resp 18   Ht 1.626 m (5' 4\")   Wt 77.7 kg (171 lb 3.2 oz)   LMP 09/09/2021 (Exact Date)   BMI 29.39 kg/m    Body mass index is 29.39 kg/m .    General Appearance:  healthy, alert, active, no distress  HEENT: NCAT  Abdomen: Soft, nontender.  Normal bowel sounds.  No masses  Pelvic: deferred    A/P     ICD-10-CM    1. Excessive bleeding in premenopausal period  N92.4 Case Request: dilation and curettage, hysteroscopy., Diagnostic laparoscopy     Case Request: dilation and curettage, hysteroscopy., Diagnostic laparoscopy     norethindrone (MICRONOR) 0.35 MG tablet   2. Intramural leiomyoma of uterus  D25.1 Case Request: dilation and curettage, hysteroscopy., Diagnostic laparoscopy     Case Request: dilation and curettage, hysteroscopy., Diagnostic laparoscopy     norethindrone (MICRONOR) 0.35 MG tablet   3. Dysmenorrhea  N94.6 Case Request: dilation and curettage, hysteroscopy., Diagnostic laparoscopy     Case Request: dilation and curettage, hysteroscopy., Diagnostic laparoscopy     norethindrone (MICRONOR) 0.35 MG tablet       1. Chronic hx of menorrhagia and dysmenorrhea, fibroids:    Reviewed that both could be treated with hormonal therapy or surgical therapy.  Because she is > 35 and a smoker, we reviewed that combined hormonal contraception is contraindicated.  We discussed progestin options including minipill, depo provera, or Nexplanon.  Discussed surgical options including laparoscopy, hysteroscopy, myomectomy, and hysterectomy.      Patient is concerned about the possibility of the " endometrial polyp, so she is most amenable to hysteroscopy.  Discussed that the fibroids may be contributing to her pain and bleeding, but that it is unclear. As we discussed additional options, she became very tearful as we broached the topic of hysterectomy, especially given her reproductive history.  Discussed that we could consider removing those fibroids, but given the size of the fibroids, they may be low yield and increased morbidity of blood loss.  However, it would preserve her uterus for childbearing.   If she elects myomectomy, she would be best served having it done laparoscopically, which I do not perform here and would need to send her to [facility] specialist for likely robotic procedure.  Discussed hysterectomy would definitely help with bleeding but remove any and all chance of conceiving.  Discussed it would likely help with dysmenorrhea, assuming that her pain is all related to her uterus.  Discussed possible component of her ovaries causing pain as well.      At the end of our discussion, she wanted to proceed with D&C hysteroscopy to evaluate for possible endometrial polyp and having endometrial sampling.  She also is not ready for myomectomy or hysterectomy, but is open to laparoscopy to evaluate her pelvic organs for adhesive disease, significant cysts or endometriosis.  It also allows me to see how much of a subserosal component there is to the fibroids for planning purposes.      Fanny Camacho M.D.    60 minutes spent on the date of the encounter doing chart review, review of test results, patient visit and documentation

## 2021-10-01 NOTE — TELEPHONE ENCOUNTER
Pt confirmed and scheduled for 10-6-21.  She will be getting covid test through her work - PCR - and bring it with her.    -Nella Parikh  Clinic Station

## 2021-10-05 ENCOUNTER — ANESTHESIA EVENT (OUTPATIENT)
Dept: SURGERY | Facility: CLINIC | Age: 36
End: 2021-10-05
Payer: COMMERCIAL

## 2021-10-05 ENCOUNTER — VIRTUAL VISIT (OUTPATIENT)
Dept: FAMILY MEDICINE | Facility: CLINIC | Age: 36
End: 2021-10-05
Payer: COMMERCIAL

## 2021-10-05 DIAGNOSIS — N92.4 EXCESSIVE BLEEDING IN PREMENOPAUSAL PERIOD: ICD-10-CM

## 2021-10-05 DIAGNOSIS — Z01.818 PREOP GENERAL PHYSICAL EXAM: Primary | ICD-10-CM

## 2021-10-05 DIAGNOSIS — D25.9 UTERINE LEIOMYOMA, UNSPECIFIED LOCATION: ICD-10-CM

## 2021-10-05 PROCEDURE — 87624 HPV HI-RISK TYP POOLED RSLT: CPT | Performed by: NURSE PRACTITIONER

## 2021-10-05 PROCEDURE — 99214 OFFICE O/P EST MOD 30 MIN: CPT | Mod: TEL | Performed by: NURSE PRACTITIONER

## 2021-10-05 ASSESSMENT — ENCOUNTER SYMPTOMS
CONSTIPATION: 0
RHINORRHEA: 0
DIARRHEA: 0
COUGH: 0
DIZZINESS: 0
LIGHT-HEADEDNESS: 0
HEADACHES: 0
NUMBNESS: 0
ABDOMINAL PAIN: 1
VOMITING: 0
PALPITATIONS: 0
CHEST TIGHTNESS: 0
SORE THROAT: 0
DYSPHORIC MOOD: 0
NERVOUS/ANXIOUS: 0
SHORTNESS OF BREATH: 0
FATIGUE: 0
MYALGIAS: 0
SLEEP DISTURBANCE: 0
WHEEZING: 0
ABDOMINAL DISTENTION: 0
ARTHRALGIAS: 0
NAUSEA: 0

## 2021-10-05 ASSESSMENT — LIFESTYLE VARIABLES: TOBACCO_USE: 1

## 2021-10-05 NOTE — PATIENT INSTRUCTIONS
If you do not receive your Covid results by 2 PM please call to get your results.  Bring those results with you to your surgery.  You will need these results in order to have your surgery.    Preparing for Your Surgery  Getting started  A nurse will call you to review your health history and instructions. They will give you an arrival time based on your scheduled surgery time.  Please be ready to share the following:    Your doctor's clinic name and phone number    Your medical, surgical and anesthesia history    A list of allergies and sensitivities    A list of medicines, including herbal treatments and over-the-counter drugs    Whether the patient has a legal guardian (ask how to send us the papers in advance)  If you have a child who's having surgery, please ask for a copy of Preparing for Your Child's Surgery.    Preparing for surgery    Within 30 days of surgery: Have a pre-op exam (sometimes called an H&P, or History and Physical). This can be done at a clinic or pre-operative center.  ? If you're having a , you may not need this exam. Talk to your care team    At your pre-op exam, talk to your care team about all medicines you take. If you need to stop any medicines before surgery, ask when to start taking them again.  ? We do this for your safety. Many medicines can make you bleed too much during surgery. Some change how well surgery (anesthesia) drugs work.    Call your insurance company to let them know you're having surgery. (If you don't have insurance, call [phone].)    Call your clinic if there's any change in your health. This includes signs of a cold or flu (sore throat, runny nose, cough, rash, fever). It also includes a scrape or scratch near the surgery site.    If you have questions on the day of surgery, call your hospital or surgery center.  Eating and drinking guidelines  For your safety: Unless your surgeon tells you otherwise, follow the guidelines below.    Eat and drink as  usual until 8 hours before surgery. After that, no food or milk.    Drink clear liquids until 2 hours before surgery. These are liquids you can see through, like water, Gatorade and Propel Water. You may also have black coffee and tea (no cream or milk).    Nothing by mouth within 2 hours of surgery. This includes gum, candy and breath mints.    If you drink, stop drinking alcohol the night before surgery.    If your care team tells you to take medicine on the morning of surgery, it's okay to take it with a sip of water.  Preventing infection    Shower or bathe the night before and morning of your surgery. Follow the instructions your clinic gave you. (If no instructions, use regular soap.)    Don't shave or clip hair near your surgery site. We'll remove the hair if needed.    Don't smoke or vape the morning of surgery. You may chew nicotine gum up to 2 hours before surgery. A nicotine patch is okay.  ? Note: Some surgeries require you to completely quit smoking and nicotine. Check with your surgeon.    Your care team will make every effort to keep you safe from infection. We will:  ? Clean our hands often with soap and water (or an alcohol-based hand rub).  ? Clean the skin at your surgery site with a special soap that kills germs.  ? Give you a special gown to keep you warm. (Cold raises the risk of infection.)  ? Wear special hair covers, masks, gowns and gloves during surgery.  ? Give antibiotic medicine, if prescribed. Not all surgeries need antibiotics.  What to bring on the day of surgery    Photo ID and insurance card    Copy of your health care directive, if you have one    Glasses and hearing aides (bring cases)  ? You can't wear contacts during surgery    Inhaler and eye drops, if you use them (tell us about these when you arrive)    CPAP machine or breathing device, if you use them    A few personal items, if spending the night    If you have . . .  ? A pacemaker or ICD (cardiac defibrillator): Bring the  ID card.  ? An implanted stimulator: Bring the remote control.  ? A legal guardian: Bring a copy of the certified (court-stamped) guardianship papers.  Please remove any jewelry, including body piercings. Leave jewelry and other valuables at home.  If you're going home the day of surgery  Important: If you don't follow the rules below, we must cancel your surgery.     Arrange for someone to drive you home after surgery. You may not drive, take a taxi or take public transportation by yourself (unless you'll have local anesthesia only).    Arrange for a responsible adult to stay with you overnight. If you don't, we may keep you in the hospital overnight, and you may need to pay the costs yourself.  Questions?   If you have any questions for your care team, list them here: _________________________________________________________________________________________________________________________________________________________________________________________________________________________________________________________________________________________________________________________  For informational purposes only. Not to replace the advice of your health care provider. Copyright   2003, 2019 [facility]. All rights reserved. Clinically reviewed by Erum Mcnamara MD. [employer] [id number] - REV 4/20.    Before Your Procedure or Hospital Admission  Testing for COVID-19 (Coronavirus)  Thank you for choosing [facility] for your health care needs. The COVID-19 pandemic is a very challenging time for everyone.   Our goal is to keep you and our team here at [facility] safe and healthy. We've taken many steps to make this happen. For example:    We test and screen our staff, care teams and patients for COVID-19.    Everyone at [facility] must wear a mask and stay 6 feet apart.    We are limiting hospital and clinic visitors.  Before you come in  All patients must get tested for COVID-19. Your test  "needs to happen 2 to 4 days before you check in to the hospital or surgery site.   A clinic scheduler will call about a week in advance to set up a testing time at one of our labs. We'll take a swab of your nose or throat.  Note: If you go to a clinic or pharmacy like [facility] or [facility] for your test, make sure you get a test inside the nose. Tests inside the nose are:    A naso-pharyngeal (NP) RT-PCR test    An anterior nares RT-PCR test  Do NOT get a \"rapid\" test or a saliva (spit) test.  After the test, please stay at home and stay out of contact with other people. It will be harder for you to recover if you get COVID-19 before your treatment.  Please follow all current safety guidelines, including:    Limit trips outside your home.    Limit the number of people you see.    Always wear a mask outside your home.    Use social distancing. Stay 6 feet away from others whenever you can.    Wash your hands often.  If your test shows you have COVID-19  If your test is positive, we'll let you know. A positive test means that you have the virus.   We'll probably have to postpone your admission, surgery or procedure. Your doctor will discuss this with you. After that, we'll let you know what to do and when you can re-schedule.   We may need to cancel your treatment on short notice for other reasons, too.  If your test shows you DON'T have COVID-19  Even if your test is negative, you can still get COVID-19. It's rare but, sometimes, the test result is wrong. You could also catch the virus after taking the test.   There's a very small chance that you could catch COVID-19 in the hospital or surgery center. [facility] has taken many steps to prevent this from happening.   Day of your surgery or procedure    Please come wearing a face covering that covers both your nose and mouth.    When you arrive, we'll ask you some questions to find out if you have any signs or symptoms of COVID-19.    Ask your care team if you can " "have visitors. All visitors must wear face coverings and will be screened for signs of COVID-19.  ? Even if no visitors are allowed, you can still have with you:    Your legal guardian or legal decision maker    A parent and one other visitor, if you are younger than 18 years old    A partner and a , if you are in labor  ? We might need to teach you about taking care of yourself after surgery. If so, a visitor can come into the hospital to learn about it, too.  ? The rules for visitors change often, depending on how much the virus is spreading. To learn more, see Visiting a Loved One in the Hospital during the COVID-19 Outbreak.  Please call your care team, hospital or surgery center if you have any questions. We thank you for your understanding and for choosing [facility] for your care.   Questions and answers  Does it matter where I get tested for COVID-19?  Yes. We urge you to get tested at one of our [facility] COVID-19 testing sites. We process these tests in our lab and can get the results quickly. Your [facility] care team needs to get your results before you check in.  What should I do if I can't get tested at [facility]?  You can get tested somewhere else, but you'll need to take these extra steps:   1. Contact your family doctor or clinic to arrange your test.  2. Take the test within 4 days of your surgery or procedure. We can't accept tests older than 4 days.  3. Make sure you're getting a test inside the nose. Tests inside the nose are:  ? A naso-pharyngeal (NP) RT-PCR test  ? An anterior nares RT-PCR test  Many pharmacies use \"rapid\" tests or saliva (spit) tests. We do NOT accept those tests before surgery or procedures. Tests from inside the nose are the most accurate tests.  1. Make sure your doctor or clinic faxes your results to [facility] at [phone].  If we don't get your results in time, we may have to delay or cancel your treatment.  For informational " purposes only. Not to replace the advice of your health care provider. Copyright   2020 [facility]. All rights reserved. Clinically reviewed by Infection Prevention and the [facility] COVID-19 Clinical Team. [employer] [id number] - Rev 09/09/21.

## 2021-10-05 NOTE — PROGRESS NOTES
[facility]  [address]  [place] [address]  Phone: [phone]  Primary Provider: Shannon Carranza  Pre-op Performing Provider: SHANNON CARRANZA    PREOPERATIVE EVALUATION:  Today's date: 10/5/2021    Jennifer Voss is a 36 year old female who presents for a preoperative evaluation.    Surgical Information:  Surgery/Procedure: dilation and curettage, hysteroscopy,Pap Smear  Diagnostic laparoscopy  Surgery Location: [facility]  Surgeon: Fanny Camacho MD  Surgery Date: 10/06/21  Time of Surgery: 12:30  Where patient plans to recover: At home with family  Fax number for surgical facility: Note does not need to be faxed, will be available electronically in Epic.    Type of Anesthesia Anticipated: General and MAC    *Wants to get note for work to be off if needed    Assessment & Plan     The proposed surgical procedure is considered INTERMEDIATE risk.    Preop general physical exam  Okay for surgery.    Uterine leiomyoma, unspecified location  Okay for surgery, continue to follow with gynecology  Previous gynecology notes reviewed.    Excessive bleeding in premenopausal period  Okay for surgery, continue to follow with gynecology           Risks and Recommendations:  The patient has the following additional risks and recommendations for perioperative complications:   - No identified additional risk factors other than previously addressed      RECOMMENDATION:  APPROVAL GIVEN to proceed with proposed procedure, without further diagnostic evaluation.    Review of external notes as documented above     20 minutes spent on the date of the encounter doing chart review, history and exam, documentation and further activities per the note    Phone call completed due to COVID 19 outbreak.  Had recent office visit on September 20, 2021.  Negative exam findings at that time.    Subjective     HPI related to upcoming procedure: Is going to be having a hysteroscopy with D&C  tomorrow. Is going to be having polyps removed as well.     Had COVID test done yesterday through work. Was a PCR test.  Typically, gets results within 24 hours.  Has not received results yet.      Preop Questions 10/5/2021   1. Have you ever had a heart attack or stroke? No   2. Have you ever had surgery on your heart or blood vessels, such as a stent placement, a coronary artery bypass, or surgery on an artery in your head, neck, heart, or legs? No   3. Do you have chest pain with activity? No   4. Do you have a history of  heart failure? No   5. Do you currently have a cold, bronchitis or symptoms of other infection? No   6. Do you have a cough, shortness of breath, or wheezing? No   7. Do you or anyone in your family have previous history of blood clots? No   8. Do you or does anyone in your family have a serious bleeding problem such as prolonged bleeding following surgeries or cuts? No   9. Have you ever had problems with anemia or been told to take iron pills? No   10. Have you had any abnormal blood loss such as black, tarry or bloody stools, or abnormal vaginal bleeding? No   11. Have you ever had a blood transfusion? No   12. Are you willing to have a blood transfusion if it is medically needed before, during, or after your surgery? Yes   13. Have you or any of your relatives ever had problems with anesthesia? No   14. Do you have sleep apnea, excessive snoring or daytime drowsiness? No   15. Do you have any artifical heart valves or other implanted medical devices like a pacemaker, defibrillator, or continuous glucose monitor? No   16. Do you have artificial joints? No   17. Are you allergic to latex? No   18. Is there any chance that you may be pregnant? No     Health Care Directive:  Patient does not have a Health Care Directive or Living Will: Discussed advance care planning with patient; however, patient declined at this time.    Preoperative Review of :   reviewed - controlled substances  reflected in medication list.      Status of Chronic Conditions:  See problem list for active medical problems.  Problems all longstanding and stable, except as noted/documented.  See ROS for pertinent symptoms related to these conditions.      Review of Systems   Constitutional: Negative for fatigue.   HENT: Negative for ear pain, rhinorrhea and sore throat.    Eyes: Negative for visual disturbance.   Respiratory: Negative for cough, chest tightness, shortness of breath and wheezing.    Cardiovascular: Negative for chest pain, palpitations and leg swelling.   Gastrointestinal: Positive for abdominal pain (LLQ). Negative for abdominal distention, constipation, diarrhea, nausea and vomiting.   Endocrine: Negative for cold intolerance and heat intolerance.   Musculoskeletal: Negative for arthralgias and myalgias.   Skin: Negative for rash.   Neurological: Negative for dizziness, light-headedness, numbness and headaches.   Psychiatric/Behavioral: Negative for dysphoric mood and sleep disturbance. The patient is not nervous/anxious.        Patient Active Problem List    Diagnosis Date Noted     Overweight (BMI 25.0-29.9) 09/20/2021     Priority: Medium     Chronic bilateral low back pain without sciatica 09/20/2021     Priority: Medium     History of poor pregnancy outcome 01/16/2018     Priority: Medium     Cervical high risk HPV (human papillomavirus) test positive 07/24/2015     Priority: Medium     7/24/15: Pap - NIL, + HR HPV 16. Plan colp  1/18/16: [place] Bx - MARIANO 2. ECC - benign. Plan LEEP  3/28/16: LEEP - benign. Plan cotest in 1 year.   7/19/17: Pap - NIL/neg HPV. Pap+HPV in 1 year.  9/25/18 Pap: NIL/neg HPV. Plan Pap+HPV in 3 years (2021).         Tobacco abuse 07/16/2012     Priority: Medium     Depression with anxiety 07/16/2012     Priority: Medium     Has been on paxil and lorazepam.  Discontinued medication 2013        Past Medical History:   Diagnosis Date     Abnormal Pap smear of cervix 08/2003      Cervical high risk HPV (human papillomavirus) test positive 07/24/2015    type 16     Chickenpox      Ectopic pregnancy     right     GERD (gastroesophageal reflux disease) 7/16/2012     H/O chronic ear infection as a child     H/O colposcopy with cervical biopsy 01/18/2016    MARIANO 2, ECC - benign     Left tubal pregnancy without intrauterine pregnancy 01/05/2018     Tonsillitis      Past Surgical History:   Procedure Laterality Date     COLONOSCOPY N/A 8/1/2017    Procedure: COMBINED COLONOSCOPY, SINGLE OR MULTIPLE BIOPSY/POLYPECTOMY BY BIOPSY;  Colonoscopy;  Surgeon: Ramakrishna Epstein MD;  Location: [facility]     LAPAROSCOPIC EVACUATION ECTOPIC PREGNANCY N/A 7/24/2015    LSC right salpingectomy      LEEP TX, CERVICAL  3/2016    benign     MOUTH SURGERY      wisdom teeth     PE TUBES       TONSILLECTOMY       Current Outpatient Medications   Medication Sig Dispense Refill     albuterol (VENTOLIN HFA) 108 (90 Base) MCG/ACT inhaler Inhale 2 puffs into the lungs every 6 hours as needed for shortness of breath / dyspnea or wheezing 18 g 1     ALPRAZolam (XANAX) 0.5 MG tablet Take 1 tablet (0.5 mg) by mouth 2 times daily as needed for anxiety Do not take with alcohol. 30 tablet 0     cyclobenzaprine (FLEXERIL) 10 MG tablet Take 1 tablet (10 mg) by mouth 3 times daily as needed for muscle spasms 30 tablet 0     norethindrone (MICRONOR) 0.35 MG tablet Take 1 tablet (0.35 mg) by mouth daily 90 tablet 0     PARoxetine (PAXIL) 20 MG tablet Take 1 tablet (20 mg) by mouth every morning 90 tablet 1     traZODone (DESYREL) 50 MG tablet Take 1-2 tabs nightly as needed for sleep. 135 tablet 1       Allergies   Allergen Reactions     Penicillins Nausea     Percocet [Oxycodone-Acetaminophen] Nausea     Ok with vicodin        Social History     Tobacco Use     Smoking status: Current Every Day Smoker     Packs/day: 1.00     Years: 10.00     Pack years: 10.00     Types: Cigarettes     Smokeless tobacco: Never Used   Substance Use Topics      Alcohol use: Yes     Alcohol/week: 0.0 standard drinks     History   Drug Use No         Objective     LMP 09/09/2021 (Exact Date)   See office visit from September 20, 2021 form most recent vitals.  Physical Exam    See office visit from September 20, 2021 for exam findings.    Lab Results   Component Value Date    WBC 11.5 04/30/2018     Lab Results   Component Value Date    RBC 4.93 04/30/2018     Lab Results   Component Value Date    HGB 13.9 04/30/2018     Lab Results   Component Value Date    HCT 42.6 04/30/2018     No components found for: MCT  Lab Results   Component Value Date    MCV 86 04/30/2018     Lab Results   Component Value Date    MCH 28.2 04/30/2018     Lab Results   Component Value Date    MCHC 32.6 04/30/2018     Lab Results   Component Value Date    RDW 13.9 04/30/2018     Lab Results   Component Value Date     04/30/2018     Recent Labs   Lab Test 05/13/21  0913 04/30/18  1448    137   POTASSIUM 4.1 4.0   CHLORIDE 109 107   CO2 24 25   ANIONGAP 6 5   * 105*   BUN 9 11   CR 0.61 0.61   CARTER 8.5 8.5       Diagnostics:    No EKG required, no history of coronary heart disease, significant arrhythmia, peripheral arterial disease or other structural heart disease.    Revised Cardiac Risk Index (RCRI):  The patient has the following serious cardiovascular risks for perioperative complications:   - No serious cardiac risks = 0 points     RCRI Interpretation: 0 points: Class I (very low risk - 0.4% complication rate)         Signed Electronically by: GISEL Johnston CNP  Copy of this evaluation report is provided to requesting physician.

## 2021-10-05 NOTE — ANESTHESIA PREPROCEDURE EVALUATION
Anesthesia Pre-Procedure Evaluation    Patient: Jennifer Voss   MRN: [id number] : 1985        Preoperative Diagnosis: Excessive bleeding in premenopausal period [N92.4]  Intramural leiomyoma of uterus [D25.1]  Dysmenorrhea [N94.6]    Procedure : Procedure(s):  dilation and curettage, hysteroscopy,Pap Smear  Diagnostic laparoscopy          Past Medical History:   Diagnosis Date     Abnormal Pap smear of cervix 2003     Cervical high risk HPV (human papillomavirus) test positive 2015    type 16     Chickenpox      Ectopic pregnancy     right     GERD (gastroesophageal reflux disease) 2012     H/O chronic ear infection as a child     H/O colposcopy with cervical biopsy 2016    MARIANO 2, ECC - benign     Left tubal pregnancy without intrauterine pregnancy 2018     Tonsillitis       Past Surgical History:   Procedure Laterality Date     COLONOSCOPY N/A 2017    Procedure: COMBINED COLONOSCOPY, SINGLE OR MULTIPLE BIOPSY/POLYPECTOMY BY BIOPSY;  Colonoscopy;  Surgeon: Ramakrishna Epstein MD;  Location: [facility]     LAPAROSCOPIC EVACUATION ECTOPIC PREGNANCY N/A 2015    LSC right salpingectomy      LEEP TX, CERVICAL  3/2016    benign     MOUTH SURGERY      wisdom teeth     PE TUBES       TONSILLECTOMY        Allergies   Allergen Reactions     Penicillins Nausea     Percocet [Oxycodone-Acetaminophen] Nausea     Ok with vicodin      Social History     Tobacco Use     Smoking status: Current Every Day Smoker     Packs/day: 1.00     Years: 10.00     Pack years: 10.00     Types: Cigarettes     Smokeless tobacco: Never Used   Substance Use Topics     Alcohol use: Yes     Alcohol/week: 0.0 standard drinks      Wt Readings from Last 1 Encounters:   10/01/21 77.7 kg (171 lb 3.2 oz)        Anesthesia Evaluation   Pt has had prior anesthetic. Type: General and MAC.        ROS/MED HX  ENT/Pulmonary:     (+) tobacco use, Current use,     Neurologic:       Cardiovascular:       METS/Exercise Tolerance:      Hematologic:       Musculoskeletal:       GI/Hepatic:     (+) GERD,     Renal/Genitourinary:       Endo:       Psychiatric/Substance Use:     (+) psychiatric history anxiety and depression     Infectious Disease:       Malignancy:       Other:      (+) , H/O Chronic Pain,        Physical Exam    Airway        Mallampati: I   TM distance: > 3 FB   Neck ROM: full   Mouth opening: > 3 cm    Respiratory Devices and Support         Dental  no notable dental history         Cardiovascular   cardiovascular exam normal          Pulmonary   pulmonary exam normal                OUTSIDE LABS:  CBC:   Lab Results   Component Value Date    WBC 11.5 (H) 04/30/2018    WBC 17.3 (H) 04/01/2018    HGB 13.9 04/30/2018    HGB 12.7 04/01/2018    HCT 42.6 04/30/2018    HCT 38.2 04/01/2018     04/30/2018     04/01/2018     BMP:   Lab Results   Component Value Date     05/13/2021     04/30/2018    POTASSIUM 4.1 05/13/2021    POTASSIUM 4.0 04/30/2018    CHLORIDE 109 05/13/2021    CHLORIDE 107 04/30/2018    CO2 24 05/13/2021    CO2 25 04/30/2018    BUN 9 05/13/2021    BUN 11 04/30/2018    CR 0.61 05/13/2021    CR 0.61 04/30/2018     (H) 05/13/2021     (H) 04/30/2018     COAGS: No results found for: PTT, INR, FIBR  POC:   Lab Results   Component Value Date    HCG Negative 04/01/2018     HEPATIC:   Lab Results   Component Value Date    ALBUMIN 3.6 05/13/2021    PROTTOTAL 6.6 (L) 05/13/2021    ALT 15 05/13/2021    AST 8 05/13/2021    ALKPHOS 48 05/13/2021    BILITOTAL 0.3 05/13/2021     OTHER:   Lab Results   Component Value Date    CARTER 8.5 05/13/2021    TSH 1.42 05/13/2021       Anesthesia Plan    ASA Status:  2      Anesthesia Type: General.     - Airway: ETT   Induction: Intravenous.   Maintenance: Balanced.        Consents    Anesthesia Plan(s) and associated risks, benefits, and realistic alternatives discussed. Questions answered and patient/representative(s) expressed understanding.     -  Discussed with:  Patient         Postoperative Care       PONV prophylaxis: Ondansetron (or other 5HT-3), Dexamethasone or Solumedrol     Comments:                GISEL Arias CRNA

## 2021-10-05 NOTE — H&P (VIEW-ONLY)
[facility]  [address]  [place] [address]  Phone: [phone]  Primary Provider: Shannon Carranza  Pre-op Performing Provider: SHANNON CARRANZA    PREOPERATIVE EVALUATION:  Today's date: 10/5/2021    Jennifer Voss is a 36 year old female who presents for a preoperative evaluation.    Surgical Information:  Surgery/Procedure: dilation and curettage, hysteroscopy,Pap Smear  Diagnostic laparoscopy  Surgery Location: [facility] [place]  Surgeon: Fanny Camacho MD  Surgery Date: 10/06/21  Time of Surgery: 12:30  Where patient plans to recover: At home with family  Fax number for surgical facility: Note does not need to be faxed, will be available electronically in Epic.    Type of Anesthesia Anticipated: General and MAC    *Wants to get note for work to be off if needed    Assessment & Plan     The proposed surgical procedure is considered INTERMEDIATE risk.    Preop general physical exam  Okay for surgery.    Uterine leiomyoma, unspecified location  Okay for surgery, continue to follow with gynecology  Previous gynecology notes reviewed.    Excessive bleeding in premenopausal period  Okay for surgery, continue to follow with gynecology           Risks and Recommendations:  The patient has the following additional risks and recommendations for perioperative complications:   - No identified additional risk factors other than previously addressed      RECOMMENDATION:  APPROVAL GIVEN to proceed with proposed procedure, without further diagnostic evaluation.    Review of external notes as documented above     20 minutes spent on the date of the encounter doing chart review, history and exam, documentation and further activities per the note    Phone call completed due to COVID 19 outbreak.  Had recent office visit on September 20, 2021.  Negative exam findings at that time.    Subjective     HPI related to upcoming procedure: Is going to be having a hysteroscopy with D&C  tomorrow. Is going to be having polyps removed as well.     Had COVID test done yesterday through work. Was a PCR test.  Typically, gets results within 24 hours.  Has not received results yet.      Preop Questions 10/5/2021   1. Have you ever had a heart attack or stroke? No   2. Have you ever had surgery on your heart or blood vessels, such as a stent placement, a coronary artery bypass, or surgery on an artery in your head, neck, heart, or legs? No   3. Do you have chest pain with activity? No   4. Do you have a history of  heart failure? No   5. Do you currently have a cold, bronchitis or symptoms of other infection? No   6. Do you have a cough, shortness of breath, or wheezing? No   7. Do you or anyone in your family have previous history of blood clots? No   8. Do you or does anyone in your family have a serious bleeding problem such as prolonged bleeding following surgeries or cuts? No   9. Have you ever had problems with anemia or been told to take iron pills? No   10. Have you had any abnormal blood loss such as black, tarry or bloody stools, or abnormal vaginal bleeding? No   11. Have you ever had a blood transfusion? No   12. Are you willing to have a blood transfusion if it is medically needed before, during, or after your surgery? Yes   13. Have you or any of your relatives ever had problems with anesthesia? No   14. Do you have sleep apnea, excessive snoring or daytime drowsiness? No   15. Do you have any artifical heart valves or other implanted medical devices like a pacemaker, defibrillator, or continuous glucose monitor? No   16. Do you have artificial joints? No   17. Are you allergic to latex? No   18. Is there any chance that you may be pregnant? No     Health Care Directive:  Patient does not have a Health Care Directive or Living Will: Discussed advance care planning with patient; however, patient declined at this time.    Preoperative Review of :   reviewed - controlled substances  reflected in medication list.      Status of Chronic Conditions:  See problem list for active medical problems.  Problems all longstanding and stable, except as noted/documented.  See ROS for pertinent symptoms related to these conditions.      Review of Systems   Constitutional: Negative for fatigue.   HENT: Negative for ear pain, rhinorrhea and sore throat.    Eyes: Negative for visual disturbance.   Respiratory: Negative for cough, chest tightness, shortness of breath and wheezing.    Cardiovascular: Negative for chest pain, palpitations and leg swelling.   Gastrointestinal: Positive for abdominal pain (LLQ). Negative for abdominal distention, constipation, diarrhea, nausea and vomiting.   Endocrine: Negative for cold intolerance and heat intolerance.   Musculoskeletal: Negative for arthralgias and myalgias.   Skin: Negative for rash.   Neurological: Negative for dizziness, light-headedness, numbness and headaches.   Psychiatric/Behavioral: Negative for dysphoric mood and sleep disturbance. The patient is not nervous/anxious.        Patient Active Problem List    Diagnosis Date Noted     Overweight (BMI 25.0-29.9) 09/20/2021     Priority: Medium     Chronic bilateral low back pain without sciatica 09/20/2021     Priority: Medium     History of poor pregnancy outcome 01/16/2018     Priority: Medium     Cervical high risk HPV (human papillomavirus) test positive 07/24/2015     Priority: Medium     7/24/15: Pap - NIL, + HR HPV 16. Plan colp  1/18/16: Tyler Bx - MARIANO 2. ECC - benign. Plan LEEP  3/28/16: LEEP - benign. Plan cotest in 1 year.   7/19/17: Pap - NIL/neg HPV. Pap+HPV in 1 year.  9/25/18 Pap: NIL/neg HPV. Plan Pap+HPV in 3 years (2021).         Tobacco abuse 07/16/2012     Priority: Medium     Depression with anxiety 07/16/2012     Priority: Medium     Has been on paxil and lorazepam.  Discontinued medication 2013        Past Medical History:   Diagnosis Date     Abnormal Pap smear of cervix 08/2003      Cervical high risk HPV (human papillomavirus) test positive 07/24/2015    type 16     Chickenpox      Ectopic pregnancy     right     GERD (gastroesophageal reflux disease) 7/16/2012     H/O chronic ear infection as a child     H/O colposcopy with cervical biopsy 01/18/2016    MARIANO 2, ECC - benign     Left tubal pregnancy without intrauterine pregnancy 01/05/2018     Tonsillitis      Past Surgical History:   Procedure Laterality Date     COLONOSCOPY N/A 8/1/2017    Procedure: COMBINED COLONOSCOPY, SINGLE OR MULTIPLE BIOPSY/POLYPECTOMY BY BIOPSY;  Colonoscopy;  Surgeon: Ramakrishna Epstein MD;  Location: [facility]     LAPAROSCOPIC EVACUATION ECTOPIC PREGNANCY N/A 7/24/2015    LSC right salpingectomy      LEEP TX, CERVICAL  3/2016    benign     MOUTH SURGERY      wisdom teeth     PE TUBES       TONSILLECTOMY       Current Outpatient Medications   Medication Sig Dispense Refill     albuterol (VENTOLIN HFA) 108 (90 Base) MCG/ACT inhaler Inhale 2 puffs into the lungs every 6 hours as needed for shortness of breath / dyspnea or wheezing 18 g 1     ALPRAZolam (XANAX) 0.5 MG tablet Take 1 tablet (0.5 mg) by mouth 2 times daily as needed for anxiety Do not take with alcohol. 30 tablet 0     cyclobenzaprine (FLEXERIL) 10 MG tablet Take 1 tablet (10 mg) by mouth 3 times daily as needed for muscle spasms 30 tablet 0     norethindrone (MICRONOR) 0.35 MG tablet Take 1 tablet (0.35 mg) by mouth daily 90 tablet 0     PARoxetine (PAXIL) 20 MG tablet Take 1 tablet (20 mg) by mouth every morning 90 tablet 1     traZODone (DESYREL) 50 MG tablet Take 1-2 tabs nightly as needed for sleep. 135 tablet 1       Allergies   Allergen Reactions     Penicillins Nausea     Percocet [Oxycodone-Acetaminophen] Nausea     Ok with vicodin        Social History     Tobacco Use     Smoking status: Current Every Day Smoker     Packs/day: 1.00     Years: 10.00     Pack years: 10.00     Types: Cigarettes     Smokeless tobacco: Never Used   Substance Use Topics      Alcohol use: Yes     Alcohol/week: 0.0 standard drinks     History   Drug Use No         Objective     LMP 09/09/2021 (Exact Date)   See office visit from September 20, 2021 form most recent vitals.  Physical Exam    See office visit from September 20, 2021 for exam findings.    Lab Results   Component Value Date    WBC 11.5 04/30/2018     Lab Results   Component Value Date    RBC 4.93 04/30/2018     Lab Results   Component Value Date    HGB 13.9 04/30/2018     Lab Results   Component Value Date    HCT 42.6 04/30/2018     No components found for: MCT  Lab Results   Component Value Date    MCV 86 04/30/2018     Lab Results   Component Value Date    MCH 28.2 04/30/2018     Lab Results   Component Value Date    MCHC 32.6 04/30/2018     Lab Results   Component Value Date    RDW 13.9 04/30/2018     Lab Results   Component Value Date     04/30/2018     Recent Labs   Lab Test 05/13/21  0913 04/30/18  1448    137   POTASSIUM 4.1 4.0   CHLORIDE 109 107   CO2 24 25   ANIONGAP 6 5   * 105*   BUN 9 11   CR 0.61 0.61   CARTER 8.5 8.5       Diagnostics:    No EKG required, no history of coronary heart disease, significant arrhythmia, peripheral arterial disease or other structural heart disease.    Revised Cardiac Risk Index (RCRI):  The patient has the following serious cardiovascular risks for perioperative complications:   - No serious cardiac risks = 0 points     RCRI Interpretation: 0 points: Class I (very low risk - 0.4% complication rate)         Signed Electronically by: GISEL Johnston CNP  Copy of this evaluation report is provided to requesting physician.

## 2021-10-06 ENCOUNTER — HOSPITAL ENCOUNTER (OUTPATIENT)
Facility: CLINIC | Age: 36
Discharge: HOME | End: 2021-10-06
Attending: OBSTETRICS & GYNECOLOGY | Admitting: OBSTETRICS & GYNECOLOGY
Payer: COMMERCIAL

## 2021-10-06 ENCOUNTER — ANESTHESIA (OUTPATIENT)
Dept: SURGERY | Facility: CLINIC | Age: 36
End: 2021-10-06
Payer: COMMERCIAL

## 2021-10-06 VITALS
OXYGEN SATURATION: 97 % | HEIGHT: 64 IN | WEIGHT: 171 LBS | TEMPERATURE: 99 F | DIASTOLIC BLOOD PRESSURE: 76 MMHG | HEART RATE: 85 BPM | SYSTOLIC BLOOD PRESSURE: 115 MMHG | RESPIRATION RATE: 16 BRPM | BODY MASS INDEX: 29.19 KG/M2

## 2021-10-06 DIAGNOSIS — D25.1 INTRAMURAL LEIOMYOMA OF UTERUS: ICD-10-CM

## 2021-10-06 DIAGNOSIS — N94.6 DYSMENORRHEA: ICD-10-CM

## 2021-10-06 DIAGNOSIS — N92.4 EXCESSIVE BLEEDING IN PREMENOPAUSAL PERIOD: ICD-10-CM

## 2021-10-06 LAB — HCG UR QL: NEGATIVE

## 2021-10-06 PROCEDURE — 999N000141 HC STATISTIC PRE-PROCEDURE NURSING ASSESSMENT: Performed by: OBSTETRICS & GYNECOLOGY

## 2021-10-06 PROCEDURE — 58558 HYSTEROSCOPY BIOPSY: CPT | Mod: 51 | Performed by: OBSTETRICS & GYNECOLOGY

## 2021-10-06 PROCEDURE — 710N000012 HC RECOVERY PHASE 2, PER MINUTE: Performed by: OBSTETRICS & GYNECOLOGY

## 2021-10-06 PROCEDURE — 49320 DIAG LAPARO SEPARATE PROC: CPT | Performed by: OBSTETRICS & GYNECOLOGY

## 2021-10-06 PROCEDURE — 710N000009 HC RECOVERY PHASE 1, LEVEL 1, PER MIN: Performed by: OBSTETRICS & GYNECOLOGY

## 2021-10-06 PROCEDURE — 272N000001 HC OR GENERAL SUPPLY STERILE: Performed by: OBSTETRICS & GYNECOLOGY

## 2021-10-06 PROCEDURE — 250N000009 HC RX 250: Performed by: NURSE ANESTHETIST, CERTIFIED REGISTERED

## 2021-10-06 PROCEDURE — 360N000076 HC SURGERY LEVEL 3, PER MIN: Performed by: OBSTETRICS & GYNECOLOGY

## 2021-10-06 PROCEDURE — 250N000025 HC SEVOFLURANE, PER MIN: Performed by: OBSTETRICS & GYNECOLOGY

## 2021-10-06 PROCEDURE — 250N000013 HC RX MED GY IP 250 OP 250 PS 637: Performed by: OBSTETRICS & GYNECOLOGY

## 2021-10-06 PROCEDURE — 258N000003 HC RX IP 258 OP 636: Performed by: NURSE ANESTHETIST, CERTIFIED REGISTERED

## 2021-10-06 PROCEDURE — 88305 TISSUE EXAM BY PATHOLOGIST: CPT | Mod: TC | Performed by: OBSTETRICS & GYNECOLOGY

## 2021-10-06 PROCEDURE — 250N000009 HC RX 250: Performed by: OBSTETRICS & GYNECOLOGY

## 2021-10-06 PROCEDURE — 81025 URINE PREGNANCY TEST: CPT | Performed by: OBSTETRICS & GYNECOLOGY

## 2021-10-06 PROCEDURE — 370N000017 HC ANESTHESIA TECHNICAL FEE, PER MIN: Performed by: OBSTETRICS & GYNECOLOGY

## 2021-10-06 PROCEDURE — 250N000011 HC RX IP 250 OP 636: Performed by: NURSE ANESTHETIST, CERTIFIED REGISTERED

## 2021-10-06 PROCEDURE — G0145 SCR C/V CYTO,THINLAYER,RESCR: HCPCS | Performed by: OBSTETRICS & GYNECOLOGY

## 2021-10-06 RX ORDER — LIDOCAINE 40 MG/G
CREAM TOPICAL
Status: DISCONTINUED | OUTPATIENT
Start: 2021-10-06 | End: 2021-10-06 | Stop reason: HOSPADM

## 2021-10-06 RX ORDER — NALOXONE HYDROCHLORIDE 0.4 MG/ML
0.2 INJECTION, SOLUTION INTRAMUSCULAR; INTRAVENOUS; SUBCUTANEOUS
Status: DISCONTINUED | OUTPATIENT
Start: 2021-10-06 | End: 2021-10-06 | Stop reason: HOSPADM

## 2021-10-06 RX ORDER — NALOXONE HYDROCHLORIDE 0.4 MG/ML
0.4 INJECTION, SOLUTION INTRAMUSCULAR; INTRAVENOUS; SUBCUTANEOUS
Status: DISCONTINUED | OUTPATIENT
Start: 2021-10-06 | End: 2021-10-06 | Stop reason: HOSPADM

## 2021-10-06 RX ORDER — ACETAMINOPHEN 325 MG/1
975 TABLET ORAL ONCE
Status: COMPLETED | OUTPATIENT
Start: 2021-10-06 | End: 2021-10-06

## 2021-10-06 RX ORDER — PROPOFOL 10 MG/ML
INJECTION, EMULSION INTRAVENOUS CONTINUOUS PRN
Status: DISCONTINUED | OUTPATIENT
Start: 2021-10-06 | End: 2021-10-06

## 2021-10-06 RX ORDER — HYDROMORPHONE HYDROCHLORIDE 2 MG/1
2 TABLET ORAL
Status: DISCONTINUED | OUTPATIENT
Start: 2021-10-06 | End: 2021-10-06 | Stop reason: HOSPADM

## 2021-10-06 RX ORDER — HYDROCODONE BITARTRATE AND ACETAMINOPHEN 5; 325 MG/1; MG/1
1-2 TABLET ORAL EVERY 4 HOURS PRN
Qty: 10 TABLET | Refills: 0 | Status: SHIPPED | OUTPATIENT
Start: 2021-10-06 | End: 2022-06-20

## 2021-10-06 RX ORDER — KETOROLAC TROMETHAMINE 30 MG/ML
INJECTION, SOLUTION INTRAMUSCULAR; INTRAVENOUS PRN
Status: DISCONTINUED | OUTPATIENT
Start: 2021-10-06 | End: 2021-10-06

## 2021-10-06 RX ORDER — HYDRALAZINE HYDROCHLORIDE 20 MG/ML
2.5-5 INJECTION INTRAMUSCULAR; INTRAVENOUS EVERY 10 MIN PRN
Status: DISCONTINUED | OUTPATIENT
Start: 2021-10-06 | End: 2021-10-06 | Stop reason: HOSPADM

## 2021-10-06 RX ORDER — SODIUM CHLORIDE, SODIUM LACTATE, POTASSIUM CHLORIDE, CALCIUM CHLORIDE 600; 310; 30; 20 MG/100ML; MG/100ML; MG/100ML; MG/100ML
INJECTION, SOLUTION INTRAVENOUS CONTINUOUS
Status: DISCONTINUED | OUTPATIENT
Start: 2021-10-06 | End: 2021-10-06 | Stop reason: HOSPADM

## 2021-10-06 RX ORDER — PROPOFOL 10 MG/ML
INJECTION, EMULSION INTRAVENOUS PRN
Status: DISCONTINUED | OUTPATIENT
Start: 2021-10-06 | End: 2021-10-06

## 2021-10-06 RX ORDER — HYDROXYZINE HYDROCHLORIDE 50 MG/1
50 TABLET, FILM COATED ORAL ONCE
Status: COMPLETED | OUTPATIENT
Start: 2021-10-06 | End: 2021-10-06

## 2021-10-06 RX ORDER — HYDROXYZINE HYDROCHLORIDE 25 MG/1
25 TABLET, FILM COATED ORAL
Status: CANCELLED | OUTPATIENT
Start: 2021-10-06

## 2021-10-06 RX ORDER — AMOXICILLIN 250 MG
1-2 CAPSULE ORAL 2 TIMES DAILY
Qty: 30 TABLET | Refills: 0 | Status: SHIPPED | OUTPATIENT
Start: 2021-10-06

## 2021-10-06 RX ORDER — ACETAMINOPHEN 325 MG/1
975 TABLET ORAL ONCE
Status: CANCELLED | OUTPATIENT
Start: 2021-10-06 | End: 2021-10-06

## 2021-10-06 RX ORDER — ONDANSETRON 4 MG/1
4 TABLET, ORALLY DISINTEGRATING ORAL EVERY 30 MIN PRN
Status: DISCONTINUED | OUTPATIENT
Start: 2021-10-06 | End: 2021-10-06 | Stop reason: HOSPADM

## 2021-10-06 RX ORDER — DEXAMETHASONE SODIUM PHOSPHATE 4 MG/ML
INJECTION, SOLUTION INTRA-ARTICULAR; INTRALESIONAL; INTRAMUSCULAR; INTRAVENOUS; SOFT TISSUE PRN
Status: DISCONTINUED | OUTPATIENT
Start: 2021-10-06 | End: 2021-10-06

## 2021-10-06 RX ORDER — ONDANSETRON 2 MG/ML
4 INJECTION INTRAMUSCULAR; INTRAVENOUS EVERY 30 MIN PRN
Status: DISCONTINUED | OUTPATIENT
Start: 2021-10-06 | End: 2021-10-06 | Stop reason: HOSPADM

## 2021-10-06 RX ORDER — ALBUTEROL SULFATE 0.83 MG/ML
2.5 SOLUTION RESPIRATORY (INHALATION) EVERY 4 HOURS PRN
Status: DISCONTINUED | OUTPATIENT
Start: 2021-10-06 | End: 2021-10-06 | Stop reason: HOSPADM

## 2021-10-06 RX ORDER — BUPIVACAINE HYDROCHLORIDE AND EPINEPHRINE 2.5; 5 MG/ML; UG/ML
INJECTION, SOLUTION INFILTRATION; PERINEURAL PRN
Status: DISCONTINUED | OUTPATIENT
Start: 2021-10-06 | End: 2021-10-06 | Stop reason: HOSPADM

## 2021-10-06 RX ORDER — HYDROXYZINE HYDROCHLORIDE 50 MG/1
50 TABLET, FILM COATED ORAL EVERY 6 HOURS PRN
Status: CANCELLED | OUTPATIENT
Start: 2021-10-06

## 2021-10-06 RX ORDER — FENTANYL CITRATE 50 UG/ML
50 INJECTION, SOLUTION INTRAMUSCULAR; INTRAVENOUS EVERY 5 MIN PRN
Status: DISCONTINUED | OUTPATIENT
Start: 2021-10-06 | End: 2021-10-06 | Stop reason: HOSPADM

## 2021-10-06 RX ORDER — HYDROMORPHONE HCL IN WATER/PF 6 MG/30 ML
0.4 PATIENT CONTROLLED ANALGESIA SYRINGE INTRAVENOUS EVERY 5 MIN PRN
Status: DISCONTINUED | OUTPATIENT
Start: 2021-10-06 | End: 2021-10-06 | Stop reason: HOSPADM

## 2021-10-06 RX ORDER — FENTANYL CITRATE 50 UG/ML
INJECTION, SOLUTION INTRAMUSCULAR; INTRAVENOUS PRN
Status: DISCONTINUED | OUTPATIENT
Start: 2021-10-06 | End: 2021-10-06

## 2021-10-06 RX ORDER — FENTANYL CITRATE 50 UG/ML
25 INJECTION, SOLUTION INTRAMUSCULAR; INTRAVENOUS
Status: DISCONTINUED | OUTPATIENT
Start: 2021-10-06 | End: 2021-10-06 | Stop reason: HOSPADM

## 2021-10-06 RX ORDER — MEPERIDINE HYDROCHLORIDE 25 MG/ML
12.5 INJECTION INTRAMUSCULAR; INTRAVENOUS; SUBCUTANEOUS
Status: DISCONTINUED | OUTPATIENT
Start: 2021-10-06 | End: 2021-10-06 | Stop reason: HOSPADM

## 2021-10-06 RX ORDER — HALOPERIDOL 5 MG/ML
1 INJECTION INTRAMUSCULAR
Status: DISCONTINUED | OUTPATIENT
Start: 2021-10-06 | End: 2021-10-06 | Stop reason: HOSPADM

## 2021-10-06 RX ORDER — MAGNESIUM SULFATE HEPTAHYDRATE 40 MG/ML
2 INJECTION, SOLUTION INTRAVENOUS ONCE
Status: DISCONTINUED | OUTPATIENT
Start: 2021-10-06 | End: 2021-10-06 | Stop reason: HOSPADM

## 2021-10-06 RX ORDER — IBUPROFEN 200 MG
800 TABLET ORAL ONCE
Status: CANCELLED | OUTPATIENT
Start: 2021-10-06 | End: 2021-10-06

## 2021-10-06 RX ORDER — IBUPROFEN 800 MG/1
800 TABLET, FILM COATED ORAL EVERY 6 HOURS PRN
Qty: 30 TABLET | Refills: 0 | Status: SHIPPED | OUTPATIENT
Start: 2021-10-06

## 2021-10-06 RX ORDER — HYDROXYZINE HYDROCHLORIDE 25 MG/1
25 TABLET, FILM COATED ORAL EVERY 6 HOURS PRN
Status: CANCELLED | OUTPATIENT
Start: 2021-10-06

## 2021-10-06 RX ORDER — ACETAMINOPHEN 325 MG/1
975 TABLET ORAL ONCE
Status: DISCONTINUED | OUTPATIENT
Start: 2021-10-06 | End: 2021-10-06 | Stop reason: HOSPADM

## 2021-10-06 RX ORDER — ONDANSETRON 2 MG/ML
INJECTION INTRAMUSCULAR; INTRAVENOUS PRN
Status: DISCONTINUED | OUTPATIENT
Start: 2021-10-06 | End: 2021-10-06

## 2021-10-06 RX ADMIN — ACETAMINOPHEN 975 MG: 325 TABLET, FILM COATED ORAL at 08:18

## 2021-10-06 RX ADMIN — SODIUM CHLORIDE, POTASSIUM CHLORIDE, SODIUM LACTATE AND CALCIUM CHLORIDE: 600; 310; 30; 20 INJECTION, SOLUTION INTRAVENOUS at 09:15

## 2021-10-06 RX ADMIN — PROPOFOL 200 MCG/KG/MIN: 10 INJECTION, EMULSION INTRAVENOUS at 09:20

## 2021-10-06 RX ADMIN — LIDOCAINE HYDROCHLORIDE 0.2 ML: 10 INJECTION, SOLUTION EPIDURAL; INFILTRATION; INTRACAUDAL; PERINEURAL at 08:28

## 2021-10-06 RX ADMIN — PROPOFOL 200 MG: 10 INJECTION, EMULSION INTRAVENOUS at 09:07

## 2021-10-06 RX ADMIN — FENTANYL CITRATE 50 MCG: 50 INJECTION INTRAMUSCULAR; INTRAVENOUS at 10:18

## 2021-10-06 RX ADMIN — HYDROXYZINE HYDROCHLORIDE 50 MG: 50 TABLET, FILM COATED ORAL at 10:24

## 2021-10-06 RX ADMIN — FENTANYL CITRATE 100 MCG: 50 INJECTION, SOLUTION INTRAMUSCULAR; INTRAVENOUS at 09:01

## 2021-10-06 RX ADMIN — MIDAZOLAM 2 MG: 1 INJECTION INTRAMUSCULAR; INTRAVENOUS at 08:59

## 2021-10-06 RX ADMIN — FENTANYL CITRATE 150 MCG: 50 INJECTION, SOLUTION INTRAMUSCULAR; INTRAVENOUS at 09:05

## 2021-10-06 RX ADMIN — HYDROMORPHONE HYDROCHLORIDE 1 MG: 1 INJECTION, SOLUTION INTRAMUSCULAR; INTRAVENOUS; SUBCUTANEOUS at 09:40

## 2021-10-06 RX ADMIN — ROCURONIUM BROMIDE 50 MG: 50 INJECTION, SOLUTION INTRAVENOUS at 09:07

## 2021-10-06 RX ADMIN — SODIUM CHLORIDE, POTASSIUM CHLORIDE, SODIUM LACTATE AND CALCIUM CHLORIDE: 600; 310; 30; 20 INJECTION, SOLUTION INTRAVENOUS at 08:29

## 2021-10-06 RX ADMIN — ONDANSETRON 4 MG: 2 INJECTION INTRAMUSCULAR; INTRAVENOUS at 09:07

## 2021-10-06 RX ADMIN — DEXAMETHASONE SODIUM PHOSPHATE 10 MG: 4 INJECTION, SOLUTION INTRA-ARTICULAR; INTRALESIONAL; INTRAMUSCULAR; INTRAVENOUS; SOFT TISSUE at 09:07

## 2021-10-06 RX ADMIN — KETOROLAC TROMETHAMINE 30 MG: 30 INJECTION, SOLUTION INTRAMUSCULAR at 09:07

## 2021-10-06 RX ADMIN — PROPOFOL 100 MG: 10 INJECTION, EMULSION INTRAVENOUS at 09:43

## 2021-10-06 RX ADMIN — SUGAMMADEX 200 MG: 100 INJECTION, SOLUTION INTRAVENOUS at 09:40

## 2021-10-06 ASSESSMENT — MIFFLIN-ST. JEOR: SCORE: 1445.65

## 2021-10-06 NOTE — ANESTHESIA POSTPROCEDURE EVALUATION
Patient: Jennifer Voss    Procedure: Procedure(s):  dilation and curettage, hysteroscopy with polypectomy,Pap Smear  Diagnostic laparoscopy       Diagnosis:Excessive bleeding in premenopausal period [N92.4]  Intramural leiomyoma of uterus [D25.1]  Dysmenorrhea [N94.6]  Diagnosis Additional Information: No value filed.    Anesthesia Type:  General    Note:  Disposition: Outpatient   Postop Pain Control: Uneventful            Sign Out: Well controlled pain   PONV: No   Neuro/Psych: Uneventful            Sign Out: Acceptable/Baseline neuro status   Airway/Respiratory: Uneventful            Sign Out: Acceptable/Baseline resp. status   CV/Hemodynamics: Uneventful            Sign Out: Acceptable CV status; No obvious hypovolemia; No obvious fluid overload   Other NRE: NONE   DID A NON-ROUTINE EVENT OCCUR? No           Last vitals:  Vitals Value Taken Time   /72 10/06/21 1045   Temp     Pulse 89 10/06/21 1053   Resp 25 10/06/21 1053   SpO2 95 % 10/06/21 1053   Vitals shown include unvalidated device data.    Electronically Signed By: GISEL Santacruz CRNA  October 6, 2021  11:18 AM

## 2021-10-06 NOTE — OP NOTE
Op Note    Preop Dx: 1. Menorrhagia, 2. Fibroid uterus, 3. Dysmenorrhea, 4. Endometrial polyp    Postop DX: same    Procedure: Pap smear, Diagnostic laparoscopy, Dilation and curettage hysteroscopy    Surgeon: Fanny Camacho M.D.     Assist: n/a    Anesthesia: local MAC    FRA Score: 2    EBL: minimal    IVF: see anesthesia records    UOP: see anesthesia records    Complications: none    Findings:  Small endometrial polyps with fluffy endometrium obscuring visualization of the tubal ostia, uterus with a 2-3 cm left broad ligament fibroid, a 1 cm anterior fibroid, and a 1 cm infero-posterior fibroid.  normal ovaries bilaterally.  normal left tube.  surgically absent right tube.  normal appendix and liver edge    Indications: Jennifer Voss is  who was seen in the office for dysmenorrhea, pelvic pain, menorrhagia, and uterine fibroids.  She does not tolerate a lot of hormonal medications and desires surgical intervention and diagnosis.  On her ultrasound there was also a 3.3 x 2.7 x 2.5 cm complex mass seen in the left adnexa.  Decision was made to proceed with dilation and curettage hysteroscopy and diagnostic laparoscopy.  Risks and benefits were discussed including risks of bleeding, infection and damage to the uterus.  Patient desired to proceed.     Procedure: The patient was brought to the operating room and general anesthesia was administered without difficulty.  Her pap smear was obtained prior to surgical preparation.  She was prepped and draped in the normal sterile fashion in the dorsal lithotomy position.  Chery catheter was placed in the bladder.  A bivalve speculum was placed in the vagina and the anterior lip of the cervix was grasped with a single-tooth tenaculum.  A Jolly cannula was placed within the cervix.  The speculum was removed.    Attention was turned to the abdomen where the infraumbilical fold was infiltrated with 0.25% Marcaine with epinephrine.  A 5 mm incision was made inside  the umbilicus.  While tenting the abdominal wall, the Veress needle was carefully introduced and intra-abdominal placement was confirmed by use of a water-filled syringe and a drop in intra-abdominal pressure with insufflation of CO2 gas.  The abdomen was insufflated to 15 mmHg.  The Veress needle was removed and a 5 mm trocar was introduced.  Intra-abdominal placement was confirmed with the laparoscope.  There was no evidence of omental insufflation, bowel injury or major vascular injury.  Care was taken during the entire procedure to avoid injury to bowel, bladder, ureters and major vascular structures.  The patient was placed in Trendelenburg and the above findings were noted.  In particular, the left adnexal mass appeared to be a broad ligament fibroid and not attached to the ovary or fallopian tube.  There was no evidence of endometriosis or significant adhesive disease.  Both ovaries appeared normal and the left tube was free of adhesions without any obvious pathology.  The remaining fibroids appeared small and mostly intramural.  The patient was returned level and the abdomen desufflated.  The trocar was removed and the incision repaired with 4-0 monocryl in a simple buried interrupted fashion.  A dressing of a 2x2 gauze and tegaderm was placed upon the incision.    Attention was returned to the vaginal region and the Jolly cannula removed.  The cervix was progressively dilated and the hysteroscope was introduced.  The above findings noted.  The Myosure was introduced and directed curettings and removal of the polyps were performed.  The hysteroscope was removed with 0 deficit.  A sharp curettage was performed to ensure complete emptying of the uterus.  All instruments were removed from the vagina and excellent hemostasis was noted.      Patient tolerated the procedure well.  Sponge, lap and needle counts are correct.  I was present for the entire procedure.  The patient was taken to her recovery room in  stable condition.              ARIEL RIOJAS MD

## 2021-10-06 NOTE — ANESTHESIA CARE TRANSFER NOTE
Patient: Jennifer Voss    Procedure: Procedure(s):  dilation and curettage, hysteroscopy with polypectomy,Pap Smear  Diagnostic laparoscopy       Diagnosis: Excessive bleeding in premenopausal period [N92.4]  Intramural leiomyoma of uterus [D25.1]  Dysmenorrhea [N94.6]  Diagnosis Additional Information: No value filed.    Anesthesia Type:   General     Note:    Oropharynx: oropharynx clear of all foreign objects and spontaneously breathing  Level of Consciousness: awake  Oxygen Supplementation: room air    Independent Airway: airway patency satisfactory and stable  Dentition: dentition unchanged  Vital Signs Stable: post-procedure vital signs reviewed and stable  Report to RN Given: handoff report given  Patient transferred to: PACU    Handoff Report: Identifed the Patient, Identified the Reponsible Provider, Reviewed the pertinent medical history, Discussed the surgical course, Reviewed Intra-OP anesthesia mangement and issues during anesthesia, Set expectations for post-procedure period and Allowed opportunity for questions and acknowledgement of understanding      Vitals:  Vitals Value Taken Time   /94 10/06/21 1030   Temp     Pulse 96 10/06/21 1034   Resp 17 10/06/21 1034   SpO2 96 % 10/06/21 1034   Vitals shown include unvalidated device data.    Electronically Signed By: GISEL Hernandes CRNA  October 6, 2021  10:35 AM

## 2021-10-06 NOTE — DISCHARGE INSTRUCTIONS
Same Day Surgery Discharge Instructions  Special Precautions After Surgery - Adult    1. It is not unusual to feel lightheaded or faint, up to 24 hours after surgery or while taking pain medication.  If you have these symptoms; sit for a few minutes before standing and have someone assist you when getting up.  2. You should rest and relax for the next 24 hours and must have someone stay with you for at least 24 hours after your discharge.  3. DO NOT DRIVE any vehicle or operate mechanical equipment for 24 hours following the end of your surgery.  DO NOT DRIVE while taking narcotic pain medications that have been prescribed by your physician.  If you had a limb operated on, you must be able to use it fully to drive.  4. DO NOT drink alcoholic beverages for 24 hours following surgery or while taking prescription pain medication.  5. Drink clear liquids (apple juice, ginger ale, broth, 7-Up, etc.).  Progress to your regular diet as you feel able.  6. Any questions call your physician and do not make important decisions for 24 hours.    ACTIVITY  ? As directed by Nickolas Camacho     INCISIONAL CARE  ? As directed by Dr Camacho     Call for an appointment to return to the clinic { :[id number]}    Medications:  ? Acetaminophen (Tylenol):  Next dose: 1215.  ? Ibuprofen (Motrin, Advil):  Next dose: 3 pm.  ? Follow the instructions on the bottle. As directed       __________________________________________________________________________________________________________________________________  IMPORTANT NUMBERS:    [facility] [place]  [phone], [phone]  Pharmacy:  [phone]  Same Day Surgery:  [phone], Monday - Friday until 8:30 p.m.    Emergency Room:  [phone]          OB Clinic:  [phone]

## 2021-10-06 NOTE — BRIEF OP NOTE
[facility]    Brief Operative Note    Pre-operative diagnosis: Excessive bleeding in premenopausal period [N92.4]  Intramural leiomyoma of uterus [D25.1]  Dysmenorrhea [N94.6]  Post-operative diagnosis same as pre-op    Procedure: Procedure(s):  dilation and curettage, hysteroscopy with polypectomy,Pap Smear  Diagnostic laparoscopy  Surgeon: Surgeon(s) and Role:     * Fanny Camacho MD - Primary  Anesthesia: General   Estimated Blood Loss: 1 mL from 10/6/2021  9:00 AM to 10/6/2021  9:59 AM      Drains: None  Specimens:   ID Type Source Tests Collected by Time Destination   1 : Pap Smear Brushing Cervix GYNECOLOGIC CYTOLOGY Fanny Camacho MD 10/6/2021  8:54 AM    2 : endometrial currettings Tissue Endometrium SURGICAL PATHOLOGY EXAM Fanny Camacho MD 10/6/2021  9:46 AM      Findings:   small endometrial polyps with fluffy endometrium obscuring visualization of the tubal ostia, uterus with a 2-3 cm left broad ligament fibroid, a 1 cm anterior fibroid, and a 1 cm infero-posterior fibroid.  normal ovaries bilaterally.  normal left tube.  surgically absent right tube.  normal appendix and liver edge.  Complications: None.  Implants: * No implants in log *

## 2021-10-07 LAB
PATH REPORT.COMMENTS IMP SPEC: NORMAL
PATH REPORT.COMMENTS IMP SPEC: NORMAL
PATH REPORT.FINAL DX SPEC: NORMAL
PATH REPORT.GROSS SPEC: NORMAL
PATH REPORT.MICROSCOPIC SPEC OTHER STN: NORMAL
PATH REPORT.RELEVANT HX SPEC: NORMAL
PHOTO IMAGE: NORMAL

## 2021-10-07 PROCEDURE — 88305 TISSUE EXAM BY PATHOLOGIST: CPT | Mod: 26 | Performed by: PATHOLOGY

## 2021-10-08 LAB
BKR LAB AP GYN ADEQUACY: NORMAL
BKR LAB AP GYN INTERPRETATION: NORMAL
PATH REPORT.COMMENTS IMP SPEC: NORMAL

## 2021-10-12 LAB
HUMAN PAPILLOMA VIRUS 16 DNA: NEGATIVE
HUMAN PAPILLOMA VIRUS 18 DNA: NEGATIVE
HUMAN PAPILLOMA VIRUS FINAL DIAGNOSIS: NORMAL
HUMAN PAPILLOMA VIRUS OTHER HR: NEGATIVE

## 2021-12-05 DIAGNOSIS — R06.02 SOB (SHORTNESS OF BREATH): ICD-10-CM

## 2021-12-07 RX ORDER — ALBUTEROL SULFATE 90 UG/1
AEROSOL, METERED RESPIRATORY (INHALATION)
Qty: 18 G | Refills: 0 | Status: SHIPPED | OUTPATIENT
Start: 2021-12-07 | End: 2022-01-19

## 2021-12-07 NOTE — TELEPHONE ENCOUNTER
"Requested Prescriptions   Pending Prescriptions Disp Refills     albuterol (PROAIR HFA/PROVENTIL HFA/VENTOLIN HFA) 108 (90 Base) MCG/ACT inhaler [Pharmacy Med Name: Albuterol Sulfate  (90 Base) MCG/ACT Inhalation Aerosol Solution] 18 g 0     Sig: INHALE 2 PUFFS BY MOUTH EVERY 6 HOURS AS NEEDED FOR SHORTNESS OF BREATH /DYSPNEA  OR  WHEEZING.       Asthma Maintenance Inhalers - Anticholinergics Passed - 12/5/2021  7:27 PM        Passed - Patient is age 12 years or older        Passed - Recent (12 mo) or future (30 days) visit within the authorizing provider's specialty     Patient has had an office visit with the authorizing provider or a provider within the authorizing providers department within the previous 12 mos or has a future within next 30 days. See \"Patient Info\" tab in inbasket, or \"Choose Columns\" in Meds & Orders section of the refill encounter.              Passed - Medication is active on med list       Short-Acting Beta Agonist Inhalers Protocol  Passed - 12/5/2021  7:27 PM        Passed - Patient is age 12 or older        Passed - Recent (12 mo) or future (30 days) visit within the authorizing provider's specialty     Patient has had an office visit with the authorizing provider or a provider within the authorizing providers department within the previous 12 mos or has a future within next 30 days. See \"Patient Info\" tab in inbasket, or \"Choose Columns\" in Meds & Orders section of the refill encounter.              Passed - Medication is active on med list           Routing refill request to provider for review/approval because:  Dx is not on problem list        "

## 2022-01-08 ENCOUNTER — HEALTH MAINTENANCE LETTER (OUTPATIENT)
Age: 37
End: 2022-01-08

## 2022-01-11 ENCOUNTER — NURSE TRIAGE (OUTPATIENT)
Dept: FAMILY MEDICINE | Facility: CLINIC | Age: 37
End: 2022-01-11
Payer: COMMERCIAL

## 2022-01-11 ENCOUNTER — MYC MEDICAL ADVICE (OUTPATIENT)
Dept: FAMILY MEDICINE | Facility: CLINIC | Age: 37
End: 2022-01-11
Payer: COMMERCIAL

## 2022-01-11 NOTE — TELEPHONE ENCOUNTER
"  See kodyt message from patient, I called her and created phone encounter in order to triage symptoms:    CALL  NOW: Immediate medical attention is needed. You need to hang up and call 911 (or an ambulance). (Triager Discretion:  I'll call you back in a few minutes to be sure you were able to reach them.)    Patient states she cannot leave work, since the lower leg numbness has been present about 3 days, she plans to stay at work, will get seen in ER this afternoon.   I advised she call 911 if sudden worsening, trouble talking, feeling weak or faint, trouble breathing.  She is sufficiently worried about this issue and states she will get seen in ER.    Patient verbalized understanding of and agreement with plan.    Michelle Shelton RN  [facility]    Reason for Disposition    New neurologic deficit that is present NOW, sudden onset of ANY of the following: * Weakness of the face, arm, or leg on one side of the body* Numbness of the face, arm, or leg on one side of the body* Loss of speech or garbled speech    Answer Assessment - Initial Assessment Questions  1. SYMPTOM: \"What is the main symptom you are concerned about?\" (e.g., weakness, numbness)      Lower left leg numb and tingly  2. ONSET: \"When did this start?\" (minutes, hours, days; while sleeping)      Saturday, when shaving in the shower  3. LAST NORMAL: \"When was the last time you were normal (no symptoms)?\"      Saturday morning  4. PATTERN \"Does this come and go, or has it been constant since it started?\"  \"Is it present now?\"      Constant, present now  5. CARDIAC SYMPTOMS: \"Have you had any of the following symptoms: chest pain, difficulty breathing, palpitations?\"      Says she can \"hear her heart pounding\", feels like it thumping   6. NEUROLOGIC SYMPTOMS: \"Have you had any of the following symptoms: headache, dizziness, vision loss, double vision, changes in speech, unsteady on your feet?\"      Headache on Friday, none " "now; feels unsteady on her feet  7. OTHER SYMPTOMS: \"Do you have any other symptoms?\"      no  8. PREGNANCY: \"Is there any chance you are pregnant?\" \"When was your last menstrual period?\"      No, lmp last week    Protocols used: NEUROLOGIC DEFICIT-A-OH      "

## 2022-01-11 NOTE — TELEPHONE ENCOUNTER
See patient's mychart message, lower leg numbness.    I called and spoke to her by phone, see phone encounter for triage.    ER/911 advised, she declines calling 911 as has had symptoms since Saturday, plans to get seen in ER today.    Michelle Shelton RN  [facility]

## 2022-01-19 ENCOUNTER — VIRTUAL VISIT (OUTPATIENT)
Dept: FAMILY MEDICINE | Facility: CLINIC | Age: 37
End: 2022-01-19
Payer: COMMERCIAL

## 2022-01-19 DIAGNOSIS — R20.0 NUMBNESS AND TINGLING OF LEFT LEG: ICD-10-CM

## 2022-01-19 DIAGNOSIS — R20.2 NUMBNESS AND TINGLING OF LEFT LEG: ICD-10-CM

## 2022-01-19 DIAGNOSIS — R06.02 SOB (SHORTNESS OF BREATH): ICD-10-CM

## 2022-01-19 DIAGNOSIS — M54.41 ACUTE RIGHT-SIDED LOW BACK PAIN WITH RIGHT-SIDED SCIATICA: Primary | ICD-10-CM

## 2022-01-19 PROCEDURE — 99214 OFFICE O/P EST MOD 30 MIN: CPT | Mod: GT | Performed by: NURSE PRACTITIONER

## 2022-01-19 RX ORDER — METHYLPREDNISOLONE 4 MG
TABLET, DOSE PACK ORAL
Qty: 21 TABLET | Refills: 0 | Status: SHIPPED | OUTPATIENT
Start: 2022-01-19 | End: 2022-07-06

## 2022-01-19 RX ORDER — HYDROCODONE BITARTRATE AND ACETAMINOPHEN 5; 325 MG/1; MG/1
1 TABLET ORAL
Qty: 10 TABLET | Refills: 0 | Status: SHIPPED | OUTPATIENT
Start: 2022-01-19 | End: 2023-09-20

## 2022-01-19 RX ORDER — ALBUTEROL SULFATE 90 UG/1
AEROSOL, METERED RESPIRATORY (INHALATION)
Qty: 18 G | Refills: 1 | Status: SHIPPED | OUTPATIENT
Start: 2022-01-19 | End: 2022-04-01

## 2022-01-19 ASSESSMENT — ENCOUNTER SYMPTOMS
BACK PAIN: 1
NUMBNESS: 1
ARTHRALGIAS: 1

## 2022-01-19 NOTE — PROGRESS NOTES
Jennifer is a 36 year old who is being evaluated via a billable video visit.      How would you like to obtain your AVS? MyChart  If the video visit is dropped, the invitation should be resent by: Text to cell phone: [phone]  Will anyone else be joining your video visit? No  Video Start Time: 10:32 AM    Assessment & Plan     SOB (shortness of breath)  - albuterol (PROAIR HFA/PROVENTIL HFA/VENTOLIN HFA) 108 (90 Base) MCG/ACT inhaler; INHALE 2 PUFFS BY MOUTH EVERY 6 HOURS AS NEEDED FOR SHORTNESS OF BREATH /DYSPNEA  OR  WHEEZING.    Acute right-sided low back pain with right-sided sciatica  Previous urgent care note reviewed.  We will set up for MRI due to severity of symptoms.  Full plan will depend on outcome.  Educated on use of medications.  Education given regarding proper dosing of over-the-counter medication.  Educated regarding warning signs to watch for and when would need to seek immediate medical attention.  - MR Lumbar Spine w/o Contrast; Future  - methylPREDNISolone (MEDROL DOSEPAK) 4 MG tablet therapy pack; Follow Package Directions  - HYDROcodone-acetaminophen (NORCO) 5-325 MG tablet; Take 1 tablet by mouth nightly as needed for moderate to severe pain or severe pain  - [facility] PT and Hand Referral; Future    Numbness and tingling of left leg  We will set up for imaging.  Full plan will depend on outcome.  - MR Lumbar Spine w/o Contrast; Future  - methylPREDNISolone (MEDROL DOSEPAK) 4 MG tablet therapy pack; Follow Package Directions  - HYDROcodone-acetaminophen (NORCO) 5-325 MG tablet; Take 1 tablet by mouth nightly as needed for moderate to severe pain or severe pain  - [facility] PT and Hand Referral; Future    Ordering of each unique test  Prescription drug management  21 minutes spent on the date of the encounter doing chart review, history and exam, documentation and further activities per the note     BMI:   Estimated body mass index is 29.35 kg/m  as calculated from the following:    Height as of  "10/6/21: 1.626 m (5' 4\").    Weight as of 10/6/21: 77.6 kg (171 lb).       No follow-ups on file.    GISEL Johnston CNP  [facility] HARVINDER Rodriguez is a 36 year old who presents for the following health issues     HPI     ED/UC Followup:    Facility:  [facility]  Date of visit: 1/12/22  Reason for visit: lower left leg numbness  Current Status: right hip pain         Video visit completed due to COVID 19 outbreak.     1/8/22 is when pain to lower back started. No injury that is aware of. Went to take a shower and shave legs and that is when noticed that was numb to left leg. Knee to left ankle is numb. Right hip pain and lower back pain.  No loss of control of bowel or bladder.  No numbness in pelvic region.  Has been working all weekend. Went to Urgent care and was given prescription for prednisone and pain meds and those helped. For pain now is taking 4 ibuprofen in AM and then will take 2 tylenol as needed. Did sit in tub on Sunday. No other ice or heat. Last dose of prednisone 2 days ago. Boyfriend has vasectomy. No chance of pregnancy. Took some flexaril the other night and that did help.     Has been counseling and therapy for anxiety and depression.  Feels like it has been very beneficial.     Is now fully vaccinated against COVID-19.    Review of Systems   Musculoskeletal: Positive for arthralgias (Right hip pain) and back pain (lower, worse on right).   Neurological: Positive for numbness (Left leg).          Objective           Vitals:  No vitals were obtained today due to virtual visit.    Physical Exam  Constitutional:       Appearance: Normal appearance.   HENT:      Nose: No congestion.   Eyes:      General: Lids are normal.   Pulmonary:      Effort: No tachypnea, bradypnea or respiratory distress.   Skin:     Coloration: Skin is not ashen, cyanotic, jaundiced or pale.   Neurological:      Mental Status: She is alert.   Psychiatric:         " Mood and Affect: Mood normal.         Speech: Speech normal.         Behavior: Behavior normal.                  Video-Visit Details    Type of service:  Video Visit    Video End Time:10:47 AM    Originating Location (pt. Location): Home    [facility]     Platform used for Video Visit: Click4Ride

## 2022-01-25 ENCOUNTER — ANCILLARY PROCEDURE (OUTPATIENT)
Dept: MRI IMAGING | Facility: CLINIC | Age: 37
End: 2022-01-25
Attending: NURSE PRACTITIONER
Payer: COMMERCIAL

## 2022-01-25 ENCOUNTER — THERAPY VISIT (OUTPATIENT)
Dept: PHYSICAL THERAPY | Facility: CLINIC | Age: 37
End: 2022-01-25
Attending: NURSE PRACTITIONER
Payer: COMMERCIAL

## 2022-01-25 DIAGNOSIS — R20.0 NUMBNESS AND TINGLING OF LEFT LEG: ICD-10-CM

## 2022-01-25 DIAGNOSIS — R20.2 NUMBNESS AND TINGLING OF LEFT LEG: ICD-10-CM

## 2022-01-25 DIAGNOSIS — M54.41 ACUTE RIGHT-SIDED LOW BACK PAIN WITH RIGHT-SIDED SCIATICA: Primary | ICD-10-CM

## 2022-01-25 DIAGNOSIS — M54.41 ACUTE RIGHT-SIDED LOW BACK PAIN WITH RIGHT-SIDED SCIATICA: ICD-10-CM

## 2022-01-25 PROCEDURE — 97161 PT EVAL LOW COMPLEX 20 MIN: CPT | Mod: GP | Performed by: PHYSICAL THERAPIST

## 2022-01-25 PROCEDURE — 72148 MRI LUMBAR SPINE W/O DYE: CPT | Mod: TC | Performed by: RADIOLOGY

## 2022-01-25 PROCEDURE — 97530 THERAPEUTIC ACTIVITIES: CPT | Mod: GP | Performed by: PHYSICAL THERAPIST

## 2022-01-25 PROCEDURE — 97110 THERAPEUTIC EXERCISES: CPT | Mod: GP | Performed by: PHYSICAL THERAPIST
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
## 2022-01-31 DIAGNOSIS — G89.29 CHRONIC BILATERAL LOW BACK PAIN WITHOUT SCIATICA: ICD-10-CM

## 2022-01-31 DIAGNOSIS — M54.50 CHRONIC BILATERAL LOW BACK PAIN WITHOUT SCIATICA: ICD-10-CM

## 2022-02-01 ENCOUNTER — THERAPY VISIT (OUTPATIENT)
Dept: PHYSICAL THERAPY | Facility: CLINIC | Age: 37
End: 2022-02-01
Payer: COMMERCIAL

## 2022-02-01 DIAGNOSIS — M54.41 ACUTE RIGHT-SIDED LOW BACK PAIN WITH RIGHT-SIDED SCIATICA: ICD-10-CM

## 2022-02-01 DIAGNOSIS — R20.2 NUMBNESS AND TINGLING OF LEFT LEG: ICD-10-CM

## 2022-02-01 DIAGNOSIS — R20.0 NUMBNESS AND TINGLING OF LEFT LEG: ICD-10-CM

## 2022-02-01 PROCEDURE — 97140 MANUAL THERAPY 1/> REGIONS: CPT | Mod: GP

## 2022-02-01 PROCEDURE — 97110 THERAPEUTIC EXERCISES: CPT | Mod: GP

## 2022-02-01 RX ORDER — CYCLOBENZAPRINE HCL 10 MG
TABLET ORAL
Qty: 30 TABLET | Refills: 0 | Status: SHIPPED | OUTPATIENT
Start: 2022-02-01 | End: 2022-03-08

## 2022-02-01 NOTE — TELEPHONE ENCOUNTER
Last Written Prescription Date:  9/20/21  Last Fill Quantity: 30,  # refills: 0   Last office visit: 9/20/2021 with prescribing provider:  АННА Chappell   VIRTUAL visit 1/19/22 for acute concern  VIRTUAL visit 10/5/21 preop physical  Future Office Visit: none    Routing refill request to provider for review/approval because:  Drug not on the [facility] refill protocol     Madison Powell RN, BSN  [facility]

## 2022-02-08 ENCOUNTER — THERAPY VISIT (OUTPATIENT)
Dept: PHYSICAL THERAPY | Facility: CLINIC | Age: 37
End: 2022-02-08
Payer: COMMERCIAL

## 2022-02-08 DIAGNOSIS — R20.0 NUMBNESS AND TINGLING OF LEFT LEG: ICD-10-CM

## 2022-02-08 DIAGNOSIS — M54.41 ACUTE RIGHT-SIDED LOW BACK PAIN WITH RIGHT-SIDED SCIATICA: ICD-10-CM

## 2022-02-08 DIAGNOSIS — R20.2 NUMBNESS AND TINGLING OF LEFT LEG: ICD-10-CM

## 2022-02-08 PROCEDURE — 97110 THERAPEUTIC EXERCISES: CPT | Mod: GP | Performed by: PHYSICAL THERAPIST

## 2022-02-15 ENCOUNTER — THERAPY VISIT (OUTPATIENT)
Dept: PHYSICAL THERAPY | Facility: CLINIC | Age: 37
End: 2022-02-15
Payer: COMMERCIAL

## 2022-02-15 DIAGNOSIS — R20.0 NUMBNESS AND TINGLING OF LEFT LEG: ICD-10-CM

## 2022-02-15 DIAGNOSIS — M54.41 ACUTE RIGHT-SIDED LOW BACK PAIN WITH RIGHT-SIDED SCIATICA: ICD-10-CM

## 2022-02-15 DIAGNOSIS — R20.2 NUMBNESS AND TINGLING OF LEFT LEG: ICD-10-CM

## 2022-02-15 PROCEDURE — 97110 THERAPEUTIC EXERCISES: CPT | Mod: GP | Performed by: PHYSICAL THERAPIST

## 2022-02-15 PROCEDURE — 97112 NEUROMUSCULAR REEDUCATION: CPT | Mod: GP | Performed by: PHYSICAL THERAPIST

## 2022-02-15 PROCEDURE — 97530 THERAPEUTIC ACTIVITIES: CPT | Mod: GP | Performed by: PHYSICAL THERAPIST

## 2022-02-15 NOTE — PROGRESS NOTES
PROGRESS  REPORT    Progress reporting period is from 1/25/21 to 2/15/22.       SUBJECTIVE  Subjective changes noted by patient: Patient reports she notes no change in the numbness she started with - not better nor worse. The right hip feels more painful than it did last week, increased pain over the last couple of days without injury. Was more sore for 24 hours after last visit then went back to baseline without benefit.     Current pain level is 6/10 ((R) hip painful today).     Previous pain level was 10/10.   Changes in function:  None  Adverse reaction to treatment or activity: None    OBJECTIVE  Changes noted in objective findings: Lumbar AROM flexion hands to floor, ext WNL, SG WNL bilat - all motions without increased pain today. R hip PROM full - excessive ER - 90 degrees - but non-painful. Strength deficits noted at initial visit remain, not surprising given short duration of treatment.     ASSESSMENT/PLAN  Updated problem list and treatment plan: Diagnosis 1: recurrent mechanical low back pain, probable SIJ contribution   Pain -  hot/cold therapy, manual therapy, education, directional preference exercise and home program  Decreased ROM/flexibility - manual therapy, therapeutic exercise and home program  Decreased joint mobility - manual therapy, therapeutic exercise and home program  Decreased strength - therapeutic exercise, therapeutic activities and home program  Impaired gait - gait training and home program  Impaired muscle performance - neuro re-education and home program  Decreased function - therapeutic activities and home program  Impaired posture - neuro re-education and home program  STG/LTGs have been met or progress has been made towards goals:  None  Assessment of Progress: The patient's condition is unchanged.  Self Management Plans:  Patient has been instructed in a home treatment program.  I have re-evaluated this patient and find that the nature, scope, duration and intensity of the  therapy is appropriate for the medical condition of the patient.  Jennifer continues to require the following intervention to meet STG and LTG's:  PT    Recommendations:  This patient would benefit from continued therapy.     Frequency:  1 X week, once daily  Duration:  for 2-4 weeks    Patient is not making progress with primary complaint (numbness) and would like to return to referring provider for more evaluation. Recommended patient continue with HEP at least and encouraged her to return to PT after following up with referring provider.    If no follow up by 3/15/22, will discharge from PT with the above serving as the discharge summary.        Please refer to the daily flowsheet for treatment today, total treatment time and time spent performing 1:1 timed codes.

## 2022-02-17 PROBLEM — R79.89 OTHER SPECIFIED ABNORMAL FINDINGS OF BLOOD CHEMISTRY: Status: RESOLVED | Noted: 2018-01-05 | Resolved: 2018-09-25

## 2022-02-21 DIAGNOSIS — M51.9 SCHMORL'S NODE: ICD-10-CM

## 2022-02-21 DIAGNOSIS — R20.0 LEFT LEG NUMBNESS: ICD-10-CM

## 2022-02-21 DIAGNOSIS — M51.369 BULGING LUMBAR DISC: Primary | ICD-10-CM

## 2022-02-24 ENCOUNTER — TELEPHONE (OUTPATIENT)
Dept: PALLIATIVE MEDICINE | Facility: CLINIC | Age: 37
End: 2022-02-24
Payer: COMMERCIAL

## 2022-02-24 NOTE — TELEPHONE ENCOUNTER

## 2022-02-24 NOTE — LETTER
February 24, 2022    Jennifer Voss  [address]  [place] [address]    Dear Jennifer,                                                                 Welcome to the [facility].  We are located on the 2nd floor ([address]) of the [facility], located at [address] [place] [address] [place] [address].  Your appointment has been scheduled on Monday March 28, 2022 at 1:30 PM with Jez Velazquez MD.    At your first visit, you will meet your team of caregivers who will help you to develop pain management strategies that will last a lifetime. You will meet with our support staff to review your insurance information, and collect your co-payment if required by your insurance company. You will also meet with a medical pain specialist and care coordinator who will assess your pain and develop a plan of care for your successful pain rehabilitation. You should expect to spend approximately 1 hour at your first visit with us. Usually, patients work with us for a period of 6-12 months, and eventually return to their primary doctor once their pain management has stabilized.      To help us make your visit go as smoothly as possible, please bring the following items with you on your visit:       Completed Pain Questionnaire enclosed in this packet.  If you do not bring the completed questionnaire, we may have to reschedule your appointment.    List of any medicines that you are currently taking or have been prescribed    Important NON-[facility] medical information such as medical records or tests results (X-rays, or laboratory tests)    Your health insurance card    Financial resources to cover your co-payment or balance due at the time of service (cash, personal check, Visa, and MasterCard are acceptable methods of payment)     Due to the demand for new patient evaluations, you must notify the scheduling department 48 hours (2 days) in advance if you are not able to keep this appointment.  Failure to do so could affect your ability to reschedule with our clinic. Please be aware that we will not prescribe any medications at your first visit.     Please call [phone] with any questions regarding your appointment. We look forward to meeting you and working to address your health care needs.     Sincerely,      [facility]

## 2022-02-28 ENCOUNTER — OFFICE VISIT (OUTPATIENT)
Dept: ORTHOPEDICS | Facility: CLINIC | Age: 37
End: 2022-02-28
Payer: COMMERCIAL

## 2022-02-28 ENCOUNTER — ANCILLARY PROCEDURE (OUTPATIENT)
Dept: GENERAL RADIOLOGY | Facility: CLINIC | Age: 37
End: 2022-02-28
Attending: ORTHOPAEDIC SURGERY
Payer: COMMERCIAL

## 2022-02-28 VITALS — HEIGHT: 64 IN | BODY MASS INDEX: 28.51 KG/M2 | WEIGHT: 167 LBS

## 2022-02-28 DIAGNOSIS — G89.29 CHRONIC BILATERAL LOW BACK PAIN WITHOUT SCIATICA: ICD-10-CM

## 2022-02-28 DIAGNOSIS — M25.551 RIGHT HIP PAIN: ICD-10-CM

## 2022-02-28 DIAGNOSIS — R20.0 LEFT LEG NUMBNESS: Primary | ICD-10-CM

## 2022-02-28 DIAGNOSIS — M54.50 CHRONIC BILATERAL LOW BACK PAIN WITHOUT SCIATICA: ICD-10-CM

## 2022-02-28 DIAGNOSIS — M51.9 SCHMORL'S NODE: ICD-10-CM

## 2022-02-28 DIAGNOSIS — M54.50 LUMBAR PAIN: Primary | ICD-10-CM

## 2022-02-28 DIAGNOSIS — M51.369 BULGING LUMBAR DISC: ICD-10-CM

## 2022-02-28 PROCEDURE — 72110 X-RAY EXAM L-2 SPINE 4/>VWS: CPT | Performed by: RADIOLOGY

## 2022-02-28 PROCEDURE — 99204 OFFICE O/P NEW MOD 45 MIN: CPT | Performed by: ORTHOPAEDIC SURGERY

## 2022-02-28 RX ORDER — GABAPENTIN 100 MG/1
100 CAPSULE ORAL 3 TIMES DAILY
Qty: 60 CAPSULE | Refills: 0 | Status: SHIPPED | OUTPATIENT
Start: 2022-02-28 | End: 2022-04-10

## 2022-02-28 ASSESSMENT — ENCOUNTER SYMPTOMS
LOSS OF CONSCIOUSNESS: 0
DEPRESSION: 1
INSOMNIA: 1
POLYDIPSIA: 0
NUMBNESS: 1
HALLUCINATIONS: 0
DECREASED CONCENTRATION: 1
POLYPHAGIA: 0
CHILLS: 0
FATIGUE: 1
DIZZINESS: 1
MEMORY LOSS: 0
INCREASED ENERGY: 1
PANIC: 1
SPEECH CHANGE: 0
TREMORS: 0
JOINT SWELLING: 0
SEIZURES: 0
PARALYSIS: 0
BACK PAIN: 1
ARTHRALGIAS: 1
HEADACHES: 1
NERVOUS/ANXIOUS: 1
NECK PAIN: 1
DECREASED APPETITE: 1
WEIGHT GAIN: 0
TINGLING: 1
DISTURBANCES IN COORDINATION: 0
WEAKNESS: 1
MUSCLE CRAMPS: 1
ALTERED TEMPERATURE REGULATION: 0
WEIGHT LOSS: 1
STIFFNESS: 1
FEVER: 0
MUSCLE WEAKNESS: 1
NIGHT SWEATS: 0

## 2022-02-28 NOTE — LETTER
"    2/28/2022         RE: Jennifer Voss  [address]  [place] [address]        Dear Colleague,    Thank you for referring your patient, Jennifer Voss, to the [facility]. Please see a copy of my visit note below.    Spine Surgery Consultation    REFERRING PHYSICIAN: Shannon Almeida   PRIMARY CARE PHYSICIAN: Shannon Almeida           Chief Complaint:   Consult (Bulging lumbar disc, Schmorl's node, Left leg numbness/ Shannon Almeida, APRN CNP in [place] )      History of Present Illness:  Symptom Profile Including: location of symptoms, onset, severity, exacerbating/alleviating factors, previous treatments:        Jennifer Voss is a 36 year old female who presents today for evaluation of left leg numbness, right hip pain, low back pain.     Patient reports left leg painless numbness in the lateral, anterior and medial aspect of the lower leg. She describes distribution from approximately tibial tubercle to ankle; numbness does not cross the ankle joint. Symptoms have been present for one month. Started with tingling paresthesias and have progressed to numbness. She notes symptoms feel worse with transfers from sitting to standing. 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  She also reports chronic low back pain as well as activity-related pain in right hip which she localizes with classic \"C\" sign.  This right hip pain extends to lateral aspect of hip near greater trochanter. She denies experiencing lancinating pain down the left leg. She does not have weakness in lower extremities.    She denies any changes in bowel or bladder function and has no saddle anesthesia.     Past treatments tried:  - Physical therapy: physical therapy x 4 weeks  - Injections: has not had injections at this point  - Medications: flexeril taken at bedtime             Past Medical History:     Past Medical History:   Diagnosis Date     Abnormal Pap " smear of cervix 08/2003     Cervical high risk HPV (human papillomavirus) test positive 07/24/2015    see problem list     Chickenpox      Ectopic pregnancy     right     GERD (gastroesophageal reflux disease) 07/16/2012     H/O chronic ear infection as a child     H/O colposcopy with cervical biopsy 01/18/2016    see problem list     Left tubal pregnancy without intrauterine pregnancy 01/05/2018     Tonsillitis             Past Surgical History:     Past Surgical History:   Procedure Laterality Date     COLONOSCOPY N/A 8/1/2017    Procedure: COMBINED COLONOSCOPY, SINGLE OR MULTIPLE BIOPSY/POLYPECTOMY BY BIOPSY;  Colonoscopy;  Surgeon: Ramakrishna Epstein MD;  Location: [facility]     DILATION AND CURETTAGE, OPERATIVE HYSTEROSCOPY, COMBINED N/A 10/6/2021    Procedure: dilation and curettage, hysteroscopy with polypectomy,Pap Smear;  Surgeon: Fanny Camacho MD;  Location: [facility]     LAPAROSCOPIC EVACUATION ECTOPIC PREGNANCY N/A 7/24/2015    LSC right salpingectomy      LAPAROSCOPY DIAGNOSTIC (GYN) N/A 10/6/2021    Procedure: Diagnostic laparoscopy;  Surgeon: Fanny Camacho MD;  Location: [facility]     LEEP TX, CERVICAL  3/2016    benign     MOUTH SURGERY      wisdom teeth     PE TUBES       TONSILLECTOMY              Social History:     Social History     Tobacco Use     Smoking status: Current Every Day Smoker     Packs/day: 1.00     Years: 10.00     Pack years: 10.00     Types: Cigarettes     Smokeless tobacco: Never Used   Substance Use Topics     Alcohol use: Yes     Alcohol/week: 0.0 standard drinks            Family History:     Family History   Problem Relation Age of Onset     Depression Mother      Diabetes Father      Hypertension Father      Substance Abuse Brother         recovered drugs     Depression Brother      Anxiety Disorder Brother      Diabetes Paternal Grandmother      Coronary Artery Disease Paternal Grandmother         MI     Aneurysm Paternal Grandmother         brain     Heart Failure Paternal  "Grandfather         CHF            Allergies:     Allergies   Allergen Reactions     Penicillins Nausea     Percocet [Oxycodone-Acetaminophen] Nausea     Ok with vicodin            Medications:     Current Outpatient Medications   Medication     albuterol (PROAIR HFA/PROVENTIL HFA/VENTOLIN HFA) 108 (90 Base) MCG/ACT inhaler     ALPRAZolam (XANAX) 0.5 MG tablet     cyclobenzaprine (FLEXERIL) 10 MG tablet     gabapentin (NEURONTIN) 100 MG capsule     HYDROcodone-acetaminophen (NORCO) 5-325 MG tablet     HYDROcodone-acetaminophen (NORCO) 5-325 MG tablet     ibuprofen (ADVIL/MOTRIN) 800 MG tablet     methylPREDNISolone (MEDROL DOSEPAK) 4 MG tablet therapy pack     norethindrone (MICRONOR) 0.35 MG tablet     PARoxetine (PAXIL) 20 MG tablet     senna-docusate (SENOKOT-S/PERICOLACE) 8.6-50 MG tablet     traZODone (DESYREL) 50 MG tablet     No current facility-administered medications for this visit.             Review of Systems:     A 10 point ROS was performed and reviewed. Specific responses to these questions are noted at the end of the document.         Physical Exam:     PHYSICAL EXAM:   Constitutional - Patient is healthy, well-nourished and appears stated age, is in no acute distress    Vitals: Ht 1.635 m (5' 4.37\")   Wt 75.8 kg (167 lb)   BMI 28.34 kg/m     Respiratory - Patient is breathing normally, no audible wheeze or respiratory distress.   Skin - No suspicious rashes or lesions.   Psychiatric - Normal mood and affect.   Cardiovascular - Extremities warm and well perfused.   GI - No abdominal distention.   Musculoskeletal - Non-antalgic gait without use of assistive devices.              Lumbar Spine:    Appearance -  normal appearing lumbar lordosis    Palpation - mild tenderness to palpation over paraspinal muscles down to PSIS    ROM - NO limitation in lumbar range of motion    Motor -        LOWER EXTREMITY Left Right   Hip flexion 5/5 5/5   Knee flexion 5/5 5/5   Knee extension 5/5 5/5   Ankle " dorsiflexion 5/5 5/5   Ankle plantarflexion 5/5 5/5   Great toe extension 5/5 5/5        Special tests -     Straight leg raise - Negative     NATHEN - Positive on the RIGHT side     Pain with hip ROM - Positive on the RIGHT side     Neurologic - Endorses marked decrease in sensation to gliding light touch over the left lateral, anterior, and medial calf. Intact over posterior calf and intact over the foot. Right leg sensation is intact in all distributions.       REFLEXES Left Right   Biceps     Triceps     Brachioradialis     Patella 2+ 2+   Ankle jerk 2+ 2+   Clonus 1 beat 1 beat     Hip Exam:  Tender to palpation over RIGHT greater trochanter  Left sided symptoms are not exacerbated with piriformis stretch  No tenderness or Tinel's sign at fibular neck on left  Left lower leg compartments are soft to palpation. Cannot feel any hernia near fascial defect where SPN exits lateral compartment.             Imaging:   We ordered and independently reviewed new radiographs at this clinic visit. The results were discussed with the patient.  Findings include:    AP, lateral and flexion/extension views of lumbar spine demonstrate maintained lumbar lordosis. 2 mm of retrolisthesis at L2-3 which appears stable on flexion and extension views. Slight postural deformity in coronal plane measuring 7 degrees from superior L2 to inferior L4.      MRI dated 1/25 demonstrates broad based disc bulge at L2-3 without significant central or lateral recess stenosis. There associated Modic changes of the inferior endplate of L2 and superior endplate of L3.  No compressive lesions seen at L3-4, 4-5 or 5-1.  Neuroforamen are patent in lower lumbar spine.              Assessment and Plan:   Assessment:  36 year old female with left lower leg numbness for one month. No compressive lesion on MRI which would correspond with these symptoms. Patient does not have provocative findings for a spinal lesion.  Suspect this could be a peripheral nerve  lesion although she has negative provocative findings at common sites of sciatic or peroneal nerve compression.     Right hip demonstrates provocative findings for hip pathology. Do not have direct imaging of the right hip. Was able to look back ot old CT of abdomen and pelvis from 2018 which did not show advanced degenerative changes. Recommend physical therapy for possible soft tissue etiology such as labral pathology as well as trochanteric bursitis.      Plan:  1. Recommended EMG which would help delineate central versus peripheral etiology for left lower leg numbness.   2. Following EMG will follow up and discuss possible treatments or referral to other specialists if peripheral cause is identified.     Time spent on this clinical encounter including previsit chart review, history and physical examination, patient counseling and documentation was 40 minutes on the date of encounter.              Respectfully,  Gaurang Polanco MD  Spine Surgery  [facility]        Answers for HPI/ROS submitted by the patient on 2/28/2022  General Symptoms: Yes  Skin Symptoms: No  HENT Symptoms: No  EYE SYMPTOMS: No  HEART SYMPTOMS: No  LUNG SYMPTOMS: No  INTESTINAL SYMPTOMS: No  URINARY SYMPTOMS: No  GYNECOLOGIC SYMPTOMS: No  BREAST SYMPTOMS: No  SKELETAL SYMPTOMS: Yes  BLOOD SYMPTOMS: No  NERVOUS SYSTEM SYMPTOMS: Yes  MENTAL HEALTH SYMPTOMS: Yes  Fever: No  Loss of appetite: Yes  Weight loss: Yes  Weight gain: No  Fatigue: Yes  Night sweats: No  Chills: No  Increased stress: Yes  Excessive hunger: No  Excessive thirst: No  Feeling hot or cold when others believe the temperature is normal: No  Loss of height: No  Post-operative complications: No  Surgical site pain: No  Hallucinations: No  Change in or Loss of Energy: Yes  Hyperactivity: No  Confusion: No  Back pain: Yes  Neck pain: Yes  Swollen joints: No  Joint pain: Yes  Bone pain: No  Muscle cramps: Yes  Muscle weakness: Yes  Joint stiffness: Yes  Bone  fracture: No  Trouble with coordination: No  Dizziness or trouble with balance: Yes  Fainting or black-out spells: No  Memory loss: No  Headache: Yes  Seizures: No  Speech problems: No  Tingling: Yes  Tremor: No  Weakness: Yes  Difficulty walking: Yes  Paralysis: No  Numbness: Yes  Nervous or Anxious: Yes  Depression: Yes  Trouble sleeping: Yes  Trouble thinking or concentrating: Yes  Mood changes: Yes  Panic attacks: Yes

## 2022-02-28 NOTE — NURSING NOTE
"Reason For Visit:   Chief Complaint   Patient presents with     Consult     Bulging lumbar disc, Schmorl's node, Left leg numbness/ Shannon Almeida, APRN CNP in [place]        Primary MD: Shannon Almeida  Ref. MD: Dr. Almeida    ?  No  Occupation .  Currently working? Yes.  Work status?  Full time.    Date of injury: No  Type of injury: No.    Date of surgery: No  Type of surgery: No.    Smoker: Yes  Request smoking cessation information: No    Ht 1.635 m (5' 4.37\")   Wt 75.8 kg (167 lb)   BMI 28.34 kg/m      Pain Assessment  Patient Currently in Pain: Yes  0-10 Pain Scale: 8  Primary Pain Location: Back    Oswestry (NIC) Questionnaire    OSWESTRY DISABILITY INDEX 2/28/2022   Count 9   Sum 17   Oswestry Score (%) 37.78   Some recent data might be hidden            Neck Disability Index (NDI) Questionnaire    No flowsheet data found.           Visual Analog Pain Scale  Back Pain Scale 0-10: 8  Right leg pain: 0  Left leg pain: 0 (numb lower half of leg)  Neck Pain Scale 0-10: 0  Right arm pain: 0  Left arm pain: 0    Promis 10 Assessment    PROMIS 10 2/28/2022   In general, would you say your health is: Good   In general, would you say your quality of life is: Very good   In general, how would you rate your physical health? Good   In general, how would you rate your mental health, including your mood and your ability to think? Fair   In general, how would you rate your satisfaction with your social activities and relationships? Good   In general, please rate how well you carry out your usual social activities and roles Good   To what extent are you able to carry out your everyday physical activities such as walking, climbing stairs, carrying groceries, or moving a chair? Mostly   How often have you been bothered by emotional problems such as feeling anxious, depressed or irritable? Sometimes   How would you rate your fatigue on average? Mild   How would you rate your pain on " average?   0 = No Pain  to  10 = Worst Imaginable Pain 8   In general, would you say your health is: 3   In general, would you say your quality of life is: 4   In general, how would you rate your physical health? 3   In general, how would you rate your mental health, including your mood and your ability to think? 2   In general, how would you rate your satisfaction with your social activities and relationships? 3   In general, please rate how well you carry out your usual social activities and roles. (This includes activities at home, at work and in your community, and responsibilities as a parent, child, spouse, employee, friend, etc.) 3   To what extent are you able to carry out your everyday physical activities such as walking, climbing stairs, carrying groceries, or moving a chair? 4   In the past 7 days, how often have you been bothered by emotional problems such as feeling anxious, depressed, or irritable? 3   In the past 7 days, how would you rate your fatigue on average? 2   In the past 7 days, how would you rate your pain on average, where 0 means no pain, and 10 means worst imaginable pain? 8   Global Mental Health Score 12   Global Physical Health Score 13   PROMIS TOTAL - SUBSCORES 25   Some recent data might be hidden                Melody Marshall LPN

## 2022-03-01 NOTE — PROGRESS NOTES
"Spine Surgery Consultation    REFERRING PHYSICIAN: Shannon Almeida   PRIMARY CARE PHYSICIAN: Shannon Almeida           Chief Complaint:   Consult (Bulging lumbar disc, Schmorl's node, Left leg numbness/ Shannon Almeida, GISEL CNP in [place] )      History of Present Illness:  Symptom Profile Including: location of symptoms, onset, severity, exacerbating/alleviating factors, previous treatments:        Jennifer Voss is a 36 year old female who presents today for evaluation of left leg numbness, right hip pain, low back pain.     Patient reports left leg painless numbness in the lateral, anterior and medial aspect of the lower leg. She describes distribution from approximately tibial tubercle to ankle; numbness does not cross the ankle joint. Symptoms have been present for one month. Started with tingling paresthesias and have progressed to numbness. She notes symptoms feel worse with transfers from sitting to standing. 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  She also reports chronic low back pain as well as activity-related pain in right hip which she localizes with classic \"C\" sign.  This right hip pain extends to lateral aspect of hip near greater trochanter. She denies experiencing lancinating pain down the left leg. She does not have weakness in lower extremities.    She denies any changes in bowel or bladder function and has no saddle anesthesia.     Past treatments tried:  - Physical therapy: physical therapy x 4 weeks  - Injections: has not had injections at this point  - Medications: flexeril taken at bedtime             Past Medical History:     Past Medical History:   Diagnosis Date     Abnormal Pap smear of cervix 08/2003     Cervical high risk HPV (human papillomavirus) test positive 07/24/2015    see problem list     Chickenpox      Ectopic pregnancy     right     GERD (gastroesophageal reflux disease) 07/16/2012     H/O chronic ear " infection as a child     H/O colposcopy with cervical biopsy 01/18/2016    see problem list     Left tubal pregnancy without intrauterine pregnancy 01/05/2018     Tonsillitis             Past Surgical History:     Past Surgical History:   Procedure Laterality Date     COLONOSCOPY N/A 8/1/2017    Procedure: COMBINED COLONOSCOPY, SINGLE OR MULTIPLE BIOPSY/POLYPECTOMY BY BIOPSY;  Colonoscopy;  Surgeon: Ramakrishna Epstein MD;  Location: [facility]     DILATION AND CURETTAGE, OPERATIVE HYSTEROSCOPY, COMBINED N/A 10/6/2021    Procedure: dilation and curettage, hysteroscopy with polypectomy,Pap Smear;  Surgeon: Fanny Camacho MD;  Location: [facility]     LAPAROSCOPIC EVACUATION ECTOPIC PREGNANCY N/A 7/24/2015    LSC right salpingectomy      LAPAROSCOPY DIAGNOSTIC (GYN) N/A 10/6/2021    Procedure: Diagnostic laparoscopy;  Surgeon: Fanny Camacho MD;  Location: [facility]     LEEP TX, CERVICAL  3/2016    benign     MOUTH SURGERY      wisdom teeth     PE TUBES       TONSILLECTOMY              Social History:     Social History     Tobacco Use     Smoking status: Current Every Day Smoker     Packs/day: 1.00     Years: 10.00     Pack years: 10.00     Types: Cigarettes     Smokeless tobacco: Never Used   Substance Use Topics     Alcohol use: Yes     Alcohol/week: 0.0 standard drinks            Family History:     Family History   Problem Relation Age of Onset     Depression Mother      Diabetes Father      Hypertension Father      Substance Abuse Brother         recovered drugs     Depression Brother      Anxiety Disorder Brother      Diabetes Paternal Grandmother      Coronary Artery Disease Paternal Grandmother         MI     Aneurysm Paternal Grandmother         brain     Heart Failure Paternal Grandfather         CHF            Allergies:     Allergies   Allergen Reactions     Penicillins Nausea     Percocet [Oxycodone-Acetaminophen] Nausea     Ok with vicodin            Medications:     Current Outpatient Medications   Medication  "    albuterol (PROAIR HFA/PROVENTIL HFA/VENTOLIN HFA) 108 (90 Base) MCG/ACT inhaler     ALPRAZolam (XANAX) 0.5 MG tablet     cyclobenzaprine (FLEXERIL) 10 MG tablet     gabapentin (NEURONTIN) 100 MG capsule     HYDROcodone-acetaminophen (NORCO) 5-325 MG tablet     HYDROcodone-acetaminophen (NORCO) 5-325 MG tablet     ibuprofen (ADVIL/MOTRIN) 800 MG tablet     methylPREDNISolone (MEDROL DOSEPAK) 4 MG tablet therapy pack     norethindrone (MICRONOR) 0.35 MG tablet     PARoxetine (PAXIL) 20 MG tablet     senna-docusate (SENOKOT-S/PERICOLACE) 8.6-50 MG tablet     traZODone (DESYREL) 50 MG tablet     No current facility-administered medications for this visit.             Review of Systems:     A 10 point ROS was performed and reviewed. Specific responses to these questions are noted at the end of the document.         Physical Exam:     PHYSICAL EXAM:   Constitutional - Patient is healthy, well-nourished and appears stated age, is in no acute distress    Vitals: Ht 1.635 m (5' 4.37\")   Wt 75.8 kg (167 lb)   BMI 28.34 kg/m     Respiratory - Patient is breathing normally, no audible wheeze or respiratory distress.   Skin - No suspicious rashes or lesions.   Psychiatric - Normal mood and affect.   Cardiovascular - Extremities warm and well perfused.   GI - No abdominal distention.   Musculoskeletal - Non-antalgic gait without use of assistive devices.              Lumbar Spine:    Appearance -  normal appearing lumbar lordosis    Palpation - mild tenderness to palpation over paraspinal muscles down to PSIS    ROM - NO limitation in lumbar range of motion    Motor -        LOWER EXTREMITY Left Right   Hip flexion 5/5 5/5   Knee flexion 5/5 5/5   Knee extension 5/5 5/5   Ankle dorsiflexion 5/5 5/5   Ankle plantarflexion 5/5 5/5   Great toe extension 5/5 5/5        Special tests -     Straight leg raise - Negative     NATHEN - Positive on the RIGHT side     Pain with hip ROM - Positive on the RIGHT side     Neurologic - " Endorses marked decrease in sensation to gliding light touch over the left lateral, anterior, and medial calf. Intact over posterior calf and intact over the foot. Right leg sensation is intact in all distributions.       REFLEXES Left Right   Biceps     Triceps     Brachioradialis     Patella 2+ 2+   Ankle jerk 2+ 2+   Clonus 1 beat 1 beat     Hip Exam:  Tender to palpation over RIGHT greater trochanter  Left sided symptoms are not exacerbated with piriformis stretch  No tenderness or Tinel's sign at fibular neck on left  Left lower leg compartments are soft to palpation. Cannot feel any hernia near fascial defect where SPN exits lateral compartment.             Imaging:   We ordered and independently reviewed new radiographs at this clinic visit. The results were discussed with the patient.  Findings include:    AP, lateral and flexion/extension views of lumbar spine demonstrate maintained lumbar lordosis. 2 mm of retrolisthesis at L2-3 which appears stable on flexion and extension views. Slight postural deformity in coronal plane measuring 7 degrees from superior L2 to inferior L4.      MRI dated 1/25 demonstrates broad based disc bulge at L2-3 without significant central or lateral recess stenosis. There associated Modic changes of the inferior endplate of L2 and superior endplate of L3.  No compressive lesions seen at L3-4, 4-5 or 5-1.  Neuroforamen are patent in lower lumbar spine.              Assessment and Plan:   Assessment:  36 year old female with left lower leg numbness for one month. No compressive lesion on MRI which would correspond with these symptoms. Patient does not have provocative findings for a spinal lesion.  Suspect this could be a peripheral nerve lesion although she has negative provocative findings at common sites of sciatic or peroneal nerve compression.     Right hip demonstrates provocative findings for hip pathology. Do not have direct imaging of the right hip. Was able to look back  ot old CT of abdomen and pelvis from 2018 which did not show advanced degenerative changes. Recommend physical therapy for possible soft tissue etiology such as labral pathology as well as trochanteric bursitis.      Plan:  1. Recommended EMG which would help delineate central versus peripheral etiology for left lower leg numbness.   2. Following EMG will follow up and discuss possible treatments or referral to other specialists if peripheral cause is identified.     Time spent on this clinical encounter including previsit chart review, history and physical examination, patient counseling and documentation was 40 minutes on the date of encounter.              Respectfully,  Gaurang Polanco MD  Spine Surgery  [facility]        Answers for HPI/ROS submitted by the patient on 2/28/2022  General Symptoms: Yes  Skin Symptoms: No  HENT Symptoms: No  EYE SYMPTOMS: No  HEART SYMPTOMS: No  LUNG SYMPTOMS: No  INTESTINAL SYMPTOMS: No  URINARY SYMPTOMS: No  GYNECOLOGIC SYMPTOMS: No  BREAST SYMPTOMS: No  SKELETAL SYMPTOMS: Yes  BLOOD SYMPTOMS: No  NERVOUS SYSTEM SYMPTOMS: Yes  MENTAL HEALTH SYMPTOMS: Yes  Fever: No  Loss of appetite: Yes  Weight loss: Yes  Weight gain: No  Fatigue: Yes  Night sweats: No  Chills: No  Increased stress: Yes  Excessive hunger: No  Excessive thirst: No  Feeling hot or cold when others believe the temperature is normal: No  Loss of height: No  Post-operative complications: No  Surgical site pain: No  Hallucinations: No  Change in or Loss of Energy: Yes  Hyperactivity: No  Confusion: No  Back pain: Yes  Neck pain: Yes  Swollen joints: No  Joint pain: Yes  Bone pain: No  Muscle cramps: Yes  Muscle weakness: Yes  Joint stiffness: Yes  Bone fracture: No  Trouble with coordination: No  Dizziness or trouble with balance: Yes  Fainting or black-out spells: No  Memory loss: No  Headache: Yes  Seizures: No  Speech problems: No  Tingling: Yes  Tremor: No  Weakness: Yes  Difficulty walking:  Yes  Paralysis: No  Numbness: Yes  Nervous or Anxious: Yes  Depression: Yes  Trouble sleeping: Yes  Trouble thinking or concentrating: Yes  Mood changes: Yes  Panic attacks: Yes

## 2022-03-09 ENCOUNTER — MYC MEDICAL ADVICE (OUTPATIENT)
Dept: ORTHOPEDICS | Facility: CLINIC | Age: 37
End: 2022-03-09
Payer: COMMERCIAL

## 2022-04-12 ENCOUNTER — OFFICE VISIT (OUTPATIENT)
Dept: NEUROLOGY | Facility: CLINIC | Age: 37
End: 2022-04-12
Attending: ORTHOPAEDIC SURGERY
Payer: COMMERCIAL

## 2022-04-12 DIAGNOSIS — R20.0 LEFT LEG NUMBNESS: ICD-10-CM

## 2022-04-12 PROCEDURE — 95909 NRV CNDJ TST 5-6 STUDIES: CPT | Performed by: PSYCHIATRY & NEUROLOGY

## 2022-04-12 PROCEDURE — 95886 MUSC TEST DONE W/N TEST COMP: CPT | Performed by: PSYCHIATRY & NEUROLOGY

## 2022-04-12 NOTE — PROGRESS NOTES
[facility]  Electrodiagnostic Laboratory                 Department of Neurology                                                                                                         Test Date:  2022    Patient: Jennifer Voss : 1985 Physician: Sourav Moser MD   Sex: Female AGE: 37 year Ref Phys: Gaurang Polanco   ID#: [id number]   Technician: Jensen Baird     Clinical Information:    Jennifer Voss is a 37 year old female with no significant past medical history who presents to EMG clinic for further evaluation of left leg numbness. Numbness onset was abrupt approximately 1 month ago and it localizes over the medial, anterior and lateral calf. She was referred by Dr. Polanco with orthopedic surgery to further evaluate central vs peripheral etiology. She had a lumbar MRI recently, which showed mild degenerative changes most significant at the L2/L3 level and no significant foraminal or spinal stenosis.     Techniques:    Motor and sensory conduction studies were done with surface recording electrodes. EMG was done with a concentric needle electrode.     Results:    Left fibular (ADM) motor nerve was normal.  The left tibial (AHB) motor nerve was normal.  The left and right superficial fibular antidromic sensory nerves were normal.  The left and right sural antidromic sensory nerves were normal. Needle EMG of the left TA, medial gastrocnemius, vastus lateralis, short head of biceps femoris, and gluteus medius muscles was normal.    Interpretation:    Normal study. There is no electrodiagnostic evidence of left lumbar radiculopathy, peroneal or tibial mononeuropathy.     ___________________________  Martín An MD    ATTENDING ADDENDUM: I was present during the entire study and directly supervised resident Dr An, who performed it. I agree with the analysis of results and interpretation as above, which I personally reviewed and  edited. Sourav Moser MD      Nerve Conduction Studies  Motor Sites      Latency Amplitude Neg. Amp Diff Segment Distance Velocity Neg. Dur Neg Area Diff Temperature Comment   Site (ms) Norm (mV) Norm %  cm m/s Norm ms %  C    Left Fibular (EDB) Motor   Ankle 4.3  < 6.0 4.6  > 2.5  Ankle-EDB 8   5.6  31.7    Bel Fib Head 10.9 - 4.6 - 0 Bel Fib Head-Ankle 36 55  > 38 6.2 0 31.5    Pop Fossa 11.6 - 4.6 - 0 Pop Fossa-Bel Fib Head 4.2 60  > 38 6.0 10.3 31.6    Left Tibial (AHB) Motor   Ankle 3.7  < 6.5 15.8  > 4.4  Ankle-AHB 8   5.4  31.7    Knee 11.5 - 10.6 - -32.9 Knee-Ankle 35.5 46  > 38 5.6 -21.5 31.4      Sensory Sites      Onset Lat Peak Lat Amp (O-P) Amp (P-P) Segment Distance Velocity Temperature   Site ms ms  V Norm  V  cm m/s Norm  C   Left Superficial Fibular Sensory   14 cm-Ankle 2.4 3.1 9  > 3 11 14 cm-Ankle 12.5 52  > 38 31.6   Right Superficial Fibular Sensory   14 cm-Ankle 2.2 2.8 15  > 3 15 14 cm-Ankle 12.5 57  > 38 32   Left Sural Sensory   Calf-Lat Mall 2.7 3.4 18  > 5 19 Calf-Lat Mall 14 52  > 38 31.9   Right Sural Sensory   Calf-Lat Mall 2.5 3.3 16  > 5 18 Calf-Lat Mall 14 56  > 38 31.9       Electromyography     Side Muscle Ins Act Fibs/PSW Fasc HF Amp Dur Poly Recrt Int Pat   Left Tib Anterior Nml None Nml 0 Nml Nml 0 Nml Nml   Left Vastus Lat Nml None Nml 0 Nml Nml 0 Nml Nml   Left Biceps Fem SH Nml None Nml 0 Nml Nml 0 Nml Nml   Left Gluteus Med Nml None Nml 0 Nml Nml 0 Nml Nml   Left Gastroc MH Nml None Nml 0 Nml Nml 0 Nml Nml         NCS Waveforms:    Motor         Sensory                  Ultrasound Images:

## 2022-04-12 NOTE — LETTER
2022       RE: Jennifer Voss  [address]  [place] [address]     Dear Colleague,    Thank you for referring your patient, Jennifer Voss, to the [facility] EMG [facility]IN[facility] [place] at [facility]. Please see a copy of my visit note below.                        [facility]  Electrodiagnostic Laboratory                 Department of Neurology                                                                                                         Test Date:  2022    Patient: Jennifer Voss : 1985 Physician: Sourav Moser MD   Sex: Female AGE: 37 year Ref Phys: Gaurang Clementsson   ID#: [id number]   Technician: Jensen Baird     Clinical Information:    Jennifer Voss is a 37 year old female with no significant past medical history who presents to EMG clinic for further evaluation of left leg numbness. Numbness onset was abrupt approximately 1 month ago and it localizes over the medial, anterior and lateral calf. She was referred by Dr. Polanco with orthopedic surgery to further evaluate central vs peripheral etiology. She had a lumbar MRI recently, which showed mild degenerative changes most significant at the L2/L3 level and no significant foraminal or spinal stenosis.     Techniques:    Motor and sensory conduction studies were done with surface recording electrodes. EMG was done with a concentric needle electrode.     Results:    Left fibular (ADM) motor nerve was normal.  The left tibial (AHB) motor nerve was normal.  The left and right superficial fibular antidromic sensory nerves were normal.  The left and right sural antidromic sensory nerves were normal. Needle EMG of the left TA, medial gastrocnemius, vastus lateralis, short head of biceps femoris, and gluteus medius muscles was normal.    Interpretation:    Normal study. There is no electrodiagnostic evidence of left lumbar radiculopathy, peroneal or  tibial mononeuropathy.     ___________________________  Martín An MD    ATTENDING ADDENDUM: I was present during the entire study and directly supervised resident Dr An, who performed it. I agree with the analysis of results and interpretation as above, which I personally reviewed and edited. Sourav Moser MD      Nerve Conduction Studies  Motor Sites      Latency Amplitude Neg. Amp Diff Segment Distance Velocity Neg. Dur Neg Area Diff Temperature Comment   Site (ms) Norm (mV) Norm %  cm m/s Norm ms %  C    Left Fibular (EDB) Motor   Ankle 4.3  < 6.0 4.6  > 2.5  Ankle-EDB 8   5.6  31.7    Bel Fib Head 10.9 - 4.6 - 0 Bel Fib Head-Ankle 36 55  > 38 6.2 0 31.5    Pop Fossa 11.6 - 4.6 - 0 Pop Fossa-Bel Fib Head 4.2 60  > 38 6.0 10.3 31.6    Left Tibial (AHB) Motor   Ankle 3.7  < 6.5 15.8  > 4.4  Ankle-AHB 8   5.4  31.7    Knee 11.5 - 10.6 - -32.9 Knee-Ankle 35.5 46  > 38 5.6 -21.5 31.4      Sensory Sites      Onset Lat Peak Lat Amp (O-P) Amp (P-P) Segment Distance Velocity Temperature   Site ms ms  V Norm  V  cm m/s Norm  C   Left Superficial Fibular Sensory   14 cm-Ankle 2.4 3.1 9  > 3 11 14 cm-Ankle 12.5 52  > 38 31.6   Right Superficial Fibular Sensory   14 cm-Ankle 2.2 2.8 15  > 3 15 14 cm-Ankle 12.5 57  > 38 32   Left Sural Sensory   Calf-Lat Mall 2.7 3.4 18  > 5 19 Calf-Lat Mall 14 52  > 38 31.9   Right Sural Sensory   Calf-Lat Mall 2.5 3.3 16  > 5 18 Calf-Lat Mall 14 56  > 38 31.9       Electromyography     Side Muscle Ins Act Fibs/PSW Fasc HF Amp Dur Poly Recrt Int Pat   Left Tib Anterior Nml None Nml 0 Nml Nml 0 Nml Nml   Left Vastus Lat Nml None Nml 0 Nml Nml 0 Nml Nml   Left Biceps Fem SH Nml None Nml 0 Nml Nml 0 Nml Nml   Left Gluteus Med Nml None Nml 0 Nml Nml 0 Nml Nml   Left Gastroc MH Nml None Nml 0 Nml Nml 0 Nml Nml         NCS Waveforms:    Motor         Sensory                  Ultrasound Images:        Sourav Moser MD

## 2022-04-20 ENCOUNTER — VIRTUAL VISIT (OUTPATIENT)
Dept: ORTHOPEDICS | Facility: CLINIC | Age: 37
End: 2022-04-20
Payer: COMMERCIAL

## 2022-04-20 DIAGNOSIS — G54.1 LUMBOSACRAL PLEXOPATHY: Primary | ICD-10-CM

## 2022-04-20 PROCEDURE — 99213 OFFICE O/P EST LOW 20 MIN: CPT | Mod: TEL | Performed by: ORTHOPAEDIC SURGERY

## 2022-04-20 NOTE — LETTER
4/20/2022         RE: Jennifer Voss  [address]  [place] [address]        Dear Colleague,    Thank you for referring your patient, Jennifer Voss, to the [facility] [place]. Please see a copy of my visit note below.    Reason For Visit:   Chief Complaint   Patient presents with     RECHECK     phone visit [phone] review EMG completed 4/12/22       Primary MD: Shannon Almeida  Ref. MD: Est    ?  No  Occupation .  Currently working? Yes.  Work status?  Full time.     Date of injury: No  Type of injury: No.     Date of surgery: No  Type of surgery: No.     Smoker: Yes  Request smoking cessation information: No    There were no vitals taken for this visit.    Pain Assessment  0-10 Pain Scale: 7  Primary Pain Location: Back    Oswestry (NIC) Questionnaire    OSWESTRY DISABILITY INDEX 4/19/2022   Count 8   Sum 6   Oswestry Score (%) 15   Some recent data might be hidden      Visual Analog Pain Scale  Back Pain Scale 0-10: 7  Right leg pain: 0  Left leg pain: 0  Neck Pain Scale 0-10: 0  Right arm pain: 0  Left arm pain: 0    Promis 10 Assessment    PROMIS 10 2/28/2022   In general, would you say your health is: Good   In general, would you say your quality of life is: Very good   In general, how would you rate your physical health? Good   In general, how would you rate your mental health, including your mood and your ability to think? Fair   In general, how would you rate your satisfaction with your social activities and relationships? Good   In general, please rate how well you carry out your usual social activities and roles Good   To what extent are you able to carry out your everyday physical activities such as walking, climbing stairs, carrying groceries, or moving a chair? Mostly   How often have you been bothered by emotional problems such as feeling anxious, depressed or irritable? Sometimes   How would you rate your fatigue on  average? Mild   How would you rate your pain on average?   0 = No Pain  to  10 = Worst Imaginable Pain 8   In general, would you say your health is: 3   In general, would you say your quality of life is: 4   In general, how would you rate your physical health? 3   In general, how would you rate your mental health, including your mood and your ability to think? 2   In general, how would you rate your satisfaction with your social activities and relationships? 3   In general, please rate how well you carry out your usual social activities and roles. (This includes activities at home, at work and in your community, and responsibilities as a parent, child, spouse, employee, friend, etc.) 3   To what extent are you able to carry out your everyday physical activities such as walking, climbing stairs, carrying groceries, or moving a chair? 4   In the past 7 days, how often have you been bothered by emotional problems such as feeling anxious, depressed, or irritable? 3   In the past 7 days, how would you rate your fatigue on average? 2   In the past 7 days, how would you rate your pain on average, where 0 means no pain, and 10 means worst imaginable pain? 8   Global Mental Health Score 12   Global Physical Health Score 13   PROMIS TOTAL - SUBSCORES 25   Some recent data might be hidden                Melody Marshall LPN    I have reviewed and updated the patient's Past Medical History, Social History, Family History and Medication List.    ALLERGIES  Penicillins and Percocet [oxycodone-acetaminophen]    Spine Surgery Follow Up    REFERRING PHYSICIAN: No ref. provider found   PRIMARY CARE PHYSICIAN: Shannon Almeida           Chief Complaint:   RECHECK (phone visit [phone] review EMG completed 4/12/22)      History of Present Illness:  Symptom Profile Including: location of symptoms, onset, severity, exacerbating/alleviating factors, previous treatments:        Jennifer Voss is a 37 year old female who I  previously saw in clinic on 2/28/2022 for recent onset of left leg numbness.  She does have a history of ectopic pregnancy and also had a gynecologic procedure recently.  Given she did not have any obvious compressive lumbar lesions which correspond with her symptoms I sent her off for an EMG.  She follows up today to discuss these results.  She also has back pain which she believes is separate from the sensory changes in her lower extremity.    She states that her symptoms are unchanged from her previous visit.  Endorses painless numbness in the lower leg medial, anterior and lateral aspects.  No obvious exacerbating factors or relieving factors.  No cauda equina symptoms.  Denies weakness      NIC Scores:  Oswestry (NIC) Questionnaire    OSWESTRY DISABILITY INDEX 4/19/2022   Count 8   Sum 6   Oswestry Score (%) 15   Some recent data might be hidden            Neck Disability Index (NDI) Questionnaire    No flowsheet data found.         Visual Analog Pain Scale  Back Pain Scale 0-10: 7  Right leg pain: 0  Left leg pain: 0  Neck Pain Scale 0-10: 0  Right arm pain: 0  Left arm pain: 0    PROMIS-10 Scores:                    Imaging:   I ordered and independently reviewed new radiographs at this clinic visit. The results were discussed with the patient.  Findings include:    Review of her EMG results demonstrates no evidence of lumbar radiculopathy nor peripheral lesion.             Assessment and Plan:   Assessment:  37 year old female with painless numbness affecting the left medial anterior and lateral lower leg.  She does have disc herniation however does not have any impingement or effacement of the nerve roots could correspond with her sensory changes.  It is possible that she has more proximal dorsal column lesion however she has no myelopathic symptoms or signs.  Also given her history of ectopic pregnancy and gynecologic procedure it is possible that this is a lumbosacral plexopathy.  We did discuss referral  to neurology for further evaluation plus or minus pelvic MRI to evaluate for lesion which could cause lumbar sacral plexopathy.     Plan:  1. MRI pelvis with and without contrast  2. Referral to neurology    I will follow-up on the results of her MRI contact patient by telephone to discuss.    Time spent on telephone and care coordination was approximately 12 minutes.    Respectfully,  Gaurang Polanco MD  Orthopaedic Spine Surgery  Dept Orthopaedic Surgery, [facility] Phone: [phone]    Dictation Disclaimer: Some of this Note has been completed with voice-recognition dictation software. Although errors are generally corrected real-time, there is the potential for a rare error to be present in the completed chart.

## 2022-04-20 NOTE — PROGRESS NOTES
I have reviewed and updated the patient's Past Medical History, Social History, Family History and Medication List.    ALLERGIES  Penicillins and Percocet [oxycodone-acetaminophen]    Spine Surgery Follow Up    REFERRING PHYSICIAN: No ref. provider found   PRIMARY CARE PHYSICIAN: Shannon Almeida           Chief Complaint:   RECHECK (phone visit [phone] review EMG completed 4/12/22)      History of Present Illness:  Symptom Profile Including: location of symptoms, onset, severity, exacerbating/alleviating factors, previous treatments:        Jennifer Voss is a 37 year old female who I previously saw in clinic on 2/28/2022 for recent onset of left leg numbness.  She does have a history of ectopic pregnancy and also had a gynecologic procedure recently.  Given she did not have any obvious compressive lumbar lesions which correspond with her symptoms I sent her off for an EMG.  She follows up today to discuss these results.  She also has back pain which she believes is separate from the sensory changes in her lower extremity.    She states that her symptoms are unchanged from her previous visit.  Endorses painless numbness in the lower leg medial, anterior and lateral aspects.  No obvious exacerbating factors or relieving factors.  No cauda equina symptoms.  Denies weakness      NIC Scores:  Oswestry (NIC) Questionnaire    OSWESTRY DISABILITY INDEX 4/19/2022   Count 8   Sum 6   Oswestry Score (%) 15   Some recent data might be hidden            Neck Disability Index (NDI) Questionnaire    No flowsheet data found.         Visual Analog Pain Scale  Back Pain Scale 0-10: 7  Right leg pain: 0  Left leg pain: 0  Neck Pain Scale 0-10: 0  Right arm pain: 0  Left arm pain: 0    PROMIS-10 Scores:                    Imaging:   I ordered and independently reviewed new radiographs at this clinic visit. The results were discussed with the patient.  Findings include:    Review of her EMG results demonstrates no  evidence of lumbar radiculopathy nor peripheral lesion.             Assessment and Plan:   Assessment:  37 year old female with painless numbness affecting the left medial anterior and lateral lower leg.  She does have disc herniation however does not have any impingement or effacement of the nerve roots could correspond with her sensory changes.  It is possible that she has more proximal dorsal column lesion however she has no myelopathic symptoms or signs.  Also given her history of ectopic pregnancy and gynecologic procedure it is possible that this is a lumbosacral plexopathy.  We did discuss referral to neurology for further evaluation plus or minus pelvic MRI to evaluate for lesion which could cause lumbar sacral plexopathy.     Plan:  1. MRI pelvis with and without contrast  2. Referral to neurology    I will follow-up on the results of her MRI contact patient by telephone to discuss.    Time spent on telephone and care coordination was approximately 12 minutes.    Respectfully,  Gaurang Polanco MD  Orthopaedic Spine Surgery  Dept Orthopaedic Surgery, [facility]  [facility]: [phone]    Dictation Disclaimer: Some of this Note has been completed with voice-recognition dictation software. Although errors are generally corrected real-time, there is the potential for a rare error to be present in the completed chart.

## 2022-04-20 NOTE — PROGRESS NOTES
Reason For Visit:   Chief Complaint   Patient presents with     RECHECK     phone visit [phone] review EMG completed 4/12/22       Primary MD: hSannon Almeida  Ref. MD: Est    ?  No  Occupation .  Currently working? Yes.  Work status?  Full time.     Date of injury: No  Type of injury: No.     Date of surgery: No  Type of surgery: No.     Smoker: Yes  Request smoking cessation information: No    There were no vitals taken for this visit.    Pain Assessment  0-10 Pain Scale: 7  Primary Pain Location: Back    Oswestry (NIC) Questionnaire    OSWESTRY DISABILITY INDEX 4/19/2022   Count 8   Sum 6   Oswestry Score (%) 15   Some recent data might be hidden      Visual Analog Pain Scale  Back Pain Scale 0-10: 7  Right leg pain: 0  Left leg pain: 0  Neck Pain Scale 0-10: 0  Right arm pain: 0  Left arm pain: 0    Promis 10 Assessment    PROMIS 10 2/28/2022   In general, would you say your health is: Good   In general, would you say your quality of life is: Very good   In general, how would you rate your physical health? Good   In general, how would you rate your mental health, including your mood and your ability to think? Fair   In general, how would you rate your satisfaction with your social activities and relationships? Good   In general, please rate how well you carry out your usual social activities and roles Good   To what extent are you able to carry out your everyday physical activities such as walking, climbing stairs, carrying groceries, or moving a chair? Mostly   How often have you been bothered by emotional problems such as feeling anxious, depressed or irritable? Sometimes   How would you rate your fatigue on average? Mild   How would you rate your pain on average?   0 = No Pain  to  10 = Worst Imaginable Pain 8   In general, would you say your health is: 3   In general, would you say your quality of life is: 4   In general, how would you rate your physical health? 3    In general, how would you rate your mental health, including your mood and your ability to think? 2   In general, how would you rate your satisfaction with your social activities and relationships? 3   In general, please rate how well you carry out your usual social activities and roles. (This includes activities at home, at work and in your community, and responsibilities as a parent, child, spouse, employee, friend, etc.) 3   To what extent are you able to carry out your everyday physical activities such as walking, climbing stairs, carrying groceries, or moving a chair? 4   In the past 7 days, how often have you been bothered by emotional problems such as feeling anxious, depressed, or irritable? 3   In the past 7 days, how would you rate your fatigue on average? 2   In the past 7 days, how would you rate your pain on average, where 0 means no pain, and 10 means worst imaginable pain? 8   Global Mental Health Score 12   Global Physical Health Score 13   PROMIS TOTAL - SUBSCORES 25   Some recent data might be hidden                Melody Marshall LPN

## 2022-05-11 ENCOUNTER — ANCILLARY PROCEDURE (OUTPATIENT)
Dept: MRI IMAGING | Facility: CLINIC | Age: 37
End: 2022-05-11
Attending: ORTHOPAEDIC SURGERY
Payer: COMMERCIAL

## 2022-05-11 DIAGNOSIS — G54.1 LUMBOSACRAL PLEXOPATHY: ICD-10-CM

## 2022-05-11 PROCEDURE — 72158 MRI LUMBAR SPINE W/O & W/DYE: CPT | Mod: TC | Performed by: RADIOLOGY

## 2022-05-11 PROCEDURE — A9585 GADOBUTROL INJECTION: HCPCS | Performed by: RADIOLOGY

## 2022-05-11 RX ORDER — GADOBUTROL 604.72 MG/ML
0.1 INJECTION INTRAVENOUS ONCE
Status: COMPLETED | OUTPATIENT
Start: 2022-05-11 | End: 2022-05-11

## 2022-05-11 RX ADMIN — GADOBUTROL 7.5 ML: 604.72 INJECTION INTRAVENOUS at 15:48

## 2022-06-03 ENCOUNTER — E-VISIT (OUTPATIENT)
Dept: FAMILY MEDICINE | Facility: CLINIC | Age: 37
End: 2022-06-03
Payer: COMMERCIAL

## 2022-06-03 DIAGNOSIS — M54.50 CHRONIC BILATERAL LOW BACK PAIN WITHOUT SCIATICA: ICD-10-CM

## 2022-06-03 DIAGNOSIS — F41.9 ANXIETY: ICD-10-CM

## 2022-06-03 DIAGNOSIS — G47.9 SLEEP DISTURBANCE: ICD-10-CM

## 2022-06-03 DIAGNOSIS — F34.1 DYSTHYMIA: ICD-10-CM

## 2022-06-03 DIAGNOSIS — G89.29 CHRONIC BILATERAL LOW BACK PAIN WITHOUT SCIATICA: ICD-10-CM

## 2022-06-03 PROCEDURE — 99421 OL DIG E/M SVC 5-10 MIN: CPT | Performed by: NURSE PRACTITIONER

## 2022-06-03 ASSESSMENT — PATIENT HEALTH QUESTIONNAIRE - PHQ9
SUM OF ALL RESPONSES TO PHQ QUESTIONS 1-9: 1
10. IF YOU CHECKED OFF ANY PROBLEMS, HOW DIFFICULT HAVE THESE PROBLEMS MADE IT FOR YOU TO DO YOUR WORK, TAKE CARE OF THINGS AT HOME, OR GET ALONG WITH OTHER PEOPLE: NOT DIFFICULT AT ALL
SUM OF ALL RESPONSES TO PHQ QUESTIONS 1-9: 1

## 2022-06-03 ASSESSMENT — ANXIETY QUESTIONNAIRES
GAD7 TOTAL SCORE: 3
5. BEING SO RESTLESS THAT IT IS HARD TO SIT STILL: SEVERAL DAYS
6. BECOMING EASILY ANNOYED OR IRRITABLE: NOT AT ALL
2. NOT BEING ABLE TO STOP OR CONTROL WORRYING: NOT AT ALL
1. FEELING NERVOUS, ANXIOUS, OR ON EDGE: SEVERAL DAYS
7. FEELING AFRAID AS IF SOMETHING AWFUL MIGHT HAPPEN: NOT AT ALL
3. WORRYING TOO MUCH ABOUT DIFFERENT THINGS: NOT AT ALL
8. IF YOU CHECKED OFF ANY PROBLEMS, HOW DIFFICULT HAVE THESE MADE IT FOR YOU TO DO YOUR WORK, TAKE CARE OF THINGS AT HOME, OR GET ALONG WITH OTHER PEOPLE?: NOT DIFFICULT AT ALL
7. FEELING AFRAID AS IF SOMETHING AWFUL MIGHT HAPPEN: NOT AT ALL
GAD7 TOTAL SCORE: 3
4. TROUBLE RELAXING: SEVERAL DAYS
GAD7 TOTAL SCORE: 3

## 2022-06-04 ASSESSMENT — ANXIETY QUESTIONNAIRES: GAD7 TOTAL SCORE: 3

## 2022-06-04 ASSESSMENT — PATIENT HEALTH QUESTIONNAIRE - PHQ9: SUM OF ALL RESPONSES TO PHQ QUESTIONS 1-9: 1

## 2022-06-06 RX ORDER — TRAZODONE HYDROCHLORIDE 50 MG/1
TABLET, FILM COATED ORAL
Qty: 135 TABLET | Refills: 1 | Status: SHIPPED | OUTPATIENT
Start: 2022-06-06 | End: 2022-12-29 | Stop reason: ALTCHOICE

## 2022-06-06 RX ORDER — PAROXETINE 20 MG/1
20 TABLET, FILM COATED ORAL EVERY MORNING
Qty: 90 TABLET | Refills: 1 | Status: SHIPPED | OUTPATIENT
Start: 2022-06-06 | End: 2022-07-06

## 2022-06-06 RX ORDER — CYCLOBENZAPRINE HCL 10 MG
TABLET ORAL
Qty: 30 TABLET | Refills: 0 | Status: SHIPPED | OUTPATIENT
Start: 2022-06-06 | End: 2022-08-15

## 2022-06-08 DIAGNOSIS — R06.02 SOB (SHORTNESS OF BREATH): ICD-10-CM

## 2022-06-09 RX ORDER — ALBUTEROL SULFATE 90 UG/1
AEROSOL, METERED RESPIRATORY (INHALATION)
Qty: 9 G | Refills: 0 | Status: SHIPPED | OUTPATIENT
Start: 2022-06-09 | End: 2022-08-15

## 2022-06-09 NOTE — TELEPHONE ENCOUNTER
"Requested Prescriptions   Signed Prescriptions Disp Refills    albuterol (PROAIR HFA/PROVENTIL HFA/VENTOLIN HFA) 108 (90 Base) MCG/ACT inhaler 9 g 0     Sig: INHALE 2 PUFFS BY MOUTH EVERY 6 HOURS AS NEEDED FOR SHORTNESS OF BREATH /  DYSPNEA  OR  WHEEZING       Asthma Maintenance Inhalers - Anticholinergics Passed - 6/8/2022  7:38 PM        Passed - Patient is age 12 years or older        Passed - Recent (12 mo) or future (30 days) visit within the authorizing provider's specialty     Patient has had an office visit with the authorizing provider or a provider within the authorizing providers department within the previous 12 mos or has a future within next 30 days. See \"Patient Info\" tab in inbasket, or \"Choose Columns\" in Meds & Orders section of the refill encounter.              Passed - Medication is active on med list       Short-Acting Beta Agonist Inhalers Protocol  Passed - 6/8/2022  7:38 PM        Passed - Patient is age 12 or older        Passed - Recent (12 mo) or future (30 days) visit within the authorizing provider's specialty     Patient has had an office visit with the authorizing provider or a provider within the authorizing providers department within the previous 12 mos or has a future within next 30 days. See \"Patient Info\" tab in inbasket, or \"Choose Columns\" in Meds & Orders section of the refill encounter.              Passed - Medication is active on med list           Priya Kim RN  [facility]     "

## 2022-06-18 ENCOUNTER — TELEPHONE (OUTPATIENT)
Dept: BEHAVIORAL HEALTH | Facility: CLINIC | Age: 37
End: 2022-06-18
Payer: COMMERCIAL

## 2022-06-18 ENCOUNTER — HOSPITAL ENCOUNTER (EMERGENCY)
Facility: CLINIC | Age: 37
Discharge: HOME | End: 2022-06-20
Attending: EMERGENCY MEDICINE | Admitting: EMERGENCY MEDICINE
Payer: COMMERCIAL

## 2022-06-18 DIAGNOSIS — Z20.822 COVID-19 RULED OUT BY LABORATORY TESTING: ICD-10-CM

## 2022-06-18 DIAGNOSIS — R45.851 SUICIDAL IDEATION: ICD-10-CM

## 2022-06-18 DIAGNOSIS — F43.21 ADJUSTMENT DISORDER WITH DEPRESSED MOOD: ICD-10-CM
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
## 2022-06-18 LAB
ALCOHOL BREATH TEST: 0 (ref 0–0.01)
ANION GAP SERPL CALCULATED.3IONS-SCNC: 5 MMOL/L (ref 3–14)
BASOPHILS # BLD AUTO: 0.1 10E3/UL (ref 0–0.2)
BASOPHILS NFR BLD AUTO: 1 %
BUN SERPL-MCNC: 12 MG/DL (ref 7–30)
CALCIUM SERPL-MCNC: 9.4 MG/DL (ref 8.5–10.1)
CHLORIDE BLD-SCNC: 108 MMOL/L (ref 94–109)
CO2 SERPL-SCNC: 27 MMOL/L (ref 20–32)
CREAT SERPL-MCNC: 0.62 MG/DL (ref 0.52–1.04)
EOSINOPHIL # BLD AUTO: 0.1 10E3/UL (ref 0–0.7)
EOSINOPHIL NFR BLD AUTO: 1 %
ERYTHROCYTE [DISTWIDTH] IN BLOOD BY AUTOMATED COUNT: 15.2 % (ref 10–15)
GFR SERPL CREATININE-BSD FRML MDRD: >90 ML/MIN/1.73M2
GLUCOSE BLD-MCNC: 80 MG/DL (ref 70–99)
HCT VFR BLD AUTO: 44.9 % (ref 35–47)
HGB BLD-MCNC: 14.4 G/DL (ref 11.7–15.7)
IMM GRANULOCYTES # BLD: 0.1 10E3/UL
IMM GRANULOCYTES NFR BLD: 1 %
LYMPHOCYTES # BLD AUTO: 3.1 10E3/UL (ref 0.8–5.3)
LYMPHOCYTES NFR BLD AUTO: 34 %
MCH RBC QN AUTO: 27.1 PG (ref 26.5–33)
MCHC RBC AUTO-ENTMCNC: 32.1 G/DL (ref 31.5–36.5)
MCV RBC AUTO: 85 FL (ref 78–100)
MONOCYTES # BLD AUTO: 0.8 10E3/UL (ref 0–1.3)
MONOCYTES NFR BLD AUTO: 9 %
NEUTROPHILS # BLD AUTO: 5 10E3/UL (ref 1.6–8.3)
NEUTROPHILS NFR BLD AUTO: 54 %
NRBC # BLD AUTO: 0 10E3/UL
NRBC BLD AUTO-RTO: 0 /100
PLATELET # BLD AUTO: 322 10E3/UL (ref 150–450)
POTASSIUM BLD-SCNC: 3.9 MMOL/L (ref 3.4–5.3)
RBC # BLD AUTO: 5.31 10E6/UL (ref 3.8–5.2)
SODIUM SERPL-SCNC: 140 MMOL/L (ref 133–144)
WBC # BLD AUTO: 9.1 10E3/UL (ref 4–11)

## 2022-06-18 PROCEDURE — 99285 EMERGENCY DEPT VISIT HI MDM: CPT | Performed by: EMERGENCY MEDICINE

## 2022-06-18 PROCEDURE — 99285 EMERGENCY DEPT VISIT HI MDM: CPT | Mod: 25 | Performed by: EMERGENCY MEDICINE

## 2022-06-18 PROCEDURE — 36415 COLL VENOUS BLD VENIPUNCTURE: CPT | Performed by: EMERGENCY MEDICINE

## 2022-06-18 PROCEDURE — 250N000013 HC RX MED GY IP 250 OP 250 PS 637: Performed by: EMERGENCY MEDICINE

## 2022-06-18 PROCEDURE — 80048 BASIC METABOLIC PNL TOTAL CA: CPT | Performed by: EMERGENCY MEDICINE

## 2022-06-18 PROCEDURE — 90791 PSYCH DIAGNOSTIC EVALUATION: CPT

## 2022-06-18 PROCEDURE — 85025 COMPLETE CBC W/AUTO DIFF WBC: CPT | Performed by: EMERGENCY MEDICINE

## 2022-06-18 PROCEDURE — C9803 HOPD COVID-19 SPEC COLLECT: HCPCS | Performed by: EMERGENCY MEDICINE

## 2022-06-18 RX ORDER — OLANZAPINE 5 MG/1
10 TABLET, ORALLY DISINTEGRATING ORAL ONCE
Status: COMPLETED | OUTPATIENT
Start: 2022-06-18 | End: 2022-06-18

## 2022-06-18 RX ADMIN — OLANZAPINE 10 MG: 5 TABLET, ORALLY DISINTEGRATING ORAL at 17:20

## 2022-06-18 ASSESSMENT — ENCOUNTER SYMPTOMS
NECK PAIN: 0
SHORTNESS OF BREATH: 0
NECK STIFFNESS: 0
FEVER: 0
DIFFICULTY URINATING: 0
HEADACHES: 0
EYE REDNESS: 0
COLOR CHANGE: 0
ARTHRALGIAS: 0
ABDOMINAL PAIN: 0
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
## 2022-06-19 LAB
AMPHETAMINES UR QL SCN: ABNORMAL
BARBITURATES UR QL: ABNORMAL
BENZODIAZ UR QL: ABNORMAL
CANNABINOIDS UR QL SCN: ABNORMAL
COCAINE UR QL: ABNORMAL
OPIATES UR QL SCN: ABNORMAL
SARS-COV-2 RNA RESP QL NAA+PROBE: NEGATIVE

## 2022-06-19 PROCEDURE — 80307 DRUG TEST PRSMV CHEM ANLYZR: CPT | Performed by: EMERGENCY MEDICINE

## 2022-06-19 PROCEDURE — 250N000013 HC RX MED GY IP 250 OP 250 PS 637: Performed by: EMERGENCY MEDICINE

## 2022-06-19 PROCEDURE — 81025 URINE PREGNANCY TEST: CPT | Performed by: EMERGENCY MEDICINE

## 2022-06-19 PROCEDURE — U0005 INFEC AGEN DETEC AMPLI PROBE: HCPCS | Performed by: EMERGENCY MEDICINE

## 2022-06-19 RX ORDER — OLANZAPINE 5 MG/1
10 TABLET, ORALLY DISINTEGRATING ORAL AT BEDTIME
Status: COMPLETED | OUTPATIENT
Start: 2022-06-19 | End: 2022-06-19

## 2022-06-19 RX ORDER — OLANZAPINE 5 MG/1
10 TABLET, ORALLY DISINTEGRATING ORAL ONCE
Status: COMPLETED | OUTPATIENT
Start: 2022-06-19 | End: 2022-06-19

## 2022-06-19 RX ADMIN — OLANZAPINE 10 MG: 5 TABLET, ORALLY DISINTEGRATING ORAL at 21:55

## 2022-06-19 RX ADMIN — Medication 5 MG: at 02:59

## 2022-06-19 RX ADMIN — OLANZAPINE 10 MG: 5 TABLET, ORALLY DISINTEGRATING ORAL at 11:20

## 2022-06-19 NOTE — PROGRESS NOTES
Patient continues to sleep. Vitals not obtained. Still awaiting opportunity to collect UA and covid swab.     0348 Covid swab collected. UA for drug screen as collected. Pt requested sleep med, MD ordered 5 mg melatonin. Pt given melatonin.   Patient is also requesting to be re-evaluated, and states that she does not need to be here.
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
## 2022-06-19 NOTE — ED NOTES
2:28 PM this is a 37-year-old female seen by myself yesterday and admitted to inpatient psychiatry but remains boarded on the [place].  I examined her a couple of times today and discussed with her that I would not be canceling her 72-hour hold.  I told her this would only happen after she had a comprehensive evaluation by a psychiatrist.  I did put in a psychiatry consultation and was called back by .  He confirmed my suspicion that he would not be discharging her from the emergency department but he would put her on the consult list and would likely be seen tomorrow.  The patient clearly has lack of insight into the whole event and still maintains that she is not suicidal despite the documented statements she made and the actions that she took.     Jani Grace MD  06/19/22 [id number]

## 2022-06-19 NOTE — TELEPHONE ENCOUNTER
R:  Per notes, pt refusing Covid swab and UDS.    Bed search @ 23:59 - No appropriate beds available    Pt remains on work list until appropriate placement is available

## 2022-06-19 NOTE — TELEPHONE ENCOUNTER
Potential bed in [place]   550pm - April, on call provider, will review and call intake back   628pm - April called, reports there is not an appropriate bed for the pt at this time     Updated Bed Search @630pm:     [facility] @ cap   [facility] @ cap   [facility] @ cap   [facility] @ cap   [facility] @ cap   [facility] @ cap   [facility] @ [facility]   [facility]   [facility] @ [facility]   [facility] @ cap   [facility] @ cap  [facility] cap   [facility] @ cap   [facility] @ cap   [place] @ cap  [facility] @ cap   [facility] posting 3 avail beds, very low acuity. [phone].  Per call to Jessica, they are unable to review currently.   [facility] @ [facility]   [facility] @ [facility]    [facility] posting 5 avail beds, low acuity only. [phone]. Pt not appropriate.   [facility] @ cap   [facility]   [facility]   [facility] posting 10 avail beds. [phone]. Per call to Pham, they are at capacity this evening.   [facility] @ cap      Pt remains on work list until appropriate placement is available

## 2022-06-19 NOTE — ED NOTES
10:41 AM discussed with the extended care person at [facility] who reviewed the chart and declined to do a repeat evaluation that was requested by the patient as it would not change the current plan.  I feel the same way and the back  that was contacted agreed with this.  I ordered a diet for her at her request and she will be informed by extended care that there will be no change in her status.     Jani Grace MD  06/19/22 1049

## 2022-06-19 NOTE — PROGRESS NOTES
Patient was awakened by staff for a short time, as patient was sliding of cart, writer and 1:1 entered room and asked patient to slide up in bed/cart for safety. Patient easily frustrated and asked when she could go home, writer informed patient that she needed to stay in the hospital. Unable to obtain covid swab at this time. Patient also denied the need to give a urine sample at this time. 1:1 outside room, one side rail of cart in up position. Will continue to monitor patient/safety.

## 2022-06-20 ENCOUNTER — TELEPHONE (OUTPATIENT)
Dept: BEHAVIORAL HEALTH | Facility: CLINIC | Age: 37
End: 2022-06-20
Payer: COMMERCIAL

## 2022-06-20 ENCOUNTER — TELEPHONE (OUTPATIENT)
Dept: BEHAVIORAL HEALTH | Facility: CLINIC | Age: 37
End: 2022-06-20

## 2022-06-20 ENCOUNTER — MYC REFILL (OUTPATIENT)
Dept: ORTHOPEDICS | Facility: CLINIC | Age: 37
End: 2022-06-20
Payer: COMMERCIAL

## 2022-06-20 VITALS
OXYGEN SATURATION: 100 % | RESPIRATION RATE: 18 BRPM | HEART RATE: 75 BPM | SYSTOLIC BLOOD PRESSURE: 114 MMHG | DIASTOLIC BLOOD PRESSURE: 80 MMHG | TEMPERATURE: 97.4 F

## 2022-06-20 DIAGNOSIS — F34.1 DYSTHYMIA: ICD-10-CM

## 2022-06-20 DIAGNOSIS — F41.9 ANXIETY: Primary | ICD-10-CM

## 2022-06-20 DIAGNOSIS — R20.0 LEFT LEG NUMBNESS: ICD-10-CM

## 2022-06-20 LAB — HCG UR QL: NEGATIVE

## 2022-06-20 PROCEDURE — 250N000013 HC RX MED GY IP 250 OP 250 PS 637: Performed by: EMERGENCY MEDICINE

## 2022-06-20 PROCEDURE — 99204 OFFICE O/P NEW MOD 45 MIN: CPT | Mod: GC | Performed by: PSYCHIATRY & NEUROLOGY

## 2022-06-20 RX ORDER — OLANZAPINE 5 MG/1
5 TABLET ORAL 2 TIMES DAILY
Qty: 15 TABLET | Refills: 0 | Status: SHIPPED | OUTPATIENT
Start: 2022-06-20 | End: 2022-07-06

## 2022-06-20 RX ORDER — OLANZAPINE 5 MG/1
5 TABLET ORAL 2 TIMES DAILY
Status: DISCONTINUED | OUTPATIENT
Start: 2022-06-20 | End: 2022-06-20 | Stop reason: HOSPADM

## 2022-06-20 RX ADMIN — OLANZAPINE 5 MG: 5 TABLET, FILM COATED ORAL at 11:38
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
## 2022-06-20 NOTE — CONSULTS
Request for mental health assessment received via [employer].     Briefly looked at chart. Extended care is following the patient. [phone] and will be in touch.

## 2022-06-20 NOTE — TELEPHONE ENCOUNTER
R:      No appropriate beds currently available within the  system. Bed search update @ 02:58        [facility]: @ cap per website      [facility]:@ cap per website      [facility]: @ cap per website      [facility]: @ cap per website      [facility]: @ cap per website      [facility]: @ cap per website      [facility]: @ cap per website      [facility]: @ cap per website      [facility]: @ cap per website      [facility]: @ cap per website      [facility]: @ cap per website      [facility]: @ cap per website  [facility]: @ cap per website      [facility]: Posting 3 beds. Low acuity only. Per Mariana @ 03:02 they require salicylate and acetaminophen levels and are only taking very low acuity at this time - pt not appropriate for beds available       [facility]: @ cap per website      [facility]: Posting 1 bed. No hx of aggression/violence/sexual offenders. Voluntary only. Pt is on a 72 emergency medical hold - meets exclusionary [facility]: Posting 5 beds. Low acuity. Must have the cognitive ability to do programming. No aggressive or violent behavior or recent HX in the last 2 yrs. MH must be primary. Called @ 03:06 and Leticia is able to review but requires a pregnancy test.    [facility]: @ cap per website      [facility]  [facility]: @ cap per website  [facility]: Posting 2 beds. Called @ 02:55, however received a busy tone after 3+ minutes of ringing and was unable to leave . Per Mariana @ 04:58, they do not have adults beds available      [facility]: Posting 3 beds. Per Margo @ 02:58 they do not have an appropriate bed available tonight          Called ED @ 03:17 re: collection of HCG for [facility] to review    HCG not ordered nor collected as of 04:54. Called [facility] to provide update and Leticia reports to call them back once it  results to see if they can still review.     Pt remains on work list until appropriate placement is available

## 2022-06-20 NOTE — DISCHARGE INSTRUCTIONS
Follow up with your therapist and medication management providers as planned.  Take Zyprexa as directed.      Aftercare Plan      If I am feeling unsafe or I am in a crisis, I will:   Contact my established care providers   Call the National Suicide Prevention Lifeline: 795.635.6484   Go to the nearest emergency room   Call 320      Warning signs that I or other people might notice when a crisis is developing for me: isolating self, crying a lot, suicidal thoughts     Things I am able to do on my own to cope or help me feel better: take my medications,sleepm reach out to others     Things that I am able to do with others to cope or help me better: reach out to family, friends, therapist      Things I can use or do for distraction: watch tv, shower/bathe      Changes I can make to support my mental health and wellness: refrain from using drugs, consistently take prescribed medications, and follow up with outpatient providers      People in my life that I can ask for help: mom, dad, friend who lives nearby, and therapist.      Your [facility] has a mental health crisis team you can call 24/7: [facility]  [phone]      Additional resources and information:      Reduce Extreme Emotion  QUICKLY:  Changing Your Body Chemistry      T:  Change your body Temperature to change your autonomic nervous system   Use Ice Water to calm yourself down FAST   Put your face in a bowl of ice water (this is the best way; have the person keep his/her face in ice water for 30-45 seconds - initial research is showing that the longer s/he can hold her/his face in the water, the better the response), or   Splash ice water on your face, or hold an ice pack on your face      I:  Intensely exercise to calm down a body revved up by emotion   Examples: running, walking fast, jumping, playing basketball, weight lifting, swimming, calisthenics, etc.   Engage in exercises that DO NOT include violent behaviors. Exercises that utilize  "violent behaviors tend to function as  behavioral rehearsal,  and rather than calming the person down, may actually  rev  the person up more, increasing the likelihood of violence, and lessening the likelihood that they will  burn off  energy     P:  Progressively relax your muscles   Starting with your hands, moving to your forearms, upper arms, shoulders, neck, forehead, eyes, cheeks and lips, tongue and teeth, chest, upper back, stomach, buttocks, thighs, calves, ankles, feet   Tense (10 seconds,   of the way), then relax each muscle (all the way)   Notice the tension   Notice the difference when relaxed (by tensing first, and then relaxing, you are able to get a more thorough relaxation than by simply relaxing)      P: Paced breathing to relax   The standard technique is to begin with counting the number of steps one takes for a typical inhale, then counting the steps one takes for a typical exhale, and then lengthening the amount of steps for the exhalation by one or two steps.  OR  Repeat this pattern for 1-2 minutes  Inhale for four (4) seconds   Exhale for six (6) to eight (8) seconds   Research demonstrated that one can change one's overall level of anxiety by doing this exercise for even a few minutes per day         Crisis Lines  Crisis Text Line  Text 333614  You will be connected with a trained live crisis counselor to provide support.     National Hope Line  1.800.SUICIDE [6361049]        Community Resources  Fast Tracker  Linking people to mental health and substance use disorder resources  fasttrackermn.org      [place] Mental Health Warm Line  Peer to peer support  Monday thru Saturday, 12 pm to 10 pm  047.695.6645 or 0.507.249.5382  Text \"Support\" to 69565     National [place] on Mental Illness (TOMASZ)  977.569.9845 or 1.888.TOMASZ.HELPS           Mental Health Apps  My3  https://myVesta [employer] Equipmentpp.[email or web address]/     VirtualHopeBox  [email or web address]/apps/virtual-hope-box/                     "

## 2022-06-20 NOTE — TELEPHONE ENCOUNTER
R:  11:15am  Call from [place] ED from Dr. Medley. He met w/ patient and the plan is for patient to discontinue home w/ family this evening if she continues to improve/stabilize.  Work list updated.

## 2022-06-20 NOTE — TELEPHONE ENCOUNTER
R: The pt is currently in the [place] ER awaiting placement.     8:14a Per chart review, pregnancy test order is still needed for bed review. Intake awaiting labs.The pt remains on the waitlist. Intake continues to identify appropriate bed placement.    8:36a Intake called [place] [facility] RN to request additional labs be ordered for bed placement. Intake awaiting lab result for bed review.
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
## 2022-06-20 NOTE — PLAN OF CARE
"  Problem: Plan of Care - These are the overarching goals to be used throughout the patient stay.    Goal: Plan of Care Review/Shift Note  Description: The Plan of Care Review/Shift note should be completed every shift.  The Outcome Evaluation is a brief statement about your assessment that the patient is improving, declining, or no change.  This information will be displayed automatically on your shift note.  Outcome: Ongoing, Progressing  Goal: Patient-Specific Goal (Individualized)  Description: You can add care plan individualizations to a care plan. Examples of Individualization might be:  \"Parent requests to be called daily at 9am for status\", \"I have a hard time hearing out of my right ear\", or \"Do not touch me to wake me up as it startles me\".  Outcome: Ongoing, Progressing  Goal: Absence of Hospital-Acquired Illness or Injury  Outcome: Ongoing, Progressing  Intervention: Identify and Manage Fall Risk  Recent Flowsheet Documentation  Taken 6/19/2022 2000 by Brian Mills RN  Safety Promotion/Fall Prevention:   sitter at bedside   patient video monitoring  Intervention: Prevent Skin Injury  Recent Flowsheet Documentation  Taken 6/20/2022 0000 by Brian Mills RN  Body Position: position changed independently  Taken 6/19/2022 2000 by Brian Mills RN  Body Position: position changed independently  Intervention: Prevent and Manage VTE (Venous Thromboembolism) Risk  Recent Flowsheet Documentation  Taken 6/20/2022 0000 by Brian Mills RN  Activity Management: activity adjusted per tolerance  Taken 6/19/2022 2100 by Brian Mills RN  Activity Management: activity adjusted per tolerance  Taken 6/19/2022 2000 by Brian Mills RN  Activity Management: activity adjusted per tolerance  Goal: Optimal Comfort and Wellbeing  Outcome: Ongoing, Progressing  Goal: Readiness for Transition of Care  Outcome: Ongoing, Progressing   Goal Outcome Evaluation:                      "

## 2022-06-20 NOTE — PROCEDURES
1900  Assumed care of pt-pt is in hallway, demanding food, anti anxiety meds-food provided  1945  Meds ordered by MD  2000  zyprexa given, pt is acting aggressive and demanding  2200  Pt is observed sleeping  0000  Sleeping  0200  Sleeping  0400  Sleeping  0600  Sleeping
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
## 2022-06-21 RX ORDER — GABAPENTIN 100 MG/1
100 CAPSULE ORAL 3 TIMES DAILY
Qty: 60 CAPSULE | Refills: 0 | Status: SHIPPED | OUTPATIENT
Start: 2022-06-21 | End: 2022-08-15

## 2022-06-27 ASSESSMENT — ANXIETY QUESTIONNAIRES
4. TROUBLE RELAXING: NOT AT ALL
6. BECOMING EASILY ANNOYED OR IRRITABLE: SEVERAL DAYS
GAD7 TOTAL SCORE: 5
1. FEELING NERVOUS, ANXIOUS, OR ON EDGE: SEVERAL DAYS
3. WORRYING TOO MUCH ABOUT DIFFERENT THINGS: SEVERAL DAYS
7. FEELING AFRAID AS IF SOMETHING AWFUL MIGHT HAPPEN: SEVERAL DAYS
2. NOT BEING ABLE TO STOP OR CONTROL WORRYING: NOT AT ALL
5. BEING SO RESTLESS THAT IT IS HARD TO SIT STILL: SEVERAL DAYS

## 2022-06-27 ASSESSMENT — PATIENT HEALTH QUESTIONNAIRE - PHQ9: SUM OF ALL RESPONSES TO PHQ QUESTIONS 1-9: 6

## 2022-07-06 ENCOUNTER — VIRTUAL VISIT (OUTPATIENT)
Dept: FAMILY MEDICINE | Facility: CLINIC | Age: 37
End: 2022-07-06
Payer: COMMERCIAL

## 2022-07-06 DIAGNOSIS — R45.86 MOOD CHANGES: Primary | ICD-10-CM

## 2022-07-06 PROCEDURE — 96127 BRIEF EMOTIONAL/BEHAV ASSMT: CPT | Mod: 95 | Performed by: NURSE PRACTITIONER

## 2022-07-06 PROCEDURE — 99213 OFFICE O/P EST LOW 20 MIN: CPT | Mod: TEL | Performed by: NURSE PRACTITIONER

## 2022-07-06 RX ORDER — OLANZAPINE 5 MG/1
5 TABLET ORAL AT BEDTIME
Qty: 30 TABLET | Refills: 1 | Status: SHIPPED | OUTPATIENT
Start: 2022-07-06 | End: 2022-08-23

## 2022-07-06 ASSESSMENT — ENCOUNTER SYMPTOMS
NERVOUS/ANXIOUS: 1
DYSPHORIC MOOD: 1
SLEEP DISTURBANCE: 1

## 2022-07-06 ASSESSMENT — ANXIETY QUESTIONNAIRES
GAD7 TOTAL SCORE: 5
2. NOT BEING ABLE TO STOP OR CONTROL WORRYING: NOT AT ALL
GAD7 TOTAL SCORE: 5
6. BECOMING EASILY ANNOYED OR IRRITABLE: SEVERAL DAYS
3. WORRYING TOO MUCH ABOUT DIFFERENT THINGS: SEVERAL DAYS
1. FEELING NERVOUS, ANXIOUS, OR ON EDGE: SEVERAL DAYS
8. IF YOU CHECKED OFF ANY PROBLEMS, HOW DIFFICULT HAVE THESE MADE IT FOR YOU TO DO YOUR WORK, TAKE CARE OF THINGS AT HOME, OR GET ALONG WITH OTHER PEOPLE?: SOMEWHAT DIFFICULT
4. TROUBLE RELAXING: NOT AT ALL
GAD7 TOTAL SCORE: 5
7. FEELING AFRAID AS IF SOMETHING AWFUL MIGHT HAPPEN: SEVERAL DAYS
7. FEELING AFRAID AS IF SOMETHING AWFUL MIGHT HAPPEN: SEVERAL DAYS
5. BEING SO RESTLESS THAT IT IS HARD TO SIT STILL: SEVERAL DAYS

## 2022-07-06 ASSESSMENT — PATIENT HEALTH QUESTIONNAIRE - PHQ9
SUM OF ALL RESPONSES TO PHQ QUESTIONS 1-9: 6
10. IF YOU CHECKED OFF ANY PROBLEMS, HOW DIFFICULT HAVE THESE PROBLEMS MADE IT FOR YOU TO DO YOUR WORK, TAKE CARE OF THINGS AT HOME, OR GET ALONG WITH OTHER PEOPLE: SOMEWHAT DIFFICULT
SUM OF ALL RESPONSES TO PHQ QUESTIONS 1-9: 6
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
## 2022-08-15 DIAGNOSIS — R20.0 LEFT LEG NUMBNESS: ICD-10-CM

## 2022-08-15 RX ORDER — GABAPENTIN 100 MG/1
100 CAPSULE ORAL 3 TIMES DAILY
Qty: 60 CAPSULE | Refills: 0 | Status: SHIPPED | OUTPATIENT
Start: 2022-08-15 | End: 2022-12-01

## 2022-08-23 ENCOUNTER — VIRTUAL VISIT (OUTPATIENT)
Dept: BEHAVIORAL HEALTH | Facility: CLINIC | Age: 37
End: 2022-08-23
Payer: COMMERCIAL

## 2022-08-23 ENCOUNTER — VIRTUAL VISIT (OUTPATIENT)
Dept: PSYCHIATRY | Facility: CLINIC | Age: 37
End: 2022-08-23
Payer: COMMERCIAL

## 2022-08-23 DIAGNOSIS — F43.10 PTSD (POST-TRAUMATIC STRESS DISORDER): ICD-10-CM

## 2022-08-23 DIAGNOSIS — F39 MOOD DISORDER (H): Primary | ICD-10-CM

## 2022-08-23 DIAGNOSIS — F41.1 GAD (GENERALIZED ANXIETY DISORDER): ICD-10-CM

## 2022-08-23 DIAGNOSIS — F41.0 PANIC DISORDER WITHOUT AGORAPHOBIA: ICD-10-CM

## 2022-08-23 DIAGNOSIS — F15.11 METHAMPHETAMINE ABUSE IN REMISSION (H): ICD-10-CM

## 2022-08-23 PROCEDURE — 90791 PSYCH DIAGNOSTIC EVALUATION: CPT | Mod: GT | Performed by: MARRIAGE & FAMILY THERAPIST

## 2022-08-23 PROCEDURE — 99214 OFFICE O/P EST MOD 30 MIN: CPT | Mod: GT | Performed by: PSYCHIATRY & NEUROLOGY

## 2022-08-23 RX ORDER — LAMOTRIGINE 25 MG/1
TABLET ORAL
Qty: 42 TABLET | Refills: 0 | Status: SHIPPED | OUTPATIENT
Start: 2022-08-23 | End: 2022-12-01

## 2022-08-23 RX ORDER — LAMOTRIGINE 100 MG/1
100 TABLET ORAL DAILY
Qty: 30 TABLET | Refills: 0 | Status: SHIPPED | OUTPATIENT
Start: 2022-09-20 | End: 2022-10-10

## 2022-08-23 RX ORDER — OLANZAPINE 10 MG/1
5-10 TABLET ORAL DAILY PRN
Qty: 90 TABLET | Refills: 1 | Status: SHIPPED | OUTPATIENT
Start: 2022-08-23 | End: 2022-10-10

## 2022-08-23 ASSESSMENT — ANXIETY QUESTIONNAIRES
2. NOT BEING ABLE TO STOP OR CONTROL WORRYING: MORE THAN HALF THE DAYS
7. FEELING AFRAID AS IF SOMETHING AWFUL MIGHT HAPPEN: NOT AT ALL
7. FEELING AFRAID AS IF SOMETHING AWFUL MIGHT HAPPEN: NOT AT ALL
IF YOU CHECKED OFF ANY PROBLEMS ON THIS QUESTIONNAIRE, HOW DIFFICULT HAVE THESE PROBLEMS MADE IT FOR YOU TO DO YOUR WORK, TAKE CARE OF THINGS AT HOME, OR GET ALONG WITH OTHER PEOPLE: SOMEWHAT DIFFICULT
1. FEELING NERVOUS, ANXIOUS, OR ON EDGE: MORE THAN HALF THE DAYS
3. WORRYING TOO MUCH ABOUT DIFFERENT THINGS: MORE THAN HALF THE DAYS
8. IF YOU CHECKED OFF ANY PROBLEMS, HOW DIFFICULT HAVE THESE MADE IT FOR YOU TO DO YOUR WORK, TAKE CARE OF THINGS AT HOME, OR GET ALONG WITH OTHER PEOPLE?: SOMEWHAT DIFFICULT
6. BECOMING EASILY ANNOYED OR IRRITABLE: SEVERAL DAYS
GAD7 TOTAL SCORE: 9
GAD7 TOTAL SCORE: 9
4. TROUBLE RELAXING: SEVERAL DAYS
GAD7 TOTAL SCORE: 9
5. BEING SO RESTLESS THAT IT IS HARD TO SIT STILL: SEVERAL DAYS

## 2022-08-23 ASSESSMENT — COLUMBIA-SUICIDE SEVERITY RATING SCALE - C-SSRS
ATTEMPT LIFETIME: NO
TOTAL  NUMBER OF INTERRUPTED ATTEMPTS LIFETIME: NO
1. HAVE YOU WISHED YOU WERE DEAD OR WISHED YOU COULD GO TO SLEEP AND NOT WAKE UP?: NO
6. HAVE YOU EVER DONE ANYTHING, STARTED TO DO ANYTHING, OR PREPARED TO DO ANYTHING TO END YOUR LIFE?: NO
2. HAVE YOU ACTUALLY HAD ANY THOUGHTS OF KILLING YOURSELF?: NO
TOTAL  NUMBER OF ABORTED OR SELF INTERRUPTED ATTEMPTS LIFETIME: NO

## 2022-08-23 ASSESSMENT — PATIENT HEALTH QUESTIONNAIRE - PHQ9
SUM OF ALL RESPONSES TO PHQ QUESTIONS 1-9: 4
10. IF YOU CHECKED OFF ANY PROBLEMS, HOW DIFFICULT HAVE THESE PROBLEMS MADE IT FOR YOU TO DO YOUR WORK, TAKE CARE OF THINGS AT HOME, OR GET ALONG WITH OTHER PEOPLE: SOMEWHAT DIFFICULT
10. IF YOU CHECKED OFF ANY PROBLEMS, HOW DIFFICULT HAVE THESE PROBLEMS MADE IT FOR YOU TO DO YOUR WORK, TAKE CARE OF THINGS AT HOME, OR GET ALONG WITH OTHER PEOPLE: SOMEWHAT DIFFICULT

## 2022-08-23 NOTE — PROGRESS NOTES
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 appointment in about six weeks.      Start olanzapine/Zyprexa 5- 10 mg daily as needed for anxiety, sleep, agitation.     Continue alprazolam/Xanax 0.5 mg twice daily as needed for anxiety.  Do not take with alcohol.  Continue to use sparingly.    Continue trazodone  mg at bedtime as needed for sleep.    Continue therapy as planned with Karina Malin at [facility]    Continue all other cares per primary care provider.     Continue all other medications as reviewed per electronic medical record today.     Safety plan reviewed. To the Emergency Department as needed or call after hours crisis line at [phone] or [phone]. [place] Crisis Text Line: Text [place] to 261451  or  Suicide LifeLine Chat: suicidepreventionlifeline.org/chat    Schedule an appointment with me in 6 weeks or sooner as needed.  Call [facility] at [phone] to schedule.    Follow up with primary care provider as planned or sooner if needed for acute medical concerns.    Call the psychiatric nurse line with medication questions or concerns at [phone].    kabukuhart may be used to communicate with your provider, but this is not intended to be used  for emergencies.    Patient Education:  Discussed Lamictal (lamotrigine) can cause serious rashes including Park-Yoshi syndrome which may requiring hospitalization and discontinuation of treatment. If any signs of a rash occur, please see your Primary Care Provider or a dermatologist immediately.     Community Resources:    National Suicide Prevention Lifeline: 420.147.5556 (TTY: 431.205.5510). Call anytime for help.  (www.suicidepreventionlifeline.org)  National [place] on Mental Illness (www.eli.org): 928.859.1813 or 638-828-6696.   Mental Health Association (www.mentalhealth.org): 689.522.1559 or 671-984-5879.  [place] Crisis Text Line: Text [place] to 355767  Suicide LifeLine Chat: suicideProbe Scientific.org/chat    Administrative Billing:   Phone Call/Video Duration: 36 Minutes  Start: 2:04p  Stop: 2:40p    Patient Status:  Patient will continue to be seen for ongoing consultation and stabilization.    Signed:   Sarah Purcell DO  [facility]    Disclaimer: This note consists of symbols derived from keyboarding, dictation and/or voice recognition software. As a result, there may be errors in the script that have gone undetected. Please consider this when interpreting information found in this chart.

## 2022-08-23 NOTE — PROGRESS NOTES
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 Stress Disorder (includes Posttraumatic Stress Disorder for Children 6 Years and Younger)  With dissociative symptoms.  4. Other Diagnoses that is relevant to services: n/a  5. Provisional Diagnosis: . ADHD vs Bipolar and PTSD  6. Prognosis: Return to Normal Functioning.  7. Likely consequences of symptoms if not treated: worsening of symtpms.  8. Client strengths include:  committed to sobriety, empathetic, good listener, has a previous history of therapy, motivated, support of family, friends and providers and supportive .     Recommendations:     1. Plan for Safety and Risk Management:   Safety and Risk: Recommended that patient call 911 or go to the local ED should there be a change in any of these risk factors..          Report to child / adult protection services was NA.     2. Patient's identified mental health concerns with a cultural influence will be addressed by TBD.     3. Initial Treatment will focus on:    Anxiety - increase coping skills  Relational Problems related to: Conflict or difficulties with partner/spouse  Mood Instability - increase coping skills.     4. Resources/Service Plan:    services are not indicated.   Modifications to assist communication are not  indicated.   Additional disability accommodations are not indicated.      5. Collaboration:   Collaboration / coordination of treatment will be initiated with the following  support professionals: psychiatry.      6.  Referrals:   The following referral(s) will be initiated: TBD. Next Scheduled Appointment: TBD.     A Release of Information has been obtained for the following: n/a.     Emergency Contact  was not obtained.     7. FRANNY:    FRANNY:  Discussed the general effects of drugs and alcohol on health and well-being. Provider gave patient printed information about the effects of chemical use on their health and well being. Recommendations:  N/a .     8. Records:   These were reviewed at time of assessment.   Information in this assessment was obtained from the medical record and  provided by patient who is a fair historian.    Patient will have open access to their mental health medical record.        Provider Name/ Credentials:  Cleo MACIEL  August 23, 2022

## 2022-08-23 NOTE — Clinical Note
Please call this patient to get them scheduled for a follow-up visit in 6 weeks. Please schedule with me and the [facility]. Thanks!

## 2022-08-26 NOTE — PATIENT INSTRUCTIONS
Treatment Plan:  Start Lamotrigine: take 25 mg daily for two weeks then increase to 50 mg daily for two weeks then increase to 100 mg until follow-up appointment in about six weeks.    Start olanzapine/Zyprexa 5- 10 mg daily as needed for anxiety, sleep, agitation.   Continue alprazolam/Xanax 0.5 mg twice daily as needed for anxiety.  Do not take with alcohol.  Continue to use sparingly.  Continue trazodone  mg at bedtime as needed for sleep.  Continue therapy as planned with Karina Malin at [facility]  Continue all other cares per primary care provider.   Continue all other medications as reviewed per electronic medical record today.   Safety plan reviewed. To the Emergency Department as needed or call after hours crisis line at [phone] or [phone]. [place] Crisis Text Line: Text [place] to 929099  or  Suicide LifeLine Chat: suicidepreventionlifeline.org/chat  Schedule an appointment with me in 6 weeks or sooner as needed.  Call [facility] at [phone] to schedule.  Follow up with primary care provider as planned or sooner if needed for acute medical concerns.  Call the psychiatric nurse line with medication questions or concerns at [phone].  [place] may be used to communicate with your provider, but this is not intended to be used for emergencies.    Patient Education:  Discussed Lamictal (lamotrigine) can cause serious rashes including Park-Yoshi syndrome which may requiring hospitalization and discontinuation of treatment. If any signs of a rash occur, please see your Primary Care Provider or a dermatologist immediately.     Community Resources:    National Suicide Prevention Lifeline: 711.562.1368 (TTY: 521.417.1891). Call anytime for help.  (www.suicidepreventionlifeline.org)  National [place] on Mental Illness (www.eli.org): 629.972.2721 or 876-200-5349.   Mental Health Association (www.mentalhealth.org): 275.444.8385 or 629-607-9564.  [place]  Crisis Text Line: Text [place] to 192032  Suicide LifeLine Chat: suicidepreventionlifeline.org/chat

## 2022-10-10 ENCOUNTER — VIRTUAL VISIT (OUTPATIENT)
Dept: PSYCHIATRY | Facility: CLINIC | Age: 37
End: 2022-10-10
Payer: COMMERCIAL

## 2022-10-10 ENCOUNTER — VIRTUAL VISIT (OUTPATIENT)
Dept: BEHAVIORAL HEALTH | Facility: CLINIC | Age: 37
End: 2022-10-10
Payer: COMMERCIAL

## 2022-10-10 DIAGNOSIS — F43.10 PTSD (POST-TRAUMATIC STRESS DISORDER): ICD-10-CM

## 2022-10-10 DIAGNOSIS — F41.1 GAD (GENERALIZED ANXIETY DISORDER): ICD-10-CM

## 2022-10-10 DIAGNOSIS — F15.11 METHAMPHETAMINE ABUSE IN REMISSION (H): ICD-10-CM

## 2022-10-10 DIAGNOSIS — F39 MOOD DISORDER (H): Primary | ICD-10-CM

## 2022-10-10 DIAGNOSIS — G47.00 INSOMNIA, UNSPECIFIED TYPE: ICD-10-CM

## 2022-10-10 PROCEDURE — 99214 OFFICE O/P EST MOD 30 MIN: CPT | Mod: GT | Performed by: PSYCHIATRY & NEUROLOGY

## 2022-10-10 PROCEDURE — 90832 PSYTX W PT 30 MINUTES: CPT | Mod: GT | Performed by: PSYCHOLOGIST

## 2022-10-10 RX ORDER — LAMOTRIGINE 100 MG/1
TABLET ORAL
Qty: 60 TABLET | Refills: 1 | Status: SHIPPED | OUTPATIENT
Start: 2022-10-10 | End: 2022-10-10

## 2022-10-10 RX ORDER — DIAZEPAM 10 MG
5-10 TABLET ORAL EVERY 6 HOURS PRN
Qty: 30 TABLET | Refills: 0 | Status: SHIPPED | OUTPATIENT
Start: 2022-10-10 | End: 2022-11-12

## 2022-10-10 RX ORDER — OLANZAPINE 2.5 MG/1
2.5 TABLET, FILM COATED ORAL DAILY PRN
Qty: 90 TABLET | Refills: 0 | Status: SHIPPED | OUTPATIENT
Start: 2022-10-10 | End: 2023-11-30

## 2022-10-10 RX ORDER — LAMOTRIGINE 100 MG/1
TABLET ORAL
Qty: 60 TABLET | Refills: 1 | Status: SHIPPED | OUTPATIENT
Start: 2022-10-10 | End: 2022-12-01

## 2022-10-10 ASSESSMENT — PATIENT HEALTH QUESTIONNAIRE - PHQ9
10. IF YOU CHECKED OFF ANY PROBLEMS, HOW DIFFICULT HAVE THESE PROBLEMS MADE IT FOR YOU TO DO YOUR WORK, TAKE CARE OF THINGS AT HOME, OR GET ALONG WITH OTHER PEOPLE: SOMEWHAT DIFFICULT
SUM OF ALL RESPONSES TO PHQ QUESTIONS 1-9: 7
SUM OF ALL RESPONSES TO PHQ QUESTIONS 1-9: 7

## 2022-10-10 NOTE — PATIENT INSTRUCTIONS
Treatment Plan:  Continue olanzapine/Zyprexa at 2.5-5 mg daily as needed for anxiety, sleep, agitation.  Increase lamotrigine to 150 mg daily for 2 weeks and then increase to 200 mg daily for mood (can take as split dose 100 mg twice daily if preferred).  Discontinue Xanax/alprazolam  Start Valium/diazepam 5-10 mg every 6 hours as needed for severe anxiety/panic symptoms.  Continue to use very sparingly as you have been doing with your Xanax.  Continue all other cares per primary care provider.   Continue all other medications as reviewed per electronic medical record today.   Safety plan reviewed. To the Emergency Department as needed or call after hours crisis line at [phone] or [phone]. [place] Crisis Text Line. Text [place] to 965598 or Suicide LifeLine Chat: suicidepreventionlifeline.org/chat  Continue therapy as planned.   Schedule an appointment with me in 6 weeks or sooner as needed. Call [facility] at [phone] to schedule.  Follow up with primary care provider as planned or for acute medical concerns.  Call the psychiatric nurse line with medication questions or concerns at [phone].  [place] may be used to communicate with your provider, but this is not intended to be used for emergencies.    Risks of benzodiazepine (Ativan, Xanax, Klonopin, Valium, etc) use including, but not limited to, sedation, tolerance, risk for addiction/dependence. Do not drink alcohol while taking benzodiazepines due to risk of trouble breathing and potential death. Do not drive or operate heavy machinery until it is known how the drug affects you. Discuss with physician or pharmacist before ever taking a benzodiazepine with a narcotic/opioid pain medication.     Have previously discussed Lamictal (lamotrigine) can cause serious rashes including Park-Yoshi syndrome which may requiring hospitalization and discontinuation of treatment. If any signs of a rash occur, please see your Primary Care  Provider or a dermatologist immediately.

## 2022-10-10 NOTE — Clinical Note
Please call this patient to get them scheduled for a follow-up visit in 6 weeks. Please schedule with me and the [facility]. Thanks!
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
## 2022-10-10 NOTE — PROGRESS NOTES
"Jennifer Voss is a 37 year old year old who is being evaluated via a billable video visit.      How would you like to obtain your AVS? MyChart  If you are dropped from the video visit, the video invite should be resent to: Text to cell phone: see Epic  Will anyone else be joining your video visit? No       Telemedicine Visit: The patient's condition can be safely assessed and treated via synchronous audio and visual telemedicine encounter.      Reason for Telemedicine Visit: Covid-19 Pandemic    Originating Site (Patient Location): Patient's home     Distant Site (Provider Location): Provider Remote Setting    Mode of Communication:  Video Conference via [place]    As the provider I attest to compliance with applicable laws and regulations related to telemedicine.         Outpatient Psychiatric Progress Note    Name: Jennifer Voss   : 1985                    Primary Care Provider: GISEL Johnston CNP   Therapist: Karina Malin at [facility]     PHQ-9 scores:  PHQ-9 SCORE 2022 2022 10/10/2022   PHQ-9 Total Score - - -   PHQ-9 Total Score MyChart - 4 (Minimal depression) 7 (Mild depression)   PHQ-9 Total Score 4 4 7       RACH-7 scores:  RACH-7 SCORE 2022   Total Score - - -   Total Score 5 (mild anxiety) 5 (mild anxiety) 9 (mild anxiety)   Total Score 5 5 9       Patient Identification:  Patient is a 37 year old, single  White Not  or  female  who presents for return visit with me.  Patient is currently employed full time. Patient attended the phone/video session alone. Patient prefers to be called: \"Jennifer\".    Interim History:  I last saw Jennifer Voss for outpatient psychiatry Consultation on 2022. During that appointment, we:      Start Lamotrigine: take 25 mg daily for two weeks then increase to 50 mg daily for two weeks then increase to 100 mg until follow-up appointment in about six weeks.      Start " "olanzapine/Zyprexa 5- 10 mg daily as needed for anxiety, sleep, agitation.     Continue alprazolam/Xanax 0.5 mg twice daily as needed for anxiety.  Do not take with alcohol.  Continue to use sparingly.    Continue trazodone  mg at bedtime as needed for sleep.    Continue therapy as planned with Karina Malin at [facility]    10/10: Pt overall movement of symptoms on lamotrigine and olanzapine.  Tolerating well with no negative side effects.  Continues to have some challenging responses if triggered by things reminding her of her ex, or even seeing her ex.  She has overall been managing this okay.  [place].  Would like to transition off of Xanax and trial Valium.  She heard that Valium is better for people with substance use histories.  No acute safety concerns.  No SI.  No problematic drug or alcohol use.    Per [facility], Dr. Poncho King, during today's team-based visit:  Reported that she feels things are getting better. Her mood is better, the brain fog has \"thinned\" but not fully lifted, and she does not think about her ex as often. Her sleep has been better as well. She did notice that she got diarrhea if she missed a day or two of the medications. She would like to discuss switching from xanax to valium. She added \"I have a valid reason for it.\" We discussed trauma treatment approaches and additional information will be sent via [employer].    Past Psychiatric Med Trials:  Xanax 0.5 mg BID PRN - very rare use (Rx for 30 tabs filled in March)  Trazodone  mg at bedtime for sleep  Olanzapine 5 mg at bedtime     Past Psych Meds:  paxil  wellbutrin xl  Ativan    Psychiatric ROS:  Jennifer Voss reports mood has been: improved  Anxiety has been: improved  Sleep has been: improved  Nataliia sxs: none  Psychosis sxs: none  ADHD/ADD sxs: improving  PTSD sxs: improving  PHQ9 and GAD7 scores were reviewed today if completed.   Medication side effects: " Denies  Current stressors include: Symptoms and see HPI above  Coping mechanisms and supports include: Family, Hobbies and Friends    Current medications include:   Current Outpatient Medications   Medication Sig     albuterol (PROAIR HFA/PROVENTIL HFA/VENTOLIN HFA) 108 (90 Base) MCG/ACT inhaler INHALE 2 PUFFS BY MOUTH EVERY 6 HOURS AS NEEDED FOR SHORTNESS OF BREATH /DYSPNEA  OR  WHEEZING.     ALPRAZolam (XANAX) 0.5 MG tablet TAKE 1 TABLET BY MOUTH TWICE DAILY AS NEEDED FOR ANXIETY .  DO  NOT  TAKE  WITH  ALCOHOL.     cyclobenzaprine (FLEXERIL) 10 MG tablet Take 1 tablet by mouth three times daily as needed for muscle spasm     gabapentin (NEURONTIN) 100 MG capsule Take 1 capsule (100 mg) by mouth 3 times daily     HYDROcodone-acetaminophen (NORCO) 5-325 MG tablet Take 1 tablet by mouth nightly as needed for moderate to severe pain or severe pain     ibuprofen (ADVIL/MOTRIN) 800 MG tablet Take 1 tablet (800 mg) by mouth every 6 hours as needed for other (mild and/or inflammatory pain)     lamoTRIgine (LAMICTAL) 100 MG tablet Take 1 tablet (100 mg) by mouth daily     lamoTRIgine (LAMICTAL) 25 MG tablet Take 25 mg by mouth daily for 2 weeks then 50 mg daily for 2 weeks then 100 mg daily.     norethindrone (MICRONOR) 0.35 MG tablet Take 1 tablet (0.35 mg) by mouth daily     OLANZapine (ZYPREXA) 10 MG tablet Take 0.5-1 tablets (5-10 mg) by mouth daily as needed (anxiety, sleep)     senna-docusate (SENOKOT-S/PERICOLACE) 8.6-50 MG tablet Take 1-2 tablets by mouth 2 times daily     traZODone (DESYREL) 50 MG tablet Take 1-2 tabs nightly as needed for sleep.     No current facility-administered medications for this visit.       The [place] Prescription Monitoring Program has been reviewed and there are no concerns about diversionary activity for controlled substances at this time.   08/15/2022  1   08/15/2022  Gabapentin 100 MG Capsule  60.00  20 Na Hen   [id number]   [place] ([id number])   0/0   [place]   06/21/2022  1    06/21/2022  Gabapentin 100 MG Capsule  60.00  20 Na Hen   [id number]   [place] ([id number][place]   [place]   04/11/2022  1   04/11/2022  Gabapentin 100 MG Capsule  60.00  20 Mo Jcarlos   [id number]   [place] ([id number])   0/[place]   03/08/2022  1   03/08/2022  Alprazolam 0.5 MG Tablet  30.00  15 Al Norris   [id number]   [facility] ([id number])   0/0  2.00 LME  [place]   02/28/2022  2   02/28/2022  Gabapentin 100 MG Capsule  60.00  25 Na Hen   [id number]   [place] ([id number])   0/[place]   01/19/2022  1   01/19/2022  Hydrocodone-Acetamin 5-325 MG  10.00  10 Al Norris   [id number]   [facility] ([id number])   0/0  5.00 M[place]   01/12/2022  1   01/12/2022  Hydrocodone-Acetamin 5-325 MG  6.00  2  [place]   [id number]   [facility] ([id number])   0/0  15.00 MME        Past Medical/Surgical History:  Past Medical History:   Diagnosis Date     Abnormal Pap smear of cervix 08/2003     Cervical high risk HPV (human papillomavirus) test positive 07/24/2015    see problem list     Chickenpox      Ectopic pregnancy     right     GERD (gastroesophageal reflux disease) 07/16/2012     H/O chronic ear infection as a child     H/O colposcopy with cervical biopsy 01/18/2016    see problem list     Left tubal pregnancy without intrauterine pregnancy 01/05/2018     Tonsillitis       has a past medical history of Abnormal Pap smear of cervix (08/2003), Cervical high risk HPV (human papillomavirus) test positive (07/24/2015), Chickenpox, Ectopic pregnancy, GERD (gastroesophageal reflux disease) (07/16/2012), H/O chronic ear infection (as a child), H/O colposcopy with cervical biopsy (01/18/2016), Left tubal pregnancy without intrauterine pregnancy (01/05/2018), and Tonsillitis.    She has no past medical history of Arthritis, Cancer (H), Cerebral infarction (H), Complication of anesthesia, Congestive heart failure (H), COPD (chronic obstructive pulmonary disease) (H), Depressive disorder, Diabetes (H), Heart disease, History of blood transfusion, Hypertension, Malignant hyperthermia, PONV  (postoperative nausea and vomiting), Sleep apnea, Thyroid disease, or Uncomplicated asthma.    Social History:  Reviewed. No changes to social history except as noted above in HPI.    Vital Signs:   None. This is phone/video visit.     Labs:  Most recent laboratory results reviewed and no new labs.     Review of Systems:  10 systems (general, cardiovascular, respiratory, eyes, ENT, endocrine, GI, , M/S, neurological) were reviewed. Most pertinent finding(s) is/are: Some chronic pains, diarrhea if she misses lamotrigine. The remaining systems are all unremarkable.    Mental Status Examination (limited as this is by phone/video):  Appearance: Awake, alert, appears stated age, no acute distress, well-groomed   Attitude:  cooperative, pleasant   Motor: No gross abnormalities observed via video, not formally tested   Oriented to:  person, place, time, and situation  Attention Span and Concentration:  normal  Speech:  clear, coherent, regular rate, rhythm, and volume  Language: intact  Mood:  better  Affect:  appropriate and in normal range and mood congruent  Associations:  no loose associations  Thought Process:  logical, linear and goal oriented  Thought Content:  no evidence of suicidal ideation or homicidal ideation, no evidence of psychotic thought, no auditory hallucinations present and no visual hallucinations present  Recent and Remote Memory:  Intact to interview. Not formally assessed. No amnesia.  Fund of Knowledge: appropriate  Insight:  good  Judgment:  intact, adequate for safety  Impulse Control:  intact    Suicide Risk Assessment:  Today Jennifer Voss reports no suicidal ideation. Based on all available evidence including the factors cited above, Jennifer Voss does not appear to be at imminent risk for self-harm, does not meet criteria for a 72-hr hold, and therefore remains appropriate for ongoing outpatient level of care.  A thorough assessment of risk factors related to suicide and self-harm  have been reviewed and are noted above. The patient convincingly denies suicidality on several occasions. Local community safety resources printed and reviewed for patient to use if needed. There was no deceit detected, and the patient presented in a manner that was believable.     DSM5 Diagnosis:  PTSD  RACH  Mood Disorder, Unspcified (HIGH suspicions for Bipolar Disorder)  R/O ADHD  Stimulant use disorder, reportedly in sustained remission (last use reported as a year ago-intravenous)    Medical comorbidities include:   Patient Active Problem List    Diagnosis Date Noted     Mood disorder (H) 08/23/2022     Priority: Medium     RACH (generalized anxiety disorder) 08/23/2022     Priority: Medium     Panic disorder without agoraphobia 08/23/2022     Priority: Medium     PTSD (post-traumatic stress disorder) 08/23/2022     Priority: Medium     Lumbosacral plexopathy 04/20/2022     Priority: Medium     Bulging lumbar disc 02/28/2022     Priority: Medium     Schmorl's node 02/28/2022     Priority: Medium     Right hip pain 02/28/2022     Priority: Medium     Acute right-sided low back pain with right-sided sciatica 01/25/2022     Priority: Medium     Left leg numbness 01/25/2022     Priority: Medium     Overweight (BMI 25.0-29.9) 09/20/2021     Priority: Medium     Chronic bilateral low back pain without sciatica 09/20/2021     Priority: Medium     History of poor pregnancy outcome 01/16/2018     Priority: Medium     Cervical high risk HPV (human papillomavirus) test positive 07/24/2015     Priority: Medium     7/24/15: Pap - NIL, + HR HPV 16. Plan colp  1/18/16: [facility] - MARIANO 2. ECC - benign. Plan LEEP  3/28/16: LEEP - benign. Plan cotest in 1 year.   7/19/17: Pap - NIL/neg HPV. Pap+HPV in 1 year.  9/25/18 Pap: NIL/neg HPV. Plan Pap+HPV in 3 years (2021).  10/6/21 NIL pap, neg HPV. Plan: cotest q 3 years x 25 years         Tobacco abuse 07/16/2012     Priority: Medium     Depression with anxiety 07/16/2012     Priority:  Medium     Has been on paxil and lorazepam.  Discontinued medication 2013         Psychosocial & Contextual Factors: see HPI above    Assessment:  From Intake, 8/23/2022:  Jennifer Voss is a 37-year-old female with past psychiatric history including depression, anxiety, insomnia, stimulant use disorder who presents today for psychiatric evaluation.  Patient would seem to very possibly meet criteria for bipolar disorder but does have some confounding symptoms due to pretty severe trauma related symptoms and also possibly ADHD symptoms.  Patient's mood symptoms severe enough to warrant stabilization prior to ADHD testing.  Recommend treating with a mood stabilizer.  Patient will continue to monitor mood symptoms and continued to discuss with her therapist.  Does seem very likely to have bipolar disorder and meet criteria for manic episodes.  Patient agreeable to starting lamotrigine and olanzapine to help control symptoms.  Patient does likely already have some baseline tardive dyskinesia symptoms from long history of methamphetamine abuse.  We will continue to monitor.  Did discuss possibility olanzapine could worsen these, she will monitor her symptoms.  She has been on olanzapine previously and it has helped her symptoms, another reason I feel she may have bipolar disorder.  Once her mood symptoms are stabilized, could consider ADHD testing.  If ADHD testing were to be affirmatively for the diagnosis, I would consider only treating with Strattera or maybe Vyvanse given very recent meth/stimulant abuse.  No acute safety concerns.  No SI.  No current drug or alcohol use.  Patient reports last methamphetamine abuse about a year ago which was IV.  Patient was encouraged to have labs drawn to check for communicable diseases (currently pending from primary care provider).  Patient currently on birth control pill to protect against pregnancy.    10/10/2022:   Patient overall doing quite a bit better on lamotrigine and  olanzapine.  Mood and anxiety starting to balance out.  We will continue to optimize therapy with lamotrigine.  She will trial decreased doses of olanzapine if she is able to.  No acute safety concerns.  No SI.  No problematic drug or alcohol use.  Continues to do her best to maintain sobriety.  We will transition her Xanax to Valium instead.  She continues to use benzodiazepines sparingly and appropriately.    Medication side effects and alternatives were reviewed. Health promotion activities recommended and reviewed today. All questions addressed. Education and counseling completed regarding risks and benefits of medications and psychotherapy options. Recommend therapy for additional support.     Treatment Plan:    Continue olanzapine/Zyprexa at 2.5-5 mg daily as needed for anxiety, sleep, agitation.    Increase lamotrigine to 150 mg daily for 2 weeks and then increase to 200 mg daily for mood (can take as split dose 100 mg twice daily if preferred).    Discontinue Xanax/alprazolam    Start Valium/diazepam 5-10 mg every 6 hours as needed for severe anxiety/panic symptoms.  Continue to use very sparingly as you have been doing with your Xanax.    Continue all other cares per primary care provider.     Continue all other medications as reviewed per electronic medical record today.     Safety plan reviewed. To the Emergency Department as needed or call after hours crisis line at [phone] or [phone]. [place] Crisis Text Line. Text [place] to 365319 or Suicide LifeLine Chat: suicidepreventionlifeline.org/chat    Continue therapy as planned.     Schedule an appointment with me in 6 weeks or sooner as needed. Call [facility] at [phone] to schedule.    Follow up with primary care provider as planned or for acute medical concerns.    Call the psychiatric nurse line with medication questions or concerns at [phone].    CG [facility]art may be used to communicate with your provider, but this is not  intended to be used for emergencies.    Risks of benzodiazepine (Ativan, Xanax, Klonopin, Valium, etc) use including, but not limited to, sedation, tolerance, risk for addiction/dependence. Do not drink alcohol while taking benzodiazepines due to risk of trouble breathing and potential death. Do not drive or operate heavy machinery until it is known how the drug affects you. Discuss with physician or pharmacist before ever taking a benzodiazepine with a narcotic/opioid pain medication.     Have previously discussed Lamictal (lamotrigine) can cause serious rashes including Park-Yoshi syndrome which may requiring hospitalization and discontinuation of treatment. If any signs of a rash occur, please see your Primary Care Provider or a dermatologist immediately.     Administrative Billing:   Phone Call/Video Duration: 20 Minutes  Start: 3:35p  Stop: 3:55p    Time spent with patient was 20 minutes and greater than 50% of time or 11 minutes was spent in counseling and coordination of care regarding above diagnoses and treatment plan.    Patient Status:  Patient will continue to be seen for ongoing consultation and stabilization.    Signed:   Sarah Purcell DO  [facility]    Disclaimer: This note consists of symbols derived from keyboarding, dictation and/or voice recognition software. As a result, there may be errors in the script that have gone undetected. Please consider this when interpreting information found in this chart.

## 2022-11-20 ENCOUNTER — HEALTH MAINTENANCE LETTER (OUTPATIENT)
Age: 37
End: 2022-11-20

## 2022-12-01 ENCOUNTER — VIRTUAL VISIT (OUTPATIENT)
Dept: PSYCHIATRY | Facility: CLINIC | Age: 37
End: 2022-12-01
Payer: COMMERCIAL

## 2022-12-01 ENCOUNTER — VIRTUAL VISIT (OUTPATIENT)
Dept: BEHAVIORAL HEALTH | Facility: CLINIC | Age: 37
End: 2022-12-01
Payer: COMMERCIAL

## 2022-12-01 DIAGNOSIS — F43.10 PTSD (POST-TRAUMATIC STRESS DISORDER): ICD-10-CM

## 2022-12-01 DIAGNOSIS — F41.1 GAD (GENERALIZED ANXIETY DISORDER): ICD-10-CM

## 2022-12-01 DIAGNOSIS — F41.0 PANIC DISORDER WITHOUT AGORAPHOBIA: ICD-10-CM

## 2022-12-01 DIAGNOSIS — G47.00 INSOMNIA, UNSPECIFIED TYPE: ICD-10-CM

## 2022-12-01 DIAGNOSIS — F39 MOOD DISORDER (H): Primary | ICD-10-CM

## 2022-12-01 DIAGNOSIS — R20.0 LEFT LEG NUMBNESS: ICD-10-CM

## 2022-12-01 DIAGNOSIS — F15.11 METHAMPHETAMINE ABUSE IN REMISSION (H): ICD-10-CM

## 2022-12-01 DIAGNOSIS — F39 MOOD DISORDER (H): ICD-10-CM

## 2022-12-01 DIAGNOSIS — F43.10 PTSD (POST-TRAUMATIC STRESS DISORDER): Primary | ICD-10-CM

## 2022-12-01 PROCEDURE — 90832 PSYTX W PT 30 MINUTES: CPT | Mod: GT | Performed by: SOCIAL WORKER

## 2022-12-01 PROCEDURE — 99214 OFFICE O/P EST MOD 30 MIN: CPT | Mod: GT | Performed by: PSYCHIATRY & NEUROLOGY

## 2022-12-01 ASSESSMENT — PATIENT HEALTH QUESTIONNAIRE - PHQ9
SUM OF ALL RESPONSES TO PHQ QUESTIONS 1-9: 9
10. IF YOU CHECKED OFF ANY PROBLEMS, HOW DIFFICULT HAVE THESE PROBLEMS MADE IT FOR YOU TO DO YOUR WORK, TAKE CARE OF THINGS AT HOME, OR GET ALONG WITH OTHER PEOPLE: SOMEWHAT DIFFICULT
SUM OF ALL RESPONSES TO PHQ QUESTIONS 1-9: 9
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
## 2022-12-02 ENCOUNTER — MYC MEDICAL ADVICE (OUTPATIENT)
Dept: PSYCHIATRY | Facility: CLINIC | Age: 37
End: 2022-12-02

## 2022-12-02 RX ORDER — LAMOTRIGINE 200 MG/1
200 TABLET ORAL DAILY
Qty: 30 TABLET | Refills: 1 | Status: SHIPPED | OUTPATIENT
Start: 2022-12-02 | End: 2022-12-29

## 2022-12-02 RX ORDER — GABAPENTIN 300 MG/1
300 CAPSULE ORAL 2 TIMES DAILY
Qty: 60 CAPSULE | Refills: 1 | Status: SHIPPED | OUTPATIENT
Start: 2022-12-02 | End: 2022-12-29

## 2022-12-02 RX ORDER — DIAZEPAM 10 MG
5-10 TABLET ORAL DAILY PRN
Qty: 15 TABLET | Refills: 0 | Status: SHIPPED | OUTPATIENT
Start: 2022-12-02 | End: 2022-12-29

## 2022-12-02 NOTE — PATIENT INSTRUCTIONS
Treatment Plan:  Continue olanzapine/Zyprexa at 2.5-5 mg daily as needed for anxiety, sleep, agitation.  Continue lamotrigine 200 mg daily for mood (can take as split dose 100 mg twice daily if preferred).  Continue Valium/diazepam 5-10 mg daily as needed for severe anxiety/panic symptoms.  Do not take daily.  15 tabs to last 30 days.  Increase gabapentin to 300 mg twice daily.  Start fluoxetine 20 mg daily for anxiety and depression.  My fax for any mental health paperwork: [phone]  Continue all other cares per primary care provider.   Continue all other medications as reviewed per electronic medical record today.   Safety plan reviewed. To the Emergency Department as needed or call after hours crisis line at [phone] or [phone]. [place] Crisis Text Line. Text [place] to 821672 or Suicide LifeLine Chat: suicidepreventionPrecision Golf Fitness Academyline.org/chat  Continue therapy as planned.   Schedule an appointment with me in 4 weeks or sooner as needed. Call [facility] at [phone] to schedule.  Follow up with primary care provider as planned or for acute medical concerns.  Call the psychiatric nurse line with medication questions or concerns at [phone].  360Thart may be used to communicate with your provider, but this is not intended to be used for emergencies.    Risks of benzodiazepine (Ativan, Xanax, Klonopin, Valium, etc) use including, but not limited to, sedation, tolerance, risk for addiction/dependence. Do not drink alcohol while taking benzodiazepines due to risk of trouble breathing and potential death. Do not drive or operate heavy machinery until it is known how the drug affects you. Discuss with physician or pharmacist before ever taking a benzodiazepine with a narcotic/opioid pain medication.     Have previously discussed Lamictal (lamotrigine) can cause serious rashes including Park-Yoshi syndrome which may requiring hospitalization and discontinuation of treatment. If any signs of  a rash occur, please see your Primary Care Provider or a dermatologist immediately.

## 2022-12-13 DIAGNOSIS — R06.02 SOB (SHORTNESS OF BREATH): ICD-10-CM

## 2022-12-13 RX ORDER — ALBUTEROL SULFATE 90 UG/1
AEROSOL, METERED RESPIRATORY (INHALATION)
Qty: 9 G | Refills: 0 | Status: SHIPPED | OUTPATIENT
Start: 2022-12-13 | End: 2023-02-09

## 2022-12-27 DIAGNOSIS — G89.29 CHRONIC BILATERAL LOW BACK PAIN WITHOUT SCIATICA: ICD-10-CM

## 2022-12-27 DIAGNOSIS — M54.50 CHRONIC BILATERAL LOW BACK PAIN WITHOUT SCIATICA: ICD-10-CM

## 2022-12-28 RX ORDER — CYCLOBENZAPRINE HCL 10 MG
TABLET ORAL
Qty: 30 TABLET | Refills: 0 | Status: SHIPPED | OUTPATIENT
Start: 2022-12-28 | End: 2023-03-07
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
## 2022-12-29 ENCOUNTER — VIRTUAL VISIT (OUTPATIENT)
Dept: BEHAVIORAL HEALTH | Facility: CLINIC | Age: 37
End: 2022-12-29
Payer: COMMERCIAL

## 2022-12-29 ENCOUNTER — VIRTUAL VISIT (OUTPATIENT)
Dept: PSYCHIATRY | Facility: CLINIC | Age: 37
End: 2022-12-29
Payer: COMMERCIAL

## 2022-12-29 DIAGNOSIS — F43.10 PTSD (POST-TRAUMATIC STRESS DISORDER): ICD-10-CM

## 2022-12-29 DIAGNOSIS — F39 MOOD DISORDER (H): ICD-10-CM

## 2022-12-29 DIAGNOSIS — F15.11 METHAMPHETAMINE ABUSE IN REMISSION (H): ICD-10-CM

## 2022-12-29 DIAGNOSIS — F39 MOOD DISORDER (H): Primary | ICD-10-CM

## 2022-12-29 DIAGNOSIS — F41.0 PANIC DISORDER WITHOUT AGORAPHOBIA: ICD-10-CM

## 2022-12-29 DIAGNOSIS — F41.1 GAD (GENERALIZED ANXIETY DISORDER): ICD-10-CM

## 2022-12-29 DIAGNOSIS — F41.1 GAD (GENERALIZED ANXIETY DISORDER): Primary | ICD-10-CM

## 2022-12-29 DIAGNOSIS — G47.00 INSOMNIA, UNSPECIFIED TYPE: ICD-10-CM

## 2022-12-29 PROCEDURE — 99214 OFFICE O/P EST MOD 30 MIN: CPT | Mod: GT | Performed by: PSYCHIATRY & NEUROLOGY

## 2022-12-29 PROCEDURE — 90832 PSYTX W PT 30 MINUTES: CPT | Mod: GT | Performed by: SOCIAL WORKER

## 2022-12-29 RX ORDER — GABAPENTIN 300 MG/1
600 CAPSULE ORAL AT BEDTIME
Qty: 180 CAPSULE | Refills: 1 | Status: SHIPPED | OUTPATIENT
Start: 2022-12-29 | End: 2023-07-26

## 2022-12-29 RX ORDER — LAMOTRIGINE 200 MG/1
200 TABLET ORAL DAILY
Qty: 90 TABLET | Refills: 1 | Status: SHIPPED | OUTPATIENT
Start: 2022-12-29 | End: 2023-08-24

## 2022-12-29 RX ORDER — DIAZEPAM 10 MG
5-10 TABLET ORAL DAILY PRN
Qty: 15 TABLET | Refills: 0 | Status: SHIPPED | OUTPATIENT
Start: 2022-12-29 | End: 2023-03-17
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
## 2022-12-29 NOTE — Clinical Note
Please call this patient to get them scheduled for a follow-up visit in 4-6 weeks. Please schedule with me and the [facility]. Thanks!

## 2022-12-29 NOTE — PATIENT INSTRUCTIONS
Treatment Plan:  Continue olanzapine/Zyprexa at 2.5-5 mg daily as needed for anxiety, sleep, agitation.  Continue lamotrigine 200 mg daily for mood (can take as split dose 100 mg twice daily if preferred).  Continue Valium/diazepam 5-10 mg daily as needed for severe anxiety/panic symptoms.  Do not take daily.  15 tabs to last 30 days.  Continue gabapentin 600 mg at bedtime.  Continue fluoxetine 20 mg daily for anxiety and depression.  My fax for any mental health paperwork: [phone]  Continue all other cares per primary care provider.   Continue all other medications as reviewed per electronic medical record today.   Safety plan reviewed. To the Emergency Department as needed or call after hours crisis line at [phone] or [phone]. [place] Crisis Text Line. Text [place] to 129208 or Suicide LifeLine Chat: suicidepreventionTabSprintline.org/chat  Continue therapy as planned.   Schedule an appointment with me in 4-6 weeks or sooner as needed. Call [facility] at [phone] to schedule.  Follow up with primary care provider as planned or for acute medical concerns.  Call the psychiatric nurse line with medication questions or concerns at [phone].  Geniushart may be used to communicate with your provider, but this is not intended to be used for emergencies.    Risks of benzodiazepine (Ativan, Xanax, Klonopin, Valium, etc) use including, but not limited to, sedation, tolerance, risk for addiction/dependence. Do not drink alcohol while taking benzodiazepines due to risk of trouble breathing and potential death. Do not drive or operate heavy machinery until it is known how the drug affects you. Discuss with physician or pharmacist before ever taking a benzodiazepine with a narcotic/opioid pain medication.     Have previously discussed Lamictal (lamotrigine) can cause serious rashes including Park-Yoshi syndrome which may requiring hospitalization and discontinuation of treatment. If any signs  of a rash occur, please see your Primary Care Provider or a dermatologist immediately.

## 2023-02-08 DIAGNOSIS — R06.02 SOB (SHORTNESS OF BREATH): ICD-10-CM

## 2023-02-09 ENCOUNTER — MYC MEDICAL ADVICE (OUTPATIENT)
Dept: FAMILY MEDICINE | Facility: CLINIC | Age: 38
End: 2023-02-09
Payer: COMMERCIAL

## 2023-02-09 RX ORDER — ALBUTEROL SULFATE 90 UG/1
AEROSOL, METERED RESPIRATORY (INHALATION)
Qty: 9 G | Refills: 0 | Status: SHIPPED | OUTPATIENT
Start: 2023-02-09 | End: 2023-05-31

## 2023-02-09 NOTE — TELEPHONE ENCOUNTER
"Can patient be scheduled in one of your  \"Same Day\" slots next week?     Fanny Raza RN BSN  [facility]    "

## 2023-02-13 NOTE — TELEPHONE ENCOUNTER
Okay to schedule patient in a same-day slot.  Please make sure she knows it will be to address the rash only.  If she needs a medication check will need routine appointment.    Shannon SNYDERC

## 2023-02-18 ENCOUNTER — MYC MEDICAL ADVICE (OUTPATIENT)
Dept: PSYCHIATRY | Facility: CLINIC | Age: 38
End: 2023-02-18
Payer: COMMERCIAL

## 2023-02-18 ASSESSMENT — ANXIETY QUESTIONNAIRES
7. FEELING AFRAID AS IF SOMETHING AWFUL MIGHT HAPPEN: NOT AT ALL
1. FEELING NERVOUS, ANXIOUS, OR ON EDGE: SEVERAL DAYS
IF YOU CHECKED OFF ANY PROBLEMS ON THIS QUESTIONNAIRE, HOW DIFFICULT HAVE THESE PROBLEMS MADE IT FOR YOU TO DO YOUR WORK, TAKE CARE OF THINGS AT HOME, OR GET ALONG WITH OTHER PEOPLE: SOMEWHAT DIFFICULT
GAD7 TOTAL SCORE: 6
7. FEELING AFRAID AS IF SOMETHING AWFUL MIGHT HAPPEN: NOT AT ALL
6. BECOMING EASILY ANNOYED OR IRRITABLE: SEVERAL DAYS
5. BEING SO RESTLESS THAT IT IS HARD TO SIT STILL: SEVERAL DAYS
4. TROUBLE RELAXING: SEVERAL DAYS
GAD7 TOTAL SCORE: 6
GAD7 TOTAL SCORE: 6
2. NOT BEING ABLE TO STOP OR CONTROL WORRYING: SEVERAL DAYS
3. WORRYING TOO MUCH ABOUT DIFFERENT THINGS: SEVERAL DAYS
8. IF YOU CHECKED OFF ANY PROBLEMS, HOW DIFFICULT HAVE THESE MADE IT FOR YOU TO DO YOUR WORK, TAKE CARE OF THINGS AT HOME, OR GET ALONG WITH OTHER PEOPLE?: SOMEWHAT DIFFICULT

## 2023-02-20 ENCOUNTER — VIRTUAL VISIT (OUTPATIENT)
Dept: PSYCHIATRY | Facility: CLINIC | Age: 38
End: 2023-02-20
Payer: COMMERCIAL

## 2023-02-20 ENCOUNTER — VIRTUAL VISIT (OUTPATIENT)
Dept: BEHAVIORAL HEALTH | Facility: CLINIC | Age: 38
End: 2023-02-20
Payer: COMMERCIAL

## 2023-02-20 DIAGNOSIS — G47.00 INSOMNIA, UNSPECIFIED TYPE: ICD-10-CM

## 2023-02-20 DIAGNOSIS — F41.1 GAD (GENERALIZED ANXIETY DISORDER): Primary | ICD-10-CM

## 2023-02-20 DIAGNOSIS — F43.10 PTSD (POST-TRAUMATIC STRESS DISORDER): ICD-10-CM

## 2023-02-20 DIAGNOSIS — F43.10 PTSD (POST-TRAUMATIC STRESS DISORDER): Primary | ICD-10-CM

## 2023-02-20 DIAGNOSIS — F15.11 METHAMPHETAMINE ABUSE IN REMISSION (H): ICD-10-CM

## 2023-02-20 DIAGNOSIS — F39 MOOD DISORDER (H): ICD-10-CM

## 2023-02-20 DIAGNOSIS — F41.1 GAD (GENERALIZED ANXIETY DISORDER): ICD-10-CM

## 2023-02-20 PROCEDURE — 99214 OFFICE O/P EST MOD 30 MIN: CPT | Mod: VID | Performed by: PSYCHIATRY & NEUROLOGY

## 2023-02-20 PROCEDURE — 90832 PSYTX W PT 30 MINUTES: CPT | Mod: VID | Performed by: PSYCHOLOGIST

## 2023-02-20 ASSESSMENT — PATIENT HEALTH QUESTIONNAIRE - PHQ9
10. IF YOU CHECKED OFF ANY PROBLEMS, HOW DIFFICULT HAVE THESE PROBLEMS MADE IT FOR YOU TO DO YOUR WORK, TAKE CARE OF THINGS AT HOME, OR GET ALONG WITH OTHER PEOPLE: SOMEWHAT DIFFICULT
SUM OF ALL RESPONSES TO PHQ QUESTIONS 1-9: 5
SUM OF ALL RESPONSES TO PHQ QUESTIONS 1-9: 5

## 2023-02-20 NOTE — PATIENT INSTRUCTIONS
Treatment Plan:  Continue olanzapine/Zyprexa at 2.5-5 mg daily as needed for anxiety, sleep, agitation.  Continue lamotrigine 200 mg daily for mood (can take as split dose 100 mg twice daily if preferred).  Continue Valium/diazepam 5-10 mg daily as needed for severe anxiety/panic symptoms.  Do not take daily.  15 tabs to last 30 days.  Continue gabapentin 600 mg at bedtime.  Discontinue fluoxetine due to allergic reaction. Can trial OTC diphenhydramine 25 mg daily at bedtime to help with itching.    My fax for any mental health paperwork: [phone]  Continue all other cares per primary care provider.   Continue all other medications as reviewed per electronic medical record today.   Safety plan reviewed. To the Emergency Department as needed or call after hours crisis line at [phone] or [phone]. [place] Crisis Text Line. Text [place] to 246971 or Suicide LifeLine Chat: suicidepreaddwishline.org/chat  Continue therapy as planned.   Schedule an appointment with me in 3 weeks or sooner as needed. Call [facility] at [phone] to schedule.  Follow up with primary care provider as planned or for acute medical concerns.  Call the psychiatric nurse line with medication questions or concerns at [phone].  Rijuvenhart may be used to communicate with your provider, but this is not intended to be used for emergencies.    Risks of benzodiazepine (Ativan, Xanax, Klonopin, Valium, etc) use including, but not limited to, sedation, tolerance, risk for addiction/dependence. Do not drink alcohol while taking benzodiazepines due to risk of trouble breathing and potential death. Do not drive or operate heavy machinery until it is known how the drug affects you. Discuss with physician or pharmacist before ever taking a benzodiazepine with a narcotic/opioid pain medication.     Have previously discussed Lamictal (lamotrigine) can cause serious rashes including Park-Yoshi syndrome which may requiring  hospitalization and discontinuation of treatment. If any signs of a rash occur, please see your Primary Care Provider or a dermatologist immediately.
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
## 2023-02-20 NOTE — Clinical Note
Please call this patient to get them scheduled for a follow-up visit in 3 weeks. Please schedule with me and the [facility]. Thanks!

## 2023-02-22 ENCOUNTER — VIRTUAL VISIT (OUTPATIENT)
Dept: FAMILY MEDICINE | Facility: CLINIC | Age: 38
End: 2023-02-22
Payer: COMMERCIAL

## 2023-02-22 DIAGNOSIS — F39 MOOD DISORDER (H): Primary | ICD-10-CM

## 2023-02-22 PROCEDURE — 99213 OFFICE O/P EST LOW 20 MIN: CPT | Mod: VID | Performed by: NURSE PRACTITIONER

## 2023-02-22 ASSESSMENT — ENCOUNTER SYMPTOMS
CONSTIPATION: 0
SHORTNESS OF BREATH: 0
DIZZINESS: 0
COUGH: 0
PALPITATIONS: 0
DYSPHORIC MOOD: 1
DIARRHEA: 0
SLEEP DISTURBANCE: 1
WHEEZING: 0
LIGHT-HEADEDNESS: 0
CHEST TIGHTNESS: 0
HEADACHES: 0
NUMBNESS: 0
MYALGIAS: 0
ABDOMINAL PAIN: 0
RHINORRHEA: 0
SORE THROAT: 0
NERVOUS/ANXIOUS: 0
ARTHRALGIAS: 0
NAUSEA: 0
VOMITING: 0
FATIGUE: 0
ABDOMINAL DISTENTION: 0

## 2023-02-22 ASSESSMENT — PATIENT HEALTH QUESTIONNAIRE - PHQ9
SUM OF ALL RESPONSES TO PHQ QUESTIONS 1-9: 4
SUM OF ALL RESPONSES TO PHQ QUESTIONS 1-9: 4
10. IF YOU CHECKED OFF ANY PROBLEMS, HOW DIFFICULT HAVE THESE PROBLEMS MADE IT FOR YOU TO DO YOUR WORK, TAKE CARE OF THINGS AT HOME, OR GET ALONG WITH OTHER PEOPLE: SOMEWHAT DIFFICULT

## 2023-02-22 NOTE — PROGRESS NOTES
"Jennifer is a 37 year old who is being evaluated via a billable video visit.      How would you like to obtain your AVS? MyChart  If the video visit is dropped, the invitation should be resent by: Text to cell phone: [phone]  Will anyone else be joining your video visit? No        Assessment & Plan     Mood disorder (H)  Mood improved.  Psychiatry notes reviewed.  Fluoxetine on allergy list.  Continue to follow with psychiatry.      Review of external notes as documented elsewhere in note  18 minutes spent on the date of the encounter doing chart review, history and exam, documentation and further activities per the note        BMI:   Estimated body mass index is 28.34 kg/m  as calculated from the following:    Height as of 2/28/22: 1.635 m (5' 4.37\").    Weight as of 2/28/22: 75.8 kg (167 lb).       No follow-ups on file.    GISEL Johnston [facility] HARVINDERSONG Rodriguez is a 37 year old, presenting for the following health issues:  Video Visit (rash)      History of Present Illness       Mental Health Follow-up:  Patient presents to follow-up on Depression & Anxiety.Patient's depression since last visit has been:  Good  The patient is not having other symptoms associated with depression.  Patient's anxiety since last visit has been:  Better  The patient is not having other symptoms associated with anxiety.  Any significant life events: No  Patient is feeling anxious or having panic attacks.  Patient has no concerns about alcohol or drug use.    She eats 0-1 servings of fruits and vegetables daily.She consumes 1 sweetened beverage(s) daily.She exercises with enough effort to increase her heart rate 30 to 60 minutes per day.  She exercises with enough effort to increase her heart rate 3 or less days per week. She is missing 1 dose(s) of medications per week.  She is not taking prescribed medications regularly due to remembering to take.    Today's PHQ-9         PHQ-9 Total " "Score: 4    PHQ-9 Q9 Thoughts of better off dead/self-harm past 2 weeks :   Not at all    How difficult have these problems made it for you to do your work, take care of things at home, or get along with other people: Somewhat difficult       Rash  Onset/Duration: 2-3 weeks  Description  Location: Bilateral extremities and ears are \"burning\"  Character: red  Itching: moderate  Intensity:  moderate  Progression of Symptoms:  constant  Accompanying signs and symptoms:   Fever: No  Body aches or joint pain: No  Sore throat symptoms: No  Recent cold symptoms: No  History:           Previous episodes of similar rash: None  New exposures:  Medication- prozac; started in December  Recent travel: No  Exposure to similar rash: No  Precipitating or alleviating factors: None  Therapies tried and outcome: eveline Phelan CMA      Started on prozac and noticed that is when rash started.  Thought maybe the itching and rash was related to anxiety it has been known to make itchy. Saw Dr. Scott and was recommend to stop taking Prozac.  Rash is much better than it was. Has appointment 3/17/23 follow up with psyc. Has been going to therapy as well and that is very beneficial.  Overall, feels like mental health is doing very well.  Is the best that has been in some time.  Does have some scratch marks from itching. Stopped taking trazodone and is using gabapentin and melatonin    Review of Systems   Constitutional: Negative for fatigue.   HENT: Negative for ear pain, rhinorrhea and sore throat.    Eyes: Negative for visual disturbance.   Respiratory: Negative for cough, chest tightness, shortness of breath and wheezing.    Cardiovascular: Negative for chest pain and palpitations.   Gastrointestinal: Negative for abdominal distention, abdominal pain, constipation, diarrhea, nausea and vomiting.   Endocrine: Negative for cold intolerance and heat intolerance.   Musculoskeletal: Negative for arthralgias and myalgias.   Skin: " Negative for rash.   Neurological: Negative for dizziness, light-headedness, numbness and headaches.   Psychiatric/Behavioral: Positive for dysphoric mood and sleep disturbance. The patient is not nervous/anxious.           Objective           Vitals:  No vitals were obtained today due to virtual visit.    Physical Exam  Constitutional:       Appearance: Normal appearance.   HENT:      Nose: No congestion.   Eyes:      General: Lids are normal.   Pulmonary:      Effort: No tachypnea, bradypnea or respiratory distress.   Skin:     Coloration: Skin is not ashen, cyanotic, jaundiced or pale.   Neurological:      Mental Status: She is alert.   Psychiatric:         Mood and Affect: Mood normal.         Speech: Speech normal.         Behavior: Behavior normal.                Video-Visit Details    Type of service:  Video Visit     Originating Location (pt. Location): Home  Distant Location (provider location):  On-site  Platform used for Video Visit: [employer]

## 2023-03-05 DIAGNOSIS — M54.50 CHRONIC BILATERAL LOW BACK PAIN WITHOUT SCIATICA: ICD-10-CM

## 2023-03-05 DIAGNOSIS — G89.29 CHRONIC BILATERAL LOW BACK PAIN WITHOUT SCIATICA: ICD-10-CM

## 2023-03-07 RX ORDER — CYCLOBENZAPRINE HCL 10 MG
TABLET ORAL
Qty: 30 TABLET | Refills: 0 | Status: SHIPPED | OUTPATIENT
Start: 2023-03-07 | End: 2023-07-26
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
## 2023-03-17 ENCOUNTER — VIRTUAL VISIT (OUTPATIENT)
Dept: BEHAVIORAL HEALTH | Facility: CLINIC | Age: 38
End: 2023-03-17
Payer: COMMERCIAL

## 2023-03-17 ENCOUNTER — VIRTUAL VISIT (OUTPATIENT)
Dept: PSYCHIATRY | Facility: CLINIC | Age: 38
End: 2023-03-17
Payer: COMMERCIAL

## 2023-03-17 DIAGNOSIS — F41.1 GAD (GENERALIZED ANXIETY DISORDER): Primary | ICD-10-CM

## 2023-03-17 DIAGNOSIS — F41.0 PANIC DISORDER WITHOUT AGORAPHOBIA: ICD-10-CM

## 2023-03-17 DIAGNOSIS — G47.00 INSOMNIA, UNSPECIFIED TYPE: ICD-10-CM

## 2023-03-17 DIAGNOSIS — F15.11 METHAMPHETAMINE ABUSE IN REMISSION (H): ICD-10-CM

## 2023-03-17 DIAGNOSIS — F39 MOOD DISORDER (H): ICD-10-CM

## 2023-03-17 DIAGNOSIS — F43.10 PTSD (POST-TRAUMATIC STRESS DISORDER): ICD-10-CM

## 2023-03-17 PROCEDURE — 99214 OFFICE O/P EST MOD 30 MIN: CPT | Mod: VID | Performed by: PSYCHIATRY & NEUROLOGY

## 2023-03-17 PROCEDURE — 90832 PSYTX W PT 30 MINUTES: CPT | Mod: VID | Performed by: SOCIAL WORKER

## 2023-03-17 RX ORDER — ESCITALOPRAM OXALATE 10 MG/1
10 TABLET ORAL DAILY
Qty: 90 TABLET | Refills: 0 | Status: SHIPPED | OUTPATIENT
Start: 2023-03-17 | End: 2023-06-19

## 2023-03-17 RX ORDER — DIAZEPAM 10 MG
5-10 TABLET ORAL DAILY PRN
Qty: 15 TABLET | Refills: 1 | Status: SHIPPED | OUTPATIENT
Start: 2023-03-17 | End: 2023-07-31

## 2023-03-17 NOTE — PATIENT INSTRUCTIONS
Treatment Plan:  Continue olanzapine/Zyprexa at 2.5-5 mg daily as needed for anxiety, sleep, agitation.  Continue lamotrigine 200 mg daily for mood (can take as split dose 100 mg twice daily if preferred).  Continue Valium/diazepam 5-10 mg daily as needed for severe anxiety/panic symptoms.  Do not take daily.  15 tabs to last 30 days.  Continue gabapentin 600 mg at bedtime.    Start Lexapro/escitalopram for mood, anxiety: take 5 mg daily for one week then 10 mg daily.   My fax for any mental health paperwork: [phone]  Continue all other cares per primary care provider.   Continue all other medications as reviewed per electronic medical record today.   Safety plan reviewed. To the Emergency Department as needed or call after hours crisis line at [phone] or [phone]. [place] Crisis Text Line. Text [place] to 230424 or Suicide LifeLine Chat: suicidepreiSIGHT Partnersline.org/chat  Continue therapy as planned.   Schedule an appointment with me in 3 weeks or sooner as needed. Call [facility] at [phone] to schedule.  Follow up with primary care provider as planned or for acute medical concerns.  Call the psychiatric nurse line with medication questions or concerns at [phone].  [employer] may be used to communicate with your provider, but this is not intended to be used for emergencies.    Risks of benzodiazepine (Ativan, Xanax, Klonopin, Valium, etc) use including, but not limited to, sedation, tolerance, risk for addiction/dependence. Do not drink alcohol while taking benzodiazepines due to risk of trouble breathing and potential death. Do not drive or operate heavy machinery until it is known how the drug affects you. Discuss with physician or pharmacist before ever taking a benzodiazepine with a narcotic/opioid pain medication.     Have previously discussed Lamictal (lamotrigine) can cause serious rashes including Park-Yoshi syndrome which may requiring hospitalization and  discontinuation of treatment. If any signs of a rash occur, please see your Primary Care Provider or a dermatologist immediately.
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
## 2023-03-17 NOTE — Clinical Note
Please call this patient to get them scheduled for a follow-up visit in 6 weeks. Please schedule with me and the [facility]. Thanks!

## 2023-04-15 ENCOUNTER — HEALTH MAINTENANCE LETTER (OUTPATIENT)
Age: 38
End: 2023-04-15
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
## 2023-04-27 ENCOUNTER — VIRTUAL VISIT (OUTPATIENT)
Dept: PSYCHIATRY | Facility: CLINIC | Age: 38
End: 2023-04-27
Payer: COMMERCIAL

## 2023-04-27 ENCOUNTER — VIRTUAL VISIT (OUTPATIENT)
Dept: BEHAVIORAL HEALTH | Facility: CLINIC | Age: 38
End: 2023-04-27
Payer: COMMERCIAL

## 2023-04-27 DIAGNOSIS — G47.00 INSOMNIA, UNSPECIFIED TYPE: ICD-10-CM

## 2023-04-27 DIAGNOSIS — F41.1 GAD (GENERALIZED ANXIETY DISORDER): ICD-10-CM

## 2023-04-27 DIAGNOSIS — F43.10 PTSD (POST-TRAUMATIC STRESS DISORDER): Primary | ICD-10-CM

## 2023-04-27 DIAGNOSIS — F39 MOOD DISORDER (H): ICD-10-CM

## 2023-04-27 DIAGNOSIS — F41.1 GAD (GENERALIZED ANXIETY DISORDER): Primary | ICD-10-CM

## 2023-04-27 DIAGNOSIS — F15.11 METHAMPHETAMINE ABUSE IN REMISSION (H): ICD-10-CM

## 2023-04-27 DIAGNOSIS — F43.10 PTSD (POST-TRAUMATIC STRESS DISORDER): ICD-10-CM

## 2023-04-27 DIAGNOSIS — F41.0 PANIC DISORDER WITHOUT AGORAPHOBIA: ICD-10-CM

## 2023-04-27 PROCEDURE — 99214 OFFICE O/P EST MOD 30 MIN: CPT | Mod: VID | Performed by: PSYCHIATRY & NEUROLOGY

## 2023-04-27 PROCEDURE — 90832 PSYTX W PT 30 MINUTES: CPT | Mod: VID | Performed by: SOCIAL WORKER

## 2023-04-27 ASSESSMENT — ANXIETY QUESTIONNAIRES
7. FEELING AFRAID AS IF SOMETHING AWFUL MIGHT HAPPEN: MORE THAN HALF THE DAYS
4. TROUBLE RELAXING: SEVERAL DAYS
GAD7 TOTAL SCORE: 11
8. IF YOU CHECKED OFF ANY PROBLEMS, HOW DIFFICULT HAVE THESE MADE IT FOR YOU TO DO YOUR WORK, TAKE CARE OF THINGS AT HOME, OR GET ALONG WITH OTHER PEOPLE?: SOMEWHAT DIFFICULT
1. FEELING NERVOUS, ANXIOUS, OR ON EDGE: SEVERAL DAYS
4. TROUBLE RELAXING: SEVERAL DAYS
2. NOT BEING ABLE TO STOP OR CONTROL WORRYING: MORE THAN HALF THE DAYS
GAD7 TOTAL SCORE: 11
5. BEING SO RESTLESS THAT IT IS HARD TO SIT STILL: MORE THAN HALF THE DAYS
GAD7 TOTAL SCORE: 11
IF YOU CHECKED OFF ANY PROBLEMS ON THIS QUESTIONNAIRE, HOW DIFFICULT HAVE THESE PROBLEMS MADE IT FOR YOU TO DO YOUR WORK, TAKE CARE OF THINGS AT HOME, OR GET ALONG WITH OTHER PEOPLE: SOMEWHAT DIFFICULT
6. BECOMING EASILY ANNOYED OR IRRITABLE: MORE THAN HALF THE DAYS
GAD7 TOTAL SCORE: 11
7. FEELING AFRAID AS IF SOMETHING AWFUL MIGHT HAPPEN: MORE THAN HALF THE DAYS
6. BECOMING EASILY ANNOYED OR IRRITABLE: MORE THAN HALF THE DAYS
8. IF YOU CHECKED OFF ANY PROBLEMS, HOW DIFFICULT HAVE THESE MADE IT FOR YOU TO DO YOUR WORK, TAKE CARE OF THINGS AT HOME, OR GET ALONG WITH OTHER PEOPLE?: SOMEWHAT DIFFICULT
3. WORRYING TOO MUCH ABOUT DIFFERENT THINGS: SEVERAL DAYS
2. NOT BEING ABLE TO STOP OR CONTROL WORRYING: MORE THAN HALF THE DAYS
5. BEING SO RESTLESS THAT IT IS HARD TO SIT STILL: MORE THAN HALF THE DAYS
GAD7 TOTAL SCORE: 11
7. FEELING AFRAID AS IF SOMETHING AWFUL MIGHT HAPPEN: MORE THAN HALF THE DAYS
IF YOU CHECKED OFF ANY PROBLEMS ON THIS QUESTIONNAIRE, HOW DIFFICULT HAVE THESE PROBLEMS MADE IT FOR YOU TO DO YOUR WORK, TAKE CARE OF THINGS AT HOME, OR GET ALONG WITH OTHER PEOPLE: SOMEWHAT DIFFICULT
7. FEELING AFRAID AS IF SOMETHING AWFUL MIGHT HAPPEN: MORE THAN HALF THE DAYS
GAD7 TOTAL SCORE: 11
1. FEELING NERVOUS, ANXIOUS, OR ON EDGE: SEVERAL DAYS
3. WORRYING TOO MUCH ABOUT DIFFERENT THINGS: SEVERAL DAYS

## 2023-04-27 ASSESSMENT — PATIENT HEALTH QUESTIONNAIRE - PHQ9
SUM OF ALL RESPONSES TO PHQ QUESTIONS 1-9: 10
SUM OF ALL RESPONSES TO PHQ QUESTIONS 1-9: 10
10. IF YOU CHECKED OFF ANY PROBLEMS, HOW DIFFICULT HAVE THESE PROBLEMS MADE IT FOR YOU TO DO YOUR WORK, TAKE CARE OF THINGS AT HOME, OR GET ALONG WITH OTHER PEOPLE: SOMEWHAT DIFFICULT

## 2023-04-27 NOTE — PATIENT INSTRUCTIONS
Treatment Plan:  Continue olanzapine/Zyprexa at 2.5-5 mg daily as needed for anxiety, sleep, agitation.  Continue lamotrigine 200 mg daily for mood (can take as split dose 100 mg twice daily if preferred).  Continue Valium/diazepam 5-10 mg daily as needed for severe anxiety/panic symptoms.  Do not take daily.  15 tabs to last 30 days.  Continue gabapentin 600 mg at bedtime.    Continue Lexapro/escitalopram 10 mg daily for mood, anxiety.  My fax for any mental health paperwork: [phone]  Continue all other cares per primary care provider.   Continue all other medications as reviewed per electronic medical record today.   Safety plan reviewed. To the Emergency Department as needed or call after hours crisis line at [phone] or [phone]. [place] Crisis Text Line. Text [place] to 946104 or Suicide LifeLine Chat: suicidepreventionVocalizeLocalline.org/chat  Continue therapy as planned.   Schedule an appointment with me in 4 months or sooner as needed. Call [facility] at [phone] to schedule.  Follow up with primary care provider as planned or for acute medical concerns.  Call the psychiatric nurse line with medication questions or concerns at [phone].  [employer] may be used to communicate with your provider, but this is not intended to be used for emergencies.    Risks of benzodiazepine (Ativan, Xanax, Klonopin, Valium, etc) use including, but not limited to, sedation, tolerance, risk for addiction/dependence. Do not drink alcohol while taking benzodiazepines due to risk of trouble breathing and potential death. Do not drive or operate heavy machinery until it is known how the drug affects you. Discuss with physician or pharmacist before ever taking a benzodiazepine with a narcotic/opioid pain medication.     Have previously discussed Lamictal (lamotrigine) can cause serious rashes including Park-Yoshi syndrome which may requiring hospitalization and discontinuation of treatment. If any signs  "of a rash occur, please see your Primary Care Provider or a dermatologist immediately.     Patient Education   Collaborative Care Psychiatry Service  What to Expect  Here's what to expect from your Collaborative Care Psychiatry Service (CCPS).   About CCPS  CCPS means 2 people work together to help you get better. You'll meet with a behavioral health clinician and a psychiatric doctor. A behavioral health clinician helps people with mental health problems by talking with them. A psychiatric doctor helps people by giving them medicine.  How it works  At every visit, you'll see the behavioral health clinician (BHC) first. They'll talk with you about how you're doing and teach you how to feel better.   Then you'll see the psychiatric doctor. This doctor can help you deal with troubling thoughts and feelings by giving you medicine. They'll make sure you know the plan for your care.   CCPS usually takes 3 to 6 visits. If you need more visits, we may have you start seeing a different psychiatric doctor for ongoing care.  If you have any questions or concerns, we'll be glad to talk with you.  About visits  Be open  At your visits, please talk openly about your problems. It may feel hard, but it's the best way for us to help you.  Cancelling visits  If you can't come to your visit, please call us right away at [phone]. If you don't cancel at least 24 hours (1 full day) before your visit, that's \"late cancellation.\"  Being late to visits  Being very late is the same as not showing up. You will be a \"no show\" if:  Your appointment starts with a BHC, and you're more than 15 minutes late for a 30-minute (half hour) visit. This will also cancel your appointment with the psychiatric doctor.  Your appointment is with a psychiatric doctor only, and you're more than 15 minutes late for a 30-minute (half hour) visit.  Your appointment is with a psychiatric doctor only, and you're more than 30 minutes late for a 60-minute (full " hour) visit.  If you cancel late or don't show up 2 times within 6 months, we may end your care.   Getting help between visits  If you need help between visits, you can call us Monday to Friday from 8 a.m. to 4:30 p.m. at [phone].  Emergency care  Call 911 or go to the nearest emergency department if your life or someone else's life is in danger.  Call 988 anytime to reach the national Suicide and Crisis hotline.  Medicine refills  To refill your medicine, call your pharmacy. You can also call [facility]'s Behavioral Access at [phone], Monday to Friday, 8 a.m. to 4:30 p.m. It can take 1 to 3 business days to get a refill.   Forms, letters, and tests  You may have papers to fill out, like FMLA, short-term disability, and workability. We can help you with these forms at your visits, but you must have an appointment. You may need more than 1 visit for this, to be in an intensive therapy program, or both.  Before we can give you medicine for ADHD, we may refer you to get tested for it or confirm it another way.  We may not be able to give you an emotional support animal letter.  We don't do mental health checks ordered by the court.   We don't do mental health testing, but we can refer you to get tested.   Thank you for choosing us for your care.  For informational purposes only. Not to replace the advice of your health care provider. Copyright   2022 [facility]. All rights reserved. [employer] [id number] - 12/22.
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
## 2023-04-27 NOTE — Clinical Note
Please call this patient to get them scheduled for a follow-up visit in 4 months. Please schedule with me and the [facility]. Thanks!

## 2023-05-30 DIAGNOSIS — R06.02 SOB (SHORTNESS OF BREATH): ICD-10-CM

## 2023-05-30 NOTE — PROGRESS NOTES
Assessment:      ICD-10-CM    1. Closed displaced avulsion fracture of right talus, initial encounter  S92.151A Physical Therapy Referral            Plan:  Orders Placed This Encounter   Procedures     Physical Therapy Referral       Discussed the etiology and treatment of the condition with the patient.  Imaging studies reviewed and discussed with the patient.  Discussed surgical and conservative options.    Small avulsion Fx lateral process talus - STJ sprain equivalent    -Immobilization- boot --WB- in boot z1 months  -Activity - minimal in boot, PT  -PT referral, ankle brace after boot        Return:  No follow-ups on file.    Lizeth Castro DPM                Chief Complaint:     Patient presents with:  Right Ankle - Injury     right ankle fracture.    HPI:  Jennifer Voss is a 38 year old year old female who presents for evaluation of ankle fracture.    Date of injury- 5/27/23  Mechanism of injury- missed a step getting out of an RV and rolled ankle and felt a snap   Pain location- lateral ankle   Bruising and swelling   Walking with boot, naproxen, pain meds prn     Past Medical & Surgical History:  Past Medical History:   Diagnosis Date     Abnormal Pap smear of cervix 08/2003     Cervical high risk HPV (human papillomavirus) test positive 07/24/2015    see problem list     Chickenpox      Ectopic pregnancy     right     GERD (gastroesophageal reflux disease) 07/16/2012     H/O chronic ear infection as a child     H/O colposcopy with cervical biopsy 01/18/2016    see problem list     Left tubal pregnancy without intrauterine pregnancy 01/05/2018     Tonsillitis       Past Surgical History:   Procedure Laterality Date     COLONOSCOPY N/A 8/1/2017    Procedure: COMBINED COLONOSCOPY, SINGLE OR MULTIPLE BIOPSY/POLYPECTOMY BY BIOPSY;  Colonoscopy;  Surgeon: Ramakrishna Epstein MD;  Location: [facility]     DILATION AND CURETTAGE, OPERATIVE HYSTEROSCOPY, COMBINED N/A 10/6/2021    Procedure: dilation and  curettage, hysteroscopy with polypectomy,Pap Smear;  Surgeon: Fanny Camacho MD;  Location: [facility]     LAPAROSCOPIC EVACUATION ECTOPIC PREGNANCY N/A 7/24/2015    LSC right salpingectomy      LAPAROSCOPY DIAGNOSTIC (GYN) N/A 10/6/2021    Procedure: Diagnostic laparoscopy;  Surgeon: Fanny Camacho MD;  Location: [facility]     LEEP TX, CERVICAL  3/2016    benign     MOUTH SURGERY      wisdom teeth     PE TUBES       TONSILLECTOMY        Family History   Problem Relation Age of Onset     Depression Mother      Diabetes Father      Hypertension Father      Substance Abuse Brother         recovered drugs     Depression Brother      Anxiety Disorder Brother      Diabetes Paternal Grandmother      Coronary Artery Disease Paternal Grandmother         MI     Aneurysm Paternal Grandmother         brain     Heart Failure Paternal Grandfather         CHF        Social History:  ?  History   Smoking Status     Every Day     Packs/day: 1.00     Years: 10.00     Types: Cigarettes   Smokeless Tobacco     Never     History   Drug Use No     Social History    Substance and Sexual Activity      Alcohol use: Yes        Alcohol/week: 0.0 standard drinks of alcohol      Allergies:  ?   Allergies   Allergen Reactions     Penicillins Nausea     Fluoxetine Itching        Medications:    Current Outpatient Medications   Medication     albuterol (PROAIR HFA/PROVENTIL HFA/VENTOLIN HFA) 108 (90 Base) MCG/ACT inhaler     cyclobenzaprine (FLEXERIL) 10 MG tablet     diazepam (VALIUM) 10 MG tablet     escitalopram (LEXAPRO) 10 MG tablet     gabapentin (NEURONTIN) 300 MG capsule     HYDROcodone-acetaminophen (NORCO) 5-325 MG tablet     ibuprofen (ADVIL/MOTRIN) 800 MG tablet     lamoTRIgine (LAMICTAL) 200 MG tablet     norethindrone (MICRONOR) 0.35 MG tablet     OLANZapine (ZYPREXA) 2.5 MG tablet     senna-docusate (SENOKOT-S/PERICOLACE) 8.6-50 MG tablet     No current facility-administered medications for this visit.       Physical Exam:   ?  Vitals:  /86   Pulse 76   Wt 75.8 kg (167 lb)   BMI 28.34 kg/m     General:  WD/WN, in NAD.  A&O x3.  Dermatologic:    Skin is intact, open lesions absent.  Bruising present sinus tarsi, lateral ankle gutter R.  Skin texture, turgor is normal.  Vascular:  Pulses palpable.  Digital capillary refill time normal right.  Skin temperature is warm to affected extremity.  Generalized edema- none.  Focal edema- moderate to affected extremity.  Neurologic:    Gross sensation normal  Gait and balance abnormal, NWB to injured extremity.  Musculoskeletal:  Maximal pain to palpation of sinus tarsi  And subfibular / lateral process taluys right.  Mild pain to palpation of ATF right.  Anterior drawer stable,  right.  Talar tilt unstable,  right.  Active ROM present to digits bilateral.    Muscle strength 5/5  to uninjured extremity, guarding of injured extremity.     Patient is able to bear weight on the injured extremity.  Patient is able to walk on the injured extremity.      Imaging:  x-ray independently reviewed and interpreted by myself today.  Non-Weight-bearing views right ankle dated 5/23 at outside clinic, reveal small avulsion Fx lateral process talus.  No other Fx.

## 2023-05-31 ENCOUNTER — OFFICE VISIT (OUTPATIENT)
Dept: PODIATRY | Facility: CLINIC | Age: 38
End: 2023-05-31
Payer: COMMERCIAL

## 2023-05-31 VITALS
HEART RATE: 76 BPM | WEIGHT: 167 LBS | BODY MASS INDEX: 28.34 KG/M2 | DIASTOLIC BLOOD PRESSURE: 86 MMHG | SYSTOLIC BLOOD PRESSURE: 130 MMHG

## 2023-05-31 DIAGNOSIS — S92.151A CLOSED DISPLACED AVULSION FRACTURE OF RIGHT TALUS, INITIAL ENCOUNTER: Primary | ICD-10-CM

## 2023-05-31 PROCEDURE — 99204 OFFICE O/P NEW MOD 45 MIN: CPT | Performed by: PODIATRIST

## 2023-05-31 RX ORDER — ALBUTEROL SULFATE 90 UG/1
AEROSOL, METERED RESPIRATORY (INHALATION)
Qty: 9 G | Refills: 0 | Status: SHIPPED | OUTPATIENT
Start: 2023-05-31 | End: 2023-09-20

## 2023-05-31 NOTE — LETTER
May 31, 2023      Jennifer Voss  [address] [facility] [address]        To Whom It May Concern:    Jennifer Voss was seen in our clinic. 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

## 2023-05-31 NOTE — PATIENT INSTRUCTIONS
PATIENT INSTRUCTIONS - Podiatry / Foot & Ankle Surgery      Note for modified duty in the boot x1 month starting 6/5/23          Boot Immobilization:  Wear the boot at all times when walking or standing.  Wear the boot for 4 weeks, or 1 week beyond resolution of pain.  You may remove the boot when at rest for ankle and toe motion & for bathing/showering.  Do not drive in the boot; wear the boot to & from the car, then switch to a stiff soled shoe for driving.  You do not need to sleep in the boot.  Wear compression (ACE bandage or Tubigrip) under the boot to decrease swelling.  Minimal activity in the boot until follow-up.  Follow-up in 6 weeks as needed          Physical Therapy  You have been referred to [facility].  Locations can be found online.  Please call to schedule an appointment:  [phone]

## 2023-05-31 NOTE — LETTER
5/31/2023         RE: Jennifer Voss  [address]  [place] [address]        Dear Colleague,    Thank you for referring your patient, Jennifer Voss, to the [facility]. Please see a copy of my visit note below.        Assessment:      ICD-10-CM    1. Closed displaced avulsion fracture of right talus, initial encounter  S92.151A Physical Therapy Referral            Plan:  Orders Placed This Encounter   Procedures     Physical Therapy Referral       Discussed the etiology and treatment of the condition with the patient.  Imaging studies reviewed and discussed with the patient.  Discussed surgical and conservative options.    Small avulsion Fx lateral process talus - STJ sprain equivalent    -Immobilization- boot --WB- in boot z1 months  -Activity - minimal in boot, PT  -PT referral, ankle brace after boot        Return:  No follow-ups on file.    Lizeth Castro DPM                Chief Complaint:     Patient presents with:  Right Ankle - Injury     right ankle fracture.    HPI:  Jennifer Voss is a 38 year old year old female who presents for evaluation of ankle fracture.    Date of injury- 5/27/23  Mechanism of injury- missed a step getting out of an RV and rolled ankle and felt a snap   Pain location- lateral ankle   Bruising and swelling   Walking with boot, naproxen, pain meds prn     Past Medical & Surgical History:  Past Medical History:   Diagnosis Date     Abnormal Pap smear of cervix 08/2003     Cervical high risk HPV (human papillomavirus) test positive 07/24/2015    see problem list     Chickenpox      Ectopic pregnancy     right     GERD (gastroesophageal reflux disease) 07/16/2012     H/O chronic ear infection as a child     H/O colposcopy with cervical biopsy 01/18/2016    see problem list     Left tubal pregnancy without intrauterine pregnancy 01/05/2018     Tonsillitis       Past Surgical History:   Procedure Laterality Date     COLONOSCOPY N/A 8/1/2017     Procedure: COMBINED COLONOSCOPY, SINGLE OR MULTIPLE BIOPSY/POLYPECTOMY BY BIOPSY;  Colonoscopy;  Surgeon: Ramakrishna Epstein MD;  Location: [facility]     DILATION AND CURETTAGE, OPERATIVE HYSTEROSCOPY, COMBINED N/A 10/6/2021    Procedure: dilation and curettage, hysteroscopy with polypectomy,Pap Smear;  Surgeon: Fanny Camacho MD;  Location: [facility]     LAPAROSCOPIC EVACUATION ECTOPIC PREGNANCY N/A 7/24/2015    LSC right salpingectomy      LAPAROSCOPY DIAGNOSTIC (GYN) N/A 10/6/2021    Procedure: Diagnostic laparoscopy;  Surgeon: Fanny aCmacho MD;  Location: [facility]     LEEP TX, CERVICAL  3/2016    benign     MOUTH SURGERY      wisdom teeth     PE TUBES       TONSILLECTOMY        Family History   Problem Relation Age of Onset     Depression Mother      Diabetes Father      Hypertension Father      Substance Abuse Brother         recovered drugs     Depression Brother      Anxiety Disorder Brother      Diabetes Paternal Grandmother      Coronary Artery Disease Paternal Grandmother         MI     Aneurysm Paternal Grandmother         brain     Heart Failure Paternal Grandfather         CHF        Social History:  ?  History   Smoking Status     Every Day     Packs/day: 1.00     Years: 10.00     Types: Cigarettes   Smokeless Tobacco     Never     History   Drug Use No     Social History    Substance and Sexual Activity      Alcohol use: Yes        Alcohol/week: 0.0 standard drinks of alcohol      Allergies:  ?   Allergies   Allergen Reactions     Penicillins Nausea     Fluoxetine Itching        Medications:    Current Outpatient Medications   Medication     albuterol (PROAIR HFA/PROVENTIL HFA/VENTOLIN HFA) 108 (90 Base) MCG/ACT inhaler     cyclobenzaprine (FLEXERIL) 10 MG tablet     diazepam (VALIUM) 10 MG tablet     escitalopram (LEXAPRO) 10 MG tablet     gabapentin (NEURONTIN) 300 MG capsule     HYDROcodone-acetaminophen (NORCO) 5-325 MG tablet     ibuprofen (ADVIL/MOTRIN) 800 MG tablet     lamoTRIgine (LAMICTAL) 200 MG  tablet     norethindrone (MICRONOR) 0.35 MG tablet     OLANZapine (ZYPREXA) 2.5 MG tablet     senna-docusate (SENOKOT-S/PERICOLACE) 8.6-50 MG tablet     No current facility-administered medications for this visit.       Physical Exam:  ?  Vitals:  /86   Pulse 76   Wt 75.8 kg (167 lb)   BMI 28.34 kg/m     General:  WD/WN, in NAD.  A&O x3.  Dermatologic:    Skin is intact, open lesions absent.  Bruising present sinus tarsi, lateral ankle gutter R.  Skin texture, turgor is normal.  Vascular:  Pulses palpable.  Digital capillary refill time normal right.  Skin temperature is warm to affected extremity.  Generalized edema- none.  Focal edema- moderate to affected extremity.  Neurologic:    Gross sensation normal  Gait and balance abnormal, NWB to injured extremity.  Musculoskeletal:  Maximal pain to palpation of sinus tarsi  And subfibular / lateral process taluys right.  Mild pain to palpation of ATF right.  Anterior drawer stable,  right.  Talar tilt unstable,  right.  Active ROM present to digits bilateral.    Muscle strength 5/5  to uninjured extremity, guarding of injured extremity.     Patient is able to bear weight on the injured extremity.  Patient is able to walk on the injured extremity.      Imaging:  x-ray independently reviewed and interpreted by myself today.  Non-Weight-bearing views right ankle dated 5/23 at outside clinic, reveal small avulsion Fx lateral process talus.  No other Fx.            Again, thank you for allowing me to participate in the care of your patient.        Sincerely,        Lizeth Castro DPM

## 2023-06-16 ENCOUNTER — MYC REFILL (OUTPATIENT)
Dept: PSYCHIATRY | Facility: CLINIC | Age: 38
End: 2023-06-16
Payer: COMMERCIAL

## 2023-06-16 DIAGNOSIS — F41.1 GAD (GENERALIZED ANXIETY DISORDER): ICD-10-CM

## 2023-06-16 DIAGNOSIS — F39 MOOD DISORDER (H): ICD-10-CM

## 2023-06-16 DIAGNOSIS — F43.10 PTSD (POST-TRAUMATIC STRESS DISORDER): ICD-10-CM

## 2023-06-19 RX ORDER — ESCITALOPRAM OXALATE 10 MG/1
10 TABLET ORAL DAILY
Qty: 90 TABLET | Refills: 0 | Status: CANCELLED | OUTPATIENT
Start: 2023-06-19

## 2023-07-18 ENCOUNTER — MYC MEDICAL ADVICE (OUTPATIENT)
Dept: PSYCHIATRY | Facility: CLINIC | Age: 38
End: 2023-07-18
Payer: COMMERCIAL

## 2023-07-25 DIAGNOSIS — G89.29 CHRONIC BILATERAL LOW BACK PAIN WITHOUT SCIATICA: ICD-10-CM

## 2023-07-25 DIAGNOSIS — M54.50 CHRONIC BILATERAL LOW BACK PAIN WITHOUT SCIATICA: ICD-10-CM

## 2023-07-26 RX ORDER — CYCLOBENZAPRINE HCL 10 MG
TABLET ORAL
Qty: 14 TABLET | Refills: 0 | Status: SHIPPED | OUTPATIENT
Start: 2023-07-26 | End: 2023-09-20

## 2023-07-31 ENCOUNTER — MYC REFILL (OUTPATIENT)
Dept: PSYCHIATRY | Facility: CLINIC | Age: 38
End: 2023-07-31
Payer: COMMERCIAL

## 2023-07-31 DIAGNOSIS — F41.1 GAD (GENERALIZED ANXIETY DISORDER): ICD-10-CM

## 2023-08-01 RX ORDER — DIAZEPAM 10 MG
5-10 TABLET ORAL DAILY PRN
Qty: 15 TABLET | Refills: 0 | Status: SHIPPED | OUTPATIENT
Start: 2023-08-01 | End: 2023-11-09

## 2023-08-01 NOTE — CONFIDENTIAL NOTE
Date of Last Office Visit: 4/27/23  Date of Next Office Visit: 8/24/23  No shows since last visit: 0  Cancellations since last visit: 0    Medication requested: diazepam (VALIUM) 10 MG tablet  Date last ordered: 3/17/23 Qty: 15 Refills: 1     Review of [place] ?: yes  Medication last sold date: 5/4/23 Qty filled: 15  Other controlled substance on [place] ?: yes  If yes, is this a new medication?: no  If yes, name of medication: Gabapentin 300 Mg Capsule  and Oxycodone Hcl (Ir) 5 Mg Tablet     Lapse in medication adherence greater than 5 days?: no, PRN medication   If yes, call patient and gather details: na  Medication refill request verified as identical to current order?: yes  Result of Last DAM, VPA, Li+ Level, CBC, or Carbamazepine Level (at or since last visit): N/A    Last visit treatment plan:   Continue olanzapine/Zyprexa at 2.5-5 mg daily as needed for anxiety, sleep, agitation.  Continue lamotrigine 200 mg daily for mood (can take as split dose 100 mg twice daily if preferred).  Continue Valium/diazepam 5-10 mg daily as needed for severe anxiety/panic symptoms.  Do not take daily.  15 tabs to last 30 days.  Continue gabapentin 600 mg at bedtime.    Continue Lexapro/escitalopram 10 mg daily for mood, anxiety.  My fax for any mental health paperwork: [phone]  Continue all other cares per primary care provider.   Continue all other medications as reviewed per electronic medical record today.   Safety plan reviewed. To the Emergency Department as needed or call after hours crisis line at [phone] or [phone]. [place] Crisis Text Line. Text [place] to 971147 or Suicide LifeLine Chat: suicidepreventionlifeline.org/chat  Continue therapy as planned.   Schedule an appointment with me in 4 months or sooner as needed. Call [facility] at [phone] to schedule.  Follow up with primary care provider as planned or for acute medical concerns.  Call the psychiatric nurse line with medication  questions or concerns at [phone].  MyChart may be used to communicate with your provider, but this is not intended to be used for emergencies.    []Medication refilled per  Medication Refill in Ambulatory Care  policy.  [x]Medication unable to be refilled by RN due to criteria not met as indicated below:    []Eligibility - not seen in the last year   []Supervision - no future appointment   []Compliance - no shows, cancellations or lapse in therapy   []Verification - order discrepancy   []Controlled medication   []Medication not included in policy   []90-day supply request   [x]Other out of scope of CMA

## 2023-08-24 ENCOUNTER — VIRTUAL VISIT (OUTPATIENT)
Dept: PSYCHIATRY | Facility: CLINIC | Age: 38
End: 2023-08-24
Payer: COMMERCIAL

## 2023-08-24 DIAGNOSIS — F41.1 GAD (GENERALIZED ANXIETY DISORDER): ICD-10-CM

## 2023-08-24 DIAGNOSIS — F15.11 METHAMPHETAMINE ABUSE IN REMISSION (H): ICD-10-CM

## 2023-08-24 DIAGNOSIS — G47.00 INSOMNIA, UNSPECIFIED TYPE: ICD-10-CM

## 2023-08-24 DIAGNOSIS — F39 MOOD DISORDER (H): ICD-10-CM

## 2023-08-24 DIAGNOSIS — F43.10 PTSD (POST-TRAUMATIC STRESS DISORDER): Primary | ICD-10-CM

## 2023-08-24 PROCEDURE — 99214 OFFICE O/P EST MOD 30 MIN: CPT | Mod: VID | Performed by: PSYCHIATRY & NEUROLOGY

## 2023-08-24 RX ORDER — LAMOTRIGINE 200 MG/1
200 TABLET ORAL DAILY
Qty: 90 TABLET | Refills: 3 | Status: SHIPPED | OUTPATIENT
Start: 2023-08-24 | End: 2024-04-18

## 2023-08-24 ASSESSMENT — ANXIETY QUESTIONNAIRES
3. WORRYING TOO MUCH ABOUT DIFFERENT THINGS: MORE THAN HALF THE DAYS
GAD7 TOTAL SCORE: 9
GAD7 TOTAL SCORE: 9
IF YOU CHECKED OFF ANY PROBLEMS ON THIS QUESTIONNAIRE, HOW DIFFICULT HAVE THESE PROBLEMS MADE IT FOR YOU TO DO YOUR WORK, TAKE CARE OF THINGS AT HOME, OR GET ALONG WITH OTHER PEOPLE: SOMEWHAT DIFFICULT
5. BEING SO RESTLESS THAT IT IS HARD TO SIT STILL: SEVERAL DAYS
1. FEELING NERVOUS, ANXIOUS, OR ON EDGE: MORE THAN HALF THE DAYS
6. BECOMING EASILY ANNOYED OR IRRITABLE: SEVERAL DAYS
7. FEELING AFRAID AS IF SOMETHING AWFUL MIGHT HAPPEN: SEVERAL DAYS
2. NOT BEING ABLE TO STOP OR CONTROL WORRYING: SEVERAL DAYS
4. TROUBLE RELAXING: SEVERAL DAYS

## 2023-08-24 ASSESSMENT — PAIN SCALES - GENERAL: PAINLEVEL: NO PAIN (0)

## 2023-08-24 NOTE — NURSING NOTE
Is the patient currently in the state of [place]? YES    Visit mode:VIDEO    If the visit is dropped, the patient can be reconnected by: VIDEO VISIT: Text to cell phone:   Telephone Information:   Mobile [phone]       Will anyone else be joining the visit? No  (If patient encounters technical issues they should call [phone])    How would you like to obtain your AVS? MyChart    Are changes needed to the allergy or medication list? No    Rooming Documentation: Assigned questionnaire(s) completed .    Reason for visit: RECHECK     KIKI Wright

## 2023-08-24 NOTE — PATIENT INSTRUCTIONS
Treatment Plan:  Continue olanzapine/Zyprexa at 2.5-5 mg daily as needed for anxiety, sleep, agitation.  Continue lamotrigine 200 mg daily for mood (can take as split dose 100 mg twice daily if preferred).  Continue Valium/diazepam 5-10 mg daily as needed for severe anxiety/panic symptoms.  Do not take daily.  15 tabs to last 30 days.  Continue gabapentin 600 mg at bedtime.    Continue Lexapro/escitalopram 10 mg daily for mood, anxiety.  Upload [place] paperwork to [employer] for completion.  Continue all other cares per primary care provider.   Continue all other medications as reviewed per electronic medical record today.   Safety plan reviewed. To the Emergency Department as needed or call after hours crisis line at [phone] or [phone]. [place] Crisis Text Line. Text [place] to 567253 or Suicide LifeLine Chat: suicidepreventionQualysline.org/chat  Continue therapy as planned.   Schedule an appointment with me in 3 months or sooner as needed. Call [facility] at [phone] to schedule.  Follow up with primary care provider as planned or for acute medical concerns.  Call the psychiatric nurse line with medication questions or concerns at [phone].  [employer] may be used to communicate with your provider, but this is not intended to be used for emergencies.    Risks of benzodiazepine (Ativan, Xanax, Klonopin, Valium, etc) use including, but not limited to, sedation, tolerance, risk for addiction/dependence. Do not drink alcohol while taking benzodiazepines due to risk of trouble breathing and potential death. Do not drive or operate heavy machinery until it is known how the drug affects you. Discuss with physician or pharmacist before ever taking a benzodiazepine with a narcotic/opioid pain medication.     Have previously discussed Lamictal (lamotrigine) can cause serious rashes including Park-Yoshi syndrome which may requiring hospitalization and discontinuation of treatment. If any signs of  "a rash occur, please see your Primary Care Provider or a dermatologist immediately.     Patient Education   Collaborative Care Psychiatry Service  What to Expect  Here's what to expect from your Collaborative Care Psychiatry Service (CCPS).   About CCPS  CCPS means 2 people work together to help you get better. You'll meet with a behavioral health clinician and a psychiatric doctor. A behavioral health clinician helps people with mental health problems by talking with them. A psychiatric doctor helps people by giving them medicine.  How it works  At every visit, you'll see the behavioral health clinician (BHC) first. They'll talk with you about how you're doing and teach you how to feel better.   Then you'll see the psychiatric doctor. This doctor can help you deal with troubling thoughts and feelings by giving you medicine. They'll make sure you know the plan for your care.   CCPS usually takes 3 to 6 visits. If you need more visits, we may have you start seeing a different psychiatric doctor for ongoing care.  If you have any questions or concerns, we'll be glad to talk with you.  About visits  Be open  At your visits, please talk openly about your problems. It may feel hard, but it's the best way for us to help you.  Cancelling visits  If you can't come to your visit, please call us right away at [phone]. If you don't cancel at least 24 hours (1 full day) before your visit, that's \"late cancellation.\"  Being late to visits  Being very late is the same as not showing up. You will be a \"no show\" if:  Your appointment starts with a BHC, and you're more than 15 minutes late for a 30-minute (half hour) visit. This will also cancel your appointment with the psychiatric doctor.  Your appointment is with a psychiatric doctor only, and you're more than 15 minutes late for a 30-minute (half hour) visit.  Your appointment is with a psychiatric doctor only, and you're more than 30 minutes late for a 60-minute (full " hour) visit.  If you cancel late or don't show up 2 times within 6 months, we may end your care.   Getting help between visits  If you need help between visits, you can call us Monday to Friday from 8 a.m. to 4:30 p.m. at [phone].  Emergency care  Call 911 or go to the nearest emergency department if your life or someone else's life is in danger.  Call 988 anytime to reach the national Suicide and Crisis hotline.  Medicine refills  To refill your medicine, call your pharmacy. You can also call [facility]'s Behavioral Access at [phone], Monday to Friday, 8 a.m. to 4:30 p.m. It can take 1 to 3 business days to get a refill.   Forms, letters, and tests  You may have papers to fill out, like FMLA, short-term disability, and workability. We can help you with these forms at your visits, but you must have an appointment. You may need more than 1 visit for this, to be in an intensive therapy program, or both.  Before we can give you medicine for ADHD, we may refer you to get tested for it or confirm it another way.  We may not be able to give you an emotional support animal letter.  We don't do mental health checks ordered by the court.   We don't do mental health testing, but we can refer you to get tested.   Thank you for choosing us for your care.  For informational purposes only. Not to replace the advice of your health care provider. Copyright   2022 [facility]. All rights reserved. [employer] [id number] - 12/22.
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
## 2023-09-20 ENCOUNTER — ANCILLARY PROCEDURE (OUTPATIENT)
Dept: GENERAL RADIOLOGY | Facility: CLINIC | Age: 38
End: 2023-09-20
Attending: NURSE PRACTITIONER
Payer: COMMERCIAL

## 2023-09-20 ENCOUNTER — OFFICE VISIT (OUTPATIENT)
Dept: FAMILY MEDICINE | Facility: CLINIC | Age: 38
End: 2023-09-20
Payer: COMMERCIAL

## 2023-09-20 VITALS
RESPIRATION RATE: 12 BRPM | HEIGHT: 64 IN | WEIGHT: 186 LBS | TEMPERATURE: 99.1 F | OXYGEN SATURATION: 97 % | DIASTOLIC BLOOD PRESSURE: 74 MMHG | HEART RATE: 78 BPM | BODY MASS INDEX: 31.76 KG/M2 | SYSTOLIC BLOOD PRESSURE: 122 MMHG

## 2023-09-20 DIAGNOSIS — L03.114 CELLULITIS OF LEFT UPPER EXTREMITY: Primary | ICD-10-CM

## 2023-09-20 DIAGNOSIS — R06.02 SOB (SHORTNESS OF BREATH): ICD-10-CM

## 2023-09-20 DIAGNOSIS — R22.32 LOCALIZED SWELLING ON LEFT HAND: ICD-10-CM

## 2023-09-20 DIAGNOSIS — M54.50 CHRONIC BILATERAL LOW BACK PAIN WITHOUT SCIATICA: ICD-10-CM

## 2023-09-20 DIAGNOSIS — Z11.59 NEED FOR HEPATITIS C SCREENING TEST: ICD-10-CM

## 2023-09-20 DIAGNOSIS — Z11.4 SCREENING FOR HIV (HUMAN IMMUNODEFICIENCY VIRUS): ICD-10-CM

## 2023-09-20 DIAGNOSIS — Z12.31 ENCOUNTER FOR SCREENING MAMMOGRAM FOR BREAST CANCER: ICD-10-CM

## 2023-09-20 DIAGNOSIS — G89.29 CHRONIC BILATERAL LOW BACK PAIN WITHOUT SCIATICA: ICD-10-CM

## 2023-09-20 DIAGNOSIS — Z12.4 CERVICAL CANCER SCREENING: ICD-10-CM

## 2023-09-20 DIAGNOSIS — Z00.00 ROUTINE GENERAL MEDICAL EXAMINATION AT A HEALTH CARE FACILITY: Primary | ICD-10-CM

## 2023-09-20 LAB
BASOPHILS # BLD AUTO: 0 10E3/UL (ref 0–0.2)
BASOPHILS NFR BLD AUTO: 0 %
CRP SERPL-MCNC: <3 MG/L
EOSINOPHIL # BLD AUTO: 0.2 10E3/UL (ref 0–0.7)
EOSINOPHIL NFR BLD AUTO: 2 %
ERYTHROCYTE [DISTWIDTH] IN BLOOD BY AUTOMATED COUNT: 13.2 % (ref 10–15)
ERYTHROCYTE [SEDIMENTATION RATE] IN BLOOD BY WESTERGREN METHOD: 9 MM/HR (ref 0–20)
HCT VFR BLD AUTO: 41.6 % (ref 35–47)
HGB BLD-MCNC: 13.7 G/DL (ref 11.7–15.7)
IMM GRANULOCYTES # BLD: 0.1 10E3/UL
IMM GRANULOCYTES NFR BLD: 1 %
LYMPHOCYTES # BLD AUTO: 2.9 10E3/UL (ref 0.8–5.3)
LYMPHOCYTES NFR BLD AUTO: 24 %
MCH RBC QN AUTO: 28.4 PG (ref 26.5–33)
MCHC RBC AUTO-ENTMCNC: 32.9 G/DL (ref 31.5–36.5)
MCV RBC AUTO: 86 FL (ref 78–100)
MONOCYTES # BLD AUTO: 0.9 10E3/UL (ref 0–1.3)
MONOCYTES NFR BLD AUTO: 8 %
NEUTROPHILS # BLD AUTO: 8.1 10E3/UL (ref 1.6–8.3)
NEUTROPHILS NFR BLD AUTO: 67 %
PLATELET # BLD AUTO: 325 10E3/UL (ref 150–450)
RBC # BLD AUTO: 4.82 10E6/UL (ref 3.8–5.2)
TSH SERPL DL<=0.005 MIU/L-ACNC: 0.95 UIU/ML (ref 0.3–4.2)
URATE SERPL-MCNC: 4.6 MG/DL (ref 2.4–5.7)
WBC # BLD AUTO: 12.2 10E3/UL (ref 4–11)

## 2023-09-20 PROCEDURE — 85025 COMPLETE CBC W/AUTO DIFF WBC: CPT | Performed by: NURSE PRACTITIONER

## 2023-09-20 PROCEDURE — 84550 ASSAY OF BLOOD/URIC ACID: CPT | Performed by: NURSE PRACTITIONER

## 2023-09-20 PROCEDURE — 99395 PREV VISIT EST AGE 18-39: CPT | Performed by: NURSE PRACTITIONER

## 2023-09-20 PROCEDURE — 36415 COLL VENOUS BLD VENIPUNCTURE: CPT | Performed by: NURSE PRACTITIONER

## 2023-09-20 PROCEDURE — 99214 OFFICE O/P EST MOD 30 MIN: CPT | Mod: 25 | Performed by: NURSE PRACTITIONER

## 2023-09-20 PROCEDURE — 73130 X-RAY EXAM OF HAND: CPT | Mod: TC | Performed by: RADIOLOGY

## 2023-09-20 PROCEDURE — 85652 RBC SED RATE AUTOMATED: CPT | Performed by: NURSE PRACTITIONER

## 2023-09-20 PROCEDURE — 86140 C-REACTIVE PROTEIN: CPT | Performed by: NURSE PRACTITIONER

## 2023-09-20 PROCEDURE — 86803 HEPATITIS C AB TEST: CPT | Performed by: NURSE PRACTITIONER

## 2023-09-20 PROCEDURE — 84443 ASSAY THYROID STIM HORMONE: CPT | Performed by: NURSE PRACTITIONER

## 2023-09-20 PROCEDURE — 87389 HIV-1 AG W/HIV-1&-2 AB AG IA: CPT | Performed by: NURSE PRACTITIONER

## 2023-09-20 RX ORDER — CYCLOBENZAPRINE HCL 10 MG
10 TABLET ORAL 3 TIMES DAILY PRN
Qty: 30 TABLET | Refills: 1 | Status: SHIPPED | OUTPATIENT
Start: 2023-09-20 | End: 2024-03-18

## 2023-09-20 RX ORDER — ALBUTEROL SULFATE 90 UG/1
1-2 AEROSOL, METERED RESPIRATORY (INHALATION) EVERY 6 HOURS PRN
Qty: 18 G | Refills: 1 | Status: SHIPPED | OUTPATIENT
Start: 2023-09-20 | End: 2023-12-26

## 2023-09-20 RX ORDER — CEPHALEXIN 500 MG/1
500 CAPSULE ORAL 4 TIMES DAILY
Qty: 40 CAPSULE | Refills: 0 | Status: SHIPPED | OUTPATIENT
Start: 2023-09-20 | End: 2023-09-30

## 2023-09-20 ASSESSMENT — PAIN SCALES - GENERAL: PAINLEVEL: NO PAIN (0)

## 2023-09-20 ASSESSMENT — ENCOUNTER SYMPTOMS
NAUSEA: 0
HEARTBURN: 0
COUGH: 1
MYALGIAS: 1
ABDOMINAL PAIN: 0
CONSTIPATION: 0
ARTHRALGIAS: 0
DIZZINESS: 0
HEMATOCHEZIA: 0
PALPITATIONS: 1
BREAST MASS: 0
NERVOUS/ANXIOUS: 1
CHILLS: 0
SHORTNESS OF BREATH: 0
FREQUENCY: 0
FEVER: 0
DIARRHEA: 0
SORE THROAT: 0
HEMATURIA: 0
WEAKNESS: 0
HEADACHES: 1
JOINT SWELLING: 0
EYE PAIN: 0
DYSURIA: 0

## 2023-09-20 ASSESSMENT — PATIENT HEALTH QUESTIONNAIRE - PHQ9
SUM OF ALL RESPONSES TO PHQ QUESTIONS 1-9: 11
10. IF YOU CHECKED OFF ANY PROBLEMS, HOW DIFFICULT HAVE THESE PROBLEMS MADE IT FOR YOU TO DO YOUR WORK, TAKE CARE OF THINGS AT HOME, OR GET ALONG WITH OTHER PEOPLE: SOMEWHAT DIFFICULT
SUM OF ALL RESPONSES TO PHQ QUESTIONS 1-9: 11

## 2023-09-20 NOTE — PATIENT INSTRUCTIONS
You can call imaging scheduling to set up appointment date, time, and location that works best for you to have mammogram [phone]      Preventive Health Recommendations  Female Ages 26 - 39  Yearly exam:   See your health care provider every year in order to  Review health changes.   Discuss preventive care.    Review your medicines if you your doctor has prescribed any.    Until age 30: Get a Pap test every three years (more often if you have had an abnormal result).    After age 30: Talk to your doctor about whether you should have a Pap test every 3 years or have a Pap test with HPV screening every 5 years.   You do not need a Pap test if your uterus was removed (hysterectomy) and you have not had cancer.  You should be tested each year for STDs (sexually transmitted diseases), if you're at risk.   Talk to your provider about how often to have your cholesterol checked.  If you are at risk for diabetes, you should have a diabetes test (fasting glucose).  Shots: Get a flu shot each year. Get a tetanus shot every 10 years.   Nutrition:   Eat at least 5 servings of fruits and vegetables each day.  Eat whole-grain bread, whole-wheat pasta and brown rice instead of white grains and rice.  Get adequate Calcium and Vitamin D.     Lifestyle  Exercise at least 150 minutes a week (30 minutes a day, 5 days of the week). This will help you control your weight and prevent disease.  Limit alcohol to one drink per day.  No smoking.   Wear sunscreen to prevent skin cancer.  See your dentist every six months for an exam and cleaning.

## 2023-09-20 NOTE — COMMUNITY RESOURCES LIST (ENGLISH)
09/20/2023   [facility] - Outpatient Clinics  N/A  For additional resource needs, please contact your health insurance member services or your primary care team.  Phone: [phone]   Email: N/A   Address: [address] [place] [address]   Hours: N/A        Food and Nutrition       Food pantry  1  [facility] Distance: 0.89 miles      Pickup   [address] [address]  Language: English  Hours: Mon - Fri 8:30 AM - 12:00 PM , Mon - Fri 1:00 PM - 4:00 PM  Fees: Free, Self Pay   Phone: (807) [phone] Email: shanelle@[facility] ArlingtonLeapfrog Online.Little Quest Website: https://www.[facility].org/usn/     2  [facility] Distance: 0.89 miles      Pick   [address] [address]  Language: English, Tajik  Hours: Mon - Thu 8:30 AM - 4:00 PM  Fees: Free, Self Pay   Phone: (963) 760-3112 Email: shanelle@[facility] ArlingtonLeapfrog Online.Little Quest Website: https://[facility].org/[facility]/socialservices-office-[place]/     SNAP application assistance  3  [employer] [place] Distance: 14.62 miles      Phone/Virtual   [address] [address]  Language: English, Hmong, Tajik, Iraqi, Kittitian  Hours: Mon - Fri 8:30 AM - 4:30 PM  Fees: Free   Phone: (946) 966-7610 Email: helpline@hungersolutions.org Website: https://www.hungersolutions.org/programs/[place]-food-helpline/     4  [facility] Economic Assistance Department (SNAP/Food Support) [facility] Distance: 0.89 miles      Phone/Virtual   [address] [address]  Language: English  Hours: Mon - Fri 8:00 AM - 4:30 PM  Fees: Free   Phone: (205) 823-6011 Website: http://Hamilton County HospitalPoppincoCarrie Tingley Hospitalandressa.     Soup kitchen or free meals  5  [facility] - Family Table Meal Distance: 3.99 miles      In-Person, Pickup   [address] [place] [address]  Language: English  Hours:  Thu 5:30 PM - 6:30 PM  Fees: Free   Phone: (264) 310-6924 Email: office@[place].Tegile [employer] Website: http://www.[place].Tegile Systems     [address] [place] [address] - Family Table Meal Distance: 4.15 miles      Pickup   [address] [place] [address]  Language: English  Hours: Sat 11:30 AM - 12:30 PM  Fees: Free   Phone: (673) 347-9999 Email: kam@[facility] Memorial HospitalVelo LabsMemorial Hospital at Stone CountyFramedia Advertising Website: http://www.[facility].[employer]eg[employer] Systems/          Important Numbers & Websites       [place] [address] 211unitedway.org  Poison Control   (225) 435-4323 [place]poison.org  Suicide and Crisis Lifeline   [address] [place], [place] [address]line.org  Childhelp [place] Child Abuse Hotline   780.454.2202 Childhelphotline.org  [place] Sexual Assault Hotline   (235) 252-3904 (HOPE) [facility].org  [place] Runaway Safeline   (978) 262-6639 (RUNAWAY) [facility] Hospitalrunaway.org  Pregnancy & Postpartum Support [place]   Call/text 151-447-8939 Ppsupportmn.org  Substance Abuse National Helpline ([facility]   756-069-HELP (6653) Findtreatment.gov  Emergency Services   911

## 2023-09-20 NOTE — PROGRESS NOTES
SUBJECTIVE:   CC: Jennifer is an 38 year old who presents for preventive health visit.       9/20/2023     1:35 PM   Additional Questions   Roomed by Darling   Accompanied by Self         9/20/2023     1:35 PM   Patient Reported Additional Medications   Patient reports taking the following new medications NA       Healthy Habits:     Getting at least 3 servings of Calcium per day:  Yes    Bi-annual eye exam:  NO    Dental care twice a year:  Yes    Sleep apnea or symptoms of sleep apnea:  None    Diet:  Regular (no restrictions)    Frequency of exercise:  1 day/week    Duration of exercise:  30-45 minutes    Taking medications regularly:  Yes    Medication side effects:  Not applicable    Additional concerns today:  Yes    *left hand swelling x1 day. Slight pain. Denies: injury or prior injuries    *Requesting refills on flexeril and albuterol      Have you ever done Advance Care Planning? (For example, a Health Directive, POLST, or a discussion with a medical provider or your loved ones about your wishes): No, advance care planning information given to patient to review.  Patient declined advance care planning discussion at this time.    Social History     Tobacco Use    Smoking status: Every Day     Packs/day: 1.00     Years: 10.00     Pack years: 10.00     Types: Cigarettes    Smokeless tobacco: Never   Substance Use Topics    Alcohol use: Not Currently           9/20/2023     8:14 AM   Alcohol Use   Prescreen: >3 drinks/day or >7 drinks/week? Not Applicable         9/25/2018     2:46 PM   AUDIT - Alcohol Use Disorders Identification Test - Reproduced from the World Health Organization Audit 2001 (Second Edition)   1.  How often do you have a drink containing alcohol? 2 to 4 times a month   2.  How many drinks containing alcohol do you have on a typical day when you are drinking? 5 or 6   3.  How often do you have five or more drinks on one occasion? Less than monthly   4.  How often during the last year have you  found that you were not able to stop drinking once you had started? Never   5.  How often during the last year have you failed to do what was normally expected of you because of drinking? Never   6.  How often during the last year have you needed a first drink in the morning to get yourself going after a heavy drinking session? Never   7.  How often during the last year have you had a feeling of guilt or remorse after drinking? Never   8.  How often during the last year have you been unable to remember what happened the night before because of your drinking? Never   9.  Have you or someone else been injured because of your drinking? No   10. Has a relative, friend, doctor or other health care worker been concerned about your drinking or suggested you cut down? No   TOTAL SCORE 5     Reviewed orders with patient.  Reviewed health maintenance and updated orders accordingly - Yes      Breast Cancer Screening:  Any new diagnosis of family breast, ovarian, or bowel cancer? No    FHS-7:     Patient under 40 years of age: Routine Mammogram Screening not recommended.   Pertinent mammograms are reviewed under the imaging tab.    History of abnormal Pap smear: NO - age 30-65 PAP every 5 years with negative HPV co-testing recommended      Latest Ref Rng & Units 10/6/2021     2:15 PM 10/6/2021     8:54 AM 9/25/2018     2:52 PM   PAP / HPV   PAP   Negative for Intraepithelial Lesion or Malignancy (NILM)     PAP (Historical)    NIL    HPV 16 DNA Negative Negative      HPV 18 DNA Negative Negative      Other HR HPV Negative Negative        Reviewed and updated as needed this visit by clinical staff   Tobacco  Allergies  Meds              Reviewed and updated as needed this visit by Provider     Meds               Here today for physical.  Is not fasting.    Left wrist pain. Woke up yesterday with swollen hand. About 1 week ago did hit hand on edge of table.  Had a little bruise at that time.  Swelling and main location of pain  is in a different area.  Has noticed a lump to wrist. Is not able to move wrist like usual. No fevers or chills.  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  Did take some naproxen this morning.  Was minimally effective.  No numbness or tingling.    Needs to have refil of flexaril. Takes as needed for lower back pain.  Working 2 different jobs, works 14 hour shifts 3 times per week.    Air quiality with breathing and needs to have refill of albuterol.     Continues to see psychiatry.  Psychiatry plans to see long-term.  Overall, feels like is doing very well.  Is in a much better place than was 1 year ago.  Did attend counseling/therapy.  X has trial in October.  Hoping he will go to prison.  This will help to decrease more anxieties.    Last Pap: October 6, 2021.  Repeat in 3 years.  Last mammogram: maternal aunt with breast cancer.   Last BMD: N/A  Last Colonoscopy: never, no family history of colon cancer.   Last eye exam: long time ago.   Last dental exam: every 6 mo  Last tetanus vaccine: 2012  Last influenza vaccine: Declines  Last shingles vaccine: N/A  Last pneumonia vaccine: Declines we will do with routine labs  Last COVID vaccine: has had both doses   Last COVID booster: declines   Hep C screen:  HIV screen: Has been in the past, declines re screening.   AAA screen (age 65-78 with smoking hx): N/A  IVD (HTN, Hyperlipid, Smoking): N/A  Lung CA screening (50-77, 20 pk smoking hx) Quit within 15 years: N/A    Review of Systems   Constitutional:  Negative for chills and fever.   HENT:  Negative for congestion, ear pain, hearing loss and sore throat.    Eyes:  Negative for pain and visual disturbance.   Respiratory:  Positive for cough. Negative for shortness of breath.    Cardiovascular:  Positive for palpitations. Negative for chest pain and peripheral edema.   Gastrointestinal:  Negative for abdominal pain, constipation, diarrhea, heartburn, hematochezia and nausea.   Breasts:  Negative for  "tenderness, breast mass and discharge.   Genitourinary:  Negative for dysuria, frequency, genital sores, hematuria, pelvic pain, urgency, vaginal bleeding and vaginal discharge.   Musculoskeletal:  Positive for myalgias (left hand). Negative for arthralgias and joint swelling.   Skin:  Negative for rash.   Neurological:  Positive for headaches. Negative for dizziness and weakness.   Psychiatric/Behavioral:  Positive for mood changes. The patient is nervous/anxious.         OBJECTIVE:   /74   Pulse 78   Temp 99.1  F (37.3  C) (Tympanic)   Resp 12   Ht 1.626 m (5' 4\")   Wt 84.4 kg (186 lb)   LMP  (LMP Unknown)   SpO2 97%   BMI 31.93 kg/m    Physical Exam  Constitutional:       Appearance: Normal appearance. She is well-developed.   HENT:      Head: Normocephalic and atraumatic.      Right Ear: Tympanic membrane and external ear normal. No middle ear effusion.      Left Ear: Tympanic membrane and external ear normal.  No middle ear effusion.      Nose: No mucosal edema.   Neck:      Thyroid: No thyromegaly.      Vascular: No carotid bruit.   Cardiovascular:      Rate and Rhythm: Normal rate and regular rhythm.      Heart sounds: Normal heart sounds.   Pulmonary:      Effort: Pulmonary effort is normal.      Breath sounds: Normal breath sounds.   Abdominal:      General: Bowel sounds are normal.      Palpations: Abdomen is soft.   Musculoskeletal:      Left hand: Swelling, tenderness and bony tenderness present. No deformity. Decreased range of motion. Decreased strength. Normal sensation.   Skin:     General: Skin is warm and dry.   Neurological:      Mental Status: She is alert.   Psychiatric:         Behavior: Behavior normal.           ASSESSMENT/PLAN:   Routine general medical examination at a health care facility  Screening guidelines reviewed.   - TSH with free T4 reflex; Future    Screening for HIV (human immunodeficiency virus)  - HIV Antigen Antibody Combo; Future    Need for hepatitis C " screening test  - Hepatitis C Screen Reflex to HCV RNA Quant and Genotype; Future    Cervical cancer screening  Last Pap 2021, due every 3 years.  We will plan to complete in October 2024    SOB (shortness of breath)  Prescription given to have available when needed.  - albuterol (PROAIR HFA/PROVENTIL HFA/VENTOLIN HFA) 108 (90 Base) MCG/ACT inhaler; Inhale 1-2 puffs into the lungs every 6 hours as needed for shortness of breath, wheezing or cough    Chronic bilateral low back pain without sciatica  Using appropriately.  Prescription given to have available when needed.  - cyclobenzaprine (FLEXERIL) 10 MG tablet; Take 1 tablet (10 mg) by mouth 3 times daily as needed for muscle spasms    Encounter for screening mammogram for breast cancer  Order placed, plans to call per self and set up after turns age 39  - MA Screening Digital Bilateral; Future    Localized swelling on left hand  We will check labs and imaging here today.  Full plan will depend on outcome.  - CBC with Platelets & Differential; Future  - CRP inflammation; Future  - Erythrocyte sedimentation rate auto; Future  - Uric acid; Future  - XR Hand Left G/E 3 Views; Future     COUNSELING:  Reviewed preventive health counseling, as reflected in patient instructions        She reports that she has been smoking cigarettes. She has a 10.00 pack-year smoking history. She has never used smokeless tobacco.  Nicotine/Tobacco Cessation Plan:   Information offered: Patient not interested at this time          GISEL Johnston CNP  [facility] [place]

## 2023-09-20 NOTE — COMMUNITY RESOURCES LIST (ENGLISH)
09/20/2023   [facility] - Outpatient Clinics  N/A  For additional resource needs, please contact your health insurance member services or your primary care team.  Phone: [phone]   Email: N/A   Address: [address] [place] [address]   Hours: N/A        Food and Nutrition       Food pantry  1  [facility] Distance: 0.89 miles      Pickup   [address] [address]  Language: English  Hours: Mon - Fri 8:30 AM - 12:00 PM , Mon - Fri 1:00 PM - 4:00 PM  Fees: Free, Self Pay   Phone: (555) 847-6009 Email: shanelle@[facility] CenterTuolar.com.Sohu.com Website: https://www.[facility].org/usn/     2  [facility] Distance: 0.89 miles      Pick   [address] [address]  Language: English, Austrian  Hours: Mon - Thu 8:30 AM - 4:00 PM  Fees: Free, Self Pay   Phone: (603) 942-2285 Email: shanelle@[facility]Bend Medical CenterTuolar.com.Sohu.com Website: https://[facility].org/[facility]/socialservices-office-[place]/     SNAP application assistance  3  [employer] [place] Distance: 14.62 miles      Phone/Virtual   [address] [address]  Language: English, Hmong, Austrian, Sammarinese, Nepalese  Hours: Mon - Fri 8:30 AM - 4:30 PM  Fees: Free   Phone: (270) 888-5344 Email: helpline@hungersolutions.org Website: https://www.hungersolutions.org/programs/[place]-food-helpline/     4  [facility] Economic Assistance Department (SNAP/Food Support) [facility] Distance: 0.89 miles      Phone/Virtual   [address] [address]  Language: English  Hours: Mon - Fri 8:00 AM - 4:30 PM  Fees: Free   Phone: (539) 180-9438 Website: http://[facility] Joseph Memorial HospitalPolyRemedycoSierra Vista Hospitalandressa.     Soup kitchen or free meals  5  [facility] - Family Table Meal Distance: 3.99 miles      In-Person, Pickup   [address] [place] [address]  Language: English  Hours:  Thu 5:30 PM - 6:30 PM  Fees: Free   Phone: (611) 104-3927 Email: office@[employer].Viewfinity Website: http://www.[employer].Viewfinity     [address] [place] [address] - Family Table Meal Distance: 4.15 miles      Pickup   [address] [place] [address]  Language: English  Hours: Sat 11:30 AM - 12:30 PM  Fees: Free   Phone: (899) 933-6777 Email: kam@[facility] CenterCNG-OneMerit Health Woman's HospitalImage Engine Design Website: http://www.[facility].Viewfinity/          Important Numbers & Websites       [place] [address] 211unitedway.org  Poison Control   (778) 645-7759 [place]poison.org  Suicide and Crisis Lifeline   [address] [place], [place] [address]line.org  Childhelp [place] Child Abuse Hotline   750.162.7758 Childhelphotline.org  [place] Sexual Assault Hotline   (469) 637-6861 (HOPE) [facility] Centern.org  [place] Runaway Safeline   (973) 608-9422 (RUNAWAY) [facility] Oconomowocrunaway.org  Pregnancy & Postpartum Support [place]   Call/text 416-891-5807 Ppsupportmn.org  Substance Abuse National Helpline ([facility] HospitalA   792-817-HELP (7492) Findtreatment.gov  Emergency Services   911

## 2023-09-20 NOTE — COMMUNITY RESOURCES LIST (ENGLISH)
09/20/2023   [facility]  N/A  For questions about this resource list or additional care needs, please contact your primary care clinic or care manager.  Phone: [phone]   Email: N/A   Address: [address] [place] [address]   Hours: N/A        Food and Nutrition       Food pantry  1  [facility] Distance: 0.89 miles      Pick   [address] [facility] [address] [place] [address]  Language: English  Hours: Mon - Fri 8:30 AM - 12:00 PM , Mon - Fri 1:00 PM - 4:00 PM  Fees: Free, Self Pay   Phone: (642) 434-2799 Email: shanelle@[facility] Luke's Medical CenterIDx.FanChatter Website: https://www.[facility] CenterDailyDigital.org/usn/     2  [facility] Distance: 0.89 miles      [facility]   [address] [address]  Language: English, Gambian  Hours: Mon - Thu 8:30 AM - 4:00 PM  Fees: Free, Self Pay   Phone: (328) 417-6820 Email: shanelle@[facility] Luke's Medical CenterIDx.FanChatter Website: https://[facility].org/[facility]/social-services-office-[place]/     SNAP application assistance  3  [facility] Economic Assistance Department (SNAP/Food Support) [facility] Distance: 0.89 miles      Phone/Virtual   [address] [address]  Language: English  Hours: Mon - Fri 8:00 AM - 4:30 PM  Fees: Free   Phone: (402) 185-7186 Website: http://Scribe Software.     4  [facility] Economic Assistance Department Distance: 0.89 miles      Phone/Virtual   [address] [address]  Language: English  Hours: Mon - Fri 8:15 AM - 4:00 PM  Fees: Free   Phone: (560) 204-2294 Email: paperwork@co.[place].mn. Website: http://www.Saint AugustinePowerspan./193/Economic-Assistance     Soup kitchen or free meals  5  [facility] - Family Table Meal Distance: 3.99 miles      In-Person, Pickup   [address] [place] [address]  Language: English  Hours: Thu 5:30 PM - 6:30 PM   Fees: Free   Phone: (134) 202-5658 Email: office@HurleyJFrog.JFrog Website: http://www.[place].org     6  [facility] - Family Table Meal Distance: 4.15 miles      [address] [address]  Language: English  Hours: Sat 11:30 AM - 12:30 PM  Fees: Free   Phone: (473) 745-3217 Email: kam@[facility].JFrog Website: http://www.[facility].JFrog/          Important Numbers & Websites       Emergency Services   911  [facility] Services   311  Poison Control   (353) 446-3984  Suicide Prevention Lifeline   (580) 286-4631 (TALK)  Child Abuse Hotline   (302) 753-5139 (4-A-Child)  Sexual Assault Hotline   (649) 876-4209 (HOPE)  National Runaway Safeline   (808) 255-8797 (RUNAWAY)  All-Options Talkline   (989) 438-9217  Substance Abuse Referral   (879) 687-8237 (HELP)

## 2023-09-20 NOTE — LETTER
[facility] [place]  [address] [place] [address]  Phone: [phone]    September 20, 2023        Jennifer Voss  [address]  [place] [address]        RE: Jennifer Voss    Patient was seen and treated today at our clinic.  Please excuse her from work today, September 20, 2023    Please contact me for questions or concerns.      Sincerely,        GISEL Johnston CNP

## 2023-09-20 NOTE — LETTER
[facility] HARVINDER  [address] [facility] [place] [address]  Phone: [phone]    September 20, 2023        Jennifer Voss  [address] [place] [address]      RE: Jennifer Voss    Patient was seen and treated today at our clinic.  He is excuse her from work September 28.    Please contact me for questions or concerns.      Sincerely,        GISEL Johnston CNP

## 2023-09-21 ENCOUNTER — TELEPHONE (OUTPATIENT)
Dept: FAMILY MEDICINE | Facility: CLINIC | Age: 38
End: 2023-09-21
Payer: COMMERCIAL

## 2023-09-21 LAB
HCV AB SERPL QL IA: NONREACTIVE
HIV 1+2 AB+HIV1 P24 AG SERPL QL IA: NONREACTIVE

## 2023-09-21 NOTE — TELEPHONE ENCOUNTER
Prior Authorization Retail Medication Request    Medication/Dose: albuterol (PROAIR HFA/PROVENTIL HFA/VENTOLIN HFA) 108 (90 Base) MCG/ACT inhaler   ICD code (if different than what is on RX):  R06.02   Previously Tried and Failed:  na  Rationale:  na    Insurance Name:  Junar   Insurance ID:  [id number]       Pharmacy Information (if different than what is on RX)  Name:  [facility]  Phone:  [phone]

## 2023-09-25 NOTE — TELEPHONE ENCOUNTER
Prior Authorization Not Needed per Insurance    Medication: albuterol (PROAIR HFA/PROVENTIL HFA/VENTOLIN HFA) 108 (90 Base) MCG/ACT inhaler  Insurance Company: [employer] - Phone [phone] Fax [phone]  Expected CoPay:      Pharmacy Filling the Rx: [facility] [address] [place] [address]  Pharmacy Notified: Yes  Patient Notified: Yes    Patient's plan only covers certain NDC's below.  Pharmacy was called and made aware to run the covered NDC's   Pharmacy has paid claim, patient picked up already.    [facility] covered inhaler NDC s   albuterol sulfate CFC-free aerosol    [address]-02 (ProAir)    [address]-57 (ProAir)    [address]-26 (Proventil)           levalbuterol tartrate CFC-free aerosol    [id number]

## 2023-11-08 NOTE — TELEPHONE ENCOUNTER
Panel Management Review      Patient has the following on her problem list:     Depression / Dysthymia review    Measure:  Needs PHQ-9 score of 4 or less during index window.  Administer PHQ-9 and if score is 5 or more, send encounter to provider for next steps.    4- 8 month window range: 8/31/18-12/31/18    PHQ-9 SCORE 8/23/2016 7/19/2017 4/30/2018   Total Score - - -   Total Score 10 4 13       If PHQ-9 recheck is 5 or more, route to provider for next steps.    Patient is due for:  PHQ9 and DAP      Composite cancer screening  Chart review shows that this patient is due/due soon for the following Pap Smear  My chart message sent to patient 7/9/18 addressing need for Pap and HPV screening. Patient has physical scheduled 9/25/18 with OBGYN.  Summary:    Patient is due/failing the following:   HIV screen, physical, pap, DAP, influenza vaccine, HPV screening, PHQ    Action needed:   Patient needs to do PHQ9 and needs DAP.    Type of outreach:    Sent [employer]stagg message.     Patient completed PHQ9 via [employer]. Score 15.    Questions for provider review:    None                                                                                                                                    Ghazala Ornelas CMA       Chart routed to none .           "CC:  Chief Complaint   Patient presents with    Medical Clearance     12/11 knee replacement (R)        HISTORY OF PRESENT ILLNESS: Patient is a 65 y.o. female established patient presenting for preoperative exam for R TKA with Dr Rhodes on December 11 2023.  Pt's GFR mildly decreased to 61. Notes she is taking a lot of ibuprofen.   Most recent EKG reviewed today and in NSR.  TSH within normal range.   Continues with Soma and Valium BID for MS spasms.     Current Outpatient Medications   Medication Sig Dispense Refill    diazepam (VALIUM) 10 MG tablet TAKE 1 TABLET BY MOUTH EVERY 12 HOURS AS NEEDED (SPASMS) FOR UP TO 90 DAYS.      carisoprodol (SOMA) 350 MG Tab Take 1 Tablet by mouth 2 times a day for 90 days. 180 Tablet 0    meloxicam (MOBIC) 15 MG tablet Take 1 Tablet by mouth every day. 90 Tablet 1    levothyroxine (SYNTHROID) 175 MCG Tab TAKE 1 TABLET BY MOUTH EVERY DAY IN THE MORNING ON AN EMPTY STOMACH 90 Tablet 0    Cholecalciferol (VITAMIN D PO) Take 3,000 Units by mouth every day.       No current facility-administered medications for this visit.        ROS:     Review of Systems   Constitutional:  Negative for chills and fever.   Respiratory:  Negative for sputum production.    Cardiovascular:  Negative for chest pain.   Gastrointestinal:  Negative for abdominal pain, heartburn, nausea and vomiting.   Musculoskeletal:  Positive for joint pain and myalgias.   Neurological:  Negative for dizziness and headaches.       Exam:    /82   Pulse 81   Temp 36.3 °C (97.4 °F) (Temporal)   Resp 12   Ht 1.6 m (5' 3\")   Wt 72.6 kg (160 lb)   SpO2 94%  Body mass index is 28.34 kg/m².    General:  Well nourished, well developed female in NAD  Head is grossly normal.  Neck: Supple.   Pulmonary: Clear to auscultation. No ronchi, wheezing or rales  Cardiac: Regular rate and rhythm. No murmurs.  Skin: Warm and dry. No obvious lesions  Neuro: Normal muscle tone. Gait normal. Alert and oriented.  Psych: Normal mood " and affect      Please note that this dictation was created using voice recognition software. I have made every reasonable attempt to correct obvious errors, but I expect that there are errors of grammar and possibly content that I did not discover before finalizing the note.        Assessment/Plan:  1. Preoperative examination  Cleared for surgery.     2. Screening for colon cancer    - COLOGUARD (FIT DNA)    3. Encounter for screening mammogram for breast cancer    - MA-SCREENING MAMMO BILAT W/TOMOSYNTHESIS W/CAD; Future    4. Multiple sclerosis involving brain stem (HCC)  Stable on Soma and Valium     5. Primary osteoarthritis of right knee  F/u with Dr Rhodes.

## 2023-11-09 DIAGNOSIS — F41.1 GAD (GENERALIZED ANXIETY DISORDER): ICD-10-CM

## 2023-11-09 RX ORDER — DIAZEPAM 10 MG
5-10 TABLET ORAL DAILY PRN
Qty: 15 TABLET | Refills: 0 | Status: SHIPPED | OUTPATIENT
Start: 2023-11-09 | End: 2023-11-30

## 2023-11-09 NOTE — TELEPHONE ENCOUNTER
Date of Last Office Visit: 8/24/23  Date of Next Office Visit: 11/30/23  No shows since last visit: 0  Cancellations since last visit: 0    Medication requested: diazepam (VALIUM) 10 MG tablet  Date last ordered: 8/1/23 Qty: 15 Refills: 0     diazepam (VALIUM) 10 MG tablet 15 tablet 0 8/1/2023  No   Sig - Route: Take 0.5-1 tablets (5-10 mg) by mouth daily as needed for anxiety 15 tabs to last 30 days - Oral   Sent to pharmacy as: diazePAM 10 MG Oral Tablet (VALIUM)   Class: E-Prescribe       Review of [place] ?: yes  Medication last sold date: 8/4/23 Qty filled: 15/30  Other controlled substance on [place] ?: yes  If yes, is this a new medication?: no  If yes, name of medication: NA and date filled: NA    Lapse in medication adherence greater than 5 days?: NA - prescribed PRN  If yes, call patient and gather details: NA  Medication refill request verified as identical to current order?: yes  Result of Last DAM, VPA, Li+ Level, CBC, or Carbamazepine Level (at or since last visit): N/A    Last visit treatment plan:     Continue olanzapine/Zyprexa at 2.5-5 mg daily as needed for anxiety, sleep, agitation.  Continue lamotrigine 200 mg daily for mood (can take as split dose 100 mg twice daily if preferred).  Continue Valium/diazepam 5-10 mg daily as needed for severe anxiety/panic symptoms.  Do not take daily.  15 tabs to last 30 days.  Continue gabapentin 600 mg at bedtime.    Continue Lexapro/escitalopram 10 mg daily for mood, anxiety.  Upload [place] paperwork to [facility] for completion.  Continue all other cares per primary care provider.   Continue all other medications as reviewed per electronic medical record today.   Safety plan reviewed. To the Emergency Department as needed or call after hours crisis line at [phone] or [phone]. [place] Crisis Text Line. Text [place] to 365038 or Suicide LifeLine Chat: suicidepreventionlifeline.org/chat  Continue therapy as planned.   Schedule an appointment with me in 3 months  or sooner as needed. Call [facility] at [phone] to schedule.    []Medication refilled per  Medication Refill in Ambulatory Care  policy.  [x]Medication unable to be refilled by RN due to criteria not met as indicated below:    []Eligibility - not seen in the last year   []Supervision - no future appointment   []Compliance - no shows, cancellations or lapse in therapy   []Verification - order discrepancy   [x]Controlled medication   []Medication not included in policy   []90-day supply request   [x]Other: LPN processed

## 2023-11-22 ENCOUNTER — MYC MEDICAL ADVICE (OUTPATIENT)
Dept: FAMILY MEDICINE | Facility: CLINIC | Age: 38
End: 2023-11-22
Payer: COMMERCIAL

## 2023-11-22 NOTE — TELEPHONE ENCOUNTER
Routing [employer] message to PCP.    Gloria, RN    Triage Nurse  [facility]

## 2023-11-27 NOTE — TELEPHONE ENCOUNTER
RN assisted patient with scheduling using providers same day slot on 11/29/23 1:30 om appointment.       Megha Mendoza RN on 11/27/2023 at 8:36 AM

## 2023-11-30 ENCOUNTER — VIRTUAL VISIT (OUTPATIENT)
Dept: PSYCHIATRY | Facility: CLINIC | Age: 38
End: 2023-11-30
Payer: COMMERCIAL

## 2023-11-30 ENCOUNTER — TELEPHONE (OUTPATIENT)
Dept: PSYCHIATRY | Facility: CLINIC | Age: 38
End: 2023-11-30
Payer: COMMERCIAL

## 2023-11-30 DIAGNOSIS — F41.1 GAD (GENERALIZED ANXIETY DISORDER): ICD-10-CM

## 2023-11-30 DIAGNOSIS — G47.00 INSOMNIA, UNSPECIFIED TYPE: ICD-10-CM

## 2023-11-30 DIAGNOSIS — F43.10 PTSD (POST-TRAUMATIC STRESS DISORDER): ICD-10-CM

## 2023-11-30 DIAGNOSIS — F39 MOOD DISORDER (H): ICD-10-CM

## 2023-11-30 PROCEDURE — 99214 OFFICE O/P EST MOD 30 MIN: CPT | Mod: VID | Performed by: PSYCHIATRY & NEUROLOGY

## 2023-11-30 RX ORDER — DIAZEPAM 10 MG
5-10 TABLET ORAL DAILY PRN
Qty: 20 TABLET | Refills: 0 | Status: SHIPPED | OUTPATIENT
Start: 2023-11-30 | End: 2024-02-21

## 2023-11-30 RX ORDER — GABAPENTIN 300 MG/1
600 CAPSULE ORAL AT BEDTIME
Qty: 180 CAPSULE | Refills: 1 | Status: SHIPPED | OUTPATIENT
Start: 2023-11-30 | End: 2024-01-25

## 2023-11-30 RX ORDER — DIAZEPAM 10 MG
5-10 TABLET ORAL DAILY PRN
Qty: 20 TABLET | Refills: 0 | Status: SHIPPED | OUTPATIENT
Start: 2023-11-30 | End: 2023-11-30

## 2023-11-30 RX ORDER — OLANZAPINE 5 MG/1
2.5-5 TABLET ORAL DAILY PRN
Qty: 90 TABLET | Refills: 1 | Status: SHIPPED | OUTPATIENT
Start: 2023-11-30 | End: 2024-01-25

## 2023-11-30 RX ORDER — ESCITALOPRAM OXALATE 20 MG/1
20 TABLET ORAL DAILY
Qty: 90 TABLET | Refills: 1 | Status: SHIPPED | OUTPATIENT
Start: 2023-11-30 | End: 2024-03-21 | Stop reason: ALTCHOICE

## 2023-11-30 ASSESSMENT — ANXIETY QUESTIONNAIRES
1. FEELING NERVOUS, ANXIOUS, OR ON EDGE: MORE THAN HALF THE DAYS
GAD7 TOTAL SCORE: 8
2. NOT BEING ABLE TO STOP OR CONTROL WORRYING: SEVERAL DAYS
6. BECOMING EASILY ANNOYED OR IRRITABLE: SEVERAL DAYS
8. IF YOU CHECKED OFF ANY PROBLEMS, HOW DIFFICULT HAVE THESE MADE IT FOR YOU TO DO YOUR WORK, TAKE CARE OF THINGS AT HOME, OR GET ALONG WITH OTHER PEOPLE?: SOMEWHAT DIFFICULT
7. FEELING AFRAID AS IF SOMETHING AWFUL MIGHT HAPPEN: SEVERAL DAYS
4. TROUBLE RELAXING: SEVERAL DAYS
7. FEELING AFRAID AS IF SOMETHING AWFUL MIGHT HAPPEN: SEVERAL DAYS
5. BEING SO RESTLESS THAT IT IS HARD TO SIT STILL: SEVERAL DAYS
GAD7 TOTAL SCORE: 8
3. WORRYING TOO MUCH ABOUT DIFFERENT THINGS: SEVERAL DAYS
IF YOU CHECKED OFF ANY PROBLEMS ON THIS QUESTIONNAIRE, HOW DIFFICULT HAVE THESE PROBLEMS MADE IT FOR YOU TO DO YOUR WORK, TAKE CARE OF THINGS AT HOME, OR GET ALONG WITH OTHER PEOPLE: SOMEWHAT DIFFICULT
GAD7 TOTAL SCORE: 8

## 2023-11-30 NOTE — PROGRESS NOTES
"Virtual Visit Details    Type of service:  Video Visit     Originating Location (pt. Location): {video visit patient location:738183::\"Home\"}  {PROVIDER LOCATION On-site should be selected for visits conducted from your clinic location or adjoining [facility] hospital, academic office, or other nearby [facility] building. Off-site should be selected for all other provider locations, including home:214961}  Distant Location (provider location):  {virtual location provider:616716}  Platform used for Video Visit: {Virtual Visit Platforms:429636::\"[facility]\"}  "

## 2023-11-30 NOTE — NURSING NOTE
Is the patient currently in the state of [place]? YES    Visit mode:VIDEO    If the visit is dropped, the patient can be reconnected by: VIDEO VISIT: Text to cell phone:   Telephone Information:   Mobile [phone]       Will anyone else be joining the visit? NO  (If patient encounters technical issues they should call [phone])    How would you like to obtain your AVS? MyChart    Are changes needed to the allergy or medication list? No    Reason for visit: RECHECK    Killian SWANSON

## 2023-11-30 NOTE — PATIENT INSTRUCTIONS
Treatment Plan:  Continue olanzapine/Zyprexa at 2.5-5 mg daily as needed for anxiety, sleep, agitation.  Continue lamotrigine 200 mg daily for mood (can take as split dose 100 mg twice daily if preferred).  Continue Valium/diazepam 5-10 mg daily as needed for severe anxiety/panic symptoms.  Do not take daily.  15 tabs to last 30 days.  1 time prescription given for 20 tablets since you will be going on vacation in early January and preferred to have extra tablets for travel.  Continue gabapentin 600 mg at bedtime.    Increase Lexapro/escitalopram to 20 mg daily for mood, anxiety.  Continue all other cares per primary care provider.   Continue all other medications as reviewed per electronic medical record today.   Safety plan reviewed. To the Emergency Department as needed or call after hours crisis line at [phone] or [phone]. [place] Crisis Text Line. Text [place] to 991927 or Suicide LifeLine Chat: suicidepreventionEUSA Pharmaline.org/chat  Continue therapy as planned.   Schedule an appointment with me in 4-6 weeks or sooner as needed. Call [facility] at [phone] to schedule.  Follow up with primary care provider as planned or for acute medical concerns.  Call the psychiatric nurse line with medication questions or concerns at [phone].  [email or web address] may be used to communicate with your provider, but this is not intended to be used for emergencies.    Risks of benzodiazepine (Ativan, Xanax, Klonopin, Valium, etc) use including, but not limited to, sedation, tolerance, risk for addiction/dependence. Do not drink alcohol while taking benzodiazepines due to risk of trouble breathing and potential death. Do not drive or operate heavy machinery until it is known how the drug affects you. Discuss with physician or pharmacist before ever taking a benzodiazepine with a narcotic/opioid pain medication.     Have previously discussed Lamictal (lamotrigine) can cause serious rashes including  "Park-Yoshi syndrome which may requiring hospitalization and discontinuation of treatment. If any signs of a rash occur, please see your Primary Care Provider or a dermatologist immediately.     Patient Education   Collaborative Care Psychiatry Service  What to Expect  Here's what to expect from your Collaborative Care Psychiatry Service (CCPS).   About CCPS  CCPS means 2 people work together to help you get better. You'll meet with a behavioral health clinician and a psychiatric doctor. A behavioral health clinician helps people with mental health problems by talking with them. A psychiatric doctor helps people by giving them medicine.  How it works  At every visit, you'll see the behavioral health clinician (BHC) first. They'll talk with you about how you're doing and teach you how to feel better.   Then you'll see the psychiatric doctor. This doctor can help you deal with troubling thoughts and feelings by giving you medicine. They'll make sure you know the plan for your care.   CCPS usually takes 3 to 6 visits. If you need more visits, we may have you start seeing a different psychiatric doctor for ongoing care.  If you have any questions or concerns, we'll be glad to talk with you.  About visits  Be open  At your visits, please talk openly about your problems. It may feel hard, but it's the best way for us to help you.  Cancelling visits  If you can't come to your visit, please call us right away at [phone]. If you don't cancel at least 24 hours (1 full day) before your visit, that's \"late cancellation.\"  Being late to visits  Being very late is the same as not showing up. You will be a \"no show\" if:  Your appointment starts with a BHC, and you're more than 15 minutes late for a 30-minute (half hour) visit. This will also cancel your appointment with the psychiatric doctor.  Your appointment is with a psychiatric doctor only, and you're more than 15 minutes late for a 30-minute (half hour) " visit.  Your appointment is with a psychiatric doctor only, and you're more than 30 minutes late for a 60-minute (full hour) visit.  If you cancel late or don't show up 2 times within 6 months, we may end your care.   Getting help between visits  If you need help between visits, you can call us Monday to Friday from 8 a.m. to 4:30 p.m. at [phone].  Emergency care  Call 911 or go to the nearest emergency department if your life or someone else's life is in danger.  Call 988 anytime to reach the national Suicide and Crisis hotline.  Medicine refills  To refill your medicine, call your pharmacy. You can also call [facility]'s Behavioral Access at [phone], Monday to Friday, 8 a.m. to 4:30 p.m. It can take 1 to 3 business days to get a refill.   Forms, letters, and tests  You may have papers to fill out, like FMLA, short-term disability, and workability. We can help you with these forms at your visits, but you must have an appointment. You may need more than 1 visit for this, to be in an intensive therapy program, or both.  Before we can give you medicine for ADHD, we may refer you to get tested for it or confirm it another way.  We may not be able to give you an emotional support animal letter.  We don't do mental health checks ordered by the court.   We don't do mental health testing, but we can refer you to get tested.   Thank you for choosing us for your care.  For informational purposes only. Not to replace the advice of your health care provider. Copyright   2022 [facility]. All rights reserved. [employer] [id number] - 12/22.
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
## 2023-11-30 NOTE — TELEPHONE ENCOUNTER
RN received the following via Right Fax from [facility] pharmacy on [address] in [place]: needing clarification on the diazepam (VALIUM) 10 MG tablet  prescription that was sent today.    The sig line indicates Take 0.5-1 tablets (5-10 mg) by mouth daily as needed for anxiety 15 tabs to last 30 days  but the Qty indicates 20 tabs    Dr. Purcell please clarify what it correct     Jessy Raines RN on 11/30/2023 at 3:53 PM

## 2023-12-05 ENCOUNTER — TELEPHONE (OUTPATIENT)
Dept: PSYCHIATRY | Facility: CLINIC | Age: 38
End: 2023-12-05
Payer: COMMERCIAL

## 2023-12-05 NOTE — TELEPHONE ENCOUNTER
"1) RN received via Right Fax from [facility] a need for clarification regarding diazepam (Valium) 10 mg tablet      Question -\"Previous fills say to last 30 days. Do we want his RX to also be last 30 days\"    2) Per 11/30/2023 treatment plan  11/30/2023:  Patient with increasing depression symptoms over the past several weeks.  We will increase Lexapro to see if helpful for her symptoms.  Patient also reminded to utilize olanzapine at bedtime to help with sleep since it has been helpful in the past.  If Lexapro remains activating, patient will move the dose to the morning.  Encouraged to continue in therapy.  No acute safety concerns.  No SI.  No problematic drug or alcohol use, although, patient does use cannabis regularly.     Medication side effects and alternatives were reviewed. Health promotion activities recommended and reviewed today. All questions addressed. Education and counseling completed regarding risks and benefits of medications and psychotherapy options. Recommend therapy for additional support.      Treatment Plan:  Continue olanzapine/Zyprexa at 2.5-5 mg daily as needed for anxiety, sleep, agitation.  Continue lamotrigine 200 mg daily for mood (can take as split dose 100 mg twice daily if preferred).  Continue Valium/diazepam 5-10 mg daily as needed for severe anxiety/panic symptoms.  Do not take daily.  15 tabs to last 30 days.  1 time prescription given for 20 tablets since you will be going on vacation in early January and preferred to have extra tablets for travel.  Continue gabapentin 600 mg at bedtime.    Increase Lexapro/escitalopram to 20 mg daily for mood, anxiety.  Continue all other cares per primary care provider.   Continue all other medications as reviewed per electronic medical record today.   Safety plan reviewed. To the Emergency Department as needed or call after hours crisis line at [phone] or [phone]. [place] Crisis Text Line. Text [place] to 378239 or Suicide " LifeLine Chat: suicidepreventionlifeline.org/chat  Continue therapy as planned.   Schedule an appointment with me in 6 weeks or sooner as needed. Call [facility] at [phone] to schedule.  Follow up with primary care provider as planned or for acute medical concerns.  Call the psychiatric nurse line with medication questions or concerns at [phone].  MyChart may be used to communicate with your provider, but this is not intended to be used for emergencies.    3) RN called [facility] pharmacy at phone [phone], spoke to the pharmacistClemencia and informed that yes, the prescription does need to last 30 days and it is ok t include that on the Sig line.    Jessy Raines RN on 12/5/2023 at 9:50 AM

## 2023-12-21 DIAGNOSIS — R06.02 SOB (SHORTNESS OF BREATH): ICD-10-CM

## 2023-12-26 RX ORDER — ALBUTEROL SULFATE 90 UG/1
AEROSOL, METERED RESPIRATORY (INHALATION)
Qty: 18 G | Refills: 0 | Status: SHIPPED | OUTPATIENT
Start: 2023-12-26 | End: 2024-02-05

## 2024-01-24 ASSESSMENT — PATIENT HEALTH QUESTIONNAIRE - PHQ9
SUM OF ALL RESPONSES TO PHQ QUESTIONS 1-9: 5
10. IF YOU CHECKED OFF ANY PROBLEMS, HOW DIFFICULT HAVE THESE PROBLEMS MADE IT FOR YOU TO DO YOUR WORK, TAKE CARE OF THINGS AT HOME, OR GET ALONG WITH OTHER PEOPLE: NOT DIFFICULT AT ALL
SUM OF ALL RESPONSES TO PHQ QUESTIONS 1-9: 5

## 2024-01-25 ENCOUNTER — VIRTUAL VISIT (OUTPATIENT)
Dept: PSYCHIATRY | Facility: CLINIC | Age: 39
End: 2024-01-25
Payer: COMMERCIAL

## 2024-01-25 ENCOUNTER — VIRTUAL VISIT (OUTPATIENT)
Dept: BEHAVIORAL HEALTH | Facility: CLINIC | Age: 39
End: 2024-01-25
Payer: COMMERCIAL

## 2024-01-25 DIAGNOSIS — F41.1 GENERALIZED ANXIETY DISORDER: Primary | ICD-10-CM

## 2024-01-25 DIAGNOSIS — G47.00 INSOMNIA, UNSPECIFIED TYPE: ICD-10-CM

## 2024-01-25 DIAGNOSIS — F15.11 METHAMPHETAMINE ABUSE IN REMISSION (H): ICD-10-CM

## 2024-01-25 DIAGNOSIS — F31.81 BIPOLAR II DISORDER (H): Primary | ICD-10-CM

## 2024-01-25 DIAGNOSIS — F43.10 PTSD (POST-TRAUMATIC STRESS DISORDER): ICD-10-CM

## 2024-01-25 DIAGNOSIS — F31.81 BIPOLAR II DISORDER (H): ICD-10-CM

## 2024-01-25 DIAGNOSIS — F41.1 GAD (GENERALIZED ANXIETY DISORDER): ICD-10-CM

## 2024-01-25 PROCEDURE — 99214 OFFICE O/P EST MOD 30 MIN: CPT | Mod: 95 | Performed by: PSYCHIATRY & NEUROLOGY

## 2024-01-25 PROCEDURE — 90791 PSYCH DIAGNOSTIC EVALUATION: CPT | Mod: 95 | Performed by: COUNSELOR

## 2024-01-25 RX ORDER — OLANZAPINE 10 MG/1
5-10 TABLET ORAL DAILY PRN
Qty: 90 TABLET | Refills: 1 | Status: SHIPPED | OUTPATIENT
Start: 2024-01-25

## 2024-01-25 RX ORDER — GABAPENTIN 300 MG/1
600 CAPSULE ORAL AT BEDTIME
Qty: 180 CAPSULE | Refills: 1 | Status: SHIPPED | OUTPATIENT
Start: 2024-01-25 | End: 2024-04-18 | Stop reason: DRUGHIGH

## 2024-01-25 ASSESSMENT — COLUMBIA-SUICIDE SEVERITY RATING SCALE - C-SSRS
TOTAL  NUMBER OF INTERRUPTED ATTEMPTS LIFETIME: NO
6. HAVE YOU EVER DONE ANYTHING, STARTED TO DO ANYTHING, OR PREPARED TO DO ANYTHING TO END YOUR LIFE?: NO
TOTAL  NUMBER OF ABORTED OR SELF INTERRUPTED ATTEMPTS LIFETIME: NO
2. HAVE YOU ACTUALLY HAD ANY THOUGHTS OF KILLING YOURSELF?: NO
ATTEMPT LIFETIME: NO
1. HAVE YOU WISHED YOU WERE DEAD OR WISHED YOU COULD GO TO SLEEP AND NOT WAKE UP?: NO

## 2024-01-25 NOTE — NURSING NOTE
Is the patient currently in the state of [place]? YES    Visit mode:VIDEO    If the visit is dropped, the patient can be reconnected by: VIDEO VISIT: Text to cell phone:   Telephone Information:   Mobile [phone]       Will anyone else be joining the visit? NO  (If patient encounters technical issues they should call [phone]    How would you like to obtain your AVS? MyChart    Are changes needed to the allergy or medication list? No    Reason for visit: RECHECK    Ibis SWANSON
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
## 2024-01-25 NOTE — PROGRESS NOTES
"Virtual Visit Details    Type of service:  Video Visit     Originating Location (pt. Location): {video visit patient location:226095::\"Home\"}  {PROVIDER LOCATION On-site should be selected for visits conducted from your clinic location or adjoining [facility] hospital, academic office, or other nearby [facility] building. Off-site should be selected for all other provider locations, including home:[id number]}  Distant Location (provider location):  {virtual location provider:381924}  Platform used for Video Visit: {Virtual Visit Platforms:657222::\"Capton\"}  "
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
## 2024-01-25 NOTE — PATIENT INSTRUCTIONS
Treatment Plan:  Increase olanzapine/Zyprexa to 5-10 mg daily as needed for anxiety, sleep, agitation, bipolar disorder.  Continue lamotrigine 200 mg daily for mood (can take as split dose 100 mg twice daily if preferred).  Continue Valium/diazepam 5-10 mg daily as needed for severe anxiety/panic symptoms.  Do not take daily.  15 tabs to last 30 days.  1 time prescription given for 20 tablets recently since went on vacation in early January.  Continue gabapentin 600 mg at bedtime.    Continue Lexapro/escitalopram 20 mg daily for mood, anxiety.  Referral placed for ADHD testing  Continue all other cares per primary care provider.   Continue all other medications as reviewed per electronic medical record today.   Safety plan reviewed. To the Emergency Department as needed or call after hours crisis line at [phone] or [phone]. [place] Crisis Text Line. Text [place] to 335586 or Suicide LifeLine Chat: suicidepreventionSafaba Translation Solutionsline.org/chat  Continue therapy as planned.   Schedule an appointment with me in 2 months or sooner as needed. Call [facility] at [phone] to schedule.  Follow up with primary care provider as planned or for acute medical concerns.  Call the psychiatric nurse line with medication questions or concerns at [phone].  [employer] may be used to communicate with your provider, but this is not intended to be used for emergencies.    Risks of benzodiazepine (Ativan, Xanax, Klonopin, Valium, etc) use including, but not limited to, sedation, tolerance, risk for addiction/dependence. Do not drink alcohol while taking benzodiazepines due to risk of trouble breathing and potential death. Do not drive or operate heavy machinery until it is known how the drug affects you. Discuss with physician or pharmacist before ever taking a benzodiazepine with a narcotic/opioid pain medication.     Have previously discussed Lamictal (lamotrigine) can cause serious rashes including Park-Yosih  "syndrome which may requiring hospitalization and discontinuation of treatment. If any signs of a rash occur, please see your Primary Care Provider or a dermatologist immediately.     Patient Education   Collaborative Care Psychiatry Service  What to Expect  Here's what to expect from your Collaborative Care Psychiatry Service (CCPS).   About CCPS  CCPS means 2 people work together to help you get better. You'll meet with a behavioral health clinician and a psychiatric doctor. A behavioral health clinician helps people with mental health problems by talking with them. A psychiatric doctor helps people by giving them medicine.  How it works  At every visit, you'll see the behavioral health clinician (BHC) first. They'll talk with you about how you're doing and teach you how to feel better.   Then you'll see the psychiatric doctor. This doctor can help you deal with troubling thoughts and feelings by giving you medicine. They'll make sure you know the plan for your care.   CCPS usually takes 3 to 6 visits. If you need more visits, we may have you start seeing a different psychiatric doctor for ongoing care.  If you have any questions or concerns, we'll be glad to talk with you.  About visits  Be open  At your visits, please talk openly about your problems. It may feel hard, but it's the best way for us to help you.  Cancelling visits  If you can't come to your visit, please call us right away at [phone]. If you don't cancel at least 24 hours (1 full day) before your visit, that's \"late cancellation.\"  Being late to visits  Being very late is the same as not showing up. You will be a \"no show\" if:  Your appointment starts with a BHC, and you're more than 15 minutes late for a 30-minute (half hour) visit. This will also cancel your appointment with the psychiatric doctor.  Your appointment is with a psychiatric doctor only, and you're more than 15 minutes late for a 30-minute (half hour) visit.  Your appointment " is with a psychiatric doctor only, and you're more than 30 minutes late for a 60-minute (full hour) visit.  If you cancel late or don't show up 2 times within 6 months, we may end your care.   Getting help between visits  If you need help between visits, you can call us Monday to Friday from 8 a.m. to 4:30 p.m. at [phone].  Emergency care  Call 911 or go to the nearest emergency department if your life or someone else's life is in danger.  Call 988 anytime to reach the national Suicide and Crisis hotline.  Medicine refills  To refill your medicine, call your pharmacy. You can also call [facility]'s Behavioral Access at [phone], Monday to Friday, 8 a.m. to 4:30 p.m. It can take 1 to 3 business days to get a refill.   Forms, letters, and tests  You may have papers to fill out, like FMLA, short-term disability, and workability. We can help you with these forms at your visits, but you must have an appointment. You may need more than 1 visit for this, to be in an intensive therapy program, or both.  Before we can give you medicine for ADHD, we may refer you to get tested for it or confirm it another way.  We may not be able to give you an emotional support animal letter.  We don't do mental health checks ordered by the court.   We don't do mental health testing, but we can refer you to get tested.   Thank you for choosing us for your care.  For informational purposes only. Not to replace the advice of your health care provider. Copyright   2022 [facility]. All rights reserved. [employer] [id number] - 12/22.

## 2024-02-04 DIAGNOSIS — R06.02 SOB (SHORTNESS OF BREATH): ICD-10-CM

## 2024-02-05 RX ORDER — ALBUTEROL SULFATE 90 UG/1
AEROSOL, METERED RESPIRATORY (INHALATION)
Qty: 7 G | Refills: 0 | Status: SHIPPED | OUTPATIENT
Start: 2024-02-05 | End: 2024-04-01

## 2024-02-21 ENCOUNTER — MYC REFILL (OUTPATIENT)
Dept: PSYCHIATRY | Facility: CLINIC | Age: 39
End: 2024-02-21
Payer: COMMERCIAL

## 2024-02-21 DIAGNOSIS — F41.1 GAD (GENERALIZED ANXIETY DISORDER): ICD-10-CM

## 2024-02-22 RX ORDER — DIAZEPAM 10 MG
5-10 TABLET ORAL DAILY PRN
Qty: 15 TABLET | Refills: 2 | Status: SHIPPED | OUTPATIENT
Start: 2024-02-22 | End: 2024-06-03

## 2024-03-17 DIAGNOSIS — M54.50 CHRONIC BILATERAL LOW BACK PAIN WITHOUT SCIATICA: ICD-10-CM

## 2024-03-17 DIAGNOSIS — G89.29 CHRONIC BILATERAL LOW BACK PAIN WITHOUT SCIATICA: ICD-10-CM

## 2024-03-18 RX ORDER — CYCLOBENZAPRINE HCL 10 MG
10 TABLET ORAL 3 TIMES DAILY PRN
Qty: 30 TABLET | Refills: 0 | Status: SHIPPED | OUTPATIENT
Start: 2024-03-18 | End: 2024-06-27
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
## 2024-03-20 ASSESSMENT — ANXIETY QUESTIONNAIRES
4. TROUBLE RELAXING: MORE THAN HALF THE DAYS
4. TROUBLE RELAXING: MORE THAN HALF THE DAYS
7. FEELING AFRAID AS IF SOMETHING AWFUL MIGHT HAPPEN: SEVERAL DAYS
2. NOT BEING ABLE TO STOP OR CONTROL WORRYING: MORE THAN HALF THE DAYS
GAD7 TOTAL SCORE: 11
GAD7 TOTAL SCORE: 11
1. FEELING NERVOUS, ANXIOUS, OR ON EDGE: MORE THAN HALF THE DAYS
GAD7 TOTAL SCORE: 11
6. BECOMING EASILY ANNOYED OR IRRITABLE: SEVERAL DAYS
2. NOT BEING ABLE TO STOP OR CONTROL WORRYING: MORE THAN HALF THE DAYS
7. FEELING AFRAID AS IF SOMETHING AWFUL MIGHT HAPPEN: SEVERAL DAYS
IF YOU CHECKED OFF ANY PROBLEMS ON THIS QUESTIONNAIRE, HOW DIFFICULT HAVE THESE PROBLEMS MADE IT FOR YOU TO DO YOUR WORK, TAKE CARE OF THINGS AT HOME, OR GET ALONG WITH OTHER PEOPLE: SOMEWHAT DIFFICULT
IF YOU CHECKED OFF ANY PROBLEMS ON THIS QUESTIONNAIRE, HOW DIFFICULT HAVE THESE PROBLEMS MADE IT FOR YOU TO DO YOUR WORK, TAKE CARE OF THINGS AT HOME, OR GET ALONG WITH OTHER PEOPLE: SOMEWHAT DIFFICULT
3. WORRYING TOO MUCH ABOUT DIFFERENT THINGS: MORE THAN HALF THE DAYS
GAD7 TOTAL SCORE: 11
GAD7 TOTAL SCORE: 11
3. WORRYING TOO MUCH ABOUT DIFFERENT THINGS: MORE THAN HALF THE DAYS
5. BEING SO RESTLESS THAT IT IS HARD TO SIT STILL: SEVERAL DAYS
8. IF YOU CHECKED OFF ANY PROBLEMS, HOW DIFFICULT HAVE THESE MADE IT FOR YOU TO DO YOUR WORK, TAKE CARE OF THINGS AT HOME, OR GET ALONG WITH OTHER PEOPLE?: SOMEWHAT DIFFICULT
8. IF YOU CHECKED OFF ANY PROBLEMS, HOW DIFFICULT HAVE THESE MADE IT FOR YOU TO DO YOUR WORK, TAKE CARE OF THINGS AT HOME, OR GET ALONG WITH OTHER PEOPLE?: SOMEWHAT DIFFICULT
GAD7 TOTAL SCORE: 11
6. BECOMING EASILY ANNOYED OR IRRITABLE: SEVERAL DAYS
7. FEELING AFRAID AS IF SOMETHING AWFUL MIGHT HAPPEN: SEVERAL DAYS
7. FEELING AFRAID AS IF SOMETHING AWFUL MIGHT HAPPEN: SEVERAL DAYS
5. BEING SO RESTLESS THAT IT IS HARD TO SIT STILL: SEVERAL DAYS
1. FEELING NERVOUS, ANXIOUS, OR ON EDGE: MORE THAN HALF THE DAYS

## 2024-03-21 ENCOUNTER — VIRTUAL VISIT (OUTPATIENT)
Dept: PSYCHIATRY | Facility: CLINIC | Age: 39
End: 2024-03-21
Payer: COMMERCIAL

## 2024-03-21 ENCOUNTER — VIRTUAL VISIT (OUTPATIENT)
Dept: BEHAVIORAL HEALTH | Facility: CLINIC | Age: 39
End: 2024-03-21
Payer: COMMERCIAL

## 2024-03-21 DIAGNOSIS — F15.11 METHAMPHETAMINE ABUSE IN REMISSION (H): ICD-10-CM

## 2024-03-21 DIAGNOSIS — G47.00 INSOMNIA, UNSPECIFIED TYPE: ICD-10-CM

## 2024-03-21 DIAGNOSIS — F43.10 PTSD (POST-TRAUMATIC STRESS DISORDER): ICD-10-CM

## 2024-03-21 DIAGNOSIS — F41.1 GENERALIZED ANXIETY DISORDER: Primary | ICD-10-CM

## 2024-03-21 DIAGNOSIS — F31.81 BIPOLAR II DISORDER (H): ICD-10-CM

## 2024-03-21 DIAGNOSIS — R41.840 ATTENTION AND CONCENTRATION DEFICIT: Primary | ICD-10-CM

## 2024-03-21 DIAGNOSIS — F41.1 GAD (GENERALIZED ANXIETY DISORDER): ICD-10-CM

## 2024-03-21 PROCEDURE — 99214 OFFICE O/P EST MOD 30 MIN: CPT | Mod: 95 | Performed by: PSYCHIATRY & NEUROLOGY

## 2024-03-21 PROCEDURE — 90832 PSYTX W PT 30 MINUTES: CPT | Mod: 95 | Performed by: COUNSELOR

## 2024-03-21 RX ORDER — ATOMOXETINE 40 MG/1
40 CAPSULE ORAL DAILY
Qty: 30 CAPSULE | Refills: 1 | Status: SHIPPED | OUTPATIENT
Start: 2024-03-21 | End: 2024-04-18 | Stop reason: DRUGHIGH

## 2024-03-21 NOTE — PATIENT INSTRUCTIONS
Treatment Plan:  Continue olanzapine/Zyprexa to 5-10 mg daily as needed for anxiety, sleep, agitation, bipolar disorder.  Continue lamotrigine 200 mg daily for mood (can take as split dose 100 mg twice daily if preferred).  Continue Valium/diazepam 5-10 mg daily as needed for severe anxiety/panic symptoms.  Do not take daily.  15 tabs to last 30 days.    Continue gabapentin 600 mg at bedtime.    Discontinue Lexapro/escitalopram: Take 10 mg daily for 1 week then trial discontinuation.  Start Strattera/atomoxetine for attention and concentration, mood: Take 40 mg daily.  Keep upcoming ADHD testing appointments in October.  Continue all other cares per primary care provider.   Continue all other medications as reviewed per electronic medical record today.   Safety plan reviewed. To the Emergency Department as needed or call after hours crisis line at [phone] or [phone]. [place] Crisis Text Line. Text [place] to 642733 or Suicide LifeLine Chat: suicidepreventionNanomed Skincareline.org/chat  Continue therapy as planned.   Schedule an appointment with me in 4-6 weeks or sooner as needed. Call [facility] at [phone] to schedule.  Follow up with primary care provider as planned or for acute medical concerns.  Call the psychiatric nurse line with medication questions or concerns at [phone].  MyChart may be used to communicate with your provider, but this is not intended to be used for emergencies.    Risks of benzodiazepine (Ativan, Xanax, Klonopin, Valium, etc) use including, but not limited to, sedation, tolerance, risk for addiction/dependence. Do not drink alcohol while taking benzodiazepines due to risk of trouble breathing and potential death. Do not drive or operate heavy machinery until it is known how the drug affects you. Discuss with physician or pharmacist before ever taking a benzodiazepine with a narcotic/opioid pain medication.     Have previously discussed Lamictal (lamotrigine) can  "cause serious rashes including Park-Yoshi syndrome which may requiring hospitalization and discontinuation of treatment. If any signs of a rash occur, please see your Primary Care Provider or a dermatologist immediately.       Patient Education   Collaborative Care Psychiatry Service  What to Expect  Here's what to expect from your Collaborative Care Psychiatry Service (CCPS).   About CCPS  CCPS means 2 people work together to help you get better. You'll meet with a behavioral health clinician and a psychiatric doctor. A behavioral health clinician helps people with mental health problems by talking with them. A psychiatric doctor helps people by giving them medicine.  How it works  At every visit, you'll see the behavioral health clinician (BHC) first. They'll talk with you about how you're doing and teach you how to feel better.   Then you'll see the psychiatric doctor. This doctor can help you deal with troubling thoughts and feelings by giving you medicine. They'll make sure you know the plan for your care.   CCPS usually takes 3 to 6 visits. If you need more visits, we may have you start seeing a different psychiatric doctor for ongoing care.  If you have any questions or concerns, we'll be glad to talk with you.  About visits  Be open  At your visits, please talk openly about your problems. It may feel hard, but it's the best way for us to help you.  Cancelling visits  If you can't come to your visit, please call us right away at [phone]. If you don't cancel at least 24 hours (1 full day) before your visit, that's \"late cancellation.\"  Being late to visits  Being very late is the same as not showing up. You will be a \"no show\" if:  Your appointment starts with a BHC, and you're more than 15 minutes late for a 30-minute (half hour) visit. This will also cancel your appointment with the psychiatric doctor.  Your appointment is with a psychiatric doctor only, and you're more than 15 minutes late for a " 30-minute (half hour) visit.  Your appointment is with a psychiatric doctor only, and you're more than 30 minutes late for a 60-minute (full hour) visit.  If you cancel late or don't show up 2 times within 6 months, we may end your care.   Getting help between visits  If you need help between visits, you can call us Monday to Friday from 8 a.m. to 4:30 p.m. at [phone].  Emergency care  Call 911 or go to the nearest emergency department if your life or someone else's life is in danger.  Call 988 anytime to reach the national Suicide and Crisis hotline.  Medicine refills  To refill your medicine, call your pharmacy. You can also call [facility] [place] [facility]'s Behavioral Access at [phone], Monday to Friday, 8 a.m. to 4:30 p.m. It can take 1 to 3 business days to get a refill.   Forms, letters, and tests  You may have papers to fill out, like FMLA, short-term disability, and workability. We can help you with these forms at your visits, but you must have an appointment. You may need more than 1 visit for this, to be in an intensive therapy program, or both.  Before we can give you medicine for ADHD, we may refer you to get tested for it or confirm it another way.  We may not be able to give you an emotional support animal letter.  We don't do mental health checks ordered by the court.   We don't do mental health testing, but we can refer you to get tested.   Thank you for choosing us for your care.  For informational purposes only. Not to replace the advice of your health care provider. Copyright   2022 [facility]. All rights reserved. [employer] [id number] - 12/22.

## 2024-03-21 NOTE — NURSING NOTE
Is the patient currently in the state of [place]? YES    Visit mode:VIDEO    If the visit is dropped, the patient can be reconnected by: VIDEO VISIT: Text to cell phone:   Telephone Information:   Mobile [phone]       Will anyone else be joining the visit? NO  (If patient encounters technical issues they should call [phone]    How would you like to obtain your AVS? MyChart    Are changes needed to the allergy or medication list? No    Reason for visit: RECHECK    Ibis SWANSON
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
## 2024-03-21 NOTE — PROGRESS NOTES
"Virtual Visit Details    Type of service:  Video Visit     Originating Location (pt. Location): {video visit patient location:570397::\"Home\"}  {PROVIDER LOCATION On-site should be selected for visits conducted from your clinic location or adjoining [facility] hospital, academic office, or other nearby [facility] building. Off-site should be selected for all other provider locations, including home:[id number]}  Distant Location (provider location):  {virtual location provider:[id number]}  Platform used for Video Visit: {Virtual Visit Platforms:600217::\"Open Utility\"}  "

## 2024-03-30 DIAGNOSIS — R06.02 SOB (SHORTNESS OF BREATH): ICD-10-CM

## 2024-04-01 RX ORDER — ALBUTEROL SULFATE 90 UG/1
AEROSOL, METERED RESPIRATORY (INHALATION)
Qty: 7 G | Refills: 0 | Status: SHIPPED | OUTPATIENT
Start: 2024-04-01 | End: 2024-05-24

## 2024-04-13 ENCOUNTER — HEALTH MAINTENANCE LETTER (OUTPATIENT)
Age: 39
End: 2024-04-13
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
## 2024-04-18 ENCOUNTER — VIRTUAL VISIT (OUTPATIENT)
Dept: PSYCHIATRY | Facility: CLINIC | Age: 39
End: 2024-04-18
Payer: COMMERCIAL

## 2024-04-18 ENCOUNTER — VIRTUAL VISIT (OUTPATIENT)
Dept: BEHAVIORAL HEALTH | Facility: CLINIC | Age: 39
End: 2024-04-18
Payer: COMMERCIAL

## 2024-04-18 DIAGNOSIS — F41.1 GAD (GENERALIZED ANXIETY DISORDER): ICD-10-CM

## 2024-04-18 DIAGNOSIS — F31.81 BIPOLAR II DISORDER (H): ICD-10-CM

## 2024-04-18 DIAGNOSIS — F43.10 PTSD (POST-TRAUMATIC STRESS DISORDER): ICD-10-CM

## 2024-04-18 DIAGNOSIS — R41.840 ATTENTION AND CONCENTRATION DEFICIT: Primary | ICD-10-CM

## 2024-04-18 DIAGNOSIS — G89.29 OTHER CHRONIC PAIN: ICD-10-CM

## 2024-04-18 DIAGNOSIS — F41.1 GENERALIZED ANXIETY DISORDER: Primary | ICD-10-CM

## 2024-04-18 PROCEDURE — 99214 OFFICE O/P EST MOD 30 MIN: CPT | Mod: 95 | Performed by: PSYCHIATRY & NEUROLOGY

## 2024-04-18 PROCEDURE — 90832 PSYTX W PT 30 MINUTES: CPT | Mod: 95 | Performed by: COUNSELOR

## 2024-04-18 RX ORDER — ESCITALOPRAM OXALATE 20 MG/1
20 TABLET ORAL DAILY
Qty: 90 TABLET | Refills: 3 | Status: SHIPPED | OUTPATIENT
Start: 2024-04-18

## 2024-04-18 RX ORDER — LAMOTRIGINE 200 MG/1
200 TABLET ORAL DAILY
Qty: 90 TABLET | Refills: 3 | Status: SHIPPED | OUTPATIENT
Start: 2024-04-18

## 2024-04-18 RX ORDER — ATOMOXETINE 60 MG/1
60 CAPSULE ORAL DAILY
Qty: 30 CAPSULE | Refills: 1 | Status: SHIPPED | OUTPATIENT
Start: 2024-04-18 | End: 2024-06-03

## 2024-04-18 RX ORDER — GABAPENTIN 800 MG/1
TABLET ORAL
Qty: 135 TABLET | Refills: 1 | Status: SHIPPED | OUTPATIENT
Start: 2024-04-18

## 2024-04-18 ASSESSMENT — PAIN SCALES - GENERAL: PAINLEVEL: NO PAIN (0)

## 2024-04-18 NOTE — PROGRESS NOTES
"Virtual Visit Details    Type of service:  Video Visit     Originating Location (pt. Location): {video visit patient location:955540::\"Home\"}  {PROVIDER LOCATION On-site should be selected for visits conducted from your clinic location or adjoining [facility] hospital, academic office, or other nearby [facility] building. Off-site should be selected for all other provider locations, including home:[id number]}  Distant Location (provider location):  {virtual location provider:623247}  Platform used for Video Visit: {Virtual Visit Platforms:663491::\"Behavio\"}    "

## 2024-04-18 NOTE — NURSING NOTE
Is the patient currently in the state of [place]? YES    Visit mode:VIDEO    If the visit is dropped, the patient can be reconnected by: VIDEO VISIT: Text to cell phone:   Telephone Information:   Mobile [phone]       Will anyone else be joining the visit? NO  (If patient encounters technical issues they should call [phone]    How would you like to obtain your AVS? MyChart    Are changes needed to the allergy or medication list? No    Are refills needed on medications prescribed by this physician? YES    Reason for visit: RECHECK    Killian SWANSON
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
## 2024-04-18 NOTE — PATIENT INSTRUCTIONS
Treatment Plan:  Continue olanzapine/Zyprexa to 5-10 mg daily as needed for anxiety, sleep, agitation, bipolar disorder.  Continue lamotrigine 200 mg daily for mood (can take as split dose 100 mg twice daily if preferred).  Continue Valium/diazepam 5-10 mg daily as needed for severe anxiety/panic symptoms.  Do not take daily.  15 tabs to last 30 days.    Increase gabapentin to 800 mg at bedtime and can take 400 mg during day as needed for anxiety or chronic pain.    Continue Lexapro/escitalopram 20 mg daily for mood, anxiety.  Increase Strattera/atomoxetine for attention and concentration, mood: Take 60 mg daily.  Keep upcoming ADHD testing appointments in October.  Continue all other cares per primary care provider.   Continue all other medications as reviewed per electronic medical record today.   Safety plan reviewed. To the Emergency Department as needed or call after hours crisis line at [phone] or [phone]. [place] Crisis Text Line. Text [place] to 697982 or Suicide LifeLine Chat: suicidepreventionAuctionataline.org/chat  Continue therapy as planned.   Schedule an appointment with me in 6 weeks or sooner as needed. Call [facility] at [phone] to schedule.  Follow up with primary care provider as planned or for acute medical concerns.  Call the psychiatric nurse line with medication questions or concerns at [phone].  MyChart may be used to communicate with your provider, but this is not intended to be used for emergencies.    Risks of benzodiazepine (Ativan, Xanax, Klonopin, Valium, etc) use including, but not limited to, sedation, tolerance, risk for addiction/dependence. Do not drink alcohol while taking benzodiazepines due to risk of trouble breathing and potential death. Do not drive or operate heavy machinery until it is known how the drug affects you. Discuss with physician or pharmacist before ever taking a benzodiazepine with a narcotic/opioid pain medication.     Have  "previously discussed Lamictal (lamotrigine) can cause serious rashes including Park-Yoshi syndrome which may requiring hospitalization and discontinuation of treatment. If any signs of a rash occur, please see your Primary Care Provider or a dermatologist immediately.     Patient Education   Collaborative Care Psychiatry Service  What to Expect  Here's what to expect from your Collaborative Care Psychiatry Service (CCPS).   About CCPS  CCPS means 2 people work together to help you get better. You'll meet with a behavioral health clinician and a psychiatric doctor. A behavioral health clinician helps people with mental health problems by talking with them. A psychiatric doctor helps people by giving them medicine.  How it works  At every visit, you'll see the behavioral health clinician (BHC) first. They'll talk with you about how you're doing and teach you how to feel better.   Then you'll see the psychiatric doctor. This doctor can help you deal with troubling thoughts and feelings by giving you medicine. They'll make sure you know the plan for your care.   CCPS usually takes 3 to 6 visits. If you need more visits, we may have you start seeing a different psychiatric doctor for ongoing care.  If you have any questions or concerns, we'll be glad to talk with you.  About visits  Be open  At your visits, please talk openly about your problems. It may feel hard, but it's the best way for us to help you.  Cancelling visits  If you can't come to your visit, please call us right away at [phone]. If you don't cancel at least 24 hours (1 full day) before your visit, that's \"late cancellation.\"  Being late to visits  Being very late is the same as not showing up. You will be a \"no show\" if:  Your appointment starts with a BHC, and you're more than 15 minutes late for a 30-minute (half hour) visit. This will also cancel your appointment with the psychiatric doctor.  Your appointment is with a psychiatric doctor " only, and you're more than 15 minutes late for a 30-minute (half hour) visit.  Your appointment is with a psychiatric doctor only, and you're more than 30 minutes late for a 60-minute (full hour) visit.  If you cancel late or don't show up 2 times within 6 months, we may end your care.   Getting help between visits  If you need help between visits, you can call us Monday to Friday from 8 a.m. to 4:30 p.m. at [phone].  Emergency care  Call 911 or go to the nearest emergency department if your life or someone else's life is in danger.  Call 468 anytime to reach the national Suicide and Crisis hotline.  Medicine refills  To refill your medicine, call your pharmacy. You can also call [facility]'s Behavioral Access at [phone], Monday to Friday, 8 a.m. to 4:30 p.m. It can take 1 to 3 business days to get a refill.   Forms, letters, and tests  You may have papers to fill out, like FMLA, short-term disability, and workability. We can help you with these forms at your visits, but you must have an appointment. You may need more than 1 visit for this, to be in an intensive therapy program, or both.  Before we can give you medicine for ADHD, we may refer you to get tested for it or confirm it another way.  We may not be able to give you an emotional support animal letter.  We don't do mental health checks ordered by the court.   We don't do mental health testing, but we can refer you to get tested.   Thank you for choosing us for your care.  For informational purposes only. Not to replace the advice of your health care provider. Copyright   2022 [facility]. All rights reserved. [employer] [id number] - 12/22.

## 2024-05-23 DIAGNOSIS — R06.02 SOB (SHORTNESS OF BREATH): ICD-10-CM

## 2024-05-24 RX ORDER — ALBUTEROL SULFATE 90 UG/1
AEROSOL, METERED RESPIRATORY (INHALATION)
Qty: 7 G | Refills: 0 | Status: SHIPPED | OUTPATIENT
Start: 2024-05-24 | End: 2024-06-27
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
## 2024-06-03 ENCOUNTER — VIRTUAL VISIT (OUTPATIENT)
Dept: BEHAVIORAL HEALTH | Facility: CLINIC | Age: 39
End: 2024-06-03
Payer: COMMERCIAL

## 2024-06-03 ENCOUNTER — VIRTUAL VISIT (OUTPATIENT)
Dept: PSYCHIATRY | Facility: CLINIC | Age: 39
End: 2024-06-03
Payer: COMMERCIAL

## 2024-06-03 VITALS — WEIGHT: 185 LBS | BODY MASS INDEX: 31.58 KG/M2 | HEIGHT: 64 IN

## 2024-06-03 DIAGNOSIS — F31.81 BIPOLAR II DISORDER (H): ICD-10-CM

## 2024-06-03 DIAGNOSIS — R41.840 ATTENTION AND CONCENTRATION DEFICIT: ICD-10-CM

## 2024-06-03 DIAGNOSIS — F15.11 METHAMPHETAMINE ABUSE IN REMISSION (H): ICD-10-CM

## 2024-06-03 DIAGNOSIS — F43.10 PTSD (POST-TRAUMATIC STRESS DISORDER): ICD-10-CM

## 2024-06-03 DIAGNOSIS — F41.1 GENERALIZED ANXIETY DISORDER: ICD-10-CM

## 2024-06-03 DIAGNOSIS — F43.10 PTSD (POST-TRAUMATIC STRESS DISORDER): Primary | ICD-10-CM

## 2024-06-03 DIAGNOSIS — F41.1 GAD (GENERALIZED ANXIETY DISORDER): Primary | ICD-10-CM

## 2024-06-03 PROCEDURE — G2211 COMPLEX E/M VISIT ADD ON: HCPCS | Mod: 95 | Performed by: PSYCHIATRY & NEUROLOGY

## 2024-06-03 PROCEDURE — 99214 OFFICE O/P EST MOD 30 MIN: CPT | Mod: 95 | Performed by: PSYCHIATRY & NEUROLOGY

## 2024-06-03 PROCEDURE — 90832 PSYTX W PT 30 MINUTES: CPT | Mod: 95 | Performed by: COUNSELOR

## 2024-06-03 RX ORDER — ATOMOXETINE 60 MG/1
60 CAPSULE ORAL DAILY
Qty: 30 CAPSULE | Refills: 2 | Status: SHIPPED | OUTPATIENT
Start: 2024-06-03

## 2024-06-03 RX ORDER — DIAZEPAM 10 MG
5-10 TABLET ORAL DAILY PRN
Qty: 15 TABLET | Refills: 2 | Status: SHIPPED | OUTPATIENT
Start: 2024-06-03

## 2024-06-03 ASSESSMENT — PATIENT HEALTH QUESTIONNAIRE - PHQ9
SUM OF ALL RESPONSES TO PHQ QUESTIONS 1-9: 8
SUM OF ALL RESPONSES TO PHQ QUESTIONS 1-9: 8
10. IF YOU CHECKED OFF ANY PROBLEMS, HOW DIFFICULT HAVE THESE PROBLEMS MADE IT FOR YOU TO DO YOUR WORK, TAKE CARE OF THINGS AT HOME, OR GET ALONG WITH OTHER PEOPLE: SOMEWHAT DIFFICULT

## 2024-06-03 ASSESSMENT — PAIN SCALES - GENERAL: PAINLEVEL: NO PAIN (0)

## 2024-06-03 NOTE — NURSING NOTE
Is the patient currently in the state of [place]? YES    Visit mode:VIDEO    If the visit is dropped, the patient can be reconnected by: VIDEO VISIT: Text to cell phone:   Telephone Information:   Mobile [phone]       Will anyone else be joining the visit? NO  (If patient encounters technical issues they should call [phone]    How would you like to obtain your AVS? MyChart    Are changes needed to the allergy or medication list? No    Are refills needed on medications prescribed by this physician? YES    Reason for visit: RECHECK    Ibis SWANSON

## 2024-06-03 NOTE — PATIENT INSTRUCTIONS
Treatment Plan:  Continue olanzapine/Zyprexa to 5-10 mg daily as needed for anxiety, sleep, agitation, bipolar disorder.  Continue lamotrigine 200 mg daily for mood (can take as split dose 100 mg twice daily if preferred).  Continue Valium/diazepam 5-10 mg daily as needed for severe anxiety/panic symptoms.  Do not take daily.  15 tabs to last 30 days.    Continue gabapentin 800 mg at bedtime and can take 400 mg during day as needed for anxiety or chronic pain.    Continue Lexapro/escitalopram 20 mg daily for mood, anxiety.  Continue Strattera/atomoxetine for attention and concentration, mood: Take 60 mg daily.  Keep upcoming ADHD testing appointments in October.  Can fax [facility] renewal paperwork to me at fax #: [phone].   Continue all other cares per primary care provider.   Continue all other medications as reviewed per electronic medical record today.   Safety plan reviewed. To the Emergency Department as needed or call after hours crisis line at [phone] or [phone]. [place] Crisis Text Line. Text [place] to 234590 or Suicide LifeLine Chat: suicidepreventionlifeline.org/chat  Continue therapy as planned.   Schedule an appointment with me in 5 months (after ADHD testing) or sooner as needed. Call [facility] at [phone] to schedule.  Follow up with primary care provider as planned or for acute medical concerns.  Call the psychiatric nurse line with medication questions or concerns at [phone].  MyChart may be used to communicate with your provider, but this is not intended to be used for emergencies.    Risks of benzodiazepine (Ativan, Xanax, Klonopin, Valium, etc) use including, but not limited to, sedation, tolerance, risk for addiction/dependence. Do not drink alcohol while taking benzodiazepines due to risk of trouble breathing and potential death. Do not drive or operate heavy machinery until it is known how the drug affects you. Discuss with physician or pharmacist before ever  "taking a benzodiazepine with a narcotic/opioid pain medication.     Have previously discussed Lamictal (lamotrigine) can cause serious rashes including Park-Yoshi syndrome which may requiring hospitalization and discontinuation of treatment. If any signs of a rash occur, please see your Primary Care Provider or a dermatologist immediately.     Patient Education   Collaborative Care Psychiatry Service  What to Expect  Here's what to expect from your [facility] Care Psychiatry Service (CCPS).   About CCPS  CCPS means 2 people work together to help you get better. You'll meet with a behavioral health clinician and a psychiatric doctor. A behavioral health clinician helps people with mental health problems by talking with them. A psychiatric doctor helps people by giving them medicine.  How it works  At every visit, you'll see the behavioral health clinician (BHC) first. They'll talk with you about how you're doing and teach you how to feel better.   Then you'll see the psychiatric doctor. This doctor can help you deal with troubling thoughts and feelings by giving you medicine. They'll make sure you know the plan for your care.   CCPS usually takes 3 to 6 visits. If you need more visits, we may have you start seeing a different psychiatric doctor for ongoing care.  If you have any questions or concerns, we'll be glad to talk with you.  About visits  Be open  At your visits, please talk openly about your problems. It may feel hard, but it's the best way for us to help you.  Cancelling visits  If you can't come to your visit, please call us right away at [phone]. If you don't cancel at least 24 hours (1 full day) before your visit, that's \"late cancellation.\"  Being late to visits  Being very late is the same as not showing up. You will be a \"no show\" if:  Your appointment starts with a BHC, and you're more than 15 minutes late for a 30-minute (half hour) visit. This will also cancel your appointment with " the psychiatric doctor.  Your appointment is with a psychiatric doctor only, and you're more than 15 minutes late for a 30-minute (half hour) visit.  Your appointment is with a psychiatric doctor only, and you're more than 30 minutes late for a 60-minute (full hour) visit.  If you cancel late or don't show up 2 times within 6 months, we may end your care.   Getting help between visits  If you need help between visits, you can call us Monday to Friday from 8 a.m. to 4:30 p.m. at [phone].  Emergency care  Call 911 or go to the nearest emergency department if your life or someone else's life is in danger.  Call 708 anytime to reach the national Suicide and Crisis hotline.  Medicine refills  To refill your medicine, call your pharmacy. You can also call [facility]'s Behavioral Access at [phone], Monday to Friday, 8 a.m. to 4:30 p.m. It can take 1 to 3 business days to get a refill.   Forms, letters, and tests  You may have papers to fill out, like FMLA, short-term disability, and workability. We can help you with these forms at your visits, but you must have an appointment. You may need more than 1 visit for this, to be in an intensive therapy program, or both.  Before we can give you medicine for ADHD, we may refer you to get tested for it or confirm it another way.  We may not be able to give you an emotional support animal letter.  We don't do mental health checks ordered by the court.   We don't do mental health testing, but we can refer you to get tested.   Thank you for choosing us for your care.  For informational purposes only. Not to replace the advice of your health care provider. Copyright   2022 Health system. All rights reserved. [employer] [id number] - 12/22.
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
## 2024-06-26 SDOH — HEALTH STABILITY: PHYSICAL HEALTH: ON AVERAGE, HOW MANY MINUTES DO YOU ENGAGE IN EXERCISE AT THIS LEVEL?: 20 MIN

## 2024-06-26 SDOH — HEALTH STABILITY: PHYSICAL HEALTH: ON AVERAGE, HOW MANY DAYS PER WEEK DO YOU ENGAGE IN MODERATE TO STRENUOUS EXERCISE (LIKE A BRISK WALK)?: 2 DAYS

## 2024-06-26 ASSESSMENT — SOCIAL DETERMINANTS OF HEALTH (SDOH): HOW OFTEN DO YOU GET TOGETHER WITH FRIENDS OR RELATIVES?: ONCE A WEEK

## 2024-06-26 ASSESSMENT — PATIENT HEALTH QUESTIONNAIRE - PHQ9: SUM OF ALL RESPONSES TO PHQ QUESTIONS 1-9: 8

## 2024-06-27 ENCOUNTER — OFFICE VISIT (OUTPATIENT)
Dept: FAMILY MEDICINE | Facility: CLINIC | Age: 39
End: 2024-06-27
Payer: COMMERCIAL

## 2024-06-27 ENCOUNTER — E-CONSULT (OUTPATIENT)
Dept: ADDICTION MEDICINE | Facility: CLINIC | Age: 39
End: 2024-06-27

## 2024-06-27 VITALS
HEART RATE: 95 BPM | OXYGEN SATURATION: 95 % | TEMPERATURE: 97.5 F | RESPIRATION RATE: 18 BRPM | SYSTOLIC BLOOD PRESSURE: 120 MMHG | BODY MASS INDEX: 31.99 KG/M2 | HEIGHT: 64 IN | WEIGHT: 187.4 LBS | DIASTOLIC BLOOD PRESSURE: 72 MMHG

## 2024-06-27 DIAGNOSIS — R06.02 SOB (SHORTNESS OF BREATH): Primary | ICD-10-CM

## 2024-06-27 DIAGNOSIS — N92.6 MISSED PERIOD: ICD-10-CM

## 2024-06-27 DIAGNOSIS — G89.29 CHRONIC BILATERAL LOW BACK PAIN WITHOUT SCIATICA: ICD-10-CM

## 2024-06-27 DIAGNOSIS — F11.20 MODERATE OPIOID USE DISORDER (H): Primary | ICD-10-CM

## 2024-06-27 DIAGNOSIS — G56.01 CARPAL TUNNEL SYNDROME OF RIGHT WRIST: ICD-10-CM

## 2024-06-27 DIAGNOSIS — F11.20 MODERATE OPIOID USE DISORDER (H): ICD-10-CM

## 2024-06-27 DIAGNOSIS — M54.50 CHRONIC BILATERAL LOW BACK PAIN WITHOUT SCIATICA: ICD-10-CM

## 2024-06-27 LAB — HCG UR QL: NEGATIVE

## 2024-06-27 PROCEDURE — G2211 COMPLEX E/M VISIT ADD ON: HCPCS | Performed by: NURSE PRACTITIONER

## 2024-06-27 PROCEDURE — 83001 ASSAY OF GONADOTROPIN (FSH): CPT | Performed by: NURSE PRACTITIONER

## 2024-06-27 PROCEDURE — 99207 E-CONSULT TO ADDICTION MEDICINE (ADULT OUTPT PROVIDER TO SPECIALIST WRITTEN QUESTION & RESPONSE): CPT | Performed by: NURSE PRACTITIONER

## 2024-06-27 PROCEDURE — 99215 OFFICE O/P EST HI 40 MIN: CPT | Performed by: NURSE PRACTITIONER

## 2024-06-27 PROCEDURE — 99451 NTRPROF PH1/NTRNET/EHR 5/>: CPT | Performed by: FAMILY MEDICINE

## 2024-06-27 PROCEDURE — 81025 URINE PREGNANCY TEST: CPT | Performed by: NURSE PRACTITIONER

## 2024-06-27 PROCEDURE — 36415 COLL VENOUS BLD VENIPUNCTURE: CPT | Performed by: NURSE PRACTITIONER

## 2024-06-27 PROCEDURE — 82670 ASSAY OF TOTAL ESTRADIOL: CPT | Performed by: NURSE PRACTITIONER

## 2024-06-27 PROCEDURE — 80053 COMPREHEN METABOLIC PANEL: CPT | Performed by: NURSE PRACTITIONER

## 2024-06-27 RX ORDER — BUPRENORPHINE AND NALOXONE 8; 2 MG/1; MG/1
FILM, SOLUBLE BUCCAL; SUBLINGUAL
Qty: 60 EACH | Refills: 1 | Status: SHIPPED | OUTPATIENT
Start: 2024-06-27 | End: 2024-07-23

## 2024-06-27 RX ORDER — CLONIDINE HYDROCHLORIDE 0.1 MG/1
0.1 TABLET ORAL 3 TIMES DAILY PRN
Qty: 20 TABLET | Refills: 0 | Status: SHIPPED | OUTPATIENT
Start: 2024-06-27 | End: 2024-09-09

## 2024-06-27 RX ORDER — ALBUTEROL SULFATE 90 UG/1
1-2 AEROSOL, METERED RESPIRATORY (INHALATION) EVERY 6 HOURS PRN
Qty: 18 G | Refills: 1 | Status: SHIPPED | OUTPATIENT
Start: 2024-06-27 | End: 2024-08-27

## 2024-06-27 RX ORDER — TRAZODONE HYDROCHLORIDE 50 MG/1
50 TABLET, FILM COATED ORAL AT BEDTIME
Qty: 10 TABLET | Refills: 0 | Status: SHIPPED | OUTPATIENT
Start: 2024-06-27

## 2024-06-27 RX ORDER — TIZANIDINE 2 MG/1
2 TABLET ORAL 3 TIMES DAILY
Qty: 20 TABLET | Refills: 0 | Status: SHIPPED | OUTPATIENT
Start: 2024-06-27 | End: 2024-09-09

## 2024-06-27 RX ORDER — ONDANSETRON 4 MG/1
4 TABLET, ORALLY DISINTEGRATING ORAL EVERY 8 HOURS PRN
Qty: 30 TABLET | Refills: 1 | Status: SHIPPED | OUTPATIENT
Start: 2024-06-27

## 2024-06-27 RX ORDER — CYCLOBENZAPRINE HCL 10 MG
10 TABLET ORAL 3 TIMES DAILY PRN
Qty: 30 TABLET | Refills: 2 | Status: SHIPPED | OUTPATIENT
Start: 2024-06-27

## 2024-06-27 ASSESSMENT — ANXIETY QUESTIONNAIRES
8. IF YOU CHECKED OFF ANY PROBLEMS, HOW DIFFICULT HAVE THESE MADE IT FOR YOU TO DO YOUR WORK, TAKE CARE OF THINGS AT HOME, OR GET ALONG WITH OTHER PEOPLE?: SOMEWHAT DIFFICULT
7. FEELING AFRAID AS IF SOMETHING AWFUL MIGHT HAPPEN: SEVERAL DAYS
7. FEELING AFRAID AS IF SOMETHING AWFUL MIGHT HAPPEN: SEVERAL DAYS
1. FEELING NERVOUS, ANXIOUS, OR ON EDGE: MORE THAN HALF THE DAYS
IF YOU CHECKED OFF ANY PROBLEMS ON THIS QUESTIONNAIRE, HOW DIFFICULT HAVE THESE PROBLEMS MADE IT FOR YOU TO DO YOUR WORK, TAKE CARE OF THINGS AT HOME, OR GET ALONG WITH OTHER PEOPLE: SOMEWHAT DIFFICULT
IF YOU CHECKED OFF ANY PROBLEMS ON THIS QUESTIONNAIRE, HOW DIFFICULT HAVE THESE PROBLEMS MADE IT FOR YOU TO DO YOUR WORK, TAKE CARE OF THINGS AT HOME, OR GET ALONG WITH OTHER PEOPLE: SOMEWHAT DIFFICULT
4. TROUBLE RELAXING: MORE THAN HALF THE DAYS
6. BECOMING EASILY ANNOYED OR IRRITABLE: SEVERAL DAYS
GAD7 TOTAL SCORE: 11
GAD7 TOTAL SCORE: 11
2. NOT BEING ABLE TO STOP OR CONTROL WORRYING: SEVERAL DAYS
4. TROUBLE RELAXING: MORE THAN HALF THE DAYS
5. BEING SO RESTLESS THAT IT IS HARD TO SIT STILL: MORE THAN HALF THE DAYS
GAD7 TOTAL SCORE: 11
6. BECOMING EASILY ANNOYED OR IRRITABLE: SEVERAL DAYS
2. NOT BEING ABLE TO STOP OR CONTROL WORRYING: SEVERAL DAYS
5. BEING SO RESTLESS THAT IT IS HARD TO SIT STILL: MORE THAN HALF THE DAYS
3. WORRYING TOO MUCH ABOUT DIFFERENT THINGS: MORE THAN HALF THE DAYS
3. WORRYING TOO MUCH ABOUT DIFFERENT THINGS: MORE THAN HALF THE DAYS
1. FEELING NERVOUS, ANXIOUS, OR ON EDGE: MORE THAN HALF THE DAYS
7. FEELING AFRAID AS IF SOMETHING AWFUL MIGHT HAPPEN: SEVERAL DAYS

## 2024-06-27 ASSESSMENT — PATIENT HEALTH QUESTIONNAIRE - PHQ9
SUM OF ALL RESPONSES TO PHQ QUESTIONS 1-9: 8
10. IF YOU CHECKED OFF ANY PROBLEMS, HOW DIFFICULT HAVE THESE PROBLEMS MADE IT FOR YOU TO DO YOUR WORK, TAKE CARE OF THINGS AT HOME, OR GET ALONG WITH OTHER PEOPLE: SOMEWHAT DIFFICULT

## 2024-06-27 NOTE — PROGRESS NOTES
Assessment & Plan     SOB (shortness of breath)  Refill of albuterol given to have available when needed.  - albuterol (PROAIR HFA/PROVENTIL HFA/VENTOLIN HFA) 108 (90 Base) MCG/ACT inhaler; Inhale 1-2 puffs into the lungs every 6 hours as needed for shortness of breath, wheezing or cough    Chronic bilateral low back pain without sciatica  Fill of cyclobenzaprine given to have available when needed.  - cyclobenzaprine (FLEXERIL) 10 MG tablet; Take 1 tablet (10 mg) by mouth 3 times daily as needed for muscle spasms    Missed period  Check labs here today.  Consider referral to gynecology in the future for early menopause.  - Estradiol; Future  - Follicle stimulating hormone; Future  - HCG Qual, Urine ([id number]); Future  - HCG Qual, Urine ([id number])  - Follicle stimulating hormone  - Estradiol    Moderate opioid use disorder (H)  Much education given.  Encouraged to not use alone.  E consult placed  Will refer to addiction medicine.  Prescription for Zofran.  - naloxone (NARCAN) 4 MG/0.1ML nasal spray; Spray 1 spray (4 mg) into one nostril alternating nostrils as needed for opioid reversal every 2-3 minutes until assistance arrives  - ondansetron (ZOFRAN ODT) 4 MG ODT tab; Take 1 tablet (4 mg) by mouth every 8 hours as needed for nausea  - Adult E-Consult to Addiction Medicine (Outpt Provider to Specialist Written Question & Response)  - Adult Mental Health  Referral; Future  - Comprehensive metabolic panel; Future  - Comprehensive metabolic panel    Carpal tunnel syndrome of right wrist  Wrist brace nightly, may wear during the day as needed as well.  Notify if ineffective and will refer to orthopedics.  - Wrist/Arm/Hand Bracking Supplies Order Wrist Brace; Right; non-thumb spica    The longitudinal plan of care for the diagnosis(es)/condition(s) as documented were addressed during this visit. Due to the added complexity in care, I will continue to support Jennifer in the subsequent management and with ongoing  "continuity of care.       50+ minutes spent by me on the date of the encounter doing chart review, history and exam, documentation and further activities per the note     BMI  Estimated body mass index is 32.39 kg/m  as calculated from the following:    Height as of this encounter: 1.62 m (5' 3.78\").    Weight as of this encounter: 85 kg (187 lb 6.4 oz).     Counseling  Appropriate preventive services were discussed with this patient, including applicable screening as appropriate for fall prevention, nutrition, physical activity, Tobacco-use cessation, weight loss and cognition.  Checklist reviewing preventive services available has been given to the patient.  Reviewed patient's diet, addressing concerns and/or questions.   She is at risk for lack of exercise and has been provided with information to increase physical activity for the benefit of her well-being.   The patient was instructed to see the dentist every 6 months.   The patient's PHQ-9 score is consistent with mild depression. She was provided with information regarding depression.     Brandyn Rodriguez is a 39 year old, presenting for the following health issues:  Abnormal Bleeding Problem (Hasn't had period since 3/2024/), numbness and tingling on rt hand last 2 fingers, and more issues (Not comfortable talking to MA )        6/27/2024     8:14 AM   Additional Questions   Roomed by Dorina   Accompanied by giovany         6/27/2024     8:14 AM   Patient Reported Additional Medications   Patient reports taking the following new medications none     History of Present Illness       Mental Health Follow-up:  Patient presents to follow-up on Depression & Anxiety.Patient's depression since last visit has been:  Better  The patient is not having other symptoms associated with depression.  Patient's anxiety since last visit has been:  Better  The patient is not having other symptoms associated with anxiety.  Any significant life events: No  Patient is feeling " "anxious or having panic attacks.  Patient has no concerns about alcohol or drug use.            Concern - numbness and tingling  Onset: last fall  Description: comes and goes  Intensity: mild  Progression of Symptoms:  same  Accompanying Signs & Symptoms: none  Previous history of similar problem: none  Precipitating factors:        Worsened by: unknown  Alleviating factors:        Improved by: putting up straight to the side  Therapies tried and outcome: None    Is fasting today for labs.  Would like to have labs drawn that were previously ordered.    Last period was in Feb. unable to recall if had menstrual cycle in March.  Is not having any vaginal dryness. Is having some hot flashes. Has PMS type symptoms once per month when has menstrual cycle. Did take a home pregnancy test that was negative.    Having numbness and tingling to right hand 4th and 5th digits. Will have to reposition elbow or shoulder and then will get better. Tingling does wake her in the middle of the night. No decreased  strength. No neck pain. No injury that is aware of. No repetative motions. No shoulder pain. Occurs a lot when is driving.  Is right-handed.    For the last 3 mo has been using fentanyl 2-3 times per week. 8-10 pills at a time.  Smoking it.  Is very embarrassed that started doing this.  Wants to stop using it but is having withdrawal symptoms.  Having flu like symtpoms. Nausea, vomting not able to eat. Hard to get out of bed. Is aching all over. Last dose of fentanyl was last night. Trying to use less fentanyl.  Just using enough to hold off withdrawal.  Uses alone, by self.  Will wake at times and is slumped over.  Is worried about potential accidental overdose.  Does not drink alcohol.  Has used cocaine and methamphetamines in the past-over 3 years ago.  Denies any other illicit drug use.        Objective    /72   Pulse 95   Temp 97.5  F (36.4  C) (Temporal)   Resp 18   Ht 1.62 m (5' 3.78\")   Wt 85 kg (187 lb " 6.4 oz)   SpO2 95%   BMI 32.39 kg/m    Body mass index is 32.39 kg/m .  Physical Exam  Constitutional:       Appearance: She is well-developed.   Cardiovascular:      Rate and Rhythm: Normal rate and regular rhythm.   Pulmonary:      Effort: Pulmonary effort is normal.      Breath sounds: Normal breath sounds.   Musculoskeletal:      Comments: The right  wrist reveals no swelling, erythema, warmth or tenderness.  Full range of motion is noted.  Normal strength is present.  No ganglion cyst on the wrist. Tinel's test is positive.          Skin:     General: Skin is warm.   Neurological:      Mental Status: She is alert.   Psychiatric:         Mood and Affect: Mood normal.             Signed Electronically by: GISEL Johnston CNP    DME (Durable Medical Equipment) Orders and Documentation  Orders Placed This Encounter   Procedures    Wrist/Arm/Hand Bracking Supplies Order Wrist Brace; Right; non-thumb spica        The patient was assessed and it was determined the patient is in need of the following listed DME Supplies/Equipment. Please complete supporting documentation below to demonstrate medical necessity.

## 2024-06-27 NOTE — PROGRESS NOTES
6/27/2024     E-Consult has been accepted.    Interprofessional consultation requested by:  Shannon Almeida APRN CNP      Clinical Question/Purpose: MY CLINICAL QUESTION IS: fentanyl use 8-10 pills inhalation 2-3 times per week for the last 3 months.  Denies use of alcohol.  History of meth and cocaine use 3+ years ago.  Denies any other illicit drug use.  Wants to stop using fentanyl.  Is experiencing withdrawal effects.  Last use was June 26.    Patient assessment and information reviewed:   - Progress note from Shannon Almeida from today, June 27, 2024, noting 8-10 fentanyl pills ingested 2-3 times per week via inhalation, suffering from recurrent withdrawals, afraid of overdose  - Progress note from Sarah Purcell from Maye 3, 2024. Taking diazepam sparingly, less than 5mg daily average.  showing #15 tablets of 10mg sent each month at most, recently less frequently.    Recommendations:     Jennifer would be appropriate for buprenorphine management. This will help treat her withdrawals better than any of the comfort medications listed below, and will provide her a stable medication to be taking for a prolonged period to time to help her avoid ongoing fentanyl use. Our OUD Care Map smartset has much of these specifics, and I will review the details that can be most helpful for Jennifer:     - Prescribe naloxone (as you already have) and instruct on ways to reduce harm while navigating the process of stabilization. One resource she can use is Never Use Alone (as you mentioned in your note) - https://neverusealone.[employer]/. She can call someone when she is using so that, if she uses more than intended, EMS can be called to help her.    - Prescribe comfort medications to treat symptoms of withdrawal. The full list can be found on our OUD Care Map Smartset. The most common medications used in our practice (in order) are clonidine, gabapentin, hydroxyzine, zofran, and a muscle relaxant (robaxin, flexeril, and  tizanidine all commonly used). Add others from the list based on her specific symptoms.    - She will need to wait for a period of time before starting suboxone. If she starts this too early, she can experience precipitated withdrawal, which is the sudden onset of severe withdrawal symptoms due to the displacement of fentanyl, as the buprenorphine (suboxone) has a higher affinity for the opioid receptor. She should be instructed to wait until she has at least 3-4 symptoms of withdrawal that are noticeable and bothersome before starting the protocol listed below.    - We have our standard induction instructions on the Smartset, but this is no longer the favored induction for people using fentanyl. The link below has the instructions. This allows her to stabilize more quickly on a therapeutic dose, and reduces the chances of precipitated withdrawal, per recent research.      https://bridgetotreatment.org/resource/rapid-guidance-for-patients-starting-buprenorphine-qkxxazh-rr-harvsfibs-or-clinics/      - The standard induction can still be used if she is preferring a slower process. They are both effective.    - We will call the patient to introduce her to our walk-in clinic so she is aware of where she can get help/care in case she needs urgent support in the near future. If you are she are able to find stabilization without the need for our walk-in clinic, we can do a more traditional referral to our other addiction clinics (preferred).    - Please feel free to call me for further guidance.              The recommendations provided in this E-Consult are based on a review of clinical data pertinent to the clinical question presented, without a review of the patient's complete medical record or, the benefit of a comprehensive in-person or virtual patient evaluation. This consultation should not replace the clinical judgement and evaluation of the provider ordering this E-Consult. Any new clinical issues, or changes in  patient status since the filing of this E-Consult will need to be taken into account when assessing these recommendations. Please contact me if you have further questions.    My total time spent reviewing clinical information and formulating assessment was 15 minutes.        Adrián Horn MD

## 2024-06-28 ENCOUNTER — TELEPHONE (OUTPATIENT)
Dept: BEHAVIORAL HEALTH | Facility: CLINIC | Age: 39
End: 2024-06-28
Payer: COMMERCIAL

## 2024-06-28 LAB
ALBUMIN SERPL BCG-MCNC: 4.3 G/DL (ref 3.5–5.2)
ALP SERPL-CCNC: 74 U/L (ref 40–150)
ALT SERPL W P-5'-P-CCNC: 14 U/L (ref 0–50)
ANION GAP SERPL CALCULATED.3IONS-SCNC: 14 MMOL/L (ref 7–15)
AST SERPL W P-5'-P-CCNC: 23 U/L (ref 0–45)
BILIRUB SERPL-MCNC: <0.2 MG/DL
BUN SERPL-MCNC: 13 MG/DL (ref 6–20)
CALCIUM SERPL-MCNC: 9.6 MG/DL (ref 8.6–10)
CHLORIDE SERPL-SCNC: 102 MMOL/L (ref 98–107)
CREAT SERPL-MCNC: 0.64 MG/DL (ref 0.51–0.95)
DEPRECATED HCO3 PLAS-SCNC: 25 MMOL/L (ref 22–29)
EGFRCR SERPLBLD CKD-EPI 2021: >90 ML/MIN/1.73M2
ESTRADIOL SERPL-MCNC: <5 PG/ML
FSH SERPL IRP2-ACNC: 3.2 MIU/ML
GLUCOSE SERPL-MCNC: 96 MG/DL (ref 70–99)
POTASSIUM SERPL-SCNC: 4.4 MMOL/L (ref 3.4–5.3)
PROT SERPL-MCNC: 7 G/DL (ref 6.4–8.3)
SODIUM SERPL-SCNC: 141 MMOL/L (ref 135–145)

## 2024-06-28 NOTE — TELEPHONE ENCOUNTER
Writer spoke with patient and updated her with the recommendations from Dr. Arango to be seen in the Recovery Clinic or Emergency Department. Patient declined being seen in the Recovery Clinic today as she is busy. Writer reiterated for patient to seek medical attention in the Emergency Department if needed over the weekend.     Patient conveyed understanding.     Maren Salinas RN on 6/28/2024 at 1:58 PM

## 2024-06-28 NOTE — TELEPHONE ENCOUNTER
Writer contacted [facility] pharmacy who reports the prescription for Suboxone 8-2 mg has went through insurance. A partial fill will be provided for patient with the remainder to be filled on Monday 07/01/2024.    Writer updated patient with this information and went thru Suboxone induction instructions that were provided to patient via [employer]. Writer recommended patient seek medical attention in UC or the ER over the weekend if needed.     Patient conveyed understanding, declined further questions and was very appreciative for everyone's assistance with this.     Maren Salinas RN on 6/28/2024 at 4:19 PM

## 2024-06-28 NOTE — TELEPHONE ENCOUNTER
----- Message from Adrián Horn sent at 6/27/2024  7:48 PM CDT -----  Regarding: Recovery Clinic referral  Patient seen in primary care today. PCP reached out, sent e-consult request as well as referral. She wanted to work with the patient on induction and stabilization, and also wants us to see the patient for follow-up.    Please call patient to provide information about Recovery Clinic as an option for her to get care on a walk-in basis, if she is struggling and needing more support more urgently. Could also be helpful to mention the [place] ED for after hours care.    Writer spoke with patient who was appreciative for the information. Elke sent as well with information for RC, ED, & Never use alone.     Patient reports she did not receive the Suboxone prescription from the pharmacy as it needs a PA regarding qty limit daily. Patients insurance will only cover 3 films per day.     Patient did not receive the prescription for Tizanidine as the pharmacy needs clarification that patient is to be prescribed and using both Tizanidine and Flexeril.     Routing high priority to PCP and Dr. Horn to ensure patient is able to  medications today.     Maren Salinas, RN on 6/28/2024 at 11:30 AM

## 2024-06-28 NOTE — TELEPHONE ENCOUNTER
Recommend patient come to  today or go to [facility] for new scripts (ER may be able to also assist with induction).  Unfortunately, since she is not an established patient with our clinics, I am unable to provide her with a new prescription.     Yeny Arango, DO on 6/28/2024 at 12:51 PM

## 2024-06-28 NOTE — TELEPHONE ENCOUNTER
Writer has been in contact with Shannon Almeida CNP and Dr. Horn. This encounter is being addressed.     Maren Salinas RN on 6/28/2024 at 2:26 PM

## 2024-07-01 DIAGNOSIS — Z78.0 MENOPAUSE: Primary | ICD-10-CM

## 2024-07-05 NOTE — CONFIDENTIAL NOTE
Reached patient to review and ensure she has been able to start suboxone without concern, and wanted to review appropriateness for which clinic to send her for referral. Taking 1-2 films per day, no withdrawals, doing well and feeling stable despite the poor taste of the films.    Will have scheduling team reach out to have her seen at [facility] in the next 2 weeks, in person.    Adrián Horn MD

## 2024-07-05 NOTE — TELEPHONE ENCOUNTER
Called patient, LVM. Follow-up plan was not confirmed at time of transition last week.    Need to hear from Jennifer about her current use of suboxone and how well this is managing symptoms. Could bridge her until next week due to the errors made by our team in regards to scheduling an appt this week, but she will need to be seen sometime next week by PCP or Recovery Clinic.    Will try calling again this afternoon.

## 2024-07-15 ASSESSMENT — PATIENT HEALTH QUESTIONNAIRE - PHQ9
10. IF YOU CHECKED OFF ANY PROBLEMS, HOW DIFFICULT HAVE THESE PROBLEMS MADE IT FOR YOU TO DO YOUR WORK, TAKE CARE OF THINGS AT HOME, OR GET ALONG WITH OTHER PEOPLE: SOMEWHAT DIFFICULT
SUM OF ALL RESPONSES TO PHQ QUESTIONS 1-9: 9
SUM OF ALL RESPONSES TO PHQ QUESTIONS 1-9: 9

## 2024-07-16 ENCOUNTER — OFFICE VISIT (OUTPATIENT)
Dept: BEHAVIORAL HEALTH | Facility: CLINIC | Age: 39
End: 2024-07-16
Payer: COMMERCIAL

## 2024-07-16 DIAGNOSIS — Z72.0 TOBACCO ABUSE: ICD-10-CM

## 2024-07-16 DIAGNOSIS — F11.20 OPIOID USE DISORDER, SEVERE, DEPENDENCE (H): Primary | ICD-10-CM

## 2024-07-16 DIAGNOSIS — F33.1 MAJOR DEPRESSIVE DISORDER, RECURRENT EPISODE, MODERATE (H): Primary | ICD-10-CM

## 2024-07-16 DIAGNOSIS — F19.10 POLYDRUG ABUSE (H): ICD-10-CM

## 2024-07-16 DIAGNOSIS — F11.23 OPIOID DEPENDENCE WITH WITHDRAWAL (H): ICD-10-CM

## 2024-07-16 DIAGNOSIS — F41.8 DEPRESSION WITH ANXIETY: ICD-10-CM

## 2024-07-16 LAB
AMPHETAMINE QUAL URINE POCT: NEGATIVE
BARBITURATE QUAL URINE POCT: NEGATIVE
BENZODIAZEPINE QUAL URINE POCT: ABNORMAL
BUPRENORPHINE QUAL URINE POCT: NEGATIVE
COCAINE QUAL URINE POCT: NEGATIVE
CREAT UR-MCNC: 189 MG/DL
CREATININE QUAL URINE POCT: ABNORMAL
INTERNAL QC QUAL URINE POCT: ABNORMAL
MDMA QUAL URINE POCT: NEGATIVE
METHADONE QUAL URINE POCT: NEGATIVE
METHAMPHETAMINE QUAL URINE POCT: NEGATIVE
OPIATE QUAL URINE POCT: NEGATIVE
OXYCODONE QUAL URINE POCT: NEGATIVE
PH QUAL URINE POCT: ABNORMAL
PHENCYCLIDINE QUAL URINE POCT: NEGATIVE
POCT KIT EXPIRATION DATE: ABNORMAL
POCT KIT LOT NUMBER: ABNORMAL
SPECIFIC GRAVITY POCT: 1.02
TEMPERATURE URINE POCT: ABNORMAL
THC QUAL URINE POCT: NEGATIVE

## 2024-07-16 PROCEDURE — 99205 OFFICE O/P NEW HI 60 MIN: CPT

## 2024-07-16 PROCEDURE — 99417 PROLNG OP E/M EACH 15 MIN: CPT

## 2024-07-16 PROCEDURE — 99207 PR NO BILLABLE SERVICE THIS VISIT: CPT | Performed by: SOCIAL WORKER

## 2024-07-16 PROCEDURE — G0480 DRUG TEST DEF 1-7 CLASSES: HCPCS

## 2024-07-16 PROCEDURE — 80305 DRUG TEST PRSMV DIR OPT OBS: CPT

## 2024-07-16 PROCEDURE — 99207 PR NO CHARGE LOS: CPT

## 2024-07-16 RX ORDER — BUPRENORPHINE AND NALOXONE 8; 2 MG/1; MG/1
FILM, SOLUBLE BUCCAL; SUBLINGUAL
Qty: 30 FILM | Refills: 0 | Status: SHIPPED | OUTPATIENT
Start: 2024-07-16 | End: 2024-07-23

## 2024-07-16 ASSESSMENT — COLUMBIA-SUICIDE SEVERITY RATING SCALE - C-SSRS
6. HAVE YOU EVER DONE ANYTHING, STARTED TO DO ANYTHING, OR PREPARED TO DO ANYTHING TO END YOUR LIFE?: YES
1. IN THE PAST MONTH, HAVE YOU WISHED YOU WERE DEAD OR WISHED YOU COULD GO TO SLEEP AND NOT WAKE UP?: NO
2. HAVE YOU ACTUALLY HAD ANY THOUGHTS OF KILLING YOURSELF?: NO
6. HAVE YOU EVER DONE ANYTHING, STARTED TO DO ANYTHING, OR PREPARED TO DO ANYTHING TO END YOUR LIFE?: NO
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
## 2024-07-16 NOTE — PROGRESS NOTES
The patient was seen by this writer as she scored elevated on the assessment forms-she stated having history of a suicidal attempts about 2 years ago and today no suicidal thoughts-no suicidal plan and no intent to kill self today-she was given the number to [phone] to call if having mental health crisis. In addition she was asked about following the recommendation from psychiatry for accessing mental health therapy and she agreed-she was told that she can meet with this provider or that this provider can help her connect with a provider that fits her needs.     On 7/17/2024 this writer called the patient and she stated that she received the number for behavioral access to schedule with long term therapist.

## 2024-07-16 NOTE — PROGRESS NOTES
[facility]    Peer  met with Jennifer Voss in the Recovery Clinic to introduce himself, detail services provided and discuss current status of recovery. Pt appeared alert, oriented and open to feedback during our discussion.     Pt arrives for visit with provider for suboxone.  PRC sees patient today under provider diagnosis of opioid substance disorder, severe, dependence  (H)       Subjective (S)-  Pt presented as emotional and reported that she's ready to go to outpatient treatment and wants an assessment.    Objective (O)-  N/A    Assessment (A)- N/A    Plan (P)-  PRC Mone shared lived experience, educated Pt on how to find NA meetings in her area and helpd Pt complete an assessment appointment on [email or web address]    PRC provided business card to pt welcoming contact for recovery based support and resources. PRC and pt agree to speak again during an upcoming  visit.           Service Type:     Individual     Session Start Time:   11:45am                    Session End Time:    12:15pm    Session Length:       30 minutes      Patient Goal:   To utilize suboxone assisted treatment for sobriety and long term recovery.     Goal Progress:   Ongoing.    Key Risk Factors to Recovery:   PRC encourages being aware of risk factors that can lead to re-use which include avoiding isolation, avoiding triggers and managing cravings in a healthy manner. being open and willing to acceptance and change on a daily basis.  PRC encourages pt to establish a sober network calling tree to reach out to when needed.  Continue to practice honesty with ourselves and trusted support person(s).   PRC encourages regular attendance at recovery based meetings as well as finding a sponsor for mentoring and accountability.   PRC encourages consideration of other services such as counseling for mental health issues which can correlate with our substance use.      Support Needs:   Ongoing care, support and  resources for opioid substance use disorder as well as other substances.     Follow up:   PRC has provided pt with his contact information for support and resource needs.    PRC and pt agree to meet during an upcoming  visit.       [facility]  [address], [address]   [place], [place], [address]  Clinic Phone: [phone]  Clinic Fax: [phone]  Peer  phone:  [phone]    Open Monday - Friday  9:00am-4:00pm  Walk in hours: 9am-3pm      Duncan Shore  July 16, 2024  4:36 PM    LING Casas provides clinical oversite and supervision of care.

## 2024-07-18 LAB
FENTANYL UR CFM-MCNC: 212 NG/ML
FENTANYL/CREAT UR: 112 NG/MG {CREAT}
GABAPENTIN UR QL CFM: PRESENT
NALOXONE UR CFM-MCNC: 12 NG/ML
NALOXONE: 6 NG/MG {CREAT}
NORBUPRENORPHINE UR CFM-MCNC: 34 NG/ML
NORBUPRENORPHINE/CREAT UR: 18 NG/MG {CREAT}
NORDIAZEPAM UR QL CFM: PRESENT
NORFENTANYL UR CFM-MCNC: 777 NG/ML
NORFENTANYL/CREAT UR: 411 NG/MG {CREAT}
OXAZEPAM UR QL CFM: PRESENT
TEMAZEPAM UR QL CFM: PRESENT

## 2024-07-23 ENCOUNTER — OFFICE VISIT (OUTPATIENT)
Dept: BEHAVIORAL HEALTH | Facility: CLINIC | Age: 39
End: 2024-07-23
Payer: COMMERCIAL

## 2024-07-23 VITALS — HEART RATE: 87 BPM | DIASTOLIC BLOOD PRESSURE: 93 MMHG | SYSTOLIC BLOOD PRESSURE: 128 MMHG

## 2024-07-23 DIAGNOSIS — F41.8 DEPRESSION WITH ANXIETY: ICD-10-CM

## 2024-07-23 DIAGNOSIS — F11.20 OPIOID USE DISORDER, SEVERE, DEPENDENCE (H): Primary | ICD-10-CM

## 2024-07-23 DIAGNOSIS — Z79.891 ENCOUNTER FOR MONITORING OPIOID MAINTENANCE THERAPY: ICD-10-CM

## 2024-07-23 DIAGNOSIS — Z51.81 ENCOUNTER FOR MONITORING OPIOID MAINTENANCE THERAPY: ICD-10-CM

## 2024-07-23 LAB
AMPHETAMINE QUAL URINE POCT: NEGATIVE
BARBITURATE QUAL URINE POCT: NEGATIVE
BENZODIAZEPINE QUAL URINE POCT: NEGATIVE
BUPRENORPHINE QUAL URINE POCT: ABNORMAL
COCAINE QUAL URINE POCT: NEGATIVE
CREATININE QUAL URINE POCT: ABNORMAL
INTERNAL QC QUAL URINE POCT: ABNORMAL
MDMA QUAL URINE POCT: NEGATIVE
METHADONE QUAL URINE POCT: NEGATIVE
METHAMPHETAMINE QUAL URINE POCT: NEGATIVE
OPIATE QUAL URINE POCT: NEGATIVE
OXYCODONE QUAL URINE POCT: NEGATIVE
PH QUAL URINE POCT: ABNORMAL
PHENCYCLIDINE QUAL URINE POCT: NEGATIVE
POCT KIT EXPIRATION DATE: ABNORMAL
POCT KIT LOT NUMBER: ABNORMAL
SPECIFIC GRAVITY POCT: 1.02
TEMPERATURE URINE POCT: ABNORMAL
THC QUAL URINE POCT: NEGATIVE

## 2024-07-23 PROCEDURE — 99214 OFFICE O/P EST MOD 30 MIN: CPT

## 2024-07-23 PROCEDURE — 80305 DRUG TEST PRSMV DIR OPT OBS: CPT

## 2024-07-23 RX ORDER — BUPRENORPHINE AND NALOXONE 8; 2 MG/1; MG/1
1 FILM, SOLUBLE BUCCAL; SUBLINGUAL 2 TIMES DAILY
Qty: 30 FILM | Refills: 0 | Status: SHIPPED | OUTPATIENT
Start: 2024-07-23 | End: 2024-08-06

## 2024-07-23 ASSESSMENT — PATIENT HEALTH QUESTIONNAIRE - PHQ9: SUM OF ALL RESPONSES TO PHQ QUESTIONS 1-9: 6

## 2024-07-23 NOTE — PATIENT INSTRUCTIONS
Buprenorphine Tips:  Make sure you are letting your buprenorphine tablets or films dissolve completely under your tongue so you know you are getting all the medication.  Don't eat, drink, or talk while taking your buprenorphine.  Avoid nicotine for 15-30 minutes before taking buprenorphine - this can cause the blood vessels to constrict and you may not absorb the medication as well.  To avoid potential dental issues related to buprenorphine rinse your mouth with water after taking your dose.  Avoid brushing your teeth for 1 hour after taking a dose.     Constipation is the most common side effect of buprenorphine.  Here are some simple things you can try to ease constipation.  Eating more fiber (fruits, vegetable, and whole grains) and drinking enough fluid (urine should be light yellow).    Take Miralax (polyethylene gylcol).  Use 1 capful daily until you start to have loose stools and then decrease use.  You can also try Colace (docusate) 100mg twice daily as needed.  These medications can be prescribed or purchased OTC.  Let your provider know if you are still struggling with constipation.    What to do if you experience nausea or upset stomach after taking buprenorphine:  Make sure you are letting it dissolve completely.  After the medication has dissolved try spitting out your saliva instead of swallowing it.    Other common side effects include:   - sleepiness/drowsiness OR trouble sleeping  - headaches    Please discuss any side effects with your provider.    Important safety info:  Ensure your medication is stored in a safe place out of sight and out of reach from kids and pets, ideally locked away.   Do not combine buprenorphine with other sedating substances or medications such as alcohol, benzodiazepines (Xanax or alprazolam for example), or other opioids.  Only take medications as prescribed.

## 2024-07-23 NOTE — NURSING NOTE
[facility]      Rooming information:    Approximate last use of full opioid agonist: yesterday Fentanyl around 7pm    Taking buprenorphine? Yes: suboxone  How much per day? 16mg   Number of buprenorphine films/tablets remaining currently: 30  Side effects related to buprenorphine (constipation, dry mouth, sedation?) No   Narcan currently available: Yes  Other recent substance use:    Denies  NICOTINE-Yes:   If using nicotine, ready to quit? No    Point of care urine drug screen positive for:  Lab Results   Component Value Date    BUP Negative 07/16/2024    BZO Screen Positive (A) 07/16/2024    BAR Negative 07/16/2024    ALMAS Negative 07/16/2024    MAMP Negative 07/16/2024    AMP Negative 07/16/2024    MDMA Negative 07/16/2024    MTD Negative 07/16/2024    [id number] Negative 07/16/2024    OXY Negative 07/16/2024    PCP Negative 07/16/2024    THC Negative 07/16/2024    TEMP 90 F 07/16/2024    SGPOCT 1.025 07/16/2024       *POC urine drug screen does not screen for Fentanyl    Pregnancy Status   LMP: aprox 5 months ago   Birth control/barriers: none  Interested in birth control if none currently? No    Depression Response    Patient completed the PHQ-9 assessment for depression and scored >9? No  Question 9 on the PHQ-9 was positive for suicidality? No  Does patient have current mental health provider? Yes    Is this a virtual visit? No    I personally notified the following: NUPUR           7/16/2024    10:00 AM   PHQ Assesment Total Score(s)   PHQ-9 Score 11         Housing needs: stable    Insurance: active     Current legal issues: none     Contact information up to date? Yes     3rd Party Involvement  (please obtain FATUMA if pt would like to include)    Jorge Bradshaw MA  July 23, 2024  1:17 PM
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
## 2024-08-06 ENCOUNTER — OFFICE VISIT (OUTPATIENT)
Dept: BEHAVIORAL HEALTH | Facility: CLINIC | Age: 39
End: 2024-08-06
Payer: COMMERCIAL

## 2024-08-06 VITALS — SYSTOLIC BLOOD PRESSURE: 123 MMHG | DIASTOLIC BLOOD PRESSURE: 83 MMHG | HEART RATE: 83 BPM

## 2024-08-06 DIAGNOSIS — F11.20 OPIOID USE DISORDER, SEVERE, DEPENDENCE (H): Primary | ICD-10-CM

## 2024-08-06 DIAGNOSIS — F41.8 DEPRESSION WITH ANXIETY: ICD-10-CM

## 2024-08-06 DIAGNOSIS — G47.00 INSOMNIA, UNSPECIFIED TYPE: ICD-10-CM

## 2024-08-06 LAB
AMPHETAMINE QUAL URINE POCT: NEGATIVE
AMPHETAMINES UR QL SCN: ABNORMAL
BARBITURATE QUAL URINE POCT: NEGATIVE
BARBITURATES UR QL SCN: ABNORMAL
BENZODIAZ UR QL SCN: ABNORMAL
BENZODIAZEPINE QUAL URINE POCT: ABNORMAL
BUPRENORPHINE QUAL URINE POCT: ABNORMAL
BUPRENORPHINE UR QL: ABNORMAL
BZE UR QL SCN: ABNORMAL
CANNABINOIDS UR QL SCN: ABNORMAL
COCAINE QUAL URINE POCT: NEGATIVE
CREATININE QUAL URINE POCT: ABNORMAL
FENTANYL UR QL: ABNORMAL
INTERNAL QC QUAL URINE POCT: ABNORMAL
MDMA QUAL URINE POCT: NEGATIVE
METHADONE QUAL URINE POCT: NEGATIVE
METHAMPHETAMINE QUAL URINE POCT: NEGATIVE
OPIATE QUAL URINE POCT: NEGATIVE
OPIATES UR QL SCN: ABNORMAL
OXYCODONE QUAL URINE POCT: NEGATIVE
OXYCODONE UR QL: NORMAL
PCP QUAL URINE (ROCHE): ABNORMAL
PH QUAL URINE POCT: ABNORMAL
PHENCYCLIDINE QUAL URINE POCT: NEGATIVE
POCT KIT EXPIRATION DATE: ABNORMAL
POCT KIT LOT NUMBER: ABNORMAL
SPECIFIC GRAVITY POCT: 1.02
TEMPERATURE URINE POCT: ABNORMAL
THC QUAL URINE POCT: NEGATIVE

## 2024-08-06 PROCEDURE — 80307 DRUG TEST PRSMV CHEM ANLYZR: CPT | Mod: 91

## 2024-08-06 PROCEDURE — 80307 DRUG TEST PRSMV CHEM ANLYZR: CPT

## 2024-08-06 PROCEDURE — G0480 DRUG TEST DEF 1-7 CLASSES: HCPCS

## 2024-08-06 PROCEDURE — 99214 OFFICE O/P EST MOD 30 MIN: CPT

## 2024-08-06 RX ORDER — BUPRENORPHINE AND NALOXONE 8; 2 MG/1; MG/1
FILM, SOLUBLE BUCCAL; SUBLINGUAL
Qty: 60 FILM | Refills: 0 | Status: SHIPPED | OUTPATIENT
Start: 2024-08-06 | End: 2024-08-27

## 2024-08-06 ASSESSMENT — PATIENT HEALTH QUESTIONNAIRE - PHQ9: SUM OF ALL RESPONSES TO PHQ QUESTIONS 1-9: 5

## 2024-08-06 NOTE — PATIENT INSTRUCTIONS
Aug 27 3:30 with me at    Sept 27 with me at [facility] at 3:30 virtual         Buprenorphine Tips:  Make sure you are letting your buprenorphine tablets or films dissolve completely under your tongue so you know you are getting all the medication.  Don't eat, drink, or talk while taking your buprenorphine.  Avoid nicotine for 15-30 minutes before taking buprenorphine - this can cause the blood vessels to constrict and you may not absorb the medication as well.  To avoid potential dental issues related to buprenorphine rinse your mouth with water after taking your dose.  Avoid brushing your teeth for 1 hour after taking a dose.     Constipation is the most common side effect of buprenorphine.  Here are some simple things you can try to ease constipation.  Eating more fiber (fruits, vegetable, and whole grains) and drinking enough fluid (urine should be light yellow).    Take Miralax (polyethylene gylcol).  Use 1 capful daily until you start to have loose stools and then decrease use.  You can also try Colace (docusate) 100mg twice daily as needed.  These medications can be prescribed or purchased OTC.  Let your provider know if you are still struggling with constipation.    What to do if you experience nausea or upset stomach after taking buprenorphine:  Make sure you are letting it dissolve completely.  After the medication has dissolved try spitting out your saliva instead of swallowing it.    Other common side effects include:   - sleepiness/drowsiness OR trouble sleeping  - headaches    Please discuss any side effects with your provider.    Important safety info:  Ensure your medication is stored in a safe place out of sight and out of reach from kids and pets, ideally locked away.   Do not combine buprenorphine with other sedating substances or medications such as alcohol, benzodiazepines (Xanax or alprazolam for example), or other opioids.  Only take medications as prescribed.
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
## 2024-08-06 NOTE — NURSING NOTE
[facility]      Rooming information:    Approximate last use of full opioid agonist: 7/22/24  Taking buprenorphine? Yes: suboxone  How much per day? 16mg   Number of buprenorphine films/tablets remaining currently: yes, unsure   Side effects related to buprenorphine (constipation, dry mouth, sedation?) No   Narcan currently available: Yes  Other recent substance use:    Denies  NICOTINE-Yes:   If using nicotine, ready to quit? No    Point of care urine drug screen positive for:  Lab Results   Component Value Date    BUP Screen Positive (A) 08/06/2024    BZO Screen Positive (A) 08/06/2024    BAR Negative 08/06/2024    ALMAS Negative 08/06/2024    MAMP Negative 08/06/2024    AMP Negative 08/06/2024    MDMA Negative 08/06/2024    MTD Negative 08/06/2024    YQH029 Negative 08/06/2024    OXY Negative 08/06/2024    PCP Negative 08/06/2024    THC Negative 08/06/2024    TEMP 90 F 08/06/2024    SGPOCT 1.025 08/06/2024       *POC urine drug screen does not screen for Fentanyl    Pregnancy Status   LMP: Irregular; pre-menopausal   Birth control/barriers: none  Interested in birth control if none currently? No    Depression Response    Patient completed the PHQ-9 assessment for depression and scored >9? No  Question 9 on the PHQ-9 was positive for suicidality? No  Does patient have current mental health provider? Yes  C-SSRS screener risk level. NA    Is this a virtual visit? No    I personally notified the following: NA          8/6/2024    10:00 AM   PHQ Assesment Total Score(s)   PHQ-9 Score 5         Housing needs: stable     Insurance: active     Current legal issues: none    Contact information up to date? Yes     3rd Party Involvement  (please obtain FATUMA if pt would like to include)    Jorge Bradshaw MA  August 6, 2024  10:46 AM

## 2024-08-07 LAB — CREAT UR-MCNC: 90 MG/DL

## 2024-08-09 LAB
BUPRENORPHINE UR CFM-MCNC: 224 NG/ML
BUPRENORPHINE/CREAT UR: 249 NG/MG {CREAT}
GABAPENTIN UR QL CFM: PRESENT
NALOXONE UR CFM-MCNC: 1581 NG/ML
NALOXONE: 1757 NG/MG {CREAT}
NORBUPRENORPHINE UR CFM-MCNC: 1344 NG/ML
NORBUPRENORPHINE/CREAT UR: 1493 NG/MG {CREAT}
NORDIAZEPAM UR QL CFM: PRESENT
OXAZEPAM UR QL CFM: PRESENT
TEMAZEPAM UR QL CFM: PRESENT

## 2024-08-15 ENCOUNTER — ANCILLARY PROCEDURE (OUTPATIENT)
Dept: MAMMOGRAPHY | Facility: CLINIC | Age: 39
End: 2024-08-15
Attending: NURSE PRACTITIONER
Payer: COMMERCIAL

## 2024-08-15 DIAGNOSIS — Z12.31 ENCOUNTER FOR SCREENING MAMMOGRAM FOR BREAST CANCER: ICD-10-CM

## 2024-08-15 PROCEDURE — 77067 SCR MAMMO BI INCL CAD: CPT | Mod: TC | Performed by: RADIOLOGY

## 2024-08-15 PROCEDURE — 77063 BREAST TOMOSYNTHESIS BI: CPT | Mod: TC | Performed by: RADIOLOGY

## 2024-08-21 ENCOUNTER — PATIENT OUTREACH (OUTPATIENT)
Dept: CARE COORDINATION | Facility: CLINIC | Age: 39
End: 2024-08-21
Payer: COMMERCIAL

## 2024-08-26 DIAGNOSIS — R06.02 SOB (SHORTNESS OF BREATH): ICD-10-CM

## 2024-08-27 DIAGNOSIS — F11.20 OPIOID USE DISORDER, SEVERE, DEPENDENCE (H): ICD-10-CM

## 2024-08-27 RX ORDER — BUPRENORPHINE AND NALOXONE 8; 2 MG/1; MG/1
FILM, SOLUBLE BUCCAL; SUBLINGUAL
Qty: 30 FILM | Refills: 0 | Status: SHIPPED | OUTPATIENT
Start: 2024-08-27 | End: 2024-09-09

## 2024-08-27 RX ORDER — ALBUTEROL SULFATE 90 UG/1
AEROSOL, METERED RESPIRATORY (INHALATION)
Qty: 7 G | Refills: 0 | Status: SHIPPED | OUTPATIENT
Start: 2024-08-27 | End: 2024-10-03

## 2024-08-27 NOTE — PROGRESS NOTES
Jennifer needed to reschedule appointment due to illness.  [facility] sent     Future Appointments   Date Time Provider Department Center   9/3/2024  2:15 PM [facility]ad [facility] [place] [facility]   9/9/2024  1:30 PM Vee Wells NP [facility]   9/27/2024  3:30 PM Vee Wells NP [facility]   10/21/2024 11:00 AM Mirella Simons PsyD CCE [facility]   10/28/2024  2:00 PM Mirella Simons PsyD CCE [facility]   11/4/2024  1:00 PM [facility]ule[facility]   11/4/2024  1:30 PM Sarah Purcell DO [facility]

## 2024-09-03 ENCOUNTER — OFFICE VISIT (OUTPATIENT)
Dept: OBGYN | Facility: CLINIC | Age: 39
End: 2024-09-03
Attending: NURSE PRACTITIONER
Payer: COMMERCIAL

## 2024-09-03 VITALS
DIASTOLIC BLOOD PRESSURE: 84 MMHG | WEIGHT: 191.2 LBS | OXYGEN SATURATION: 97 % | HEART RATE: 89 BPM | BODY MASS INDEX: 32.64 KG/M2 | HEIGHT: 64 IN | SYSTOLIC BLOOD PRESSURE: 130 MMHG

## 2024-09-03 DIAGNOSIS — Z78.0 MENOPAUSE: ICD-10-CM

## 2024-09-03 DIAGNOSIS — N91.2 AMENORRHEA: ICD-10-CM

## 2024-09-03 DIAGNOSIS — Z12.4 SCREENING FOR MALIGNANT NEOPLASM OF CERVIX: Primary | ICD-10-CM

## 2024-09-03 PROCEDURE — 83001 ASSAY OF GONADOTROPIN (FSH): CPT | Performed by: NURSE PRACTITIONER

## 2024-09-03 PROCEDURE — G0145 SCR C/V CYTO,THINLAYER,RESCR: HCPCS | Performed by: NURSE PRACTITIONER

## 2024-09-03 PROCEDURE — 82670 ASSAY OF TOTAL ESTRADIOL: CPT | Performed by: NURSE PRACTITIONER

## 2024-09-03 PROCEDURE — 83002 ASSAY OF GONADOTROPIN (LH): CPT | Performed by: NURSE PRACTITIONER

## 2024-09-03 PROCEDURE — 84702 CHORIONIC GONADOTROPIN TEST: CPT | Performed by: NURSE PRACTITIONER

## 2024-09-03 PROCEDURE — 36415 COLL VENOUS BLD VENIPUNCTURE: CPT | Performed by: NURSE PRACTITIONER

## 2024-09-03 PROCEDURE — 84146 ASSAY OF PROLACTIN: CPT | Performed by: NURSE PRACTITIONER

## 2024-09-03 PROCEDURE — 84443 ASSAY THYROID STIM HORMONE: CPT | Performed by: NURSE PRACTITIONER

## 2024-09-03 PROCEDURE — 99459 PELVIC EXAMINATION: CPT | Performed by: NURSE PRACTITIONER

## 2024-09-03 PROCEDURE — 87624 HPV HI-RISK TYP POOLED RSLT: CPT | Performed by: NURSE PRACTITIONER

## 2024-09-03 PROCEDURE — 99214 OFFICE O/P EST MOD 30 MIN: CPT | Performed by: NURSE PRACTITIONER

## 2024-09-03 NOTE — PROGRESS NOTES
Assessment & Plan     Menopause  - Ob/Gyn  Referral    Screening for malignant neoplasm of cervix  - HPV and Gynecologic Cytology Panel - Recommended Age 30 - 65 Years    Amenorrhea  We reviewed her history, current medications and discussed possible etiologies of her absent cycles. Estradiol was low, but FSH not menopausal range. We discussed low likelihood of her Zyprexa causing hyperprolactinemia, but would recommend we check prolactin level, also add TSH, LH and repeat FSH and Estradiol levels.  Discussed with menopause levels of low estradiol and elevated FSH, true menopause can be diagnosed after 12 months with no menstrual bleeding. If FSH remains normal, discussed possible perimenopausal etiology-would need to consider hormonal medication for prevention of endometrial hyperplasia and also contraception if she becomes sexually active.  Await results and will follow up as indicated. Will also check Quant HCG-urine was negative in June. Consider pelvic ultrasound after labs as indicated.  Patient is given an opportunity to ask questions and have them answered.  - Luteinizing Hormone; Future  - Prolactin; Future  - TSH with free T4 reflex; Future  - Follicle stimulating hormone; Future  - HCG quantitative pregnancy; Future  - Estradiol; Future  - Luteinizing Hormone  - Prolactin  - TSH with free T4 reflex  - Follicle stimulating hormone  - HCG quantitative pregnancy  - Estradiol    33 minutes spent by me on the date of the encounter doing chart review, history and exam, documentation and further activities per the note      Brandyn Rodriguez is a 39 year old, presenting for the following health issues:  Menopausal Sx    HPI       Menopausal symptoms    Patient saw primary care in June, had mentioned her last menstrual cycle was in February and unsure if she had a cycle in March. Noting some vasomotor symptoms-hot flashes, night sweats. No genitourinary symptoms of menopause. She was last sexually  "active earlier this year.   Prior to February, cycles were monthly, lasting 3-4 days with occasional a heavy day. No intermenstrual bleeding. Normal cramping. Then they just stopped suddenly after she turned 39. Is being followed by behavioral health for opioid use disorder, depression and anxiety.   Denies symptoms of galactorrhea, headaches, vision changes. Mom and maternal aunt were menopausal in early to mid 40s.   History of abnormal pap smears, due next month for follow up and would be amenable to having this one today.  Prior history of ectopic pregnancy with right salpingectomy.   Had a hysteroscopy with D&C and polypectomy in 2021 for excessive bleeding, uterine fibroid and polyp.  No use of hormonal contraception, she is a smoker and not a candidate for combined hormonal medication.    Component      Latest Ref Rng 6/27/2024  9:22 AM 6/27/2024  9:24 AM   HCG Qual Urine      Negative   Negative    FSH      mIU/mL 3.2     Estradiol      pg/mL <5         Review of Systems  Constitutional, HEENT, cardiovascular, pulmonary, gi and gu systems are negative, except as otherwise noted.      Objective    /84 (BP Location: Right arm, Patient Position: Sitting, Cuff Size: Adult Regular)   Pulse 89   Ht 1.62 m (5' 3.78\")   Wt 86.7 kg (191 lb 3.2 oz)   LMP 02/05/2024 (Approximate)   SpO2 97%   BMI 33.05 kg/m    Body mass index is 33.05 kg/m .  Physical Exam   GENERAL: alert and no distress   (female) w/bimanual: normal female external genitalia, normal urethral meatus, normal vaginal mucosa, normal cervix/adnexa/uterus without masses or discharge  MS: no gross musculoskeletal defects noted, no edema  SKIN: no suspicious lesions or rashes  PSYCH: mentation appears normal, affect normal/bright    Signed Electronically by: GISEL Espinoza CNP    "

## 2024-09-03 NOTE — PATIENT INSTRUCTIONS
If you have any questions regarding your visit, Please contact your care team.     [employer] [facility]: [phone]  To Schedule an Appointment 24/7  Call: [phone] TELEPHONE NUMBER     Latasha Abad- APRN CNP      Jen Cardenas-Surgery Scheduler  Heather-Surgery Scheduler         Monday 7:30am-2:00pm    Tuesday 7:30am-4:00pm    Wednesday 7:30am-2:00pm    Thursday 7:30am-11:00am    Friday 7:30am-2:00pm [address] [address]  Phone- [phone]   Fax- [phone]     Imaging Scheduling all locations  [phone]    [facility]  [address] [place] [address]   [place] [address] [phone]         Urgent Care locations:  [facility]   Monday-Friday  10 am - 8 pm  Saturday and Sunday   9 am - 5 pm     [phone]   If you need a medication refill, please contact your pharmacy. Please allow 3 business days for your refill to be completed.  As always, Thank you for trusting us with your healthcare needs!      see additional instructions from your care team below

## 2024-09-04 ENCOUNTER — PATIENT OUTREACH (OUTPATIENT)
Dept: CARE COORDINATION | Facility: CLINIC | Age: 39
End: 2024-09-04
Payer: COMMERCIAL

## 2024-09-04 LAB
ESTRADIOL SERPL-MCNC: <5 PG/ML
FSH SERPL IRP2-ACNC: 4.4 MIU/ML
HCG INTACT+B SERPL-ACNC: <1 MIU/ML
HPV HR 12 DNA CVX QL NAA+PROBE: NEGATIVE
HPV16 DNA CVX QL NAA+PROBE: NEGATIVE
HPV18 DNA CVX QL NAA+PROBE: NEGATIVE
HUMAN PAPILLOMA VIRUS FINAL DIAGNOSIS: NORMAL
LH SERPL-ACNC: 1.7 MIU/ML
PROLACTIN SERPL 3RD IS-MCNC: 13 NG/ML (ref 5–23)
TSH SERPL DL<=0.005 MIU/L-ACNC: 1.3 UIU/ML (ref 0.3–4.2)

## 2024-09-06 ENCOUNTER — MYC MEDICAL ADVICE (OUTPATIENT)
Dept: FAMILY MEDICINE | Facility: CLINIC | Age: 39
End: 2024-09-06
Payer: COMMERCIAL

## 2024-09-06 DIAGNOSIS — G56.01 CARPAL TUNNEL SYNDROME OF RIGHT WRIST: Primary | ICD-10-CM

## 2024-09-06 LAB
BKR AP ASSOCIATED HPV REPORT: NORMAL
BKR LAB AP GYN ADEQUACY: NORMAL
BKR LAB AP GYN INTERPRETATION: NORMAL
BKR LAB AP LMP: NORMAL
BKR LAB AP PREVIOUS ABNORMAL: NORMAL
PATH REPORT.COMMENTS IMP SPEC: NORMAL
PATH REPORT.COMMENTS IMP SPEC: NORMAL
PATH REPORT.RELEVANT HX SPEC: NORMAL

## 2024-09-06 NOTE — TELEPHONE ENCOUNTER
Notes from visit 6/27/24     Having numbness and tingling to right hand 4th and 5th digits. Will have to reposition elbow or shoulder and then will get better. Tingling does wake her in the middle of the night. No decreased  strength. No neck pain. No injury that is aware of. No repetative motions. No shoulder pain. Occurs a lot when is driving.  Is right-handed.         Carpal tunnel syndrome of right wrist  Wrist brace nightly, may wear during the day as needed as well.  Notify if ineffective and will refer to orthopedics.  - Wrist/Arm/Hand Bracking Supplies Order Wrist Brace; Right; non-thumb spica      Megha Mendoza RN on 9/6/2024 at 3:46 PM

## 2024-09-09 ENCOUNTER — OFFICE VISIT (OUTPATIENT)
Dept: BEHAVIORAL HEALTH | Facility: CLINIC | Age: 39
End: 2024-09-09
Payer: COMMERCIAL

## 2024-09-09 DIAGNOSIS — F11.23 OPIOID DEPENDENCE WITH WITHDRAWAL (H): ICD-10-CM

## 2024-09-09 DIAGNOSIS — Z72.0 TOBACCO ABUSE: ICD-10-CM

## 2024-09-09 DIAGNOSIS — F41.8 DEPRESSION WITH ANXIETY: ICD-10-CM

## 2024-09-09 DIAGNOSIS — F11.20 OPIOID USE DISORDER, SEVERE, DEPENDENCE (H): Primary | ICD-10-CM

## 2024-09-09 LAB
AMPHETAMINES UR QL SCN: ABNORMAL
BARBITURATES UR QL SCN: ABNORMAL
BENZODIAZ UR QL SCN: ABNORMAL
BUPRENORPHINE UR QL: ABNORMAL
BZE UR QL SCN: ABNORMAL
CANNABINOIDS UR QL SCN: ABNORMAL
FENTANYL UR QL: ABNORMAL
OPIATES UR QL SCN: ABNORMAL
PCP QUAL URINE (ROCHE): ABNORMAL

## 2024-09-09 PROCEDURE — 80307 DRUG TEST PRSMV CHEM ANLYZR: CPT

## 2024-09-09 PROCEDURE — 99214 OFFICE O/P EST MOD 30 MIN: CPT

## 2024-09-09 PROCEDURE — H0038 SELF-HELP/PEER SVC PER 15MIN: HCPCS

## 2024-09-09 RX ORDER — BUPRENORPHINE AND NALOXONE 8; 2 MG/1; MG/1
1 FILM, SOLUBLE BUCCAL; SUBLINGUAL 2 TIMES DAILY
Qty: 36 FILM | Refills: 0 | Status: SHIPPED | OUTPATIENT
Start: 2024-09-09 | End: 2024-09-10

## 2024-09-09 ASSESSMENT — PATIENT HEALTH QUESTIONNAIRE - PHQ9
SUM OF ALL RESPONSES TO PHQ QUESTIONS 1-9: 7
SUM OF ALL RESPONSES TO PHQ QUESTIONS 1-9: 7
10. IF YOU CHECKED OFF ANY PROBLEMS, HOW DIFFICULT HAVE THESE PROBLEMS MADE IT FOR YOU TO DO YOUR WORK, TAKE CARE OF THINGS AT HOME, OR GET ALONG WITH OTHER PEOPLE: SOMEWHAT DIFFICULT

## 2024-09-09 NOTE — PROGRESS NOTES
[facility] Follow Up    ASSESSMENT/PLAN                                                    1. Opioid use disorder, severe, dependence (H)  -needs improvement  -take 8 mg BID.    -continue with 12 step recovery meetings  -start psychotherapy   - buprenorphine HCl-naloxone HCl (SUBOXONE) 8-2 MG per film; Place 1 Film under the tongue 2 times daily for 18 days. Take 1 film twice a day and an additional 0.5 to 1 film as needed for breakthrough withdrawal, cravings  Dispense: 36 Film; Refill: 0  - Urine Drug Screen Buprenorphine Urine Qualitative [id number]; Future  - Urine Drug Screen Buprenorphine Urine Qualitative [id number]  -confirmed access to narcan  -Counseling provided regarding   Opioid warning reviewed.    Risk of overdose following a period of abstinence due to decrease tolerance was discussed including risk of death.     Strongly recommended abstain from alcohol, benzodiazepines, THC, opioids and other drugs of abuse.     Increased risk of return to opioid use after use of these substances discussed.      Increased risk of overdose/death with use of other substances particularly benzodiazepines/alcohol reviewed.  -schedule appointment for lab prior to virtual addiction medicine appointment     2. Depression with anxiety  -no change  -continue with psychiatry and follow all recommendations  -start psychotherapy    3. Tobacco abuse  -no change   -continue to ask, assess and advise          Return in about 18 days (around 9/27/2024).      Patient counseling completed today:  Discussed mechanism of action, potential risks/benefits/side effects of medications and other recommendations above.     Discussed risk of precipitated withdrawal with initiation of buprenorphine in the presence of full opioid agonists.    Reviewed directions for initiation of buprenorphine to reduce risk of precipitated withdrawal and maximize efficacy.    Harm reduction counseling including never use alone,  availability of naloxone, risks associated with concurrent use of opioids and benzodiazepines, alcohol, or other sedatives, safer administration as applicable.  Discussed importance of avoiding isolation, building a network of supportive relationships, avoiding people/places/things associated with past use to reduce risk of relapse; including motivational interviewing regarding psychosocial interventions.   SUBJECTIVE                                                       CC/HPI:  Jennifer Voss is a 39 year old female with PMH low back pain, tobacco use, depression, anxiety PTSD and Opiate use disorder who presents to the Recovery Clinic for return visit.     Brief history with Recovery Clinic:   First visit 7/16/24  Significant events affecting care:   Recommendations last visit: from 8/6/24 visit     1. Opioid use disorder, severe, dependence (H)  -showing improvement. Cravings controlled, no use, side effects manageable  - Drugs of Abuse Screen Urine (POC CUPS) POCT  - buprenorphine HCl-naloxone HCl (SUBOXONE) 8-2 MG per film; Take 1 film twice a day and an additional 0.5 to 1 film as needed for breakthrough withdrawal, cravings  Dispense: 60 Film; Refill: 0  -continue with suboxone 8 mg BID and take additional 0.5-1 film as needed for withdrawal symptoms or cravings.   -Follow up with me at  in three weeks (October 27 in person)  Sept 27 through [facility] can be virtual with plans for UDS at local lab      2. Depression with anxiety  -no change.  Mood improved with suboxone   -Continue with medications per psychiatry   - Do not take suboxone at the same time as benzo's.  Use of suboxone and benzodiazepines have an increased risk of CNS depression     3. Insomnia, unspecified type  -improved  -keeping regular sleep schedule.    --Sleep hygiene discussed  -keep a regular schedule  -limit or quit caffeine  -avoid alcohol  -increase activity/exercise  -keep room cool quiet and dark  -avoid evening eating  -avoid  checking the clock                 Return for  Aug 27 3:30 with me at    with me at [facility] at 3:30 virtual .      24 HPI:  Doing well, had a slip with a one time use.  Did not have effects. Had been taking 1.5 films day.  Resumed 16 MG TDD.    Mood, more depression with recent slip.   Went to a couple meetings.  Found a small meeting Wednesday that she likes.  Has not yet shared story  Back to work, going well.  Waking up earlier.  Enjoying having structure to day. People at work noticing a positive change.        Substance Use History :  Opioids:   Age at first use: 38  Current use: substance: Fentanyl ; quantity 8-10 pills; route: smoking ; timing of last use: ;                 IV drug use: Yes:    History of overdose: Yes:   Previous residential or outpatient treatments for addiction : No  Previous medication treatments for addiction: No  Longest period of sobriety:   Medical complications related to substance use:   Hepatitis C: non-reactive; Date of most recent testin23  HIV: non-reactive; Date of most recent testin23     Other Addiction History:  Stimulants (cocaine, methamphetamine, MDMA/ecstasy)   2 years ago  Sedatives/hypnotics/anxiolytics: (benzodiazepines, GHB, Ambien, phenobarbital)  Current valium prescription  Alcohol:      Nicotine: (cigarettes, vaping, chew/snuff)  Daily   Cannabis:      Hallucinogens/Dissociatives: (acid, mushrooms, ketamine)     Eating disorder:     Gambling:                    [place] Prescription Drug Monitoring Program Reviewed:  Yes;         PAST PSYCHIATRIC HISTORY:  Diagnoses- depression, anxiety, panic disorder, PTSD   Suicide Attempts: Yes   Hospitalizations:        2024    10:00 AM 2024     1:00 PM 2024    10:00 AM   PHQ   PHQ-9 Total Score 11 6 5   Q9: Thoughts of better off dead/self-harm past 2 weeks Not at all Not at all Not at all         Social History  Housing status: alone  Education:   Employment  status: works in school system.  Off for summer, returns to work Aug 7  Relationship status: Single  Children:   Legal:       7/23/2024     1:00 PM 8/6/2024    10:00 AM 9/9/2024     7:58 AM   PHQ   PHQ-9 Total Score 6 5 7   Q9: Thoughts of better off dead/self-harm past 2 weeks Not at all Not at all Not at all           Labs discussed with patient?  Yes      [place] Prescription Drug Monitoring Program Reviewed:  Yes      Medications:  Current Outpatient Medications   Medication Sig Dispense Refill    albuterol (PROAIR HFA/PROVENTIL HFA/VENTOLIN HFA) 108 (90 Base) MCG/ACT inhaler INHALE 1 TO 2 PUFFS BY MOUTH EVERY 6 HOURS AS NEEDED FOR SHORTNESS OF BREATH, WHEEZING, OR COUGH 7 g 0    atomoxetine (STRATTERA) 60 MG capsule Take 1 capsule (60 mg) by mouth daily 30 capsule 2    buprenorphine HCl-naloxone HCl (SUBOXONE) 8-2 MG per film Take 1 film twice a day and an additional 0.5 to 1 film as needed for breakthrough withdrawal, cravings 30 Film 0    cloNIDine (CATAPRES) 0.1 MG tablet Take 1 tablet (0.1 mg) by mouth 3 times daily as needed (anxiety, sweating, restlessness) 20 tablet 0    cyclobenzaprine (FLEXERIL) 10 MG tablet Take 1 tablet (10 mg) by mouth 3 times daily as needed for muscle spasms 30 tablet 2    diazepam (VALIUM) 10 MG tablet Take 0.5-1 tablets (5-10 mg) by mouth daily as needed for anxiety 15 tabs to last at least 30 days. 15 tablet 2    escitalopram (LEXAPRO) 20 MG tablet Take 1 tablet (20 mg) by mouth daily 90 tablet 3    gabapentin (NEURONTIN) 800 MG tablet Take one tab by mouth at bedtime and can take half-tab daily as needed for pain and anxiety. 135 tablet 1    ibuprofen (ADVIL/MOTRIN) 800 MG tablet Take 1 tablet (800 mg) by mouth every 6 hours as needed for other (mild and/or inflammatory pain) 30 tablet 0    lamoTRIgine (LAMICTAL) 200 MG tablet Take 1 tablet (200 mg) by mouth daily 90 tablet 3    naloxone (NARCAN) 4 MG/0.1ML nasal spray Spray 1 spray (4 mg) into one nostril alternating  nostrils as needed for opioid reversal every 2-3 minutes until assistance arrives 0.2 mL 11    OLANZapine (ZYPREXA) 10 MG tablet Take 0.5-1 tablets (5-10 mg) by mouth daily as needed (anxiety, sleep) 90 tablet 1    ondansetron (ZOFRAN ODT) 4 MG ODT tab Take 1 tablet (4 mg) by mouth every 8 hours as needed for nausea 30 tablet 1    senna-docusate (SENOKOT-S/PERICOLACE) 8.6-50 MG tablet Take 1-2 tablets by mouth 2 times daily 30 tablet 0    tiZANidine (ZANAFLEX) 2 MG tablet Take 1 tablet (2 mg) by mouth 3 times daily For muscle cramps/aches 20 tablet 0    traZODone (DESYREL) 50 MG tablet Take 1 tablet (50 mg) by mouth at bedtime 10 tablet 0     No current facility-administered medications for this visit.       Allergies   Allergen Reactions    Penicillins Nausea    Fluoxetine Itching       PMH, PSH, FamHx reviewed      OBJECTIVE                                                      LMP 02/05/2024 (Approximate)     Physical Exam  Constitutional:       Appearance: Normal appearance.   HENT:      Head: Normocephalic.      Right Ear: External ear normal.      Left Ear: External ear normal.      Nose: Nose normal.   Eyes:      Extraocular Movements: Extraocular movements intact.      Conjunctiva/sclera: Conjunctivae normal.      Pupils: Pupils are equal, round, and reactive to light.   Pulmonary:      Effort: Pulmonary effort is normal.   Musculoskeletal:         General: Normal range of motion.   Skin:     General: Skin is warm.   Neurological:      Mental Status: She is alert.   Psychiatric:         Mood and Affect: Mood normal.         Behavior: Behavior normal.         Thought Content: Thought content normal.         Labs:    UDS:    Lab Results   Component Value Date    BUP Screen Positive (A) 08/06/2024    BZO Screen Positive (A) 08/06/2024    BAR Negative 08/06/2024    ALMAS Negative 08/06/2024    MAMP Negative 08/06/2024    AMP Negative 08/06/2024    MDMA Negative 08/06/2024    MTD Negative 08/06/2024    [id number]  Negative 08/06/2024    OXY Negative 08/06/2024    PCP Negative 08/06/2024    THC Negative 08/06/2024    TEMP 90 F 08/06/2024    SGPOCT 1.025 08/06/2024     *POC urine drug screen does not screen for Fentanyl      Recent Results (from the past 240 hour(s))   Gynecologic Cytology (PAP)    Collection Time: 09/03/24  2:34 PM   Result Value Ref Range    Interpretation        Negative for Intraepithelial Lesion or Malignancy (NILM)    Comment         Papanicolaou Test Limitations:  Cervical cytology is a screening test with limited sensitivity, and regular screening is critical for cancer prevention.  Pap tests are primarily effective for the diagnosis/prevention of squamous cell carcinoma, not adenocarcinoma or other cancers.        Specimen Adequacy       Satisfactory for evaluation, endocervical/transformation zone component absent    Clinical Information       none      LMP/Menopause Date       2/5/2024      Previous Abnormal?       No      Performing Labs       The technical component of this testing was completed at [facility].    Stain controls for all stains resulted within this report have been reviewed and show appropriate reactivity.      Associated HPV Report       Please see the associated HPV High Risk Types DNA Cervical report for Specimen [id number] from the same collection date.     HPV and Gynecologic Cytology Panel - Recommended Age 30 - 65 Years    Collection Time: 09/03/24  2:34 PM   Result Value Ref Range    Human Papilloma Virus 16 DNA Negative Negative    Human Papilloma Virus 18 DNA Negative Negative    Human Papilloma Virus Other Negative Negative    FINAL DIAGNOSIS       This patient's sample is negative for high risk HPV DNA.          METHODOLOGY: The [employer] system uses automated extraction, simultaneous amplification of HPV (E6/E7 oncogenes) and beta-globin, followed by real time detection of fluorescent labeled HPV and beta  globin using specific oligonucleotide probes. The test specifically identifies types HPV 16 DNA and HPV 18 DNA while concurrently detecting the rest of the high risk types (31, 33, 35, 39, 45, 51, 52, 56, 58, 59, 66 or 68).     COMMENTS: This test is not intended for use as a screening device for woman under age 30 with normal cervical cytology. Results should be correlated with cytologic and histologic findings. Close clinical follow up is recommended.      Please see the separate Gynecologic Cytology (Pap) report from the same collection date.     Luteinizing Hormone    Collection Time: 09/03/24  2:45 PM   Result Value Ref Range    Luteinizing Hormone 1.7 mIU/mL   Prolactin    Collection Time: 09/03/24  2:45 PM   Result Value Ref Range    Prolactin 13 5 - 23 ng/mL   TSH with free T4 reflex    Collection Time: 09/03/24  2:45 PM   Result Value Ref Range    TSH 1.30 0.30 - 4.20 uIU/mL   Follicle stimulating hormone    Collection Time: 09/03/24  2:45 PM   Result Value Ref Range    FSH 4.4 mIU/mL   HCG quantitative pregnancy    Collection Time: 09/03/24  2:45 PM   Result Value Ref Range    hCG Quantitative <1 <5 mIU/mL   Estradiol    Collection Time: 09/03/24  2:45 PM   Result Value Ref Range    Estradiol <5 pg/mL         Vee Wells NP    [place] [id number] [address] [address]  [phone]      Answers submitted by the patient for this visit:  Patient Health Questionnaire (Submitted on 9/9/2024)  If you checked off any problems, how difficult have these problems made it for you to do your work, take care of things at home, or get along with other people?: Somewhat difficult  PHQ9 TOTAL SCORE: 7

## 2024-09-09 NOTE — NURSING NOTE
[facility]      Rooming information:  Pt stated Was doing well in recovery since July but had a lapse on Friday  Approximate last use of full opioid agonist: 9/6/24  Taking buprenorphine? Yes: SUBOXONE  How much per day? 16MG  Number of buprenorphine films/tablets remaining currently: some  Side effects related to buprenorphine (constipation, dry mouth, sedation?) Yes: constipation taking meds now   Narcan currently available: Yes  Other recent substance use:    Cannabis   NICOTINE-Yes:   If using nicotine, ready to quit? No    Point of care urine drug screen positive for:  Lab Results   Component Value Date    BUP Screen Positive (A) 08/06/2024    BZO Screen Positive (A) 08/06/2024    BAR Negative 08/06/2024    ALMAS Negative 08/06/2024    MAMP Negative 08/06/2024    AMP Negative 08/06/2024    MDMA Negative 08/06/2024    MTD Negative 08/06/2024    [id number] Negative 08/06/2024    OXY Negative 08/06/2024    PCP Negative 08/06/2024    THC Negative 08/06/2024    TEMP 90 F 08/06/2024    SGPOCT 1.025 08/06/2024       *POC urine drug screen does not screen for Fentanyl    Pregnancy Status   LMP: irregular- pre-fly  Birth control/barriers: none  Interested in birth control if none currently? No    Depression Response    Patient completed the PHQ-9 assessment for depression and scored >9? No  Question 9 on the PHQ-9 was positive for suicidality? No  Does patient have current mental health provider? Yes  C-SSRS screener risk level.       Is this a virtual visit? No    I personally notified the following: NUPUR          9/9/2024     1:00 PM   PHQ Assesment Total Score(s)   PHQ-9 Score 6         Housing needs: stable    Insurance: active    Current legal issues: none    Contact information up to date? yes    3rd Party Involvement not today (please obtain FATUMA if pt would like to include)    Emma Bravo MA  September 9, 2024  1:16 PM

## 2024-09-10 ENCOUNTER — MYC MEDICAL ADVICE (OUTPATIENT)
Dept: BEHAVIORAL HEALTH | Facility: CLINIC | Age: 39
End: 2024-09-10
Payer: COMMERCIAL

## 2024-09-10 ENCOUNTER — MYC MEDICAL ADVICE (OUTPATIENT)
Dept: OBGYN | Facility: CLINIC | Age: 39
End: 2024-09-10
Payer: COMMERCIAL

## 2024-09-10 ENCOUNTER — TELEPHONE (OUTPATIENT)
Dept: BEHAVIORAL HEALTH | Facility: CLINIC | Age: 39
End: 2024-09-10
Payer: COMMERCIAL

## 2024-09-10 DIAGNOSIS — N91.2 AMENORRHEA: Primary | ICD-10-CM

## 2024-09-10 RX ORDER — BUPRENORPHINE AND NALOXONE 8; 2 MG/1; MG/1
FILM, SOLUBLE BUCCAL; SUBLINGUAL
Qty: 36 FILM | Refills: 0 | Status: SHIPPED | OUTPATIENT
Start: 2024-09-10 | End: 2024-09-27

## 2024-09-10 NOTE — PROGRESS NOTES
[facility]    Peer  met with Jennifer Voss in the Recovery Clinic to introduce himself, detail services provided and discuss current status of recovery. Pt appeared alert, oriented and open to feedback during our discussion.     Pt arrives for visit with provider for suboxone.  [facility] sees patient today under provider diagnosis of opioid substance disorder, severe, dependence  (H)       Subjective (S)-  Pt stated she had a relapse last Friday 9/6/24 of Fentanyl and feels a lot of remorse and loss about it.  Pt states she was proud of her clean time.      Objective (O)-  Pt presented with a large emotional response and crying.      Assessment (A)- N/A    Plan (P)-   MIREILLE Shore shared lived experience regarding relapse and getting up and continuing and considering the experience an asset Pt can share with others..    [facility] provided business card to pt welcoming contact for recovery based support and resources. PRC and pt agree to speak again during an upcoming  visit.           Service Type:     Individual     Session Start Time: 1:20pm                      Session End Time: 1:40pm       Session Length: 20 minutes            Patient Goal:   To utilize suboxone assisted treatment for sobriety and long term recovery.     Goal Progress:   Ongoing.    Key Risk Factors to Recovery:   [facility] encourages being aware of risk factors that can lead to re-use which include avoiding isolation, avoiding triggers and managing cravings in a healthy manner. being open and willing to acceptance and change on a daily basis.  [facility] encourages pt to establish a sober network calling tree to reach out to when needed.  Continue to practice honesty with ourselves and trusted support person(s).   [facility] encourages regular attendance at recovery based meetings as well as finding a sponsor for mentoring and accountability.   [facility] encourages consideration of other services such as counseling for mental health  issues which can correlate with our substance use.      Support Needs:   Ongoing care, support and resources for opioid substance use disorder as well as other substances.     Follow up:   PRC has provided pt with his contact information for support and resource needs.    PRC and pt agree to meet during an upcoming  visit.       [facility]  [address], [address]   [place], [place], [address]  Clinic Phone: [phone]  Clinic Fax: [phone]  Peer  phone:  [phone]    Open Monday - Friday  9:00am-4:00pm  Walk in hours: 9am-3pm      Duncan Shore  September 10, 2024  11:05 AM    LING Casas provides clinical oversite and supervision of care.

## 2024-09-10 NOTE — TELEPHONE ENCOUNTER
Encouraged patient to contact pharmacy directly. Per chart, buprenorphine rx was received by pharmacy.    Lauren Fernandes RN on 9/10/2024 at 12:17 PM

## 2024-09-10 NOTE — TELEPHONE ENCOUNTER
Incoming fax from [facility] pharmacy requesting clarification on the following prescription:  buprenorphine HCl-naloxone HCl (SUBOXONE) 8-2 MG per film (Discontinued) 36 Film 0 9/9/2024 9/10/2024 No   Sig - Route: Place 1 Film under the tongue 2 times daily for 18 days. Take 1 film twice a day and an additional 0.5 to 1 film as needed for breakthrough withdrawal, cravings - Sublingual     New order placed by provider Christina Wells CNP on 09/10/2024.   buprenorphine HCl-naloxone HCl (SUBOXONE) 8-2 MG per film 36 Film 0 9/10/2024 -- No   Sig: Take 1 film twice a day and an additional 0.5 to 1 film as needed for breakthrough withdrawal, cravings     Writer spoke with [facility] pharmacy who confirmed receiving the updated prescription.     Maren Salinas RN on 9/10/2024 at 1:04 PM

## 2024-09-11 NOTE — TELEPHONE ENCOUNTER
REASON FOR VISIT: (R) Wrist    DATE OF APPT: 10/01/2024   NOTES (FOR ALL VISITS) STATUS DETAILS   OFFICE NOTE from referring provider Internal [facility] Shannon Basilio, GISEL CNP 9/06/2024   MEDICATION LIST Internal    IMAGING  (FOR ALL VISITS)     XR N/A    MRI (HEAD, NECK, SPINE) N/A    CT (HEAD, NECK, SPINE) N/A

## 2024-09-11 NOTE — PROGRESS NOTES
Assessment & Plan     Amenorrhea  We reviewed her history and most recent lab results. Message had been sent to patient to schedule pelvic ultrasound, but has not yet.  Discussed with patient we need some additional imaging. Recommend pelvic ultrasound to assess endometrial lining, also rule out outflow obstruction due to prior hysteroscopy. 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 Discussed FHA is presumed to be due disruption of pulsatile hypothalamic gonadotropin-releasing hormone (GnRH) secretion. Abnormal GnRH secretion leads to reduced gonadotropin secretion, absent midcycle surges of LH secretion, absence of normal follicular development, anovulation, and low serum estradiol levels.   Discussed recommendation for pituitary MRI and rationale as this will help with additional evaluation. Consider need for DEXA baseline as well.  Plan follow up once imaging x 2 completed, may consider referral to Endocrine.  Discussed with patient that labs are not consistent with menopause. Patient is given an opportunity to ask questions and have them answered.  - US Pelvic Transabdominal and Transvaginal; Future  - MR Pituitary w/o & w Contrast; Future    Subjective   Jennifer is a 39 year old, presenting for the following health issues:  Follow Up (Amenorrhea/ Menopause)    HPI       Follow up- Amenorrhea/ Menopause    Patient seen last week for evaluation of menopause. Last menstrual bleed was in February-unsure about bleeding in March. Prior to February, cycles were regular.   Labs with primary care:     Component      Latest Ref Rng 6/27/2024  9:22 AM 6/27/2024  9:24 AM   HCG Qual Urine      Negative   Negative    FSH      mIU/mL 3.2     Estradiol      pg/mL <5       Referred to GYN for low Estradiol level and to discuss estrogen replacement.   At her appointment last week, labs below done and she presents for follow up.     Component      Latest Ref  "Rng 9/3/2024  2:45 PM   Luteinizing Hormone      mIU/mL 1.7    Prolactin      5 - 23 ng/mL 13    TSH      0.30 - 4.20 uIU/mL 1.30    FSH      mIU/mL 4.4    hCG Quantitative      <5 mIU/mL <1    Estradiol      pg/mL <5       Has some mild irregular vasomotor symptoms. Last sexually active earlier this year. Behavioral health manages her anxiety, depression, opioid use disorder.   No galactorrhea, headaches, vision changes.  History of right salpingectomy for ectopic pregnancy.  Hysteroscopy D&C and polypectomy in 2021 for excessive bleeding, uterine fibroid, polyp. Smokes approximately 1 ppd.     Review of Systems  Constitutional, HEENT, cardiovascular, pulmonary, gi and gu systems are negative, except as otherwise noted.      Objective    /82 (BP Location: Right arm, Patient Position: Sitting, Cuff Size: Adult Regular)   Pulse 93   Ht 1.62 m (5' 3.78\")   Wt 90.1 kg (198 lb 9.6 oz)   LMP 02/05/2024 (Approximate)   SpO2 94%   BMI 34.33 kg/m    Body mass index is 34.33 kg/m .  Physical Exam   GENERAL: alert and no distress  MS: no gross musculoskeletal defects noted, no edema  SKIN: no suspicious lesions or rashes  PSYCH: mentation appears normal, affect normal/bright      Signed Electronically by: GISEL Espinoza CNP    "

## 2024-09-13 ENCOUNTER — OFFICE VISIT (OUTPATIENT)
Dept: OBGYN | Facility: CLINIC | Age: 39
End: 2024-09-13
Payer: COMMERCIAL

## 2024-09-13 VITALS
HEART RATE: 93 BPM | HEIGHT: 64 IN | SYSTOLIC BLOOD PRESSURE: 126 MMHG | BODY MASS INDEX: 33.9 KG/M2 | DIASTOLIC BLOOD PRESSURE: 82 MMHG | WEIGHT: 198.6 LBS | OXYGEN SATURATION: 94 %

## 2024-09-13 DIAGNOSIS — N91.2 AMENORRHEA: Primary | ICD-10-CM

## 2024-09-13 PROCEDURE — 99213 OFFICE O/P EST LOW 20 MIN: CPT | Performed by: NURSE PRACTITIONER

## 2024-09-13 NOTE — PATIENT INSTRUCTIONS
If you have any questions regarding your visit, Please contact your care team.     [employer]: [phone]  To Schedule an Appointment 24/7  Call: [phone]RS TELEPHONE NUMBER     Latasha Abad- APRN CNP      Jen Cardenas-Surgery Scheduler  Heather-Surgery Scheduler         Monday 7:30am-2:00pm    Tuesday 7:30am-4:00pm    Wednesday 7:30am-2:00pm    Thursday 7:30am-11:00am    Friday 7:30am-2:00pm [address] [address]  Phone- [phone]   Fax- [phone]     Imaging Scheduling all locations  [phone]    [facility]  [address] [place] [address]   [place] [address] [phone]         Urgent Care locations:  [facility]   Monday-Friday  10 am - 8 pm  Saturday and Sunday   9 am - 5 pm     [phone]   If you need a medication refill, please contact your pharmacy. Please allow 3 business days for your refill to be completed.  As always, Thank you for trusting us with your healthcare needs!      see additional instructions from your care team below

## 2024-09-19 ENCOUNTER — MYC MEDICAL ADVICE (OUTPATIENT)
Dept: PSYCHIATRY | Facility: CLINIC | Age: 39
End: 2024-09-19
Payer: COMMERCIAL

## 2024-09-26 ENCOUNTER — LAB (OUTPATIENT)
Dept: LAB | Facility: CLINIC | Age: 39
End: 2024-09-26
Payer: COMMERCIAL

## 2024-09-26 DIAGNOSIS — F11.23 OPIOID DEPENDENCE WITH WITHDRAWAL (H): ICD-10-CM

## 2024-09-26 DIAGNOSIS — F11.20 OPIOID USE DISORDER, SEVERE, DEPENDENCE (H): ICD-10-CM

## 2024-09-26 LAB
AMPHETAMINES UR QL SCN: ABNORMAL
BARBITURATES UR QL SCN: ABNORMAL
BENZODIAZ UR QL SCN: ABNORMAL
BUPRENORPHINE UR QL: ABNORMAL
BZE UR QL SCN: ABNORMAL
CANNABINOIDS UR QL SCN: ABNORMAL
FENTANYL UR QL: ABNORMAL
OPIATES UR QL SCN: ABNORMAL
OXYCODONE UR QL: NORMAL
PCP QUAL URINE (ROCHE): ABNORMAL

## 2024-09-26 PROCEDURE — 80307 DRUG TEST PRSMV CHEM ANLYZR: CPT

## 2024-09-26 ASSESSMENT — ANXIETY QUESTIONNAIRES
GAD7 TOTAL SCORE: 13
8. IF YOU CHECKED OFF ANY PROBLEMS, HOW DIFFICULT HAVE THESE MADE IT FOR YOU TO DO YOUR WORK, TAKE CARE OF THINGS AT HOME, OR GET ALONG WITH OTHER PEOPLE?: SOMEWHAT DIFFICULT
7. FEELING AFRAID AS IF SOMETHING AWFUL MIGHT HAPPEN: MORE THAN HALF THE DAYS

## 2024-09-27 ENCOUNTER — HOSPITAL ENCOUNTER (OUTPATIENT)
Dept: MRI IMAGING | Facility: CLINIC | Age: 39
Discharge: HOME | End: 2024-09-27
Attending: NURSE PRACTITIONER | Admitting: NURSE PRACTITIONER
Payer: COMMERCIAL

## 2024-09-27 ENCOUNTER — VIRTUAL VISIT (OUTPATIENT)
Dept: ADDICTION MEDICINE | Facility: CLINIC | Age: 39
End: 2024-09-27
Payer: COMMERCIAL

## 2024-09-27 DIAGNOSIS — F11.20 OPIOID USE DISORDER, SEVERE, DEPENDENCE (H): ICD-10-CM

## 2024-09-27 DIAGNOSIS — N91.2 AMENORRHEA: ICD-10-CM

## 2024-09-27 PROCEDURE — 70553 MRI BRAIN STEM W/O & W/DYE: CPT

## 2024-09-27 PROCEDURE — 255N000002 HC RX 255 OP 636: Performed by: NURSE PRACTITIONER

## 2024-09-27 PROCEDURE — A9585 GADOBUTROL INJECTION: HCPCS | Performed by: NURSE PRACTITIONER

## 2024-09-27 RX ORDER — BUPRENORPHINE AND NALOXONE 8; 2 MG/1; MG/1
FILM, SOLUBLE BUCCAL; SUBLINGUAL
Qty: 90 FILM | Refills: 0 | Status: SHIPPED | OUTPATIENT
Start: 2024-09-27

## 2024-09-27 RX ORDER — GADOBUTROL 604.72 MG/ML
9 INJECTION INTRAVENOUS ONCE
Status: COMPLETED | OUTPATIENT
Start: 2024-09-27 | End: 2024-09-27

## 2024-09-27 RX ORDER — BUPRENORPHINE AND NALOXONE 8; 2 MG/1; MG/1
FILM, SOLUBLE BUCCAL; SUBLINGUAL
Qty: 36 FILM | Refills: 0 | Status: CANCELLED | OUTPATIENT
Start: 2024-09-27

## 2024-09-27 RX ADMIN — GADOBUTROL 9 ML: 604.72 INJECTION INTRAVENOUS at 13:37

## 2024-09-27 ASSESSMENT — PAIN SCALES - GENERAL: PAINLEVEL: NO PAIN (0)

## 2024-09-27 NOTE — PROGRESS NOTES
Virtual Visit Details    Type of service:  Video Visit   Joined the call at 9/27/2024, 3:27:21 pm.  Left the call at 9/27/2024, 3:40:46 pm.    Originating Location (pt. Location): Home    Distant Location (provider location):  On-site  Platform used for Video Visit: [facility] [place] [facility]    A/P                                                    ASSESSMENT/PLAN  1. Opioid use disorder, severe, dependence (H)  -needs improvement, recent use.    -take 8 mg BID and additional 4-8 mg at noon  -continue with 12 step recovery meetings  -continue with psychotherapy  - buprenorphine HCl-naloxone HCl (SUBOXONE) 8-2 MG per film; Place 1 Film under the tongue 2 times daily for 18 days. Take 1 film twice a day and an additional 0.5 to 1 film as needed for breakthrough withdrawal, cravings  Dispense: 36 Film; Refill: 0  - Urine Drug Screen Buprenorphine Urine Qualitative [id number]; Future  - Urine Drug Screen Buprenorphine Urine Qualitative [id number]  -confirmed access to narcan  -Counseling provided regarding   Opioid warning reviewed.    Risk of overdose following a period of abstinence due to decrease tolerance was discussed including risk of death.                Strongly recommended abstain from alcohol, benzodiazepines, THC, opioids and other drugs of abuse.                Increased risk of return to opioid use after use of these substances discussed.                     Increased risk of overdose/death with use of other substances particularly benzodiazepines/alcohol reviewed.  -1 month follow up     2. Depression with anxiety  -no change  -continue with psychiatry and follow all recommendations  -start psychotherapy     3. Tobacco abuse  -no change   -continue to ask, assess and advise         Sep 27, 2024  - increase suboxone to take additional 4-8 mg for cravings        Continued Complex Management  The longitudinal plan of care for Opioid Use Disorder (OUD) was addressed during this visit.  Due to the added complexity in care, I will continue to support Jennifer in the subsequent management and with ongoing continuity of care.      Last encounter A/P  1. Opioid use disorder, severe, dependence (H)  -needs improvement  -take 8 mg BID.    -continue with 12 step recovery meetings  -start psychotherapy   - buprenorphine HCl-naloxone HCl (SUBOXONE) 8-2 MG per film; Place 1 Film under the tongue 2 times daily for 18 days. Take 1 film twice a day and an additional 0.5 to 1 film as needed for breakthrough withdrawal, cravings  Dispense: 36 Film; Refill: 0  - Urine Drug Screen Buprenorphine Urine Qualitative [id number]; Future  - Urine Drug Screen Buprenorphine Urine Qualitative [id number]  -confirmed access to narcan  -Counseling provided regarding   Opioid warning reviewed.    Risk of overdose following a period of abstinence due to decrease tolerance was discussed including risk of death.                Strongly recommended abstain from alcohol, benzodiazepines, THC, opioids and other drugs of abuse.                Increased risk of return to opioid use after use of these substances discussed.                     Increased risk of overdose/death with use of other substances particularly benzodiazepines/alcohol reviewed.  -schedule appointment for lab prior to virtual addiction medicine appointment      2. Depression with anxiety  -no change  -continue with psychiatry and follow all recommendations  -start psychotherapy     3. Tobacco abuse  -no change   -continue to ask, assess and advise       PDMP Review         Value Time User    State PDMP site checked  Yes 9/29/2024  7:11 PM Vee Wells, MATTHEW              RTC  Return in about 1 month (around 10/27/2024).      Counseled the patient on the importance of having a recovery program in addition to medication to manage recovery.  Components include avoiding isolating, having willingness to change, avoiding triggers and managing cravings. Encouraged having some type  of sober network and practicing honesty with trusted support person(s). Encouraged other services such as counseling, 12 step or other self-help organizations.      Opioid warning reviewed.  Risk of overdose following a period of abstinence due to decrease tolerance was discussed including risk of death.  Strongly recommended abstain from alcohol, benzodiazepines, THC, opioids and other drugs of abuse.  Increased risk of return to opioid use after use of these substances discussed.  Increased risk of overdose/death with use of other substances particularly benzodiazepines/alcohol reviewed.        SUBJECTIVE                                                    Jennifer Voss is a 39 year old female who presents to clinic today for follow up    Visit performed virtual video       Substance Use History :  Opioids:   Age at first use: 38  Current use: substance: Fentanyl ; quantity 8-10 pills; route: smoking ; timing of last use: ;                 IV drug use: Yes:    History of overdose: Yes:   Previous residential or outpatient treatments for addiction : No  Previous medication treatments for addiction: No  Longest period of sobriety:   Medical complications related to substance use:   Hepatitis C: non-reactive; Date of most recent testin23  HIV: non-reactive; Date of most recent testin23     Other Addiction History:  Stimulants (cocaine, methamphetamine, MDMA/ecstasy)   2 years ago  Sedatives/hypnotics/anxiolytics: (benzodiazepines, GHB, Ambien, phenobarbital)  Current valium prescription  Alcohol:      Nicotine: (cigarettes, vaping, chew/snuff)  Daily   Cannabis:      Hallucinogens/Dissociatives: (acid, mushrooms, ketamine)     Eating disorder:     Gambling:                    [place] Prescription Drug Monitoring Program Reviewed:  Yes;         PAST PSYCHIATRIC HISTORY:  Diagnoses- depression, anxiety, panic disorder, PTSD   Suicide Attempts: Yes   Hospitalizations:     Social  "History  Housing status: alone  Education:   Employment status: works in school system.  Off for summer, returns to work Aug 7  Relationship status: Single  Children:   Legal:     Recent HPI Details:  09/09/24 HPI:  Doing well, had a slip with a one time use.  Did not have effects. Had been taking 1.5 films day.  Resumed 16 MG TDD.    Mood, more depression with recent slip.   Went to a couple meetings.  Found a small meeting Wednesday that she likes.  Has not yet shared story  Back to work, going well.  Waking up earlier.  Enjoying having structure to day. People at work noticing a positive change.      TODAY'S VISIT  HPI Sep 27, 2024  - MRI on brain today, concern for pituitary adenoma, thought she was in menopause.  Threw back out yesterday.   Started therapy, trying to figure out \"who am I without the drug\"  though [facility] virtual therapy.  Once a week.   UDS positive for fentanyl \"I didn't seek it out\" was at a friends house \"she was respectful and everything by doing it in the bathroom, I knew what she was doing and asked her for some\"        OBJECTIVE  PHYSICAL EXAM:  [facility] 02/05/2024 (Approximate)     GENERAL: healthy, alert and no distress  EYES: Eyes grossly normal to inspection, PERRL and conjunctivae and sclerae normal  RESP: No respiratory distress  MENTAL STATUS EXAM  Appearance/Behavior: No appearant distress  Speech: Normal  Mood/Affect: normal affect  Insight: Fair      PHQ-9 Score:       9/9/2024     7:58 AM 9/9/2024     1:00 PM 9/26/2024     3:54 PM   PHQ   PHQ-9 Total Score 7 6 8   Q9: Thoughts of better off dead/self-harm past 2 weeks Not at all Not at all Not at all       RACH-7 Score:      6/27/2024     8:02 AM 6/27/2024     8:11 AM 9/26/2024     3:55 PM   RACH-7 SCORE   Total Score  11 (moderate anxiety) 13 (moderate anxiety)   Total Score 11 11 13       LABS (may not contain today's labs)                                                        Today's lab data  No results found for any visits " on 09/27/24.        HISTORY                                                    Problem list reviewed & adjusted, as indicated.  Patient Active Problem List   Diagnosis    Tobacco abuse    Depression with anxiety    Cervical high risk HPV (human papillomavirus) test positive    History of poor pregnancy outcome    Overweight (BMI 25.0-29.9)    Chronic bilateral low back pain without sciatica    Acute right-sided low back pain with right-sided sciatica    Left leg numbness    Bulging lumbar disc    Schmorl's node    Right hip pain    Lumbosacral plexopathy    Mood disorder (H)    RACH (generalized anxiety disorder)    Panic disorder without agoraphobia    PTSD (post-traumatic stress disorder)    Moderate opioid use disorder (H)    Opioid dependence with withdrawal (H)         MEDICATION LIST (prior to visit)  Current Outpatient Medications   Medication Sig Dispense Refill    albuterol (PROAIR HFA/PROVENTIL HFA/VENTOLIN HFA) 108 (90 Base) MCG/ACT inhaler INHALE 1 TO 2 PUFFS BY MOUTH EVERY 6 HOURS AS NEEDED FOR SHORTNESS OF BREATH, WHEEZING, OR COUGH 7 g 0    atomoxetine (STRATTERA) 60 MG capsule Take 1 capsule (60 mg) by mouth daily 30 capsule 2    buprenorphine HCl-naloxone HCl (SUBOXONE) 8-2 MG per film Take 1 film twice a day and an additional 0.5 to 1 film as needed for breakthrough withdrawal, cravings 36 Film 0    cyclobenzaprine (FLEXERIL) 10 MG tablet Take 1 tablet (10 mg) by mouth 3 times daily as needed for muscle spasms 30 tablet 2    diazepam (VALIUM) 10 MG tablet Take 0.5-1 tablets (5-10 mg) by mouth daily as needed for anxiety 15 tabs to last at least 30 days. 15 tablet 2    escitalopram (LEXAPRO) 20 MG tablet Take 1 tablet (20 mg) by mouth daily 90 tablet 3    gabapentin (NEURONTIN) 800 MG tablet Take one tab by mouth at bedtime and can take half-tab daily as needed for pain and anxiety. 135 tablet 1    ibuprofen (ADVIL/MOTRIN) 800 MG tablet Take 1 tablet (800 mg) by mouth every 6 hours as needed for  other (mild and/or inflammatory pain) 30 tablet 0    lamoTRIgine (LAMICTAL) 200 MG tablet Take 1 tablet (200 mg) by mouth daily 90 tablet 3    naloxone (NARCAN) 4 MG/0.1ML nasal spray Spray 1 spray (4 mg) into one nostril alternating nostrils as needed for opioid reversal every 2-3 minutes until assistance arrives 0.2 mL 11    OLANZapine (ZYPREXA) 10 MG tablet Take 0.5-1 tablets (5-10 mg) by mouth daily as needed (anxiety, sleep) 90 tablet 1    ondansetron (ZOFRAN ODT) 4 MG ODT tab Take 1 tablet (4 mg) by mouth every 8 hours as needed for nausea 30 tablet 1    senna-docusate (SENOKOT-S/PERICOLACE) 8.6-50 MG tablet Take 1-2 tablets by mouth 2 times daily 30 tablet 0    traZODone (DESYREL) 50 MG tablet Take 1 tablet (50 mg) by mouth at bedtime 10 tablet 0     No current facility-administered medications for this visit.       MEDICATION LIST (after visit)  Current Outpatient Medications   Medication Sig Dispense Refill    albuterol (PROAIR HFA/PROVENTIL HFA/VENTOLIN HFA) 108 (90 Base) MCG/ACT inhaler INHALE 1 TO 2 PUFFS BY MOUTH EVERY 6 HOURS AS NEEDED FOR SHORTNESS OF BREATH, WHEEZING, OR COUGH 7 g 0    atomoxetine (STRATTERA) 60 MG capsule Take 1 capsule (60 mg) by mouth daily 30 capsule 2    buprenorphine HCl-naloxone HCl (SUBOXONE) 8-2 MG per film Take 1 film twice a day and an additional 0.5 to 1 film as needed for breakthrough withdrawal, cravings 36 Film 0    cyclobenzaprine (FLEXERIL) 10 MG tablet Take 1 tablet (10 mg) by mouth 3 times daily as needed for muscle spasms 30 tablet 2    diazepam (VALIUM) 10 MG tablet Take 0.5-1 tablets (5-10 mg) by mouth daily as needed for anxiety 15 tabs to last at least 30 days. 15 tablet 2    escitalopram (LEXAPRO) 20 MG tablet Take 1 tablet (20 mg) by mouth daily 90 tablet 3    gabapentin (NEURONTIN) 800 MG tablet Take one tab by mouth at bedtime and can take half-tab daily as needed for pain and anxiety. 135 tablet 1    ibuprofen (ADVIL/MOTRIN) 800 MG tablet Take 1 tablet  (800 mg) by mouth every 6 hours as needed for other (mild and/or inflammatory pain) 30 tablet 0    lamoTRIgine (LAMICTAL) 200 MG tablet Take 1 tablet (200 mg) by mouth daily 90 tablet 3    naloxone (NARCAN) 4 MG/0.1ML nasal spray Spray 1 spray (4 mg) into one nostril alternating nostrils as needed for opioid reversal every 2-3 minutes until assistance arrives 0.2 mL 11    OLANZapine (ZYPREXA) 10 MG tablet Take 0.5-1 tablets (5-10 mg) by mouth daily as needed (anxiety, sleep) 90 tablet 1    ondansetron (ZOFRAN ODT) 4 MG ODT tab Take 1 tablet (4 mg) by mouth every 8 hours as needed for nausea 30 tablet 1    senna-docusate (SENOKOT-S/PERICOLACE) 8.6-50 MG tablet Take 1-2 tablets by mouth 2 times daily 30 tablet 0    traZODone (DESYREL) 50 MG tablet Take 1 tablet (50 mg) by mouth at bedtime 10 tablet 0     No current facility-administered medications for this visit.         Allergies   Allergen Reactions    Penicillins Nausea    Fluoxetine Itching         Vee Wells NP  [facility]  [phone]    Answers submitted by the patient for this visit:  Patient Health Questionnaire (Submitted on 9/26/2024)  If you checked off any problems, how difficult have these problems made it for you to do your work, take care of things at home, or get along with other people?: Somewhat difficult  PHQ9 TOTAL SCORE: 8  Patient Health Questionnaire (G7) (Submitted on 9/26/2024)  RACH 7 TOTAL SCORE: 13

## 2024-09-27 NOTE — PROGRESS NOTES
Virtual Visit Details    Type of service:  Video Visit     Originating Location (pt. Location): Home  {PROVIDER LOCATION On-site should be selected for visits conducted from your clinic location or adjoining [facility] hospital, academic office, or other nearby [facility] building. Off-site should be selected for all other provider locations, including home:[id number]}  Distant Location (provider location):  On-site  Platform used for Video Visit: [facility] [place] [facility]    A/P                                                    ASSESSMENT/PLAN  There are no diagnoses linked to this encounter.  No orders of the defined types were placed in this encounter.          Sep 27, 2024  - increase suboxone to 8 mg bid and additional 4-8 mg as needed for cravings       Continued Complex Management  The longitudinal plan of care for Opioid Use Disorder (OUD) was addressed during this visit. Due to the added complexity in care, I will continue to support Jennifer in the subsequent management and with ongoing continuity of care.      Last encounter A/P  1. Opioid use disorder, severe, dependence (H)  -needs improvement  -take 8 mg BID.    -continue with 12 step recovery meetings  -start psychotherapy   - buprenorphine HCl-naloxone HCl (SUBOXONE) 8-2 MG per film; Place 1 Film under the tongue 2 times daily for 18 days. Take 1 film twice a day and an additional 0.5 to 1 film as needed for breakthrough withdrawal, cravings  Dispense: 36 Film; Refill: 0  - Urine Drug Screen Buprenorphine Urine Qualitative [id number]; Future  - Urine Drug Screen Buprenorphine Urine Qualitative [id number]  -confirmed access to narcan  -Counseling provided regarding   Opioid warning reviewed.    Risk of overdose following a period of abstinence due to decrease tolerance was discussed including risk of death.                Strongly recommended abstain from alcohol, benzodiazepines, THC, opioids and other drugs of abuse.                Increased  risk of return to opioid use after use of these substances discussed.                     Increased risk of overdose/death with use of other substances particularly benzodiazepines/alcohol reviewed.  -schedule appointment for lab prior to virtual addiction medicine appointment      2. Depression with anxiety  -no change  -continue with psychiatry and follow all recommendations  -start psychotherapy     3. Tobacco abuse  -no change   -continue to ask, assess and advise       PDMP Review         Value Time User    State PDMP site checked  Yes 6/27/2024 12:35 PM Adrián Horn MD              RTC  No follow-ups on file.      Counseled the patient on the importance of having a recovery program in addition to medication to manage recovery.  Components include avoiding isolating, having willingness to change, avoiding triggers and managing cravings. Encouraged having some type of sober network and practicing honesty with trusted support person(s). Encouraged other services such as counseling, 12 step or other self-help organizations.      Opioid warning reviewed.  Risk of overdose following a period of abstinence due to decrease tolerance was discussed including risk of death.  Strongly recommended abstain from alcohol, benzodiazepines, THC, opioids and other drugs of abuse.  Increased risk of return to opioid use after use of these substances discussed.  Increased risk of overdose/death with use of other substances particularly benzodiazepines/alcohol reviewed.        SUBJECTIVE                                                    Jennifer Voss is a 39 year old female who presents to clinic today for follow up    Visit performed virtual video       Substance Use History :  Opioids:   Age at first use: 38  Current use: substance: Fentanyl ; quantity 8-10 pills; route: smoking ; timing of last use: Sunday July 14,2024;                 IV drug use: Yes:    History of overdose: Yes:   Previous residential or  "outpatient treatments for addiction : No  Previous medication treatments for addiction: No  Longest period of sobriety:   Medical complications related to substance use:   Hepatitis C: non-reactive; Date of most recent testin23  HIV: non-reactive; Date of most recent testin23     Other Addiction History:  Stimulants (cocaine, methamphetamine, MDMA/ecstasy)   2 years ago  Sedatives/hypnotics/anxiolytics: (benzodiazepines, GHB, Ambien, phenobarbital)  Current valium prescription  Alcohol:      Nicotine: (cigarettes, vaping, chew/snuff)  Daily   Cannabis:      Hallucinogens/Dissociatives: (acid, mushrooms, ketamine)     Eating disorder:     Gambling:                    [place] Prescription Drug Monitoring Program Reviewed:  Yes;         PAST PSYCHIATRIC HISTORY:  Diagnoses- depression, anxiety, panic disorder, PTSD   Suicide Attempts: Yes   Hospitalizations:     Social History  Housing status: alone  Education:   Employment status: works in school system.  Off for summer, returns to work Aug 7  Relationship status: Single  Children:   Legal:     Recent HPI Details:  24 HPI:  Doing well, had a slip with a one time use.  Did not have effects. Had been taking 1.5 films day.  Resumed 16 MG TDD.    Mood, more depression with recent slip.   Went to a couple meetings.  Found a small meeting Wednesday that she likes.  Has not yet shared story  Back to work, going well.  Waking up earlier.  Enjoying having structure to day. People at work noticing a positive change.      TODAY'S VISIT  HPI Sep 27, 2024  - ***      OBJECTIVE  PHYSICAL EXAM:  [facility] 2024 (Approximate)     { Exam Brief:700096::\"GENERAL: healthy, alert and no distress\",\"EYES: Eyes grossly normal to inspection, PERRL and conjunctivae and sclerae normal\",\"RESP: No respiratory distress\",\"MENTAL STATUS EXAM\"}      PHQ-9 Score:       2024     7:58 AM 2024     1:00 PM 2024     3:54 PM   PHQ   PHQ-9 Total Score 7 6 8   Q9: " Thoughts of better off dead/self-harm past 2 weeks Not at all Not at all Not at all       RACH-7 Score:      6/27/2024     8:02 AM 6/27/2024     8:11 AM 9/26/2024     3:55 PM   RACH-7 SCORE   Total Score  11 (moderate anxiety) 13 (moderate anxiety)   Total Score 11 11 13       LABS (may not contain today's labs)                                                    {S choices:221397}    Today's lab data  No results found for any visits on 09/27/24.        HISTORY                                                    Problem list reviewed & adjusted, as indicated.  Patient Active Problem List   Diagnosis    Tobacco abuse    Depression with anxiety    Cervical high risk HPV (human papillomavirus) test positive    History of poor pregnancy outcome    Overweight (BMI 25.0-29.9)    Chronic bilateral low back pain without sciatica    Acute right-sided low back pain with right-sided sciatica    Left leg numbness    Bulging lumbar disc    Schmorl's node    Right hip pain    Lumbosacral plexopathy    Mood disorder (H)    RACH (generalized anxiety disorder)    Panic disorder without agoraphobia    PTSD (post-traumatic stress disorder)    Moderate opioid use disorder (H)    Opioid dependence with withdrawal (H)         MEDICATION LIST (prior to visit)  Current Outpatient Medications   Medication Sig Dispense Refill    albuterol (PROAIR HFA/PROVENTIL HFA/VENTOLIN HFA) 108 (90 Base) MCG/ACT inhaler INHALE 1 TO 2 PUFFS BY MOUTH EVERY 6 HOURS AS NEEDED FOR SHORTNESS OF BREATH, WHEEZING, OR COUGH 7 g 0    atomoxetine (STRATTERA) 60 MG capsule Take 1 capsule (60 mg) by mouth daily 30 capsule 2    buprenorphine HCl-naloxone HCl (SUBOXONE) 8-2 MG per film Take 1 film twice a day and an additional 0.5 to 1 film as needed for breakthrough withdrawal, cravings 36 Film 0    cyclobenzaprine (FLEXERIL) 10 MG tablet Take 1 tablet (10 mg) by mouth 3 times daily as needed for muscle spasms 30 tablet 2    diazepam (VALIUM) 10 MG tablet Take 0.5-1  tablets (5-10 mg) by mouth daily as needed for anxiety 15 tabs to last at least 30 days. 15 tablet 2    escitalopram (LEXAPRO) 20 MG tablet Take 1 tablet (20 mg) by mouth daily 90 tablet 3    gabapentin (NEURONTIN) 800 MG tablet Take one tab by mouth at bedtime and can take half-tab daily as needed for pain and anxiety. 135 tablet 1    ibuprofen (ADVIL/MOTRIN) 800 MG tablet Take 1 tablet (800 mg) by mouth every 6 hours as needed for other (mild and/or inflammatory pain) 30 tablet 0    lamoTRIgine (LAMICTAL) 200 MG tablet Take 1 tablet (200 mg) by mouth daily 90 tablet 3    naloxone (NARCAN) 4 MG/0.1ML nasal spray Spray 1 spray (4 mg) into one nostril alternating nostrils as needed for opioid reversal every 2-3 minutes until assistance arrives 0.2 mL 11    OLANZapine (ZYPREXA) 10 MG tablet Take 0.5-1 tablets (5-10 mg) by mouth daily as needed (anxiety, sleep) 90 tablet 1    ondansetron (ZOFRAN ODT) 4 MG ODT tab Take 1 tablet (4 mg) by mouth every 8 hours as needed for nausea 30 tablet 1    senna-docusate (SENOKOT-S/PERICOLACE) 8.6-50 MG tablet Take 1-2 tablets by mouth 2 times daily 30 tablet 0    traZODone (DESYREL) 50 MG tablet Take 1 tablet (50 mg) by mouth at bedtime 10 tablet 0     No current facility-administered medications for this visit.       MEDICATION LIST (after visit)  Current Outpatient Medications   Medication Sig Dispense Refill    albuterol (PROAIR HFA/PROVENTIL HFA/VENTOLIN HFA) 108 (90 Base) MCG/ACT inhaler INHALE 1 TO 2 PUFFS BY MOUTH EVERY 6 HOURS AS NEEDED FOR SHORTNESS OF BREATH, WHEEZING, OR COUGH 7 g 0    atomoxetine (STRATTERA) 60 MG capsule Take 1 capsule (60 mg) by mouth daily 30 capsule 2    buprenorphine HCl-naloxone HCl (SUBOXONE) 8-2 MG per film Take 1 film twice a day and an additional 0.5 to 1 film as needed for breakthrough withdrawal, cravings 36 Film 0    cyclobenzaprine (FLEXERIL) 10 MG tablet Take 1 tablet (10 mg) by mouth 3 times daily as needed for muscle spasms 30 tablet 2     diazepam (VALIUM) 10 MG tablet Take 0.5-1 tablets (5-10 mg) by mouth daily as needed for anxiety 15 tabs to last at least 30 days. 15 tablet 2    escitalopram (LEXAPRO) 20 MG tablet Take 1 tablet (20 mg) by mouth daily 90 tablet 3    gabapentin (NEURONTIN) 800 MG tablet Take one tab by mouth at bedtime and can take half-tab daily as needed for pain and anxiety. 135 tablet 1    ibuprofen (ADVIL/MOTRIN) 800 MG tablet Take 1 tablet (800 mg) by mouth every 6 hours as needed for other (mild and/or inflammatory pain) 30 tablet 0    lamoTRIgine (LAMICTAL) 200 MG tablet Take 1 tablet (200 mg) by mouth daily 90 tablet 3    naloxone (NARCAN) 4 MG/0.1ML nasal spray Spray 1 spray (4 mg) into one nostril alternating nostrils as needed for opioid reversal every 2-3 minutes until assistance arrives 0.2 mL 11    OLANZapine (ZYPREXA) 10 MG tablet Take 0.5-1 tablets (5-10 mg) by mouth daily as needed (anxiety, sleep) 90 tablet 1    ondansetron (ZOFRAN ODT) 4 MG ODT tab Take 1 tablet (4 mg) by mouth every 8 hours as needed for nausea 30 tablet 1    senna-docusate (SENOKOT-S/PERICOLACE) 8.6-50 MG tablet Take 1-2 tablets by mouth 2 times daily 30 tablet 0    traZODone (DESYREL) 50 MG tablet Take 1 tablet (50 mg) by mouth at bedtime 10 tablet 0     No current facility-administered medications for this visit.         Allergies   Allergen Reactions    Penicillins Nausea    Fluoxetine Itching         Vee Wells NP  [facility]  [phone]    Answers submitted by the patient for this visit:  Patient Health Questionnaire (Submitted on 9/26/2024)  If you checked off any problems, how difficult have these problems made it for you to do your work, take care of things at home, or get along with other people?: Somewhat difficult  PHQ9 TOTAL SCORE: 8  Patient Health Questionnaire (G7) (Submitted on 9/26/2024)  RACH 7 TOTAL SCORE: 13

## 2024-09-27 NOTE — PATIENT INSTRUCTIONS
Buprenorphine Tips:  Make sure you are letting your buprenorphine tablets or films dissolve completely under your tongue so you know you are getting all the medication.  Don't eat, drink, or talk while taking your buprenorphine.  Avoid nicotine for 15-30 minutes before taking buprenorphine - this can cause the blood vessels to constrict and you may not absorb the medication as well.  To avoid potential dental issues related to buprenorphine rinse your mouth with water after taking your dose.  Avoid brushing your teeth for 1 hour after taking a dose.     Constipation is the most common side effect of buprenorphine.  Here are some simple things you can try to ease constipation.  Eating more fiber (fruits, vegetable, and whole grains) and drinking enough fluid (urine should be light yellow).    Take Miralax (polyethylene gylcol).  Use 1 capful daily until you start to have loose stools and then decrease use.  You can also try Colace (docusate) 100mg twice daily as needed.  These medications can be prescribed or purchased OTC.  Let your provider know if you are still struggling with constipation.    What to do if you experience nausea or upset stomach after taking buprenorphine:  Make sure you are letting it dissolve completely.  After the medication has dissolved try spitting out your saliva instead of swallowing it.    Other common side effects include:   - sleepiness/drowsiness OR trouble sleeping  - headaches    Please discuss any side effects with your provider.    Important safety info:  Ensure your medication is stored in a safe place out of sight and out of reach from kids and pets, ideally locked away.   Do not combine buprenorphine with other sedating substances or medications such as alcohol, benzodiazepines (Xanax or alprazolam for example), or other opioids.  Only take medications as prescribed.                   
Yes

## 2024-09-27 NOTE — NURSING NOTE
Current patient location: [address]  [place] [address]    Is the patient currently in the state of [place]? YES    Visit mode:VIDEO    If the visit is dropped, the patient can be reconnected by: VIDEO VISIT: Text to cell phone:   Telephone Information:   Mobile [phone]       Will anyone else be joining the visit? NO  (If patient encounters technical issues they should call [phone] :[phone])    How would you like to obtain your AVS? MyChart    Are changes needed to the allergy or medication list? No    Are refills needed on medications prescribed by this physician? YES    buprenorphine HCl-naloxone HCl (SUBOXONE) 8-2 MG per film    Rooming Documentation:  Questionnaire(s) completed    Reason for visit: RECHECK    Irais SWANSON

## 2024-10-01 ENCOUNTER — PRE VISIT (OUTPATIENT)
Dept: ORTHOPEDICS | Facility: CLINIC | Age: 39
End: 2024-10-01

## 2024-10-01 ENCOUNTER — MYC MEDICAL ADVICE (OUTPATIENT)
Dept: OBGYN | Facility: CLINIC | Age: 39
End: 2024-10-01

## 2024-10-01 NOTE — TELEPHONE ENCOUNTER
"Pt is asking what is the area of concern that needs follow up from her recent MRI.    Per 9/27 MRI result note:  \"I see you are scheduled for your ultrasound later this week. Your MRI does not show a pituitary concern, but there is one area on imaging that does warrant further/follow up monitoring.  Let's have you do the ultrasound and then plan a follow up to review the MRI and ultrasound and also discuss the management of your cycles-I am more than happy to see you in clinic for this, or this can be scheduled as a video visit\"    Pt mentions she has been experiencing finger numbness, tingling and pain over the last 2 weeks.  She is seeing an orthopedic provider about this today but is wondering if these symptoms could be related to what was seen on her MRI.    Routing to GISEL Eaton CNP, to further explain to pt where the \"one area on imaging that does warrant further/follow up monitoring\" is.    Mirella Mir RN-[facility]    "

## 2024-10-02 NOTE — TELEPHONE ENCOUNTER
REASON FOR VISIT: (R) Wrist carpal tunnel    DATE OF APPT: 10/15/2024   NOTES (FOR ALL VISITS) STATUS DETAILS   OFFICE NOTE from referring provider Internal [facility] Anmol Almeida, Shannon Ray, GISEL CNP 9/06/2024   MEDICATION LIST Internal    IMAGING  (FOR ALL VISITS)     XR N/A    MRI (HEAD, NECK, SPINE) N/A    CT (HEAD, NECK, SPINE) N/A

## 2024-10-03 DIAGNOSIS — R06.02 SOB (SHORTNESS OF BREATH): ICD-10-CM

## 2024-10-03 RX ORDER — ALBUTEROL SULFATE 90 UG/1
INHALANT RESPIRATORY (INHALATION)
Qty: 7 G | Refills: 0 | Status: SHIPPED | OUTPATIENT
Start: 2024-10-03

## 2024-10-07 ENCOUNTER — ANCILLARY PROCEDURE (OUTPATIENT)
Dept: ULTRASOUND IMAGING | Facility: CLINIC | Age: 39
End: 2024-10-07
Attending: NURSE PRACTITIONER
Payer: COMMERCIAL

## 2024-10-07 ENCOUNTER — MYC MEDICAL ADVICE (OUTPATIENT)
Dept: PSYCHIATRY | Facility: CLINIC | Age: 39
End: 2024-10-07

## 2024-10-07 PROCEDURE — 76856 US EXAM PELVIC COMPLETE: CPT | Mod: TC | Performed by: RADIOLOGY

## 2024-10-07 PROCEDURE — 76830 TRANSVAGINAL US NON-OB: CPT | Mod: TC | Performed by: RADIOLOGY

## 2024-10-07 NOTE — PROGRESS NOTES
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 Will keep appointment tomorrow with PCP to further discuss MRI finding-also advised PCP may refer to Neuro. Discussed recommendation based on labs/imaging for replacement estrogen and reviewed rationale. Discussed her smoking status and risks of estrogen use in smokers of 1 ppd and over age 35. Discussed risks of thromboembolic event such as, but not limited to DVT, PE, Stroke, MI. Discussed that amount of estrogen in replacement dose is less than with combined contraception. After discussion of the risks of estrogen use with smoking vs not replacing estrogen, patient opts to start hormone replacement. Smoking cessation encouraged. Discussed need for progesterone as well if taking estrogen to prevent endometrial hyperplasia. Discussed HRT doses do not provide contraception. At this time she is not currently sexually active. Patient is preferring to take both prescriptions continuously and not cyclically at this time. Will need to consider DEXA in near future as well. Discussed taking medications regularly, possible initial side effects. Can increase Eatradiol dose if needed or consider transdermal option as well. Patient is given an opportunity to ask questions and have them answered. Plan follow up in 6 months, sooner if needed.  - estradiol (ESTRACE) 0.5 MG tablet; Take 1 tablet (0.5 mg) by mouth daily.  - progesterone (PROMETRIUM) 100 MG capsule; Take 1 capsule (100 mg) by mouth daily.    Brandyn Rodriguez is a 39 year old, presenting for the following health issues:  Follow Up (Amenorrhea)    HPI       Follow up- Amenorrhea     Patient has not had a menstrual cycle now in about 8 months. Were occurring regularly until February and then they stopped. Seen in the last month to discuss possible menopause and work up completed. At this time, still has not had a menstrual cycle.   Lab testing  "completed with her primary provider:     Component      Latest Ref Rng 6/27/2024  9:22 AM 6/27/2024  9:24 AM   HCG Qual Urine      Negative   Negative    FSH      mIU/mL 3.2     Estradiol      pg/mL <5       In September, additional testing with myself:  Component      Latest Ref Rng 9/3/2024  2:45 PM   Luteinizing Hormone      mIU/mL 1.7    Prolactin      5 - 23 ng/mL 13    TSH      0.30 - 4.20 uIU/mL 1.30    FSH      mIU/mL 4.4    hCG Quantitative      <5 mIU/mL <1    Estradiol      pg/mL <5      Has since also had a pituitary MRI that showed no pituitary microadenoma, but presence of FLAIR hyperintense lesion in the left parietal lobe. She has an appointment tomorrow with her primary provider to discuss this further.    Pelvic ultrasound also completed and shows slight increase in size if uterine fibroids. Endometrium at 9mm where it was visualized.    Presents today to review all labs and imaging and discuss management. At visit prior to imaging being completed, we had discussed likely functional hypothalamic amenorrhea as cause of her absent cycles. MRI did rule out microadenoma and no abnormalities on pelvic ultrasound. FLAIR hyperintense lesion will be addressed by PCP.     Review of Systems  Constitutional, HEENT, cardiovascular, pulmonary, gi and gu systems are negative, except as otherwise noted.      Objective    BP (!) 143/86 (BP Location: Right arm, Patient Position: Sitting, Cuff Size: Adult Regular)   Pulse 79   Ht 1.62 m (5' 3.78\")   Wt 88.2 kg (194 lb 6.4 oz)   LMP 02/05/2024 (Approximate)   SpO2 96%   BMI 33.60 kg/m    Body mass index is 33.6 kg/m .  Physical Exam   GENERAL: alert and no distress  MS: no gross musculoskeletal defects noted, no edema  SKIN: no suspicious lesions or rashes  PSYCH: mentation appears normal, affect normal/bright  PELVIC: Done in September-not repeated today    Signed Electronically by: GISEL Espinoza CNP    "

## 2024-10-08 ENCOUNTER — MYC MEDICAL ADVICE (OUTPATIENT)
Dept: FAMILY MEDICINE | Facility: CLINIC | Age: 39
End: 2024-10-08

## 2024-10-08 ENCOUNTER — OFFICE VISIT (OUTPATIENT)
Dept: OBGYN | Facility: CLINIC | Age: 39
End: 2024-10-08
Payer: COMMERCIAL

## 2024-10-08 VITALS
BODY MASS INDEX: 33.19 KG/M2 | HEIGHT: 64 IN | WEIGHT: 194.4 LBS | SYSTOLIC BLOOD PRESSURE: 143 MMHG | HEART RATE: 79 BPM | OXYGEN SATURATION: 96 % | DIASTOLIC BLOOD PRESSURE: 86 MMHG

## 2024-10-08 DIAGNOSIS — E23.7: Primary | ICD-10-CM

## 2024-10-08 DIAGNOSIS — N91.1: Primary | ICD-10-CM

## 2024-10-08 PROCEDURE — 99213 OFFICE O/P EST LOW 20 MIN: CPT | Performed by: NURSE PRACTITIONER

## 2024-10-08 RX ORDER — PROGESTERONE 100 MG/1
100 CAPSULE ORAL DAILY
Qty: 90 CAPSULE | Refills: 1 | Status: SHIPPED | OUTPATIENT
Start: 2024-10-08

## 2024-10-08 RX ORDER — ESTRADIOL 0.5 MG/1
0.5 TABLET ORAL DAILY
Qty: 90 TABLET | Refills: 1 | Status: SHIPPED | OUTPATIENT
Start: 2024-10-08

## 2024-10-08 NOTE — PATIENT INSTRUCTIONS
If you have any questions regarding your visit, Please contact your care team.     [employer] [facility]: [phone]  To Schedule an Appointment 24/7  Call: [phone] TELEPHONE NUMBER     Latasha Abad- APRN CNP      Jen Cardenas-Surgery Scheduler  Heather-Surgery Scheduler         Monday 7:30am-2:00pm    Tuesday 7:30am-4:00pm    Wednesday 7:30am-2:00pm    Thursday 7:30am-11:00am    Friday 7:30am-2:00pm [address] [address]  Phone- [phone]   Fax- [phone]     Imaging Scheduling all locations  [phone]    [facility]  [address] [place] [address]   [place] [address] [phone]         Urgent Care locations:  [facility]   Monday-Friday  10 am - 8 pm  Saturday and Sunday   9 am - 5 pm     [phone]   If you need a medication refill, please contact your pharmacy. Please allow 3 business days for your refill to be completed.  As always, Thank you for trusting us with your healthcare needs!      see additional instructions from your care team below

## 2024-10-09 ENCOUNTER — MYC MEDICAL ADVICE (OUTPATIENT)
Dept: FAMILY MEDICINE | Facility: CLINIC | Age: 39
End: 2024-10-09

## 2024-10-09 ENCOUNTER — VIRTUAL VISIT (OUTPATIENT)
Dept: FAMILY MEDICINE | Facility: CLINIC | Age: 39
End: 2024-10-09
Payer: COMMERCIAL

## 2024-10-09 DIAGNOSIS — E66.09 CLASS 1 OBESITY DUE TO EXCESS CALORIES WITHOUT SERIOUS COMORBIDITY WITH BODY MASS INDEX (BMI) OF 33.0 TO 33.9 IN ADULT: Primary | ICD-10-CM

## 2024-10-09 DIAGNOSIS — R93.0 ABNORMAL MRI OF HEAD: ICD-10-CM

## 2024-10-09 DIAGNOSIS — E66.811 CLASS 1 OBESITY DUE TO EXCESS CALORIES WITHOUT SERIOUS COMORBIDITY WITH BODY MASS INDEX (BMI) OF 33.0 TO 33.9 IN ADULT: Primary | ICD-10-CM

## 2024-10-09 DIAGNOSIS — Z82.49 FAMILY HISTORY OF ABDOMINAL AORTIC ANEURYSM (AAA): ICD-10-CM

## 2024-10-09 PROCEDURE — 99214 OFFICE O/P EST MOD 30 MIN: CPT | Mod: 95 | Performed by: NURSE PRACTITIONER

## 2024-10-09 PROCEDURE — G2211 COMPLEX E/M VISIT ADD ON: HCPCS | Mod: 95 | Performed by: NURSE PRACTITIONER

## 2024-10-09 NOTE — PROGRESS NOTES
"Jennifer is a 39 year old who is being evaluated via a billable video visit.    How would you like to obtain your AVS? MyChart  If the video visit is dropped, the invitation should be resent by: Text to cell phone: [phone]  Will anyone else be joining your video visit? No      Assessment & Plan     Class 1 obesity due to excess calories without serious comorbidity with body mass index (BMI) of 33.0 to 33.9 in adult  Discussed GLP1 agonist class medications with patient   -Serious reactions including pancreatitis, anaphylaxis, papillary thyroid cancer, thyroid cell and medullary thyroid carcinoma risk discussed. Patient does not have any personal or family history of thyroid malignancies.   Plan to start on Wegovy.  Educated on use and possible side effects.  Be active  Eat a well balanced diet with fresh fruits and vegetables  Drink plenty of water 6-8 8 oz glasses of water per day  Be aware of portion sizes  Enc to look at food labels  Avoid pop    - Semaglutide-Weight Management (WEGOVY) 0.25 MG/0.5ML pen; Inject 0.25 mg subcutaneously once a week.  - Semaglutide-Weight Management (WEGOVY) 0.5 MG/0.5ML pen; Inject 0.5 mg subcutaneously once a week.    Family history of abdominal aortic aneurysm (AAA)  Plan routine screening    Abnormal MRI of head  Previous MRI reviewed.  Will plan to recheck in 2 months.  Currently without concerning symptoms.  - MR Brain Perfusion wo & w Contrast; Future       The longitudinal plan of care for the diagnosis(es)/condition(s) as documented were addressed during this visit. Due to the added complexity in care, I will continue to support Jennifer in the subsequent management and with ongoing continuity of care.     BMI  Estimated body mass index is 33.6 kg/m  as calculated from the following:    Height as of 10/8/24: 1.62 m (5' 3.78\").    Weight as of 10/8/24: 88.2 kg (194 lb 6.4 oz).         Subjective   Jennifer is a 39 year old, presenting for the following health " "issues:  RECHECK      10/9/2024     6:59 AM   Additional Questions   Roomed by Patient completed e-check in via [employer]     HPI       Pt put in appt notes, \"My ob suggested seeing you to discuss my MRI results. There is an area of concern\"       Had MRI of brain/pituitary that was ordered by neurologist.  Told needed to follow-up with primary.  Had MRI because has not had period since March.  Has been started on hormones.     Has been having difficulty losing weight.  Is up and active at job.  Walks at least 06292 steps per day has changed diet.. Works out 2-3 time per week. Stopped eating pizza so much. Eating more salads, chicken, rice. Drinks protein shakes.  Tries not to drink pop.  Continues to gain weight.  Current weight is 194 pounds.  Has never taken medications for weight loss in the past.  Does not feel overly hungry or craves certain foods.  Portions are larger than they should be. BMI 33.3.  No family history of thyroid cancer.    Father with AAA-smokes and has HTN    No family history of thryoid cancer.         Objective           Vitals:  No vitals were obtained today due to virtual visit.    Physical Exam  Constitutional:       Appearance: Normal appearance.   HENT:      Nose: No congestion.   Eyes:      General: Lids are normal.   Pulmonary:      Effort: No tachypnea, bradypnea or respiratory distress.   Skin:     Coloration: Skin is not ashen, cyanotic, jaundiced or pale.   Neurological:      Mental Status: She is alert.   Psychiatric:         Mood and Affect: Mood normal.         Speech: Speech normal.         Behavior: Behavior normal.              Video-Visit Details    Type of service:  Video Visit   Originating Location (pt. Location): Home    Distant Location (provider location):  On-site  Platform used for Video Visit: [employer]  Signed Electronically by: GISEL Johnston CNP    "

## 2024-10-10 ENCOUNTER — TELEPHONE (OUTPATIENT)
Dept: FAMILY MEDICINE | Facility: CLINIC | Age: 39
End: 2024-10-10
Payer: COMMERCIAL

## 2024-10-10 NOTE — TELEPHONE ENCOUNTER
Prior Authorization Retail Medication Request    Medication/Dose: Semaglutide Weight Management Wegovy 0.5mg/0.5 ml  Diagnosis and ICD code (if different than what is on RX):    Class 1 obesity due to excess calories without serious comorbidity with body mass index (BMI) of 33.0 to 33.9 in adult [E66.811, E66.09, Z68.33]  - Primary        New/renewal/insurance change PA/secondary ins. PA:  Previously Tried and Failed:    Rationale:  Plan does not cover     Insurance [employer]  Primary:   Insurance ID:  [id number]     Secondary (if applicable):  Insurance ID:      Pharmacy Information (if different than what is on RX)  Name:  [facility]   Phone:  [phone]  Fax:[phone]    Clinic Information  Preferred routing pool for dept communication:

## 2024-10-11 ASSESSMENT — PATIENT HEALTH QUESTIONNAIRE - PHQ9: SUM OF ALL RESPONSES TO PHQ QUESTIONS 1-9: 9

## 2024-10-12 ASSESSMENT — PATIENT HEALTH QUESTIONNAIRE - PHQ9: SUM OF ALL RESPONSES TO PHQ QUESTIONS 1-9: 6

## 2024-10-14 NOTE — TELEPHONE ENCOUNTER
Prior Authorization Approval    Authorization Effective Date: 10/10/2024  Authorization Expiration Date: 5/10/2025  Medication: Semaglutide Weight Management Wegovy 0.5mg/0.5 ml  Approved Dose/Quantity:    Reference #:     Insurance Company: [employer] Non-Specialty PA's - Phone [phone] Fax [phone]  Expected CoPay:       CoPay Card Available:      Foundation Assistance Needed:    Which Pharmacy is filling the prescription (Not needed for infusion/clinic administered): [facility] [address] [place] [address]  Pharmacy Notified:  Yes  Patient Notified:  **Instructed pharmacy to notify patient when script is ready to /ship.**

## 2024-10-15 ENCOUNTER — OFFICE VISIT (OUTPATIENT)
Dept: ORTHOPEDICS | Facility: CLINIC | Age: 39
End: 2024-10-15
Attending: NURSE PRACTITIONER
Payer: COMMERCIAL

## 2024-10-15 ENCOUNTER — PRE VISIT (OUTPATIENT)
Dept: ORTHOPEDICS | Facility: CLINIC | Age: 39
End: 2024-10-15

## 2024-10-15 ENCOUNTER — MYC MEDICAL ADVICE (OUTPATIENT)
Dept: PSYCHIATRY | Facility: CLINIC | Age: 39
End: 2024-10-15

## 2024-10-15 DIAGNOSIS — G56.01 CARPAL TUNNEL SYNDROME OF RIGHT WRIST: ICD-10-CM

## 2024-10-15 PROCEDURE — 99204 OFFICE O/P NEW MOD 45 MIN: CPT | Performed by: PLASTIC SURGERY

## 2024-10-15 NOTE — LETTER
10/15/2024      Jennifer Voss  [address] [place] [address]      Dear Colleague,    Thank you for referring your patient, Jennifer Voss, to the [facility]. Please see a copy of my visit note below.    Referring Provider:  Shannon Almeida, GISEL CNP  [address] [address]     Primary Care Provider:  Shannon Almeida      RE: Jennifer Voss.  : 1985.  THUY: 10/15/2024.    Reason for visit: Bilateral hand numbness and tingling     HPI: RHD.  The patient has been having numbness tingling in the median innervated fingers for a few years now.  It started in  and is worsening over time.  She now has consistent numbness tingling in these fingers.  She has night symptoms.  Splints do not help.  She has had no treatments.  Her right and left side are pretty equal.  No nerve conduction tests have been done.  She denies any neck pain, feet symptoms, and trauma to her wrist.    Medical history:  Past Medical History:   Diagnosis Date     Abnormal Pap smear of cervix 2003     Cervical high risk HPV (human papillomavirus) test positive 2015    see problem list     Chickenpox      Depressive disorder      Ectopic pregnancy     right     GERD (gastroesophageal reflux disease) 2012     H/O chronic ear infection as a child     H/O colposcopy with cervical biopsy 2016    see problem list     Left tubal pregnancy without intrauterine pregnancy 2018     Tonsillitis      Uncomplicated asthma        Surgical history:  Past Surgical History:   Procedure Laterality Date     COLONOSCOPY N/A 2017    Procedure: COMBINED COLONOSCOPY, SINGLE OR MULTIPLE BIOPSY/POLYPECTOMY BY BIOPSY;  Colonoscopy;  Surgeon: Ramakrishna Epstein MD;  Location: [facility]     DILATION AND CURETTAGE, OPERATIVE HYSTEROSCOPY, COMBINED N/A 10/6/2021    Procedure: dilation and curettage, hysteroscopy with polypectomy,Pap Smear;  Surgeon: Fanny Camacho MD;   Location: [facility]     LAPAROSCOPIC EVACUATION ECTOPIC PREGNANCY N/A 7/24/2015    LSC right salpingectomy      LAPAROSCOPY DIAGNOSTIC (GYN) N/A 10/6/2021    Procedure: Diagnostic laparoscopy;  Surgeon: Fanny Camacho MD;  Location: [facility]     LEEP TX, CERVICAL  3/2016    benign     MOUTH SURGERY      wisdom teeth     PE TUBES       TONSILLECTOMY         Family history:  Family History   Problem Relation Age of Onset     Depression Mother      Diabetes Father      Hypertension Father      Substance Abuse Brother         recovered drugs     Depression Brother      Anxiety Disorder Brother      Diabetes Paternal Grandmother      Coronary Artery Disease Paternal Grandmother         MI     Aneurysm Paternal Grandmother         brain     Heart Failure Paternal Grandfather         CHF       Medications:  Current Outpatient Medications   Medication Sig Dispense Refill     albuterol (PROAIR HFA/PROVENTIL HFA/VENTOLIN HFA) 108 (90 Base) MCG/ACT inhaler INHALE 1 TO 2 PUFFS BY MOUTH EVERY 6 HOURS AS NEEDED FOR SHORTNESS OF BREATH FOR WHEEZING FOR COUGH 7 g 0     atomoxetine (STRATTERA) 60 MG capsule Take 1 capsule (60 mg) by mouth daily 30 capsule 2     buprenorphine HCl-naloxone HCl (SUBOXONE) 8-2 MG per film Take 1 film twice a day and an additional 0.5 to 1 film as needed for breakthrough withdrawal, cravings 90 Film 0     cyclobenzaprine (FLEXERIL) 10 MG tablet Take 1 tablet (10 mg) by mouth 3 times daily as needed for muscle spasms 30 tablet 2     diazepam (VALIUM) 10 MG tablet Take 0.5-1 tablets (5-10 mg) by mouth daily as needed for anxiety 15 tabs to last at least 30 days. 15 tablet 2     escitalopram (LEXAPRO) 20 MG tablet Take 1 tablet (20 mg) by mouth daily 90 tablet 3     estradiol (ESTRACE) 0.5 MG tablet Take 1 tablet (0.5 mg) by mouth daily. 90 tablet 1     gabapentin (NEURONTIN) 800 MG tablet Take one tab by mouth at bedtime and can take half-tab daily as needed for pain and anxiety. 135 tablet 1     ibuprofen  (ADVIL/MOTRIN) 800 MG tablet Take 1 tablet (800 mg) by mouth every 6 hours as needed for other (mild and/or inflammatory pain) 30 tablet 0     lamoTRIgine (LAMICTAL) 200 MG tablet Take 1 tablet (200 mg) by mouth daily 90 tablet 3     naloxone (NARCAN) 4 MG/0.1ML nasal spray Spray 1 spray (4 mg) into one nostril alternating nostrils as needed for opioid reversal every 2-3 minutes until assistance arrives 0.2 mL 11     OLANZapine (ZYPREXA) 10 MG tablet Take 0.5-1 tablets (5-10 mg) by mouth daily as needed (anxiety, sleep) 90 tablet 1     ondansetron (ZOFRAN ODT) 4 MG ODT tab Take 1 tablet (4 mg) by mouth every 8 hours as needed for nausea 30 tablet 1     progesterone (PROMETRIUM) 100 MG capsule Take 1 capsule (100 mg) by mouth daily. 90 capsule 1     Semaglutide-Weight Management (WEGOVY) 0.25 MG/0.5ML pen Inject 0.25 mg subcutaneously once a week. 2 mL 0     Semaglutide-Weight Management (WEGOVY) 0.5 MG/0.5ML pen Inject 0.5 mg subcutaneously once a week. 2 mL 0     senna-docusate (SENOKOT-S/PERICOLACE) 8.6-50 MG tablet Take 1-2 tablets by mouth 2 times daily 30 tablet 0     traZODone (DESYREL) 50 MG tablet Take 1 tablet (50 mg) by mouth at bedtime 10 tablet 0       Allergies:  Allergies   Allergen Reactions     Penicillins Nausea     Fluoxetine Itching       Social history:   Social History     Tobacco Use     Smoking status: Every Day     Current packs/day: 1.00     Average packs/day: 1 pack/day for 10.0 years (10.0 ttl pk-yrs)     Types: Cigarettes     Smokeless tobacco: Never   Substance Use Topics     Alcohol use: Not Currently         Physical Examination:  LMP 02/05/2024 (Approximate)   There is no height or weight on file to calculate BMI.    General: No acute distress.    HAND EXAM: On examination of her hand, she has no obvious atrophy of the right hand.  Her 2-point discrimination in the median innervated finger is between 9 and 10 mm and in the left ulnar side is about 4 mm.  Tinel is positive.  Carpal  compression is positive and Phalen is positive.  She can oppose her thumb.  Side is similar.    Nerve conduction tests none done.        ASSESMENT and PLAN:     Based upon the above findings, a diagnosis of bilateral carpal tunnel syndrome was made.  I had a reba, detailed discussion with the patient, in the presence of my nurse, who was present from beginning to end.  I discussed with the patient the pathophysiology behind the problem, the concept behind the procedure/treatment proposed, expectations of the planned procedure(s), and all thang-operative steps.     In my opinion, she is a good candidate for release of the carpal tunnel syndrome to prevent further degradation in function and to potentially help with the symptoms.  Whether or not her symptoms improve or regress only time will tell, but I cannot guarantee that will happen.  Most importantly, we will try and prevent further degradation in muscular function of the thumb.  I explained to her how the surgery will be done, where the scars will be, and what to expect.  Risks of pain, infection, bleeding, scarring, asymmetry, seromas, hematomas, wound breakdown, wound dehiscence, stiffness, CRPS, injury to deeper structures like nerves and vessels, requirement of further surgeries, inability to promise all her symptoms will go away, DVT, PE, MI, CVA, pneumonia, renal failure and death.  They were understood and agreed upon by the patient, they were acknowledged by the patient, all the patient's questions were answered in detail to the patient's fullest understanding that they acknowledged, the team approach for treatment in the operating room was agreed upon by the patient, and proceeding with surgery was agreed upon by the patient.  We will schedule her as soon as possible.  All her questions answered.  She was happy with the visit.      Start of the right thing go to the left.    All questions were answered. The patient was happy with the visit. I look  forward to helping the patient out in the near future as indicated.       Total time spent in the encounter today including chart review, visit itself, and post-visit paperwork was 45 minutes.       Marta De La Garza MD    Chief, Division of Plastic Surgery  Department of Surgery  [facility]      CC: GISEL Ruiz CNP  [address] [place] [address]  CC: Shannon Almeida            Again, thank you for allowing me to participate in the care of your patient.        Sincerely,        PEDRO De La Garza MD

## 2024-10-16 ENCOUNTER — TELEPHONE (OUTPATIENT)
Dept: ORTHOPEDICS | Facility: CLINIC | Age: 39
End: 2024-10-16
Payer: COMMERCIAL

## 2024-10-16 NOTE — TELEPHONE ENCOUNTER
Patient clarified that testing was for ADHD but she prefers to talk to provider more about this at 11/4/24 visit. RN acknowledged receipt of message.     Routing MyC message to provider for review.     Tali Pascual RN on 10/16/2024 at 9:11 AM

## 2024-10-16 NOTE — PROGRESS NOTES
Referring Provider:  Shannon Almeida, GISEL CNP  [address] [place] [address]     Primary Care Provider:  Shannon Almeida      RE: Jennifer Voss.  : 1985.  THUY: 10/15/2024.    Reason for visit: Bilateral hand numbness and tingling     HPI: RHD.  The patient has been having numbness tingling in the median innervated fingers for a few years now.  It started in  and is worsening over time.  She now has consistent numbness tingling in these fingers.  She has night symptoms.  Splints do not help.  She has had no treatments.  Her right and left side are pretty equal.  No nerve conduction tests have been done.  She denies any neck pain, feet symptoms, and trauma to her wrist.    Medical history:  Past Medical History:   Diagnosis Date    Abnormal Pap smear of cervix 2003    Cervical high risk HPV (human papillomavirus) test positive 2015    see problem list    Chickenpox     Depressive disorder     Ectopic pregnancy     right    GERD (gastroesophageal reflux disease) 2012    H/O chronic ear infection as a child    H/O colposcopy with cervical biopsy 2016    see problem list    Left tubal pregnancy without intrauterine pregnancy 2018    Tonsillitis     Uncomplicated asthma        Surgical history:  Past Surgical History:   Procedure Laterality Date    COLONOSCOPY N/A 2017    Procedure: COMBINED COLONOSCOPY, SINGLE OR MULTIPLE BIOPSY/POLYPECTOMY BY BIOPSY;  Colonoscopy;  Surgeon: Ramakrishna Epstein MD;  Location: [facility]    DILATION AND CURETTAGE, OPERATIVE HYSTEROSCOPY, COMBINED N/A 10/6/2021    Procedure: dilation and curettage, hysteroscopy with polypectomy,Pap Smear;  Surgeon: Fanny Camacho MD;  Location: [facility]    LAPAROSCOPIC EVACUATION ECTOPIC PREGNANCY N/A 2015    LSC right salpingectomy     LAPAROSCOPY DIAGNOSTIC (GYN) N/A 10/6/2021    Procedure: Diagnostic laparoscopy;  Surgeon: Fanny Camacho MD;  Location: [facility]    LEEP TX, CERVICAL   3/2016    benign    MOUTH SURGERY      wisdom teeth    PE TUBES      TONSILLECTOMY         Family history:  Family History   Problem Relation Age of Onset    Depression Mother     Diabetes Father     Hypertension Father     Substance Abuse Brother         recovered drugs    Depression Brother     Anxiety Disorder Brother     Diabetes Paternal Grandmother     Coronary Artery Disease Paternal Grandmother         MI    Aneurysm Paternal Grandmother         brain    Heart Failure Paternal Grandfather         CHF       Medications:  Current Outpatient Medications   Medication Sig Dispense Refill    albuterol (PROAIR HFA/PROVENTIL HFA/VENTOLIN HFA) 108 (90 Base) MCG/ACT inhaler INHALE 1 TO 2 PUFFS BY MOUTH EVERY 6 HOURS AS NEEDED FOR SHORTNESS OF BREATH FOR WHEEZING FOR COUGH 7 g 0    atomoxetine (STRATTERA) 60 MG capsule Take 1 capsule (60 mg) by mouth daily 30 capsule 2    buprenorphine HCl-naloxone HCl (SUBOXONE) 8-2 MG per film Take 1 film twice a day and an additional 0.5 to 1 film as needed for breakthrough withdrawal, cravings 90 Film 0    cyclobenzaprine (FLEXERIL) 10 MG tablet Take 1 tablet (10 mg) by mouth 3 times daily as needed for muscle spasms 30 tablet 2    diazepam (VALIUM) 10 MG tablet Take 0.5-1 tablets (5-10 mg) by mouth daily as needed for anxiety 15 tabs to last at least 30 days. 15 tablet 2    escitalopram (LEXAPRO) 20 MG tablet Take 1 tablet (20 mg) by mouth daily 90 tablet 3    estradiol (ESTRACE) 0.5 MG tablet Take 1 tablet (0.5 mg) by mouth daily. 90 tablet 1    gabapentin (NEURONTIN) 800 MG tablet Take one tab by mouth at bedtime and can take half-tab daily as needed for pain and anxiety. 135 tablet 1    ibuprofen (ADVIL/MOTRIN) 800 MG tablet Take 1 tablet (800 mg) by mouth every 6 hours as needed for other (mild and/or inflammatory pain) 30 tablet 0    lamoTRIgine (LAMICTAL) 200 MG tablet Take 1 tablet (200 mg) by mouth daily 90 tablet 3    naloxone (NARCAN) 4 MG/0.1ML nasal spray Spray 1 spray  (4 mg) into one nostril alternating nostrils as needed for opioid reversal every 2-3 minutes until assistance arrives 0.2 mL 11    OLANZapine (ZYPREXA) 10 MG tablet Take 0.5-1 tablets (5-10 mg) by mouth daily as needed (anxiety, sleep) 90 tablet 1    ondansetron (ZOFRAN ODT) 4 MG ODT tab Take 1 tablet (4 mg) by mouth every 8 hours as needed for nausea 30 tablet 1    progesterone (PROMETRIUM) 100 MG capsule Take 1 capsule (100 mg) by mouth daily. 90 capsule 1    Semaglutide-Weight Management (WEGOVY) 0.25 MG/0.5ML pen Inject 0.25 mg subcutaneously once a week. 2 mL 0    Semaglutide-Weight Management (WEGOVY) 0.5 MG/0.5ML pen Inject 0.5 mg subcutaneously once a week. 2 mL 0    senna-docusate (SENOKOT-S/PERICOLACE) 8.6-50 MG tablet Take 1-2 tablets by mouth 2 times daily 30 tablet 0    traZODone (DESYREL) 50 MG tablet Take 1 tablet (50 mg) by mouth at bedtime 10 tablet 0       Allergies:  Allergies   Allergen Reactions    Penicillins Nausea    Fluoxetine Itching       Social history:   Social History     Tobacco Use    Smoking status: Every Day     Current packs/day: 1.00     Average packs/day: 1 pack/day for 10.0 years (10.0 ttl pk-yrs)     Types: Cigarettes    Smokeless tobacco: Never   Substance Use Topics    Alcohol use: Not Currently         Physical Examination:  [facility] 02/05/2024 (Approximate)   There is no height or weight on file to calculate BMI.    General: No acute distress.    HAND EXAM: On examination of her hand, she has no obvious atrophy of the right hand.  Her 2-point discrimination in the median innervated finger is between 9 and 10 mm and in the left ulnar side is about 4 mm.  Tinel is positive.  Carpal compression is positive and Phalen is positive.  She can oppose her thumb.  Side is similar.    Nerve conduction tests none done.        ASSESMENT and PLAN:     Based upon the above findings, a diagnosis of bilateral carpal tunnel syndrome was made.  I had a reba, detailed discussion with the patient,  in the presence of my nurse, who was present from beginning to end.  I discussed with the patient the pathophysiology behind the problem, the concept behind the procedure/treatment proposed, expectations of the planned procedure(s), and all thang-operative steps.     In my opinion, she is a good candidate for release of the carpal tunnel syndrome to prevent further degradation in function and to potentially help with the symptoms.  Whether or not her symptoms improve or regress only time will tell, but I cannot guarantee that will happen.  Most importantly, we will try and prevent further degradation in muscular function of the thumb.  I explained to her how the surgery will be done, where the scars will be, and what to expect.  Risks of pain, infection, bleeding, scarring, asymmetry, seromas, hematomas, wound breakdown, wound dehiscence, stiffness, CRPS, injury to deeper structures like nerves and vessels, requirement of further surgeries, inability to promise all her symptoms will go away, DVT, PE, MI, CVA, pneumonia, renal failure and death.  They were understood and agreed upon by the patient, they were acknowledged by the patient, all the patient's questions were answered in detail to the patient's fullest understanding that they acknowledged, the team approach for treatment in the operating room was agreed upon by the patient, and proceeding with surgery was agreed upon by the patient.  We will schedule her as soon as possible.  All her questions answered.  She was happy with the visit.      Start of the right thing go to the left.    All questions were answered. The patient was happy with the visit. I look forward to helping the patient out in the near future as indicated.       Total time spent in the encounter today including chart review, visit itself, and post-visit paperwork was 45 minutes.       Marta De La Garza MD    Chief, Division of Plastic Surgery  Department of Surgery  [facility]      CC: Shannon Almeida, GISEL CNP  [address] [facility] [place] [address]  CC: Shannon Almeida

## 2024-10-17 NOTE — TELEPHONE ENCOUNTER
Procedure: Right open carpal tunnel release  Facility: [facility] [place] [facility]  Length: 20 minutes  Anesthesia: Local  Post-op appointments needed: 2 weeks with PA, 6 weeks with surgeon  Surgery packet/instructions given to patient?  to be mailed or sent via [employer]

## 2024-10-18 ENCOUNTER — PREP FOR PROCEDURE (OUTPATIENT)
Dept: PLASTIC SURGERY | Facility: CLINIC | Age: 39
End: 2024-10-18
Payer: COMMERCIAL

## 2024-10-18 PROBLEM — G56.01 CARPAL TUNNEL SYNDROME OF RIGHT WRIST: Status: ACTIVE | Noted: 2024-10-15

## 2024-10-18 NOTE — TELEPHONE ENCOUNTER
Called to schedule surgery with: Dr. De La Garza    Spoke with: Jennifer     Date of Surgery: 3/7    Estimated Arrival time Discussed with Patient:  No    Location of surgery: [facility]    Pre-operative H&P:  Not needed, local only    Pre-surgical Consult: N/A    Additional Appointments: N/A    Post-operative Appointment w/RODRIGO or Surgeon: Dr. De La Garza 3/18 at 1:10 PM    Discussed with patient pre-op RN will call 2-3 days prior to surgery with arrival time and instructions:  Yes     Standard Surgery Packet: Yes to be sent via US mail    Additional Comments:  Patient has questions regarding FMLA forms and recovery process. Please give patient a call. Thank you.     All patients questions were answered and was instructed to contact the clinic with any questions or concerns.       Grecia Agrawal on 10/18/2024 at 8:21 AM

## 2024-10-18 NOTE — TELEPHONE ENCOUNTER
Called Pt to discuss. She will send forms via [employer] to complete. Plan to be off for up to 6 weeks, likely go back at 4 weeks.     Pt would also like anesthesia and knows to get pre-op physical and stop her semaglutide 7 days prior to surgery. Will ask surgery scheduler to adjust case request.    Newton Gonzalez RNCC

## 2024-10-21 ENCOUNTER — MYC MEDICAL ADVICE (OUTPATIENT)
Dept: ORTHOPEDICS | Facility: CLINIC | Age: 39
End: 2024-10-21
Payer: COMMERCIAL

## 2024-10-21 ENCOUNTER — MYC MEDICAL ADVICE (OUTPATIENT)
Dept: PSYCHIATRY | Facility: CLINIC | Age: 39
End: 2024-10-21
Payer: COMMERCIAL

## 2024-10-21 NOTE — TELEPHONE ENCOUNTER
Received call to reschedule surgery with: Dr. De La Garza    Spoke with Jennifer    Rescheduled surgery per patient request to May, since she is laid off during the summer months    Surgery Date: 5/30    Location of surgery: [facility]    Pre-operative H&P: patient confirmed they will schedule with GISEL Chappell    Additional Appointments: N/A    Post-operative Appointment w/RODRIGO or Surgeon: Dr. De La Garza 6/10 at 1:00 PM    Additional Comments:  Patient would like to know if she can schedule surgery on her other hand now, or if she needs to wait until her first hand is healed. Will ask Dr. De La Garza.       Phuong Ayoub on 10/21/2024 at 2:24 PM

## 2024-10-21 NOTE — TELEPHONE ENCOUNTER
Left voicemail for patient regarding rescheduling surgery with Dr. De La Garza    Provided direct contact number to discuss  P: [phone]    Phuong Ayoub on 10/21/2024 at 12:02 PM

## 2024-10-22 DIAGNOSIS — G56.02 LEFT CARPAL TUNNEL SYNDROME: Primary | ICD-10-CM

## 2024-10-23 NOTE — CONFIDENTIAL NOTE
Pt's updated for mailed 10/14 but patient reports has not received. New form completed and faxed to [facility] with request on cover page to reach out to pt to see how they can get her the form.     Thanks!     Sarah Purcell, DO on 10/23/2024 at 8:32 AM

## 2024-10-23 NOTE — TELEPHONE ENCOUNTER
Left voicemail regarding scheduling surgery for left hand with Dr. De La Garza. Provided direct phone number for patient to call to discuss.    P: [phone]      Grecia Agrawal on 10/23/2024 at 10:24 AM

## 2024-10-24 ENCOUNTER — LAB (OUTPATIENT)
Dept: LAB | Facility: CLINIC | Age: 39
End: 2024-10-24
Payer: COMMERCIAL

## 2024-10-24 DIAGNOSIS — F11.23 OPIOID DEPENDENCE WITH WITHDRAWAL (H): ICD-10-CM

## 2024-10-24 DIAGNOSIS — F11.20 OPIOID USE DISORDER, SEVERE, DEPENDENCE (H): ICD-10-CM

## 2024-10-24 PROCEDURE — 80307 DRUG TEST PRSMV CHEM ANLYZR: CPT

## 2024-10-24 NOTE — TELEPHONE ENCOUNTER
Received voicemail from patient regarding scheduling other hand surgery      Phuong Ayoub on 10/24/2024 at 9:29 AM

## 2024-10-24 NOTE — TELEPHONE ENCOUNTER
Left voicemail regarding scheduling surgery for other hand with Dr. De La Garza. Provided direct phone number for patient to call to discuss.    P: [phone]      Grecia Agrawal on 10/24/2024 at 11:50 AM

## 2024-10-25 ENCOUNTER — VIRTUAL VISIT (OUTPATIENT)
Dept: ADDICTION MEDICINE | Facility: CLINIC | Age: 39
End: 2024-10-25
Payer: COMMERCIAL

## 2024-10-25 DIAGNOSIS — F11.20 OPIOID USE DISORDER, SEVERE, DEPENDENCE (H): Primary | ICD-10-CM

## 2024-10-25 DIAGNOSIS — Z72.0 TOBACCO ABUSE: ICD-10-CM

## 2024-10-25 DIAGNOSIS — F41.1 GAD (GENERALIZED ANXIETY DISORDER): ICD-10-CM

## 2024-10-25 DIAGNOSIS — F39 MOOD DISORDER (H): ICD-10-CM

## 2024-10-25 PROCEDURE — G2211 COMPLEX E/M VISIT ADD ON: HCPCS | Mod: 95

## 2024-10-25 PROCEDURE — 99214 OFFICE O/P EST MOD 30 MIN: CPT | Mod: 95

## 2024-10-25 RX ORDER — BUPRENORPHINE AND NALOXONE 8; 2 MG/1; MG/1
1 FILM, SOLUBLE BUCCAL; SUBLINGUAL 2 TIMES DAILY
Qty: 60 FILM | Refills: 0 | Status: SHIPPED | OUTPATIENT
Start: 2024-10-25

## 2024-10-25 ASSESSMENT — PAIN SCALES - GENERAL: PAINLEVEL_OUTOF10: NO PAIN (0)

## 2024-10-25 NOTE — PROGRESS NOTES
[facility] Addiction Medicine    A/P                                                    ASSESSMENT/PLAN  1. Opioid use disorder, severe, dependence (H) (Primary)  -showing improvement   - buprenorphine HCl-naloxone HCl (SUBOXONE) 8-2 MG per film; Place 1 Film under the tongue 2 times daily. Take 1 film twice a day and an additional 0.5 to 1 film as needed for breakthrough withdrawal, cravings  Dispense: 60 Film; Refill: 0  - Fentanyl Qualitative with Reflex to Quant Urine; Standing  -anecdotal evidence improvement in opiate use with GLP1 medications used for weight loss.    -encouraging recovery activities  -discussion regarding THURSTON sublocade.  Jennifer will research and consider    2. RACH (generalized anxiety disorder)  3. Mood disorder (H)  -no change   -la paperwork completed by psychiatry for appointment attendance   -follow up with psychotherapy  -continue with psychiatry and follow all recommendations    4. Tobacco abuse  -no change   -continue to ask, assess and advise       Oct 25, 2024  - suboxone 16-24 mg TDD. Encouraged consistent dosing        Continued Complex Management  The longitudinal plan of care for Opioid Use Disorder (OUD) was addressed during this visit. Due to the added complexity in care, I will continue to support Jennifer in the subsequent management and with ongoing continuity of care.      Last encounter A/P  1. Opioid use disorder, severe, dependence (H)  -needs improvement, recent use.    -take 8 mg BID and additional 4-8 mg at noon  -continue with 12 step recovery meetings  -continue with psychotherapy  - buprenorphine HCl-naloxone HCl (SUBOXONE) 8-2 MG per film; Place 1 Film under the tongue 2 times daily for 18 days. Take 1 film twice a day and an additional 0.5 to 1 film as needed for breakthrough withdrawal, cravings  Dispense: 36 Film; Refill: 0  - Urine Drug Screen Buprenorphine Urine Qualitative [id number]; Future  - Urine Drug Screen Buprenorphine Urine Qualitative  [id number]  -confirmed access to narcan  -Counseling provided regarding   Opioid warning reviewed.    Risk of overdose following a period of abstinence due to decrease tolerance was discussed including risk of death.                Strongly recommended abstain from alcohol, benzodiazepines, THC, opioids and other drugs of abuse.                Increased risk of return to opioid use after use of these substances discussed.                     Increased risk of overdose/death with use of other substances particularly benzodiazepines/alcohol reviewed.  -1 month follow up     2. Depression with anxiety  -no change  -continue with psychiatry and follow all recommendations  -start psychotherapy     3. Tobacco abuse  -no change   -continue to ask, assess and advise          PDMP Review         Value Time User    State PDMP site checked  Yes 10/28/2024  8:44 AM Vee Wells NP              RTC  Return in about 1 month (around 11/25/2024).      Counseled the patient on the importance of having a recovery program in addition to medication to manage recovery.  Components include avoiding isolating, having willingness to change, avoiding triggers and managing cravings. Encouraged having some type of sober network and practicing honesty with trusted support person(s). Encouraged other services such as counseling, 12 step or other self-help organizations.      Opioid warning reviewed.  Risk of overdose following a period of abstinence due to decrease tolerance was discussed including risk of death.  Strongly recommended abstain from alcohol, benzodiazepines, THC, opioids and other drugs of abuse.  Increased risk of return to opioid use after use of these substances discussed.  Increased risk of overdose/death with use of other substances particularly benzodiazepines/alcohol reviewed.        SUBJECTIVE                                                    Jennifer Voss is a 39 year old female who presents to clinic today for  "follow up    Visit performed virtual video       Substance Use History :  Opioids:   Age at first use: 38  Current use: substance: Fentanyl ; quantity 8-10 pills; route: smoking ; timing of last use: ;                 IV drug use: Yes:    History of overdose: Yes:   Previous residential or outpatient treatments for addiction : No  Previous medication treatments for addiction: No  Longest period of sobriety:   Medical complications related to substance use:   Hepatitis C: non-reactive; Date of most recent testin23  HIV: non-reactive; Date of most recent testin23     Other Addiction History:  Stimulants (cocaine, methamphetamine, MDMA/ecstasy)   2 years ago  Sedatives/hypnotics/anxiolytics: (benzodiazepines, GHB, Ambien, phenobarbital)  Current valium prescription  Alcohol:      Nicotine: (cigarettes, vaping, chew/snuff)  Daily   Cannabis:      Hallucinogens/Dissociatives: (acid, mushrooms, ketamine)     Eating disorder:     Gambling:                    [place] Prescription Drug Monitoring Program Reviewed:  Yes;         PAST PSYCHIATRIC HISTORY:  Diagnoses- depression, anxiety, panic disorder, PTSD   Suicide Attempts: Yes   Hospitalizations:      Social History  Housing status: alone  Education:   Employment status: works in school system.  Off for summer, returns to work Aug 7  Relationship status: Single  Children:   Legal:     Recent HPI Details:  HPI Sep 27, 2024  - MRI on brain today, concern for pituitary adenoma, thought she was in menopause.  Threw back out yesterday.   Started therapy, trying to figure out \"who am I without the drug\"  though [facility] Gaspar virtual therapy.  Once a week.   UDS positive for fentanyl \"I didn't seek it out\" was at a friends house \"she was respectful and everything by doing it in the bathroom, I knew what she was doing and asked her for some\"     TODAY'S VISIT  HPI Oct 25, 2024  - \"First of all I need to tell you I rescheduled a urine test for " "next week, I was pissed when I saw that I was positive because I've been clean\"      Some days taking 2 films, some days taking one  Saw OB for amenorhea.  Had MRI, not in menopause but not making estrogen.  Started on HRT  Started wegovy, for weight, has lost 10 lbs.  Considers that opiate abstinence may coincide with starting GLP1      OBJECTIVE  PHYSICAL EXAM:  There were no vitals taken for this visit.    GENERAL: healthy, alert and no distress  EYES: Eyes grossly normal to inspection, PERRL and conjunctivae and sclerae normal  RESP: No respiratory distress  MENTAL STATUS EXAM  Appearance/Behavior: No appearant distress  Speech: Normal  Mood/Affect: normal affect  Insight: Fair      PHQ-9 Score:       9/9/2024     7:58 AM 9/9/2024     1:00 PM 9/26/2024     3:54 PM   PHQ   PHQ-9 Total Score 7 6 8   Q9: Thoughts of better off dead/self-harm past 2 weeks Not at all  Not at all Not at all        Patient-reported       RACH-7 Score:      6/27/2024     8:02 AM 6/27/2024     8:11 AM 9/26/2024     3:55 PM   RACH-7 SCORE   Total Score  11 (moderate anxiety) 13 (moderate anxiety)   Total Score 11 11 13       LABS (may not contain today's labs)                                                      Today's lab data  No results found for any visits on 10/25/24.        HISTORY                                                    Problem list reviewed & adjusted, as indicated.  Patient Active Problem List   Diagnosis    Tobacco abuse    Depression with anxiety    Cervical high risk HPV (human papillomavirus) test positive    History of poor pregnancy outcome    Overweight (BMI 25.0-29.9)    Chronic bilateral low back pain without sciatica    Acute right-sided low back pain with right-sided sciatica    Left leg numbness    Bulging lumbar disc    Schmorl's node    Right hip pain    Lumbosacral plexopathy    Mood disorder (H)    RACH (generalized anxiety disorder)    Panic disorder without agoraphobia    PTSD (post-traumatic stress " disorder)    Moderate opioid use disorder (H)    Opioid dependence with withdrawal (H)    Carpal tunnel syndrome of right wrist         MEDICATION LIST (prior to visit)  Current Outpatient Medications   Medication Sig Dispense Refill    albuterol (PROAIR HFA/PROVENTIL HFA/VENTOLIN HFA) 108 (90 Base) MCG/ACT inhaler INHALE 1 TO 2 PUFFS BY MOUTH EVERY 6 HOURS AS NEEDED FOR SHORTNESS OF BREATH FOR WHEEZING FOR COUGH 7 g 0    atomoxetine (STRATTERA) 60 MG capsule Take 1 capsule (60 mg) by mouth daily 30 capsule 2    buprenorphine HCl-naloxone HCl (SUBOXONE) 8-2 MG per film Take 1 film twice a day and an additional 0.5 to 1 film as needed for breakthrough withdrawal, cravings 90 Film 0    cyclobenzaprine (FLEXERIL) 10 MG tablet Take 1 tablet (10 mg) by mouth 3 times daily as needed for muscle spasms 30 tablet 2    diazepam (VALIUM) 10 MG tablet Take 0.5-1 tablets (5-10 mg) by mouth daily as needed for anxiety 15 tabs to last at least 30 days. 15 tablet 2    escitalopram (LEXAPRO) 20 MG tablet Take 1 tablet (20 mg) by mouth daily 90 tablet 3    estradiol (ESTRACE) 0.5 MG tablet Take 1 tablet (0.5 mg) by mouth daily. 90 tablet 1    gabapentin (NEURONTIN) 800 MG tablet Take one tab by mouth at bedtime and can take half-tab daily as needed for pain and anxiety. 135 tablet 1    ibuprofen (ADVIL/MOTRIN) 800 MG tablet Take 1 tablet (800 mg) by mouth every 6 hours as needed for other (mild and/or inflammatory pain) 30 tablet 0    lamoTRIgine (LAMICTAL) 200 MG tablet Take 1 tablet (200 mg) by mouth daily 90 tablet 3    naloxone (NARCAN) 4 MG/0.1ML nasal spray Spray 1 spray (4 mg) into one nostril alternating nostrils as needed for opioid reversal every 2-3 minutes until assistance arrives 0.2 mL 11    OLANZapine (ZYPREXA) 10 MG tablet Take 0.5-1 tablets (5-10 mg) by mouth daily as needed (anxiety, sleep) 90 tablet 1    ondansetron (ZOFRAN ODT) 4 MG ODT tab Take 1 tablet (4 mg) by mouth every 8 hours as needed for nausea 30 tablet  1    progesterone (PROMETRIUM) 100 MG capsule Take 1 capsule (100 mg) by mouth daily. 90 capsule 1    Semaglutide-Weight Management (WEGOVY) 0.25 MG/0.5ML pen Inject 0.25 mg subcutaneously once a week. 2 mL 0    Semaglutide-Weight Management (WEGOVY) 0.5 MG/0.5ML pen Inject 0.5 mg subcutaneously once a week. 2 mL 0    senna-docusate (SENOKOT-S/PERICOLACE) 8.6-50 MG tablet Take 1-2 tablets by mouth 2 times daily 30 tablet 0    traZODone (DESYREL) 50 MG tablet Take 1 tablet (50 mg) by mouth at bedtime 10 tablet 0     No current facility-administered medications for this visit.       MEDICATION LIST (after visit)  Current Outpatient Medications   Medication Sig Dispense Refill    albuterol (PROAIR HFA/PROVENTIL HFA/VENTOLIN HFA) 108 (90 Base) MCG/ACT inhaler INHALE 1 TO 2 PUFFS BY MOUTH EVERY 6 HOURS AS NEEDED FOR SHORTNESS OF BREATH FOR WHEEZING FOR COUGH 7 g 0    atomoxetine (STRATTERA) 60 MG capsule Take 1 capsule (60 mg) by mouth daily 30 capsule 2    buprenorphine HCl-naloxone HCl (SUBOXONE) 8-2 MG per film Take 1 film twice a day and an additional 0.5 to 1 film as needed for breakthrough withdrawal, cravings 90 Film 0    cyclobenzaprine (FLEXERIL) 10 MG tablet Take 1 tablet (10 mg) by mouth 3 times daily as needed for muscle spasms 30 tablet 2    diazepam (VALIUM) 10 MG tablet Take 0.5-1 tablets (5-10 mg) by mouth daily as needed for anxiety 15 tabs to last at least 30 days. 15 tablet 2    escitalopram (LEXAPRO) 20 MG tablet Take 1 tablet (20 mg) by mouth daily 90 tablet 3    estradiol (ESTRACE) 0.5 MG tablet Take 1 tablet (0.5 mg) by mouth daily. 90 tablet 1    gabapentin (NEURONTIN) 800 MG tablet Take one tab by mouth at bedtime and can take half-tab daily as needed for pain and anxiety. 135 tablet 1    ibuprofen (ADVIL/MOTRIN) 800 MG tablet Take 1 tablet (800 mg) by mouth every 6 hours as needed for other (mild and/or inflammatory pain) 30 tablet 0    lamoTRIgine (LAMICTAL) 200 MG tablet Take 1 tablet (200 mg)  by mouth daily 90 tablet 3    naloxone (NARCAN) 4 MG/0.1ML nasal spray Spray 1 spray (4 mg) into one nostril alternating nostrils as needed for opioid reversal every 2-3 minutes until assistance arrives 0.2 mL 11    OLANZapine (ZYPREXA) 10 MG tablet Take 0.5-1 tablets (5-10 mg) by mouth daily as needed (anxiety, sleep) 90 tablet 1    ondansetron (ZOFRAN ODT) 4 MG ODT tab Take 1 tablet (4 mg) by mouth every 8 hours as needed for nausea 30 tablet 1    progesterone (PROMETRIUM) 100 MG capsule Take 1 capsule (100 mg) by mouth daily. 90 capsule 1    Semaglutide-Weight Management (WEGOVY) 0.25 MG/0.5ML pen Inject 0.25 mg subcutaneously once a week. 2 mL 0    Semaglutide-Weight Management (WEGOVY) 0.5 MG/0.5ML pen Inject 0.5 mg subcutaneously once a week. 2 mL 0    senna-docusate (SENOKOT-S/PERICOLACE) 8.6-50 MG tablet Take 1-2 tablets by mouth 2 times daily 30 tablet 0    traZODone (DESYREL) 50 MG tablet Take 1 tablet (50 mg) by mouth at bedtime 10 tablet 0     No current facility-administered medications for this visit.         Allergies   Allergen Reactions    Penicillins Nausea    Fluoxetine Itching         Vee Wells NP  [facility] Addiction Medicine  [phone]

## 2024-10-25 NOTE — NURSING NOTE
Current patient location: [address] [place] [address]  [place] [address]    Is the patient currently in the state of [place]? YES    Visit mode:VIDEO    If the visit is dropped, the patient can be reconnected by: VIDEO VISIT: Text to cell phone:   Telephone Information:   Mobile [phone]    and VIDEO VISIT: Send to e-mail at: [email or web address]    Will anyone else be joining the visit? NO  (If patient encounters technical issues they should call [phone]    Are changes needed to the allergy or medication list? No    Are refills needed on medications prescribed by this physician? YES    buprenorphine HCl-naloxone HCl (SUBOXONE) 8-2 MG per film     Rooming Documentation:  Questionnaire(s) completed    Reason for visit: RECHECK    Irais SWANSON

## 2024-10-25 NOTE — PROGRESS NOTES
Virtual Visit Details    Type of service:  Video Visit     Joined the call at 10/25/2024, 2:53:55 pm.  Left the call at 10/25/2024, 3:19:10 pm.    Originating Location (pt. Location): Home    Distant Location (provider location):  On-site  Platform used for Video Visit: Rojelio

## 2024-10-28 ENCOUNTER — TELEPHONE (OUTPATIENT)
Dept: ORTHOPEDICS | Facility: CLINIC | Age: 39
End: 2024-10-28

## 2024-10-28 NOTE — TELEPHONE ENCOUNTER
Left voicemail for patient regarding scheduling surgery with Dr. De La Garza    LEFT hand may be scheduled 6 weeks after RIGHT hand on 5/30    Provided direct contact number to discuss  P: [phone]    Phuong Ayoub on 10/28/2024 at 3:13 PM
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
## 2024-11-04 ENCOUNTER — VIRTUAL VISIT (OUTPATIENT)
Dept: PSYCHIATRY | Facility: CLINIC | Age: 39
End: 2024-11-04
Payer: COMMERCIAL

## 2024-11-04 ENCOUNTER — VIRTUAL VISIT (OUTPATIENT)
Dept: BEHAVIORAL HEALTH | Facility: CLINIC | Age: 39
End: 2024-11-04
Payer: COMMERCIAL

## 2024-11-04 VITALS — BODY MASS INDEX: 32.1 KG/M2 | HEIGHT: 64 IN | WEIGHT: 188 LBS

## 2024-11-04 DIAGNOSIS — F11.20 MODERATE OPIOID USE DISORDER (H): ICD-10-CM

## 2024-11-04 DIAGNOSIS — G89.29 OTHER CHRONIC PAIN: ICD-10-CM

## 2024-11-04 DIAGNOSIS — G47.00 INSOMNIA, UNSPECIFIED TYPE: ICD-10-CM

## 2024-11-04 DIAGNOSIS — M54.50 CHRONIC BILATERAL LOW BACK PAIN WITHOUT SCIATICA: ICD-10-CM

## 2024-11-04 DIAGNOSIS — F41.1 GAD (GENERALIZED ANXIETY DISORDER): Primary | ICD-10-CM

## 2024-11-04 DIAGNOSIS — F31.81 BIPOLAR II DISORDER (H): ICD-10-CM

## 2024-11-04 DIAGNOSIS — G89.29 CHRONIC BILATERAL LOW BACK PAIN WITHOUT SCIATICA: ICD-10-CM

## 2024-11-04 DIAGNOSIS — F43.10 PTSD (POST-TRAUMATIC STRESS DISORDER): ICD-10-CM

## 2024-11-04 DIAGNOSIS — F15.11 METHAMPHETAMINE ABUSE IN REMISSION (H): ICD-10-CM

## 2024-11-04 PROBLEM — G56.02 LEFT CARPAL TUNNEL SYNDROME: Status: ACTIVE | Noted: 2024-10-22

## 2024-11-04 PROCEDURE — G2211 COMPLEX E/M VISIT ADD ON: HCPCS | Mod: 95 | Performed by: PSYCHIATRY & NEUROLOGY

## 2024-11-04 PROCEDURE — 99214 OFFICE O/P EST MOD 30 MIN: CPT | Mod: 95 | Performed by: PSYCHIATRY & NEUROLOGY

## 2024-11-04 PROCEDURE — 90832 PSYTX W PT 30 MINUTES: CPT | Mod: 95 | Performed by: COUNSELOR

## 2024-11-04 RX ORDER — DIAZEPAM 10 MG/1
5-10 TABLET ORAL DAILY PRN
Qty: 15 TABLET | Refills: 2 | Status: CANCELLED | OUTPATIENT
Start: 2024-11-04

## 2024-11-04 RX ORDER — GABAPENTIN 800 MG/1
TABLET ORAL
Qty: 135 TABLET | Refills: 1 | Status: SHIPPED | OUTPATIENT
Start: 2024-11-04

## 2024-11-04 RX ORDER — OLANZAPINE 10 MG/1
5-10 TABLET ORAL DAILY PRN
Qty: 90 TABLET | Refills: 1 | Status: SHIPPED | OUTPATIENT
Start: 2024-11-04

## 2024-11-04 RX ORDER — CYCLOBENZAPRINE HCL 10 MG
10 TABLET ORAL 3 TIMES DAILY PRN
Qty: 30 TABLET | Refills: 0 | Status: SHIPPED | OUTPATIENT
Start: 2024-11-04

## 2024-11-04 RX ORDER — LAMOTRIGINE 200 MG/1
200 TABLET ORAL DAILY
Qty: 90 TABLET | Refills: 3 | Status: SHIPPED | OUTPATIENT
Start: 2024-11-04

## 2024-11-04 RX ORDER — TRAZODONE HYDROCHLORIDE 50 MG/1
25-50 TABLET, FILM COATED ORAL
Qty: 90 TABLET | Refills: 0 | Status: SHIPPED | OUTPATIENT
Start: 2024-11-04

## 2024-11-04 RX ORDER — ESCITALOPRAM OXALATE 20 MG/1
20 TABLET ORAL DAILY
Qty: 90 TABLET | Refills: 3 | Status: SHIPPED | OUTPATIENT
Start: 2024-11-04

## 2024-11-04 RX ORDER — ATOMOXETINE 60 MG/1
60 CAPSULE ORAL DAILY
Qty: 90 CAPSULE | Refills: 3 | Status: SHIPPED | OUTPATIENT
Start: 2024-11-04

## 2024-11-04 ASSESSMENT — PATIENT HEALTH QUESTIONNAIRE - PHQ9
SUM OF ALL RESPONSES TO PHQ QUESTIONS 1-9: 5
SUM OF ALL RESPONSES TO PHQ QUESTIONS 1-9: 5
10. IF YOU CHECKED OFF ANY PROBLEMS, HOW DIFFICULT HAVE THESE PROBLEMS MADE IT FOR YOU TO DO YOUR WORK, TAKE CARE OF THINGS AT HOME, OR GET ALONG WITH OTHER PEOPLE: SOMEWHAT DIFFICULT

## 2024-11-04 ASSESSMENT — PAIN SCALES - GENERAL: PAINLEVEL_OUTOF10: NO PAIN (0)

## 2024-11-04 NOTE — TELEPHONE ENCOUNTER
Left voicemail for patient regarding scheduling surgery with Dr. De La Garza. Provided direct phone number for patient to call to discuss.    P: [phone]      Grecia Agrawal on 11/4/2024 at 1:46 PM

## 2024-11-04 NOTE — NURSING NOTE
Current patient location: [address] [place] [address]  [place] [address]    Is the patient currently in the state of [place]? YES    Visit mode:VIDEO    If the visit is dropped, the patient can be reconnected by: VIDEO VISIT: Text to cell phone:   Telephone Information:   Mobile [phone]       Will anyone else be joining the visit? NO  (If patient encounters technical issues they should call [phone]    Are changes needed to the allergy or medication list? No    Are refills needed on medications prescribed by this physician? YES    Rooming Documentation:  Questionnaire(s) completed    Reason for visit: RECHECK    Ibis SWANSON

## 2024-11-04 NOTE — TELEPHONE ENCOUNTER
Received call to schedule surgery with: Dr. De La Garza    Spoke with: Jennifer     Date of Surgery: 7/11    Estimated Arrival time Discussed with Patient:  No    Location of surgery: [facility]    Pre-operative H&P: patient confirmed they will schedule with primary care clinic    Post-operative Appointment w/RODRIGO or Surgeon: Dr. De La Garza 7/22 at 1:20 PM    Additional Appointments: N/A, no additional visits requested    Discussed with patient pre-op RN will call 2-3 days prior to surgery with arrival time and instructions:  Yes     Standard Ortho Surgery Packet: Yes to be sent via US mail    All patients questions were answered and was instructed to contact the clinic with any questions or concerns.     Additional Comments: Patient states she would like general anesthesia for both surgeries on 5/30 and 7/11.      Grecia Agrawal on 11/4/2024 at 1:54 PM

## 2024-11-04 NOTE — PROGRESS NOTES
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 concerns at [phone].  MyChart may be used to communicate with your provider, but this is not intended to be used for emergencies.    Risks of benzodiazepine (Ativan, Xanax, Klonopin, Valium, etc) use including, but not limited to, sedation, tolerance, risk for addiction/dependence. Do not drink alcohol while taking benzodiazepines due to risk of trouble breathing and potential death. Do not drive or operate heavy machinery until it is known how the drug affects you. Discuss with physician or pharmacist before ever taking a benzodiazepine with a narcotic/opioid pain medication.     Have previously discussed Lamictal (lamotrigine) can cause serious rashes including Park-Yoshi syndrome which may requiring hospitalization and discontinuation of treatment. If any signs of a rash occur, please see your Primary Care Provider or a dermatologist immediately.     Administrative Billing:   Phone Call/Video Duration: 8 Minutes  Start: 1:35p  Stop: 1:43p    The longitudinal plan of care for the diagnosis(es)/condition(s) as documented were addressed during this visit. Due to the added complexity in care, I will continue to support Jennifer in the subsequent management and with ongoing continuity of care.    Patient Status:  Patient is long-term/continous care patient.     Signed:   Sarah Purcell DO  [facility]    Disclaimer: This  note consists of symbols derived from keyboarding, dictation and/or voice recognition software. As a result, there may be errors in the script that have gone undetected. Please consider this when interpreting information found in this chart.

## 2024-11-04 NOTE — TELEPHONE ENCOUNTER
Received voicemail from patient regarding scheduling surgery     States she is unable to answer between 11:30 AM & 1 PM due to meal service, afternoon typically is best     Phuong Ayoub on 11/4/2024 at 9:02 AM

## 2024-11-04 NOTE — PROGRESS NOTES
"Virtual Visit Details    Type of service:  Video Visit     Originating Location (pt. Location): {video visit patient location:277624::\"Home\"}  {PROVIDER LOCATION On-site should be selected for visits conducted from your clinic location or adjoining [facility] hospital, academic office, or other nearby [facility] building. Off-site should be selected for all other provider locations, including home:923575}  Distant Location (provider location):  {virtual location provider:886931}  Platform used for Video Visit: {Virtual Visit Platforms:096150::\"Gr[facility] Eloise\"}  "

## 2024-11-04 NOTE — PATIENT INSTRUCTIONS
Treatment Plan:  Continue olanzapine/Zyprexa to 5-10 mg daily as needed for anxiety, sleep, agitation, bipolar disorder.  Continue lamotrigine 200 mg daily for mood (can take as split dose 100 mg twice daily if preferred).  Pause/holding Valium/diazepam until additional ongoing sobriety/clean urine drug screens  Continue gabapentin 800 mg at bedtime and can take 400 mg during day as needed for anxiety or chronic pain.    Continue Lexapro/escitalopram 20 mg daily for mood, anxiety.  Continue Strattera/atomoxetine for attention and concentration, mood: Take 60 mg daily.  Can fax [facility] renewal paperwork to me at fax #: [phone].   Continue all other cares per primary care provider.   Continue all other medications as reviewed per electronic medical record today.   Safety plan reviewed. To the Emergency Department as needed or call after hours crisis line at [phone] or [phone]. [place] Crisis Text Line. Text [place] to 576680 or Suicide LifeLine Chat: suicidepreventionlifeline.org/chat  Continue therapy as planned.   Schedule an appointment with me in 4-6 weeks or sooner as needed. Call [facility] at [phone] to schedule.  Follow up with primary care provider as planned or for acute medical concerns.  Call the psychiatric nurse line with medication questions or concerns at [phone].  MyChart may be used to communicate with your provider, but this is not intended to be used for emergencies.    Risks of benzodiazepine (Ativan, Xanax, Klonopin, Valium, etc) use including, but not limited to, sedation, tolerance, risk for addiction/dependence. Do not drink alcohol while taking benzodiazepines due to risk of trouble breathing and potential death. Do not drive or operate heavy machinery until it is known how the drug affects you. Discuss with physician or pharmacist before ever taking a benzodiazepine with a narcotic/opioid pain medication.     Have previously discussed Lamictal  "(lamotrigine) can cause serious rashes including Park-Yoshi syndrome which may requiring hospitalization and discontinuation of treatment. If any signs of a rash occur, please see your Primary Care Provider or a dermatologist immediately.    Patient Education   [facility] Care Psychiatry Service  What to Expect  Here's what to expect from your Collaborative Care Psychiatry Service (CCPS).   About CCPS  CCPS means 2 people work together to help you get better. You'll meet with a behavioral health clinician and a psychiatric doctor. A behavioral health clinician helps people with mental health problems by talking with them. A psychiatric doctor helps people by giving them medicine.  How it works  At every visit, you'll see the behavioral health clinician (BHC) first. They'll talk with you about how you're doing and teach you how to feel better.   Then you'll see the psychiatric doctor. This doctor can help you deal with troubling thoughts and feelings by giving you medicine. They'll make sure you know the plan for your care.   CCPS usually takes 3 to 6 visits. If you need more visits, we may have you start seeing a different psychiatric doctor for ongoing care.  If you have any questions or concerns, we'll be glad to talk with you.  About visits  Be open  At your visits, please talk openly about your problems. It may feel hard, but it's the best way for us to help you.  Cancelling visits  If you can't come to your visit, please call us right away at [phone]. If you don't cancel at least 24 hours (1 full day) before your visit, that's \"late cancellation.\"  Being late to visits  Being very late is the same as not showing up. You will be a \"no show\" if:  Your appointment starts with a BHC, and you're more than 15 minutes late for a 30-minute (half hour) visit. This will also cancel your appointment with the psychiatric doctor.  Your appointment is with a psychiatric doctor only, and you're more than 15 " minutes late for a 30-minute (half hour) visit.  Your appointment is with a psychiatric doctor only, and you're more than 30 minutes late for a 60-minute (full hour) visit.  If you cancel late or don't show up 2 times within 6 months, we may end your care.   Getting help between visits  If you need help between visits, you can call us Monday to Friday from 8 a.m. to 4:30 p.m. at [phone].  Emergency care  Call 911 or go to the nearest emergency department if your life or someone else's life is in danger.  Call 988 anytime to reach the national Suicide and Crisis hotline.  Medicine refills  To refill your medicine, call your pharmacy. You can also call [facility] at [phone], Monday to Friday, 8 a.m. to 4:30 p.m. It can take 1 to 3 business days to get a refill.   Forms, letters, and tests  You may have papers to fill out, like FMLA, short-term disability, and workability. We can help you with these forms at your visits, but you must have an appointment. You may need more than 1 visit for this, to be in an intensive therapy program, or both.  Before we can give you medicine for ADHD, we may refer you to get tested for it or confirm it another way.  We may not be able to give you an emotional support animal letter.  We don't do mental health checks ordered by the court.   We don't do mental health testing, but we can refer you to get tested.   Thank you for choosing us for your care.  For informational purposes only. Not to replace the advice of your health care provider. Copyright   2022 [facility]. All rights reserved. [employer] 593438 - 12/22.

## 2024-11-05 ENCOUNTER — LAB (OUTPATIENT)
Dept: LAB | Facility: CLINIC | Age: 39
End: 2024-11-05
Payer: COMMERCIAL

## 2024-11-05 DIAGNOSIS — F11.23 OPIOID DEPENDENCE WITH WITHDRAWAL (H): ICD-10-CM

## 2024-11-05 DIAGNOSIS — F11.20 OPIOID USE DISORDER, SEVERE, DEPENDENCE (H): ICD-10-CM

## 2024-11-05 PROCEDURE — 80307 DRUG TEST PRSMV CHEM ANLYZR: CPT

## 2024-11-05 PROCEDURE — G0480 DRUG TEST DEF 1-7 CLASSES: HCPCS | Mod: 90

## 2024-11-06 LAB
AMPHETAMINES UR QL SCN: ABNORMAL
BARBITURATES UR QL SCN: ABNORMAL
BENZODIAZ UR QL SCN: ABNORMAL
BUPRENORPHINE UR QL: ABNORMAL
BZE UR QL SCN: ABNORMAL
CANNABINOIDS UR QL SCN: ABNORMAL
FENTANYL UR QL: ABNORMAL
FENTANYL UR QL: ABNORMAL
OPIATES UR QL SCN: ABNORMAL
OXYCODONE UR QL: NORMAL
PCP QUAL URINE (ROCHE): ABNORMAL

## 2024-11-09 ENCOUNTER — HEALTH MAINTENANCE LETTER (OUTPATIENT)
Age: 39
End: 2024-11-09

## 2024-11-10 LAB
FENTANYL UR-MCNC: <1 NG/ML
NORFENTANYL UR-MCNC: 5.2 NG/ML

## 2024-11-20 ENCOUNTER — OFFICE VISIT (OUTPATIENT)
Dept: FAMILY MEDICINE | Facility: CLINIC | Age: 39
End: 2024-11-20
Payer: COMMERCIAL

## 2024-11-20 VITALS
SYSTOLIC BLOOD PRESSURE: 124 MMHG | OXYGEN SATURATION: 98 % | TEMPERATURE: 97.7 F | HEART RATE: 88 BPM | BODY MASS INDEX: 31.31 KG/M2 | RESPIRATION RATE: 18 BRPM | HEIGHT: 64 IN | DIASTOLIC BLOOD PRESSURE: 86 MMHG | WEIGHT: 183.4 LBS

## 2024-11-20 DIAGNOSIS — F11.90 OPIOID USE DISORDER: ICD-10-CM

## 2024-11-20 DIAGNOSIS — K59.03 DRUG INDUCED CONSTIPATION: ICD-10-CM

## 2024-11-20 DIAGNOSIS — E66.811 CLASS 1 OBESITY DUE TO EXCESS CALORIES WITHOUT SERIOUS COMORBIDITY WITH BODY MASS INDEX (BMI) OF 33.0 TO 33.9 IN ADULT: ICD-10-CM

## 2024-11-20 DIAGNOSIS — E66.09 CLASS 1 OBESITY DUE TO EXCESS CALORIES WITHOUT SERIOUS COMORBIDITY WITH BODY MASS INDEX (BMI) OF 33.0 TO 33.9 IN ADULT: ICD-10-CM

## 2024-11-20 DIAGNOSIS — K21.00 GASTROESOPHAGEAL REFLUX DISEASE WITH ESOPHAGITIS WITHOUT HEMORRHAGE: Primary | ICD-10-CM

## 2024-11-20 DIAGNOSIS — R06.02 SOB (SHORTNESS OF BREATH): ICD-10-CM

## 2024-11-20 PROCEDURE — 99213 OFFICE O/P EST LOW 20 MIN: CPT | Performed by: NURSE PRACTITIONER

## 2024-11-20 PROCEDURE — G2211 COMPLEX E/M VISIT ADD ON: HCPCS | Performed by: NURSE PRACTITIONER

## 2024-11-20 RX ORDER — FAMOTIDINE 20 MG/1
20 TABLET, FILM COATED ORAL DAILY
Qty: 90 TABLET | Refills: 3 | Status: SHIPPED | OUTPATIENT
Start: 2024-11-20

## 2024-11-20 RX ORDER — ALBUTEROL SULFATE 90 UG/1
1 INHALANT RESPIRATORY (INHALATION) 2 TIMES DAILY PRN
Qty: 18 G | Refills: 0 | Status: SHIPPED | OUTPATIENT
Start: 2024-11-20

## 2024-11-20 NOTE — PROGRESS NOTES
"  Assessment & Plan     SOB (shortness of breath)  Refill given to have available when needed.  - albuterol (PROAIR HFA/PROVENTIL HFA/VENTOLIN HFA) 108 (90 Base) MCG/ACT inhaler; Inhale 1 puff into the lungs 2 times daily as needed for shortness of breath, wheezing or cough.    Class 1 obesity due to excess calories without serious comorbidity with body mass index (BMI) of 33.0 to 33.9 in adult  See benefit of medication.  Will plan to continue at 0.5 mg weekly.  New prescription sent.  Follow-up in 3 months or sooner if needed.  - Semaglutide-Weight Management (WEGOVY) 0.5 MG/0.5ML pen; Inject 0.5 mg subcutaneously once a week.    Gastroesophageal reflux disease with esophagitis without hemorrhage  Secondary to semaglutide use.  Encouraged to elevate head of bed.  Small frequent meals.  Avoid eating 2 to 3 weeks prior to going to bed.  Start on Pepcid daily.  If no improvement over the next 2 to 3 weeks increase to twice daily.  - famotidine (PEPCID) 20 MG tablet; Take 1 tablet (20 mg) by mouth daily.    Drug induced constipation  Enc to drink plenty of water  Allow adequate time for BM's  Eat high fiber foods  Promote daily BM   Continue with stool softener and probiotic.  Take dose of MiraLAX tonight and in AM.  May need to start MiraLAX daily    Opioid use disorder  Congratulated on sobriety.  Continue to follow with addiction medicine.  Continue current medications including semaglutide.    The longitudinal plan of care for the diagnosis(es)/condition(s) as documented were addressed during this visit. Due to the added complexity in care, I will continue to support Jennifer in the subsequent management and with ongoing continuity of care.   BMI  Estimated body mass index is 31.46 kg/m  as calculated from the following:    Height as of this encounter: 1.626 m (5' 4.02\").    Weight as of this encounter: 83.2 kg (183 lb 6.4 oz).       Subjective   Jennifer is a 39 year old, presenting for the following health " "issues:  RECHECK        11/20/2024     3:59 PM   Additional Questions   Roomed by Gracie HEIN         11/20/2024     3:59 PM   Patient Reported Additional Medications   Patient reports taking the following new medications None per patient     History of Present Illness       Reason for visit:  Discuss my progress on Wegovy    She eats 2-3 servings of fruits and vegetables daily.She consumes 0 sweetened beverage(s) daily.She exercises with enough effort to increase her heart rate 20 to 29 minutes per day.  She exercises with enough effort to increase her heart rate 4 days per week. She is missing 1 dose(s) of medications per week.  She is not taking prescribed medications regularly due to remembering to take.       Medication Followup of wegovy   Taking Medication as prescribed: yes  Side Effects:  nausea   Medication Helping Symptoms:  yes    Here today for follow-up on Wegovy.  Has been taking it and tolerating it well.  Has noticed that has helped to decrease appetite and cravings late in the evening.  Has also help to decrease cravings for fentanyl.  Is very happy.  This is the first medication that has worked to help completely get rid of those cravings.  Has been sober for over a month.  Has been constipated.  Last bowel movement was Sunday.  Normally takes probiotic and stool softener daily but forgot to take it for a couple of days.  No abdominal pain or cramping.  No nausea or vomiting.        Objective    /86   Pulse 88   Temp 97.7  F (36.5  C) (Temporal)   Resp 18   Ht 1.626 m (5' 4.02\")   Wt 83.2 kg (183 lb 6.4 oz)   LMP 02/05/2024 (Approximate)   SpO2 98%   BMI 31.46 kg/m    Body mass index is 31.46 kg/m .  Physical Exam  Constitutional:       Appearance: Normal appearance. She is well-developed.   HENT:      Nose: No congestion.   Eyes:      General: Lids are normal.   Cardiovascular:      Rate and Rhythm: Normal rate and regular rhythm.   Pulmonary:      Effort: Pulmonary effort is normal. " No tachypnea, bradypnea or respiratory distress.      Breath sounds: Normal breath sounds.   Skin:     General: Skin is warm.      Coloration: Skin is not ashen, cyanotic, jaundiced or pale.   Neurological:      Mental Status: She is alert.   Psychiatric:         Mood and Affect: Mood normal.         Speech: Speech normal.         Behavior: Behavior normal.              Signed Electronically by: GISEL Johnston CNP

## 2024-11-22 ENCOUNTER — VIRTUAL VISIT (OUTPATIENT)
Dept: ADDICTION MEDICINE | Facility: CLINIC | Age: 39
End: 2024-11-22
Payer: COMMERCIAL

## 2024-11-22 DIAGNOSIS — F41.1 GAD (GENERALIZED ANXIETY DISORDER): ICD-10-CM

## 2024-11-22 DIAGNOSIS — F11.20 OPIOID USE DISORDER, SEVERE, DEPENDENCE (H): Primary | ICD-10-CM

## 2024-11-22 DIAGNOSIS — Z72.0 TOBACCO ABUSE: ICD-10-CM

## 2024-11-22 DIAGNOSIS — F39 MOOD DISORDER (H): ICD-10-CM

## 2024-11-22 RX ORDER — BUPRENORPHINE AND NALOXONE 8; 2 MG/1; MG/1
1.5 FILM, SOLUBLE BUCCAL; SUBLINGUAL DAILY
Qty: 45 FILM | Refills: 0 | Status: SHIPPED | OUTPATIENT
Start: 2024-11-22 | End: 2024-12-22

## 2024-11-22 ASSESSMENT — PAIN SCALES - GENERAL: PAINLEVEL_OUTOF10: NO PAIN (0)

## 2024-11-22 NOTE — PROGRESS NOTES
[facility] Addiction Medicine    A/P                                                    ASSESSMENT/PLAN  1. Opioid use disorder, severe, dependence (H) (Primary)  -showing improvement   -encouraging recovery activities  -continue with suboxone at 12 mg TDD  -improved with suboxone and the initiation of GLP1  - buprenorphine HCl-naloxone HCl (SUBOXONE) 8-2 MG per film; Place 1.5 Film under the tongue daily. Take 1 film twice a day and an additional 0.5 to 1 film as needed for breakthrough withdrawal, cravings  Dispense: 45 Film; Refill: 0  -confirmed access to narcan  -Counseling provided regarding   Opioid warning reviewed.    Risk of overdose following a period of abstinence due to decrease tolerance was discussed including risk of death.     Strongly recommended abstain from alcohol, benzodiazepines, THC, opioids and other drugs of abuse.     Increased risk of return to opioid use after use of these substances discussed.      Increased risk of overdose/death with use of other substances particularly benzodiazepines/alcohol reviewed.  -1 month follow up    2. RACH (generalized anxiety disorder)  3. Mood disorder (H)  -showing improvement with fentanyl cessation  -continue with psychiatry follow up and follow all recommendations   -strattera, lexapro, gabapentin, lamotrigine, zyprexa per psychiatry     4. Tobacco abuse  -no change  -not interested in NRT  -continue to ask, assess and advise       Nov 22, 2024  - suboxone 12 mg        Continued Complex Management  The longitudinal plan of care for Opioid Use Disorder (OUD) was addressed during this visit. Due to the added complexity in care, I will continue to support Jennifer in the subsequent management and with ongoing continuity of care.      Last encounter A/P  1. Opioid use disorder, severe, dependence (H) (Primary)  -showing improvement   - buprenorphine HCl-naloxone HCl (SUBOXONE) 8-2 MG per film; Place 1 Film under the tongue 2 times daily. Take  1 film twice a day and an additional 0.5 to 1 film as needed for breakthrough withdrawal, cravings  Dispense: 60 Film; Refill: 0  - Fentanyl Qualitative with Reflex to Quant Urine; Standing  -anecdotal evidence improvement in opiate use with GLP1 medications used for weight loss.    -encouraging recovery activities  -discussion regarding THURSTON sublocade.  Jennifer will research and consider     2. RACH (generalized anxiety disorder)  3. Mood disorder (H)  -no change   -fmla paperwork completed by psychiatry for appointment attendance   -follow up with psychotherapy  -continue with psychiatry and follow all recommendations     4. Tobacco abuse  -no change   -continue to ask, assess and advise         Oct 25, 2024  - suboxone 16-24 mg TDD. Encouraged consistent dosing       PDMP Review         Value Time User    State PDMP site checked  Yes 10/28/2024  8:44 AM Vee Wells, MATTHEW              RTC  No follow-ups on file.      Counseled the patient on the importance of having a recovery program in addition to medication to manage recovery.  Components include avoiding isolating, having willingness to change, avoiding triggers and managing cravings. Encouraged having some type of sober network and practicing honesty with trusted support person(s). Encouraged other services such as counseling, 12 step or other self-help organizations.      Opioid warning reviewed.  Risk of overdose following a period of abstinence due to decrease tolerance was discussed including risk of death.  Strongly recommended abstain from alcohol, benzodiazepines, THC, opioids and other drugs of abuse.  Increased risk of return to opioid use after use of these substances discussed.  Increased risk of overdose/death with use of other substances particularly benzodiazepines/alcohol reviewed.        SUBJECTIVE                                                    Jennifer Voss is a 39 year old female who presents to clinic today for follow up    Visit  "performed virtual video       Substance Use History :  Opioids:   Age at first use: 38  Current use: substance: Fentanyl ; quantity 8-10 pills; route: smoking ; timing of last use: ;                 IV drug use: Yes:    History of overdose: Yes:   Previous residential or outpatient treatments for addiction : No  Previous medication treatments for addiction: No  Longest period of sobriety:   Medical complications related to substance use:   Hepatitis C: non-reactive; Date of most recent testin23  HIV: non-reactive; Date of most recent testin23     Other Addiction History:  Stimulants (cocaine, methamphetamine, MDMA/ecstasy)   2 years ago  Sedatives/hypnotics/anxiolytics: (benzodiazepines, GHB, Ambien, phenobarbital)  Current valium prescription  Alcohol:      Nicotine: (cigarettes, vaping, chew/snuff)  Daily   Cannabis:      Hallucinogens/Dissociatives: (acid, mushrooms, ketamine)     Eating disorder:     Gambling:                    [place] Prescription Drug Monitoring Program Reviewed:  Yes;         PAST PSYCHIATRIC HISTORY:  Diagnoses- depression, anxiety, panic disorder, PTSD   Suicide Attempts: Yes   Hospitalizations:      Social History  Housing status: alone  Education:   Employment status: works in school system.  Off for summer, returns to work Aug 7  Relationship status: Single  Children:   Legal:     Recent HPI Details:  HPI Oct 25, 2024  - \"First of all I need to tell you I rescheduled a urine test for next week, I was pissed when I saw that I was positive because I've been clean\"       Some days taking 2 films, some days taking one  Saw OB for amenorhea.  Had MRI, not in menopause but not making estrogen.  Started on HRT  Started wegovy, for weight, has lost 10 lbs.  Considers that opiate abstinence may coincide with starting GLP1    TODAY'S VISIT  HPI 2024  - Day off today to meet a friend.  Originally friend was flying in, but had to cancel.     No use, no " cravings, pleased to see last Level results with undetectable fentanyl.     Taking 8 in the am and 4 in evening, no cravings, Not doing therapy, feeling like she's in a really good place  Thanksgiving, dinner with Dad [place] Franco.     Mom has some questions regarding sublocade, would like to discuss further at next visit.        OBJECTIVE  PHYSICAL EXAM:  LMP 02/05/2024 (Approximate)     GENERAL: healthy, alert and no distress  EYES: Eyes grossly normal to inspection, PERRL and conjunctivae and sclerae normal  RESP: No respiratory distress  MENTAL STATUS EXAM  Appearance/Behavior: No appearant distress  Speech: Normal  Mood/Affect: normal affect  Insight: Adequate      PHQ-9 Score:       9/26/2024     3:54 PM 11/4/2024    11:47 AM 11/22/2024     7:47 AM   PHQ   PHQ-9 Total Score 8 5  4    Q9: Thoughts of better off dead/self-harm past 2 weeks Not at all  Not at all  Not at all        Patient-reported       RACH-7 Score:      6/27/2024     8:02 AM 6/27/2024     8:11 AM 9/26/2024     3:55 PM   RAHC-7 SCORE   Total Score  11 (moderate anxiety) 13 (moderate anxiety)   Total Score 11 11 13       LABS (may not contain today's labs)                                                      Today's lab data  No results found for any visits on 11/22/24.        HISTORY                                                    Problem list reviewed & adjusted, as indicated.  Patient Active Problem List   Diagnosis    Tobacco abuse    Depression with anxiety    Cervical high risk HPV (human papillomavirus) test positive    History of poor pregnancy outcome    Overweight (BMI 25.0-29.9)    Chronic bilateral low back pain without sciatica    Acute right-sided low back pain with right-sided sciatica    Left leg numbness    Bulging lumbar disc    Schmorl's node    Right hip pain    Lumbosacral plexopathy    Mood disorder (H)    RACH (generalized anxiety disorder)    Panic disorder without agoraphobia    PTSD (post-traumatic stress disorder)     Moderate opioid use disorder (H)    Opioid dependence with withdrawal (H)    Carpal tunnel syndrome of right wrist    Left carpal tunnel syndrome    Drug induced constipation    Gastroesophageal reflux disease with esophagitis without hemorrhage    Class 1 obesity due to excess calories without serious comorbidity with body mass index (BMI) of 33.0 to 33.9 in adult         MEDICATION LIST (prior to visit)  Current Outpatient Medications   Medication Sig Dispense Refill    albuterol (PROAIR HFA/PROVENTIL HFA/VENTOLIN HFA) 108 (90 Base) MCG/ACT inhaler Inhale 1 puff into the lungs 2 times daily as needed for shortness of breath, wheezing or cough. 18 g 0    atomoxetine (STRATTERA) 60 MG capsule Take 1 capsule (60 mg) by mouth daily. 90 capsule 3    buprenorphine HCl-naloxone HCl (SUBOXONE) 8-2 MG per film Place 1 Film under the tongue 2 times daily. Take 1 film twice a day and an additional 0.5 to 1 film as needed for breakthrough withdrawal, cravings 60 Film 0    cyclobenzaprine (FLEXERIL) 10 MG tablet Take 1 tablet by mouth three times daily as needed for muscle spasm 30 tablet 0    escitalopram (LEXAPRO) 20 MG tablet Take 1 tablet (20 mg) by mouth daily. 90 tablet 3    estradiol (ESTRACE) 0.5 MG tablet Take 1 tablet (0.5 mg) by mouth daily. 90 tablet 1    famotidine (PEPCID) 20 MG tablet Take 1 tablet (20 mg) by mouth daily. 90 tablet 3    gabapentin (NEURONTIN) 800 MG tablet Take one tab by mouth at bedtime and can take half-tab daily as needed for pain and anxiety. 135 tablet 1    ibuprofen (ADVIL/MOTRIN) 800 MG tablet Take 1 tablet (800 mg) by mouth every 6 hours as needed for other (mild and/or inflammatory pain) 30 tablet 0    lamoTRIgine (LAMICTAL) 200 MG tablet Take 1 tablet (200 mg) by mouth daily. 90 tablet 3    naloxone (NARCAN) 4 MG/0.1ML nasal spray Spray 1 spray (4 mg) into one nostril alternating nostrils as needed for opioid reversal every 2-3 minutes until assistance arrives 0.2 mL 11    OLANZapine  (ZYPREXA) 10 MG tablet Take 0.5-1 tablets (5-10 mg) by mouth daily as needed (anxiety, sleep). 90 tablet 1    ondansetron (ZOFRAN ODT) 4 MG ODT tab Take 1 tablet (4 mg) by mouth every 8 hours as needed for nausea 30 tablet 1    progesterone (PROMETRIUM) 100 MG capsule Take 1 capsule (100 mg) by mouth daily. 90 capsule 1    Semaglutide-Weight Management (WEGOVY) 0.5 MG/0.5ML pen Inject 0.5 mg subcutaneously once a week. 6 mL 0    senna-docusate (SENOKOT-S/PERICOLACE) 8.6-50 MG tablet Take 1-2 tablets by mouth 2 times daily 30 tablet 0    traZODone (DESYREL) 50 MG tablet Take 0.5-1 tablets (25-50 mg) by mouth nightly as needed for sleep. 90 tablet 0     No current facility-administered medications for this visit.       MEDICATION LIST (after visit)  Current Outpatient Medications   Medication Sig Dispense Refill    albuterol (PROAIR HFA/PROVENTIL HFA/VENTOLIN HFA) 108 (90 Base) MCG/ACT inhaler Inhale 1 puff into the lungs 2 times daily as needed for shortness of breath, wheezing or cough. 18 g 0    atomoxetine (STRATTERA) 60 MG capsule Take 1 capsule (60 mg) by mouth daily. 90 capsule 3    buprenorphine HCl-naloxone HCl (SUBOXONE) 8-2 MG per film Place 1 Film under the tongue 2 times daily. Take 1 film twice a day and an additional 0.5 to 1 film as needed for breakthrough withdrawal, cravings 60 Film 0    cyclobenzaprine (FLEXERIL) 10 MG tablet Take 1 tablet by mouth three times daily as needed for muscle spasm 30 tablet 0    escitalopram (LEXAPRO) 20 MG tablet Take 1 tablet (20 mg) by mouth daily. 90 tablet 3    estradiol (ESTRACE) 0.5 MG tablet Take 1 tablet (0.5 mg) by mouth daily. 90 tablet 1    famotidine (PEPCID) 20 MG tablet Take 1 tablet (20 mg) by mouth daily. 90 tablet 3    gabapentin (NEURONTIN) 800 MG tablet Take one tab by mouth at bedtime and can take half-tab daily as needed for pain and anxiety. 135 tablet 1    ibuprofen (ADVIL/MOTRIN) 800 MG tablet Take 1 tablet (800 mg) by mouth every 6 hours as  needed for other (mild and/or inflammatory pain) 30 tablet 0    lamoTRIgine (LAMICTAL) 200 MG tablet Take 1 tablet (200 mg) by mouth daily. 90 tablet 3    naloxone (NARCAN) 4 MG/0.1ML nasal spray Spray 1 spray (4 mg) into one nostril alternating nostrils as needed for opioid reversal every 2-3 minutes until assistance arrives 0.2 mL 11    OLANZapine (ZYPREXA) 10 MG tablet Take 0.5-1 tablets (5-10 mg) by mouth daily as needed (anxiety, sleep). 90 tablet 1    ondansetron (ZOFRAN ODT) 4 MG ODT tab Take 1 tablet (4 mg) by mouth every 8 hours as needed for nausea 30 tablet 1    progesterone (PROMETRIUM) 100 MG capsule Take 1 capsule (100 mg) by mouth daily. 90 capsule 1    Semaglutide-Weight Management (WEGOVY) 0.5 MG/0.5ML pen Inject 0.5 mg subcutaneously once a week. 6 mL 0    senna-docusate (SENOKOT-S/PERICOLACE) 8.6-50 MG tablet Take 1-2 tablets by mouth 2 times daily 30 tablet 0    traZODone (DESYREL) 50 MG tablet Take 0.5-1 tablets (25-50 mg) by mouth nightly as needed for sleep. 90 tablet 0     No current facility-administered medications for this visit.         Allergies   Allergen Reactions    Penicillins Nausea    Fluoxetine Itching       Vee Wells NP  [facility] Medicine  [phone]    Answers submitted by the patient for this visit:  Patient Health Questionnaire (Submitted on 11/22/2024)  If you checked off any problems, how difficult have these problems made it for you to do your work, take care of things at home, or get along with other people?: Somewhat difficult  PHQ9 TOTAL SCORE: 4

## 2024-11-22 NOTE — PROGRESS NOTES
Virtual Visit Details    Type of service:  Video Visit   Joined the call at 11/22/2024, 8:52:21 am.  Left the call at 11/22/2024, 9:09:45 am.    Distant Location (provider location):  On-site  Platform used for Video Visit: DiffonWell

## 2024-11-22 NOTE — NURSING NOTE
Current patient location: [address] [place] [id number]    Is the patient currently in the state of [place]? YES    Visit mode:VIDEO    If the visit is dropped, the patient can be reconnected by:VIDEO VISIT: Text to cell phone:   Telephone Information:   Mobile [phone]       Will anyone else be joining the visit? NO  (If patient encounters technical issues they should call [phone]    Are changes needed to the allergy or medication list? No    Are refills needed on medications prescribed by this physician? NO    Rooming Documentation:  Questionnaire(s) completed    Reason for visit: RECHECK    Killian SWANSON

## 2024-11-23 ENCOUNTER — MYC MEDICAL ADVICE (OUTPATIENT)
Dept: FAMILY MEDICINE | Facility: CLINIC | Age: 39
End: 2024-11-23
Payer: COMMERCIAL

## 2024-11-25 ENCOUNTER — LAB (OUTPATIENT)
Dept: LAB | Facility: CLINIC | Age: 39
End: 2024-11-25
Payer: COMMERCIAL

## 2024-11-25 DIAGNOSIS — F11.23 OPIOID DEPENDENCE WITH WITHDRAWAL (H): ICD-10-CM

## 2024-11-25 DIAGNOSIS — F11.20 OPIOID USE DISORDER, SEVERE, DEPENDENCE (H): ICD-10-CM

## 2024-11-25 PROCEDURE — 80307 DRUG TEST PRSMV CHEM ANLYZR: CPT

## 2024-11-26 LAB
AMPHETAMINES UR QL SCN: ABNORMAL
BARBITURATES UR QL SCN: ABNORMAL
BENZODIAZ UR QL SCN: ABNORMAL
BUPRENORPHINE UR QL: ABNORMAL
BZE UR QL SCN: ABNORMAL
CANNABINOIDS UR QL SCN: ABNORMAL
FENTANYL UR QL: ABNORMAL
FENTANYL UR QL: NORMAL
OPIATES UR QL SCN: ABNORMAL
OXYCODONE UR QL: NORMAL
PCP QUAL URINE (ROCHE): ABNORMAL

## 2024-12-03 ENCOUNTER — MYC MEDICAL ADVICE (OUTPATIENT)
Dept: FAMILY MEDICINE | Facility: CLINIC | Age: 39
End: 2024-12-03
Payer: COMMERCIAL

## 2024-12-03 DIAGNOSIS — E66.09 CLASS 1 OBESITY DUE TO EXCESS CALORIES WITHOUT SERIOUS COMORBIDITY WITH BODY MASS INDEX (BMI) OF 33.0 TO 33.9 IN ADULT: Primary | ICD-10-CM

## 2024-12-03 DIAGNOSIS — E66.811 CLASS 1 OBESITY DUE TO EXCESS CALORIES WITHOUT SERIOUS COMORBIDITY WITH BODY MASS INDEX (BMI) OF 33.0 TO 33.9 IN ADULT: Primary | ICD-10-CM
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
## 2024-12-04 ENCOUNTER — VIRTUAL VISIT (OUTPATIENT)
Dept: BEHAVIORAL HEALTH | Facility: CLINIC | Age: 39
End: 2024-12-04
Payer: COMMERCIAL

## 2024-12-04 ENCOUNTER — VIRTUAL VISIT (OUTPATIENT)
Dept: PSYCHIATRY | Facility: CLINIC | Age: 39
End: 2024-12-04
Payer: COMMERCIAL

## 2024-12-04 VITALS — WEIGHT: 183 LBS | HEIGHT: 64 IN | BODY MASS INDEX: 31.24 KG/M2

## 2024-12-04 DIAGNOSIS — F43.10 PTSD (POST-TRAUMATIC STRESS DISORDER): ICD-10-CM

## 2024-12-04 DIAGNOSIS — F31.81 BIPOLAR II DISORDER (H): ICD-10-CM

## 2024-12-04 DIAGNOSIS — F15.11 METHAMPHETAMINE ABUSE IN REMISSION (H): ICD-10-CM

## 2024-12-04 DIAGNOSIS — F41.1 GAD (GENERALIZED ANXIETY DISORDER): Primary | ICD-10-CM

## 2024-12-04 DIAGNOSIS — F11.20 MODERATE OPIOID USE DISORDER (H): ICD-10-CM

## 2024-12-04 PROCEDURE — G2211 COMPLEX E/M VISIT ADD ON: HCPCS | Mod: 95 | Performed by: PSYCHIATRY & NEUROLOGY

## 2024-12-04 PROCEDURE — 99214 OFFICE O/P EST MOD 30 MIN: CPT | Mod: 95 | Performed by: PSYCHIATRY & NEUROLOGY

## 2024-12-04 PROCEDURE — 90832 PSYTX W PT 30 MINUTES: CPT | Mod: 95 | Performed by: COUNSELOR

## 2024-12-04 RX ORDER — DIAZEPAM 5 MG/1
5-10 TABLET ORAL DAILY PRN
Qty: 15 TABLET | Refills: 2 | Status: SHIPPED | OUTPATIENT
Start: 2024-12-04

## 2024-12-04 ASSESSMENT — PAIN SCALES - GENERAL: PAINLEVEL_OUTOF10: NO PAIN (0)

## 2024-12-04 NOTE — PROGRESS NOTES
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 sooner as needed. Call [facility] at [phone] to schedule.  Follow up with primary care provider as planned or for acute medical concerns.  Call the psychiatric nurse line with medication questions or concerns at [phone].  [place] Beyondsofthart may be used to communicate with your provider, but this is not intended to be used for emergencies.    Risks of benzodiazepine (Ativan, Xanax, Klonopin, Valium, etc) use including, but not limited to, sedation, tolerance, risk for addiction/dependence. Do not drink alcohol while taking benzodiazepines due to risk of trouble breathing and potential death. Do not drive or operate heavy machinery until it is known how the drug affects you. Discuss with physician or pharmacist before ever taking a benzodiazepine with a narcotic/opioid pain medication.     Have previously discussed Lamictal (lamotrigine) can cause serious rashes including Park-Yoshi syndrome which may requiring hospitalization and discontinuation of treatment. If any signs of a rash occur, please see your Primary Care Provider or a dermatologist immediately.     Administrative Billing:   Phone Call/Video Duration: 10 Minutes  Start: 1:54p  Stop: 2:04p    The longitudinal plan of care for the diagnosis(es)/condition(s) as documented were addressed during this visit. Due to the added complexity in care, I will continue to support Jennifer in  the subsequent management and with ongoing continuity of care.    Patient Status:  Patient is long-term/continous care patient.     Signed:   Sarah Purcell DO  [facility]    Disclaimer: This note consists of symbols derived from keyboarding, dictation and/or voice recognition software. As a result, there may be errors in the script that have gone undetected. Please consider this when interpreting information found in this chart.

## 2024-12-04 NOTE — PATIENT INSTRUCTIONS
Treatment Plan:  Continue olanzapine/Zyprexa to 5-10 mg daily as needed for anxiety, sleep, agitation, bipolar disorder.  Continue lamotrigine 200 mg daily for mood (can take as split dose 100 mg twice daily if preferred).  Restart Valium/diazepam at 5-10 mg once daily as needed for severe anxiety/panic only.   Continue gabapentin 800 mg at bedtime and can take 400 mg during day as needed for anxiety or chronic pain.    Continue Lexapro/escitalopram 20 mg daily for mood, anxiety.  Continue Strattera/atomoxetine for attention and concentration, mood: Take 60 mg daily.  Can fax [facility] renewal paperwork to me at fax #: [phone].   Continue all other cares per primary care provider.   Continue all other medications as reviewed per electronic medical record today.   Safety plan reviewed. To the Emergency Department as needed or call after hours crisis line at [phone] or [phone]. [place] Crisis Text Line. Text [place] to 550291 or Suicide LifeLine Chat: [email or web address]  Continue therapy as planned.   Schedule an appointment with me in 2 months or sooner as needed. Call [facility] at [phone] to schedule.  Follow up with primary care provider as planned or for acute medical concerns.  Call the psychiatric nurse line with medication questions or concerns at [phone].  MyChart may be used to communicate with your provider, but this is not intended to be used for emergencies.    Risks of benzodiazepine (Ativan, Xanax, Klonopin, Valium, etc) use including, but not limited to, sedation, tolerance, risk for addiction/dependence. Do not drink alcohol while taking benzodiazepines due to risk of trouble breathing and potential death. Do not drive or operate heavy machinery until it is known how the drug affects you. Discuss with physician or pharmacist before ever taking a benzodiazepine with a narcotic/opioid pain medication.     Have previously discussed Lamictal (lamotrigine)  can cause serious rashes including Park-Yoshi syndrome which may requiring hospitalization and discontinuation of treatment. If any signs of a rash occur, please see your Primary Care Provider or a dermatologist immediately.

## 2024-12-04 NOTE — NURSING NOTE
Current patient location: [address] [place] [address]  [place] [address]    Is the patient currently in the state of [place]? YES    Visit mode:VIDEO    If the visit is dropped, the patient can be reconnected by:VIDEO VISIT: Text to cell phone:   Telephone Information:   Mobile [phone]       Will anyone else be joining the visit? NO  (If patient encounters technical issues they should call [phone]    Are changes needed to the allergy or medication list? No    Are refills needed on medications prescribed by this physician? Discuss with provider    Rooming Documentation:  Questionnaire(s) completed Questionnaire(s) not pre-assigned    Reason for visit: EDWIN SWANSON

## 2024-12-20 ENCOUNTER — HOSPITAL ENCOUNTER (OUTPATIENT)
Dept: MRI IMAGING | Facility: CLINIC | Age: 39
Discharge: HOME | End: 2024-12-20
Attending: NURSE PRACTITIONER | Admitting: NURSE PRACTITIONER
Payer: COMMERCIAL

## 2024-12-20 DIAGNOSIS — R93.0 ABNORMAL MRI OF HEAD: ICD-10-CM

## 2024-12-20 PROCEDURE — 70553 MRI BRAIN STEM W/O & W/DYE: CPT

## 2024-12-20 PROCEDURE — A9585 GADOBUTROL INJECTION: HCPCS | Performed by: NURSE PRACTITIONER

## 2024-12-20 PROCEDURE — 255N000002 HC RX 255 OP 636: Performed by: NURSE PRACTITIONER

## 2024-12-20 RX ORDER — GADOBUTROL 604.72 MG/ML
8.5 INJECTION INTRAVENOUS ONCE
Status: COMPLETED | OUTPATIENT
Start: 2024-12-20 | End: 2024-12-20

## 2024-12-20 RX ADMIN — GADOBUTROL 8.5 ML: 604.72 INJECTION INTRAVENOUS at 11:27

## 2024-12-26 ENCOUNTER — OFFICE VISIT (OUTPATIENT)
Dept: ADDICTION MEDICINE | Facility: CLINIC | Age: 39
End: 2024-12-26
Payer: COMMERCIAL

## 2024-12-26 ENCOUNTER — TELEPHONE (OUTPATIENT)
Dept: ADDICTION MEDICINE | Facility: CLINIC | Age: 39
End: 2024-12-26

## 2024-12-26 VITALS
HEIGHT: 64 IN | HEART RATE: 78 BPM | SYSTOLIC BLOOD PRESSURE: 131 MMHG | DIASTOLIC BLOOD PRESSURE: 87 MMHG | OXYGEN SATURATION: 96 % | BODY MASS INDEX: 31.58 KG/M2 | WEIGHT: 185 LBS

## 2024-12-26 DIAGNOSIS — F11.20 OPIOID USE DISORDER, SEVERE, DEPENDENCE (H): Primary | ICD-10-CM

## 2024-12-26 DIAGNOSIS — F39 MOOD DISORDER (H): ICD-10-CM

## 2024-12-26 DIAGNOSIS — F41.1 GAD (GENERALIZED ANXIETY DISORDER): ICD-10-CM

## 2024-12-26 DIAGNOSIS — Z72.0 TOBACCO ABUSE: ICD-10-CM

## 2024-12-26 LAB
AMPHETAMINE QUAL URINE POCT: NEGATIVE
BARBITURATE QUAL URINE POCT: NEGATIVE
BENZODIAZEPINE QUAL URINE POCT: ABNORMAL
BUPRENORPHINE QUAL URINE POCT: ABNORMAL
COCAINE QUAL URINE POCT: NEGATIVE
CREAT UR-MCNC: 87 MG/DL
CREATININE QUAL URINE POCT: ABNORMAL
INTERNAL QC QUAL URINE POCT: ABNORMAL
MDMA QUAL URINE POCT: NEGATIVE
METHADONE QUAL URINE POCT: NEGATIVE
METHAMPHETAMINE QUAL URINE POCT: NEGATIVE
OPIATE QUAL URINE POCT: NEGATIVE
OXYCODONE QUAL URINE POCT: NEGATIVE
PH QUAL URINE POCT: ABNORMAL
PHENCYCLIDINE QUAL URINE POCT: NEGATIVE
POCT KIT EXPIRATION DATE: ABNORMAL
POCT KIT LOT NUMBER: ABNORMAL
SPECIFIC GRAVITY POCT: 1.01
TEMPERATURE URINE POCT: ABNORMAL
THC QUAL URINE POCT: ABNORMAL

## 2024-12-26 RX ORDER — LIDOCAINE HYDROCHLORIDE 10 MG/ML
2 INJECTION, SOLUTION EPIDURAL; INFILTRATION; INTRACAUDAL; PERINEURAL ONCE
OUTPATIENT
Start: 2025-01-02 | End: 2025-01-02

## 2024-12-26 RX ORDER — DIPHENHYDRAMINE HYDROCHLORIDE 50 MG/ML
50 INJECTION INTRAMUSCULAR; INTRAVENOUS
Start: 2025-01-02

## 2024-12-26 RX ORDER — ALBUTEROL SULFATE 0.83 MG/ML
2.5 SOLUTION RESPIRATORY (INHALATION)
OUTPATIENT
Start: 2025-01-02

## 2024-12-26 RX ORDER — MEPERIDINE HYDROCHLORIDE 25 MG/ML
25 INJECTION INTRAMUSCULAR; INTRAVENOUS; SUBCUTANEOUS
OUTPATIENT
Start: 2025-01-02

## 2024-12-26 RX ORDER — EPINEPHRINE 1 MG/ML
0.3 INJECTION, SOLUTION, CONCENTRATE INTRAVENOUS EVERY 5 MIN PRN
OUTPATIENT
Start: 2025-01-02

## 2024-12-26 RX ORDER — BUPRENORPHINE AND NALOXONE 12; 3 MG/1; MG/1
1 FILM, SOLUBLE BUCCAL; SUBLINGUAL DAILY
Qty: 30 FILM | Refills: 0 | Status: SHIPPED | OUTPATIENT
Start: 2024-12-26

## 2024-12-26 RX ORDER — METHYLPREDNISOLONE SODIUM SUCCINATE 40 MG/ML
40 INJECTION INTRAMUSCULAR; INTRAVENOUS
Start: 2025-01-02

## 2024-12-26 RX ORDER — DIPHENHYDRAMINE HYDROCHLORIDE 50 MG/ML
25 INJECTION INTRAMUSCULAR; INTRAVENOUS
Start: 2025-01-02

## 2024-12-26 RX ORDER — ALBUTEROL SULFATE 90 UG/1
1-2 INHALANT RESPIRATORY (INHALATION)
Start: 2025-01-02

## 2024-12-26 ASSESSMENT — ANXIETY QUESTIONNAIRES
5. BEING SO RESTLESS THAT IT IS HARD TO SIT STILL: SEVERAL DAYS
IF YOU CHECKED OFF ANY PROBLEMS ON THIS QUESTIONNAIRE, HOW DIFFICULT HAVE THESE PROBLEMS MADE IT FOR YOU TO DO YOUR WORK, TAKE CARE OF THINGS AT HOME, OR GET ALONG WITH OTHER PEOPLE: SOMEWHAT DIFFICULT
6. BECOMING EASILY ANNOYED OR IRRITABLE: NOT AT ALL
8. IF YOU CHECKED OFF ANY PROBLEMS, HOW DIFFICULT HAVE THESE MADE IT FOR YOU TO DO YOUR WORK, TAKE CARE OF THINGS AT HOME, OR GET ALONG WITH OTHER PEOPLE?: SOMEWHAT DIFFICULT
4. TROUBLE RELAXING: SEVERAL DAYS
7. FEELING AFRAID AS IF SOMETHING AWFUL MIGHT HAPPEN: SEVERAL DAYS
GAD7 TOTAL SCORE: 6
GAD7 TOTAL SCORE: 6
7. FEELING AFRAID AS IF SOMETHING AWFUL MIGHT HAPPEN: SEVERAL DAYS
3. WORRYING TOO MUCH ABOUT DIFFERENT THINGS: SEVERAL DAYS
1. FEELING NERVOUS, ANXIOUS, OR ON EDGE: SEVERAL DAYS
GAD7 TOTAL SCORE: 6
2. NOT BEING ABLE TO STOP OR CONTROL WORRYING: SEVERAL DAYS

## 2024-12-26 ASSESSMENT — PATIENT HEALTH QUESTIONNAIRE - PHQ9
SUM OF ALL RESPONSES TO PHQ QUESTIONS 1-9: 6
SUM OF ALL RESPONSES TO PHQ QUESTIONS 1-9: 6
10. IF YOU CHECKED OFF ANY PROBLEMS, HOW DIFFICULT HAVE THESE PROBLEMS MADE IT FOR YOU TO DO YOUR WORK, TAKE CARE OF THINGS AT HOME, OR GET ALONG WITH OTHER PEOPLE: SOMEWHAT DIFFICULT

## 2024-12-26 ASSESSMENT — PAIN SCALES - GENERAL: PAINLEVEL_OUTOF10: NO PAIN (0)

## 2024-12-26 NOTE — TELEPHONE ENCOUNTER
- I have reviewed recommendations for comprehensive treatment plan with the patient  - I have reviewed the patient's medications comprehensively  and provided education to the patient on risks associated with concurrent use of benzodiazepines, alcohol, other sedatives with opioids  - I have recommended concomitant psychosocial support  - I have complied with all aspects of REMS program for Sublocade. [facility] where Sublocade will be administered is in compliance with all aspects of REMS program.   - Patient meets DSM-5 criteria for moderate or severe opioid use disorder  - Patient has been prescribed buprenorphine 8-24mg/day for at least one 1 week, demonstrating control of cravings and withdrawal symptoms as well as tolerance of buprenorphine.   - Patient will discontinue sublingual buprenorphine upon initiation of Sublocade  - Patient does not have evidence of tampering or attempts to remove the depot at the injection site.   - Patient does not have severe hepatic impairment.   - Patient does not have adrenal insufficiency.   - Patient does not have a history of long QT syndrome  - Patient does not take any antiarrhythmic medications or other medications known to significantly prolong QT interval   - Urine Drug Screen on 12/26/24 was positive for buprenorphine  - Patient will not be receiving methadone while on Sublocade  - Patient will not be receiving any other long acting buprenorphine products or long acting naltrexone while on Sublocade    - [place]  reflecting buprenorphine prescriptions including strengths and dates:

## 2024-12-26 NOTE — PROGRESS NOTES
[facility]    A/P                                                    ASSESSMENT/PLAN  1. Opioid use disorder, severe, dependence (H) (Primary)  -needs improvement.  Inconsistent dosing.  Cravings improved since started GLP-1 however inconsistent suboxone dosing.    -sublocade plan ordered. All questions answered.  Completed insupport paperwork  -continue with scheduled suboxone until day of sublocade appointment, then discontinue scheduled suboxone and take 0.5-1 film only as needed  - Drugs of Abuse Screen Urine (POC CUPS) POCT  - Drug Confirmation Panel Urine with Creat - lab collect; Future  - Buprenorphine HCl-Naloxone HCl (SUBOXONE) 12-3 MG FILM per film; Place 1 Film under the tongue daily.  Dispense: 30 Film; Refill: 0  - Drug Confirmation Panel Urine with Creat - lab collect  -encouraged recovery activities   -confirmed access to narcan  -Counseling provided regarding   Opioid warning reviewed.    Risk of overdose following a period of abstinence due to decrease tolerance was discussed including risk of death.     Strongly recommended abstain from alcohol, benzodiazepines, THC, opioids and other drugs of abuse.     Increased risk of return to opioid use after use of these substances discussed.     Increased risk of overdose/death with use of other substances particularly benzodiazepines/alcohol reviewed.  -one month follow up, can be virtual     2. RACH (generalized anxiety disorder)  3. Mood disorder (H)  -showing improvement with fentanyl cessation  -continue with psychiatry follow up and follow all recommendations   -strattera, lexapro, gabapentin, lamotrigine, zyprexa per psychiatry     4. Tobacco abuse  -no change  -not interested in NRT  -continue to ask, assess and advise        Dec 26, 2024  - schedule sublocade  -continue suboxone until sublocade injection, then 0.5-1 film daily only as needed        Continued Complex Management  The longitudinal plan of care for  Opioid Use Disorder (OUD) was addressed during this visit. Due to the added complexity in care, I will continue to support Jennifer in the subsequent management and with ongoing continuity of care.      Last encounter A/P  1. Opioid use disorder, severe, dependence (H) (Primary)  -showing improvement   -encouraging recovery activities  -continue with suboxone at 12 mg TDD  -improved with suboxone and the initiation of GLP1  - buprenorphine HCl-naloxone HCl (SUBOXONE) 8-2 MG per film; Place 1.5 Film under the tongue daily. Take 1 film twice a day and an additional 0.5 to 1 film as needed for breakthrough withdrawal, cravings  Dispense: 45 Film; Refill: 0  -confirmed access to narcan  -Counseling provided regarding   Opioid warning reviewed.    Risk of overdose following a period of abstinence due to decrease tolerance was discussed including risk of death.                Strongly recommended abstain from alcohol, benzodiazepines, THC, opioids and other drugs of abuse.                Increased risk of return to opioid use after use of these substances discussed.                     Increased risk of overdose/death with use of other substances particularly benzodiazepines/alcohol reviewed.  -1 month follow up     2. RACH (generalized anxiety disorder)  3. Mood disorder (H)  -showing improvement with fentanyl cessation  -continue with psychiatry follow up and follow all recommendations   -strattera, lexapro, gabapentin, lamotrigine, zyprexa per psychiatry      4. Tobacco abuse  -no change  -not interested in NRT  -continue to ask, assess and advise         Nov 22, 2024  - suboxone 12 mg       PDMP Review         Value Time User    State PDMP site checked  Yes 12/26/2024  1:30 PM Vee Wells NP              RTC  Return in about 1 month (around 1/26/2025) for using a video visit.      Counseled the patient on the importance of having a recovery program in addition to medication to manage recovery.  Components include  avoiding isolating, having willingness to change, avoiding triggers and managing cravings. Encouraged having some type of sober network and practicing honesty with trusted support person(s). Encouraged other services such as counseling, 12 step or other self-help organizations.      Opioid warning reviewed.  Risk of overdose following a period of abstinence due to decrease tolerance was discussed including risk of death.  Strongly recommended abstain from alcohol, benzodiazepines, THC, opioids and other drugs of abuse.  Increased risk of return to opioid use after use of these substances discussed.  Increased risk of overdose/death with use of other substances particularly benzodiazepines/alcohol reviewed.        SUBJECTIVE                                                    Jennifer Voss is a 39 year old female who presents to clinic today for follow up    Visit performed In Person, face-to-face    Substance Use History :  Opioids:   Age at first use: 38  Current use: substance: Fentanyl ; quantity 8-10 pills; route: smoking ; timing of last use: ;                 IV drug use: Yes:    History of overdose: Yes:   Previous residential or outpatient treatments for addiction : No  Previous medication treatments for addiction: No  Longest period of sobriety:   Medical complications related to substance use:   Hepatitis C: non-reactive; Date of most recent testin23  HIV: non-reactive; Date of most recent testin23     Other Addiction History:  Stimulants (cocaine, methamphetamine, MDMA/ecstasy)   2 years ago  Sedatives/hypnotics/anxiolytics: (benzodiazepines, GHB, Ambien, phenobarbital)  Current valium prescription  Alcohol:      Nicotine: (cigarettes, vaping, chew/snuff)  Daily   Cannabis:      Hallucinogens/Dissociatives: (acid, mushrooms, ketamine)     Eating disorder:     Gambling:                    [place] Prescription Drug Monitoring Program Reviewed:  Yes;         PAST  "PSYCHIATRIC HISTORY:  Diagnoses- depression, anxiety, panic disorder, PTSD   Suicide Attempts: Yes   Hospitalizations:      Social History  Housing status: alone  Education:   Employment status: works in school system.  Off for summer, returns to work Aug 7  Relationship status: Single  Children:   Legal:     Recent HPI Details:  HPI Nov 22, 2024  - Day off today to meet a friend.  Originally friend was flying in, but had to cancel.     No use, no cravings, pleased to see last Level results with undetectable fentanyl.      Taking 8 in the am and 4 in evening, no cravings, Not doing therapy, feeling like she's in a really good place  Thanksgiving, dinner with Dad Guanaco Franco.      Mom has some questions regarding sublocade, would like to discuss further at next visit.      TODAY'S VISIT  HPI Dec 26, 2024  - Presents with mother today wanting to further discuss Sublocade risks,benefits side effects.  Currently taking 8 mg daily.  Missing and forgetting doses \"I don't think I need them and then skip a dose\" then will experience withdrawal symptoms.  Had a recent slip (shared via [employer] message, wanting to focus visit on sublocade)       OBJECTIVE  PHYSICAL EXAM:  [facility] 02/05/2024 (Approximate)     GENERAL: healthy, alert and no distress  EYES: Eyes grossly normal to inspection, PERRL and conjunctivae and sclerae normal  RESP: No respiratory distress  MENTAL STATUS EXAM  Appearance/Behavior: No appearant distress  Speech: Normal  Mood/Affect: normal affect  Insight: Fair      PHQ-9 Score:       11/4/2024    11:47 AM 11/22/2024     7:47 AM 12/26/2024     8:10 AM   PHQ   PHQ-9 Total Score 5  4  6    Q9: Thoughts of better off dead/self-harm past 2 weeks Not at all Not at all Not at all       Patient-reported       RACH-7 Score:      6/27/2024     8:11 AM 9/26/2024     3:55 PM 12/26/2024     8:11 AM   RACH-7 SCORE   Total Score 11 (moderate anxiety) 13 (moderate anxiety) 6 (mild anxiety)   Total Score 11 13 6        " Patient-reported       LABS (may not contain today's labs)                                                      Today's lab data  Results for orders placed or performed in visit on 12/26/24   Drugs of Abuse Screen Urine (POC CUPS) POCT     Status: Abnormal   Result Value Ref Range    POCT Kit Lot Number [id number]     POCT Kit Expiration Date 2026-02-28     Temperature Urine POCT 90 F 90 F, 92 F, 94 F, 96 F, 98 F, 100 F    Specific [place] POCT 1.015 1.005, 1.015, 1.025    pH Qual Urine POCT 9 pH 4 pH, 5 pH, 7 pH, 9 pH    Creatinine Qual Urine POCT 50 mg/dL 20 mg/dL, 50 mg/dL, 100 mg/dL, 200 mg/dL    Internal QC Qual Urine POCT Valid Valid    Amphetamine Qual Urine POCT Negative Negative    Barbiturate Qual Urine POCT Negative Negative    Buprenorphine Qual Urine POCT Screen Positive (A) Negative    Benzodiazepine Qual Urine POCT Screen Positive (A) Negative    Cocaine Qual Urine POCT Negative Negative    Methamphetamine Qual Urine POCT Negative Negative    MDMA Qual Urine POCT Negative Negative    Methadone Qual Urine POCT Negative Negative    Opiate Qual Urine POCT Negative Negative    Oxycodone Qual Urine POCT Negative Negative    Phencyclidine Qual Urine POCT Negative Negative    THC Qual Urine POCT Screen Positive (A) Negative           HISTORY                                                    Problem list reviewed & adjusted, as indicated.  Patient Active Problem List   Diagnosis    Tobacco abuse    Depression with anxiety    Cervical high risk HPV (human papillomavirus) test positive    History of poor pregnancy outcome    Overweight (BMI 25.0-29.9)    Chronic bilateral low back pain without sciatica    Acute right-sided low back pain with right-sided sciatica    Left leg numbness    Bulging lumbar disc    Schmorl's node    Right hip pain    Lumbosacral plexopathy    Mood disorder (H)    RACH (generalized anxiety disorder)    Panic disorder without agoraphobia    PTSD (post-traumatic stress disorder)     Moderate opioid use disorder (H)    Opioid dependence with withdrawal (H)    Carpal tunnel syndrome of right wrist    Left carpal tunnel syndrome    Drug induced constipation    Gastroesophageal reflux disease with esophagitis without hemorrhage    Class 1 obesity due to excess calories without serious comorbidity with body mass index (BMI) of 33.0 to 33.9 in adult         MEDICATION LIST (after visit)  Current Outpatient Medications   Medication Sig Dispense Refill    albuterol (PROAIR HFA/PROVENTIL HFA/VENTOLIN HFA) 108 (90 Base) MCG/ACT inhaler Inhale 1 puff into the lungs 2 times daily as needed for shortness of breath, wheezing or cough. 18 g 0    atomoxetine (STRATTERA) 60 MG capsule Take 1 capsule (60 mg) by mouth daily. 90 capsule 3    buprenorphine HCl-naloxone HCl (SUBOXONE) 8-2 MG per film Take 1 film twice a day and an additional 0.5 to 1 film as needed for breakthrough withdrawal, cravings.  For a total daily dose of 16- 24 mg. 45 Film 0    cyclobenzaprine (FLEXERIL) 10 MG tablet Take 1 tablet by mouth three times daily as needed for muscle spasm 30 tablet 0    diazepam (VALIUM) 5 MG tablet Take 1-2 tablets (5-10 mg) by mouth daily as needed for anxiety. 15 tabs to last 30 days 15 tablet 2    escitalopram (LEXAPRO) 20 MG tablet Take 1 tablet (20 mg) by mouth daily. 90 tablet 3    estradiol (ESTRACE) 0.5 MG tablet Take 1 tablet (0.5 mg) by mouth daily. 90 tablet 1    famotidine (PEPCID) 20 MG tablet Take 1 tablet (20 mg) by mouth daily. 90 tablet 3    gabapentin (NEURONTIN) 800 MG tablet Take one tab by mouth at bedtime and can take half-tab daily as needed for pain and anxiety. 135 tablet 1    ibuprofen (ADVIL/MOTRIN) 800 MG tablet Take 1 tablet (800 mg) by mouth every 6 hours as needed for other (mild and/or inflammatory pain) 30 tablet 0    lamoTRIgine (LAMICTAL) 200 MG tablet Take 1 tablet (200 mg) by mouth daily. 90 tablet 3    naloxone (NARCAN) 4 MG/0.1ML nasal spray Spray 1 spray (4 mg) into one  nostril alternating nostrils as needed for opioid reversal every 2-3 minutes until assistance arrives 0.2 mL 11    OLANZapine (ZYPREXA) 10 MG tablet Take 0.5-1 tablets (5-10 mg) by mouth daily as needed (anxiety, sleep). 90 tablet 1    ondansetron (ZOFRAN ODT) 4 MG ODT tab Take 1 tablet (4 mg) by mouth every 8 hours as needed for nausea 30 tablet 1    progesterone (PROMETRIUM) 100 MG capsule Take 1 capsule (100 mg) by mouth daily. 90 capsule 1    Semaglutide-Weight Management (WEGOVY) 0.5 MG/0.5ML pen Inject 0.5 mg subcutaneously once a week. 2 mL 0    Semaglutide-Weight Management (WEGOVY) 1 MG/0.5ML pen Inject 1 mg subcutaneously once a week. 6 mL 0    senna-docusate (SENOKOT-S/PERICOLACE) 8.6-50 MG tablet Take 1-2 tablets by mouth 2 times daily 30 tablet 0     No current facility-administered medications for this visit.         Allergies   Allergen Reactions    Penicillins Nausea    Fluoxetine Itching         Vee Wells NP  [facility] Addiction Medicine  [phone]    Time statement  The total TIME spent on this patient on the day of the appointment was 69 minutes.  This includes time spent preparing to see the patient, reviewing history, ordering/reviewing tests, medications, communicating with and referring to other health care professionals when indicated, and documenting clinical information in Epic    Answers submitted by the patient for this visit:  Patient Health Questionnaire (Submitted on 12/26/2024)  If you checked off any problems, how difficult have these problems made it for you to do your work, take care of things at home, or get along with other people?: Somewhat difficult  PHQ9 TOTAL SCORE: 6  Patient Health Questionnaire (G7) (Submitted on 12/26/2024)  RACH 7 TOTAL SCORE: 6

## 2024-12-26 NOTE — PATIENT INSTRUCTIONS
Sublocade Scheduling at Infusion Center     Your provider has ordered the injectable medication, Sublocade, as part of your treatment plan. This medication must be approved by your insurance company before it can be administered. That process is called a prior authorization. To start the prior authorization process, you need to schedule a Sublocade appointment at the [facility].     Please call the [facility] ([place]): [phone]  to schedule your Sublocade injection as soon as possible.     If your insurance does not approve the Sublocade injection before your appointment, you will be contacted to reschedule your appointment at the [facility].     [facility]   [address], [address]nd [place] [address]   [phone]     Other potential sites for Sublocade administration:    [facility] ([place]): [phone]       [facility] ([place]): [phone], option #3  [facility] ([place]): [phone]     Copays and Deductibles     If you have private or employer-based insurance, your insurance company may approve the Sublocade prior authorization, however may not cover any copay or deductible costs. Please contact your insurance directly regarding copay/deductible costs.     If you need copay/deductible assistance, contact [employer] at [email or web address] or [phone].     Eligibility requirements for copay/deductible assistance for patients with private insurance are as follows:      Private health insurance not funded by a government organization    At least 18 years of age and less than 65 years of age    Resident of the [place] or [facility]    Resident of a state where copay assistance is not prohibited    Private insurance does not prohibit coupons/copay assistance for SUBLOCADE    Prescribed SUBLOCADE for an indication approved by the Food and  Drug    Administration        Buprenorphine Tips:  Make sure you are letting your buprenorphine tablets or films dissolve completely under your tongue so you know you are getting all the medication.  Don't eat, drink, or talk while taking your buprenorphine.  Avoid nicotine for 15-30 minutes before taking buprenorphine - this can cause the blood vessels to constrict and you may not absorb the medication as well.  To avoid potential dental issues related to buprenorphine rinse your mouth with water after taking your dose.  Avoid brushing your teeth for 1 hour after taking a dose.     Constipation is the most common side effect of buprenorphine.  Here are some simple things you can try to ease constipation.  Eating more fiber (fruits, vegetable, and whole grains) and drinking enough fluid (urine should be light yellow).    Take Miralax (polyethylene gylcol).  Use 1 capful daily until you start to have loose stools and then decrease use.  You can also try Colace (docusate) 100mg twice daily as needed.  These medications can be prescribed or purchased OTC.  Let your provider know if you are still struggling with constipation.    What to do if you experience nausea or upset stomach after taking buprenorphine:  Make sure you are letting it dissolve completely.  After the medication has dissolved try spitting out your saliva instead of swallowing it.    Other common side effects include:   - sleepiness/drowsiness OR trouble sleeping  - headaches    Please discuss any side effects with your provider.    Important safety info:  Ensure your medication is stored in a safe place out of sight and out of reach from kids and pets, ideally locked away.   Do not combine buprenorphine with other sedating substances or medications such as alcohol, benzodiazepines (Xanax or alprazolam for example), or other opioids.  Only take medications as prescribed.

## 2024-12-26 NOTE — PROGRESS NOTES
[facility] - Addiction Medicine       Rooming information: Recheck    Point of care urine drug screen positive for:  Lab Results   Component Value Date    BUP Screen Positive (A) 12/26/2024    BZO Screen Positive (A) 12/26/2024    BAR Negative 12/26/2024    ALMAS Negative 12/26/2024    MAMP Negative 12/26/2024    AMP Negative 12/26/2024    MDMA Negative 12/26/2024    MTD Negative 12/26/2024    THV759 Negative 12/26/2024    OXY Negative 12/26/2024    PCP Negative 12/26/2024    THC Screen Positive (A) 12/26/2024    TEMP 90 F 12/26/2024    SGPOCT 1.015 12/26/2024       *POC urine drug screen does not screen for Fentanyl    PHQ-9 Scores:       11/4/2024    11:47 AM 11/22/2024     7:47 AM 12/26/2024     8:10 AM   PHQ   PHQ-9 Total Score 5  4  6    Q9: Thoughts of better off dead/self-harm past 2 weeks Not at all Not at all Not at all       Patient-reported     RACH-7 Scores:      6/27/2024     8:11 AM 9/26/2024     3:55 PM 12/26/2024     8:11 AM   RACH-7 SCORE   Total Score 11 (moderate anxiety) 13 (moderate anxiety) 6 (mild anxiety)   Total Score 11 13 6        Patient-reported       Any other recent substance use:    N/A    NICOTINE-Yes: 1 PPD  If using nicotine, ready to quit? No    Side effects related to medications pt would like to discuss with provider (constipation, dry mouth, HA, GI upset, sedation?) No     Narcan currently available: Yes    Primary care provider: GISEL Johnston CNP     Mental health provider: a psychiatrist  (follow up on MH referral if needed)    Any housing, insurance deficits?: denies    Contact information up to date? yes    [employer] FATUMA signed for her mother (please obtain FATUMA if pt would like to include)        Hallie Perry LPN  December 26, 2024  10:50 AM

## 2024-12-31 DIAGNOSIS — R90.89 ABNORMAL FINDING ON MRI OF BRAIN: Primary | ICD-10-CM

## 2025-01-02 LAB
BUPRENORPHINE UR CFM-MCNC: 158 NG/ML
BUPRENORPHINE/CREAT UR: 182 NG/MG {CREAT}
GABAPENTIN UR QL CFM: PRESENT
NALOXONE UR CFM-MCNC: 2100 NG/ML
NALOXONE: 2414 NG/MG {CREAT}
NORBUPRENORPHINE UR CFM-MCNC: 235 NG/ML
NORBUPRENORPHINE/CREAT UR: 270 NG/MG {CREAT}
NORDIAZEPAM UR QL CFM: PRESENT
NORFENTANYL UR CFM-MCNC: 91 NG/ML
NORFENTANYL/CREAT UR: 105 NG/MG {CREAT}
OXAZEPAM UR QL CFM: PRESENT
TEMAZEPAM UR QL CFM: PRESENT

## 2025-01-07 ENCOUNTER — INFUSION THERAPY VISIT (OUTPATIENT)
Dept: INFUSION THERAPY | Facility: CLINIC | Age: 40
End: 2025-01-07
Payer: COMMERCIAL

## 2025-01-07 VITALS — HEART RATE: 75 BPM | SYSTOLIC BLOOD PRESSURE: 125 MMHG | DIASTOLIC BLOOD PRESSURE: 85 MMHG | TEMPERATURE: 98.4 F

## 2025-01-07 DIAGNOSIS — F11.20 OPIOID USE DISORDER, SEVERE, DEPENDENCE (H): Primary | ICD-10-CM

## 2025-01-07 PROCEDURE — 250N000011 HC RX IP 250 OP 636: Mod: JZ

## 2025-01-07 PROCEDURE — 250N000009 HC RX 250

## 2025-01-07 PROCEDURE — 96372 THER/PROPH/DIAG INJ SC/IM: CPT

## 2025-01-07 RX ORDER — MEPERIDINE HYDROCHLORIDE 25 MG/ML
25 INJECTION INTRAMUSCULAR; INTRAVENOUS; SUBCUTANEOUS
OUTPATIENT
Start: 2025-02-04

## 2025-01-07 RX ORDER — LIDOCAINE HYDROCHLORIDE 10 MG/ML
2 INJECTION, SOLUTION EPIDURAL; INFILTRATION; INTRACAUDAL; PERINEURAL ONCE
Status: COMPLETED | OUTPATIENT
Start: 2025-01-07 | End: 2025-01-07

## 2025-01-07 RX ORDER — EPINEPHRINE 1 MG/ML
0.3 INJECTION, SOLUTION, CONCENTRATE INTRAVENOUS EVERY 5 MIN PRN
OUTPATIENT
Start: 2025-02-04

## 2025-01-07 RX ORDER — DIPHENHYDRAMINE HYDROCHLORIDE 50 MG/ML
50 INJECTION INTRAMUSCULAR; INTRAVENOUS
Start: 2025-02-04

## 2025-01-07 RX ORDER — ALBUTEROL SULFATE 90 UG/1
1-2 INHALANT RESPIRATORY (INHALATION)
Start: 2025-02-04

## 2025-01-07 RX ORDER — ALBUTEROL SULFATE 0.83 MG/ML
2.5 SOLUTION RESPIRATORY (INHALATION)
OUTPATIENT
Start: 2025-02-04

## 2025-01-07 RX ORDER — LIDOCAINE HYDROCHLORIDE 10 MG/ML
2 INJECTION, SOLUTION EPIDURAL; INFILTRATION; INTRACAUDAL; PERINEURAL ONCE
OUTPATIENT
Start: 2025-02-04 | End: 2025-02-04

## 2025-01-07 RX ORDER — DIPHENHYDRAMINE HYDROCHLORIDE 50 MG/ML
25 INJECTION INTRAMUSCULAR; INTRAVENOUS
Start: 2025-02-04

## 2025-01-07 RX ORDER — METHYLPREDNISOLONE SODIUM SUCCINATE 40 MG/ML
40 INJECTION INTRAMUSCULAR; INTRAVENOUS
Start: 2025-02-04

## 2025-01-07 RX ADMIN — BUPRENORPHINE 300 MG: 300 SOLUTION SUBCUTANEOUS at 14:45

## 2025-01-07 RX ADMIN — LIDOCAINE HYDROCHLORIDE 2 ML: 10 INJECTION, SOLUTION EPIDURAL; INFILTRATION; INTRACAUDAL; PERINEURAL at 14:41

## 2025-01-07 NOTE — PROGRESS NOTES
Infusion Nursing Note:  Jennifer Voss presents today for Sublocade.    Patient seen by provider today: No   present during visit today: Not Applicable.    Note: Oriented to infusion center. Answered all questions    Intravenous Access:  No Intravenous access/labs at this visit.    Treatment Conditions:  Not Applicable.    Post Infusion Assessment:  Patient tolerated injection without incident.     Discharge Plan:   Patient and/or family verbalized understanding of discharge instructions and all questions answered.  Patient discharged in stable condition accompanied by: self.  Departure Mode: Ambulatory.    Randall Narayanan RN

## 2025-01-10 DIAGNOSIS — M54.50 CHRONIC BILATERAL LOW BACK PAIN WITHOUT SCIATICA: ICD-10-CM

## 2025-01-10 DIAGNOSIS — G89.29 CHRONIC BILATERAL LOW BACK PAIN WITHOUT SCIATICA: ICD-10-CM

## 2025-01-12 RX ORDER — CYCLOBENZAPRINE HCL 10 MG
10 TABLET ORAL 3 TIMES DAILY PRN
Qty: 30 TABLET | Refills: 0 | Status: SHIPPED | OUTPATIENT
Start: 2025-01-12

## 2025-01-13 ENCOUNTER — TELEPHONE (OUTPATIENT)
Dept: ORTHOPEDICS | Facility: CLINIC | Age: 40
End: 2025-01-13
Payer: COMMERCIAL

## 2025-01-13 NOTE — TELEPHONE ENCOUNTER
Received voicemail from: patient     Returned call to patient, no answer    Left voicemail for patient    Phuong Ayoub on 1/13/2025 at 5:01 PM

## 2025-01-13 NOTE — TELEPHONE ENCOUNTER
Left voicemail for patient regarding rescheduling surgery with Dr. De La Garza on 7/11 as he will be out of the office    Provided direct contact number to discuss  P: [phone]    Phuong Ayoub on 1/13/2025 at 3:27 PM

## 2025-01-15 NOTE — TELEPHONE ENCOUNTER
Left voicemail regarding rescheduling surgery with Dr. De La Garza on 7/11, he will be out of the office. Provided direct line for patient to call back.    P: [phone]    Grecia Agrawal on 1/15/2025 at 2:19 PM

## 2025-01-23 DIAGNOSIS — E66.811 CLASS 1 OBESITY DUE TO EXCESS CALORIES WITHOUT SERIOUS COMORBIDITY WITH BODY MASS INDEX (BMI) OF 33.0 TO 33.9 IN ADULT: Primary | ICD-10-CM

## 2025-01-23 DIAGNOSIS — F11.90 OPIOID USE DISORDER: ICD-10-CM

## 2025-01-23 DIAGNOSIS — E66.09 CLASS 1 OBESITY DUE TO EXCESS CALORIES WITHOUT SERIOUS COMORBIDITY WITH BODY MASS INDEX (BMI) OF 33.0 TO 33.9 IN ADULT: Primary | ICD-10-CM

## 2025-01-29 ENCOUNTER — VIRTUAL VISIT (OUTPATIENT)
Dept: ADDICTION MEDICINE | Facility: CLINIC | Age: 40
End: 2025-01-29
Payer: COMMERCIAL

## 2025-01-29 DIAGNOSIS — F11.20 OPIOID USE DISORDER, SEVERE, DEPENDENCE (H): Primary | ICD-10-CM

## 2025-01-29 DIAGNOSIS — F39 MOOD DISORDER: ICD-10-CM

## 2025-01-29 DIAGNOSIS — F41.1 GAD (GENERALIZED ANXIETY DISORDER): ICD-10-CM

## 2025-01-29 DIAGNOSIS — Z72.0 TOBACCO ABUSE: ICD-10-CM

## 2025-01-29 ASSESSMENT — PATIENT HEALTH QUESTIONNAIRE - PHQ9
SUM OF ALL RESPONSES TO PHQ QUESTIONS 1-9: 7
10. IF YOU CHECKED OFF ANY PROBLEMS, HOW DIFFICULT HAVE THESE PROBLEMS MADE IT FOR YOU TO DO YOUR WORK, TAKE CARE OF THINGS AT HOME, OR GET ALONG WITH OTHER PEOPLE: SOMEWHAT DIFFICULT
SUM OF ALL RESPONSES TO PHQ QUESTIONS 1-9: 7

## 2025-01-29 ASSESSMENT — PAIN SCALES - GENERAL: PAINLEVEL_OUTOF10: NO PAIN (0)

## 2025-01-29 NOTE — NURSING NOTE
Current patient location: [address] [place] [id number]    Is the patient currently in the state of [place]? YES    Visit mode: VIDEO    If the visit is dropped, the patient can be reconnected by:VIDEO VISIT: Text to cell phone:   Telephone Information:   Mobile [phone]       Will anyone else be joining the visit? NO  (If patient encounters technical issues they should call [phone]    Are changes needed to the allergy or medication list? Pt stated no changes to allergies and Pt stated no med changes    Are refills needed on medications prescribed by this physician? NO    Rooming Documentation:  Questionnaire(s) completed    Reason for visit: EDWIN Marti VVF    Patient is OK with student attending visit

## 2025-01-29 NOTE — PROGRESS NOTES
Virtual Visit Details    Type of service:  Video Visit     Joined the call at 1/29/2025, 1:49:31 pm.  Left the call at 1/29/2025, 2:03:37 pm.    Originating Location (pt. Location): Home    Distant Location (provider location):  Off-site  Platform used for Video Visit: [facility]    A/P                                                    ASSESSMENT/PLAN  1. Opioid use disorder, severe, dependence (H) (Primary)  -controlled, denies use, one episode of perceived withdrawal symptoms where 4 mg suboxone helpful  -monthly sublocade, next appt Feb 5  -Jacquelin will send [employer] message if experiences cellulitis at sublocade injection site  - Drugs of Abuse Screen Urine (POC CUPS) POCT  - Drug Confirmation Panel Urine with Creat - lab collect; Future  -encouraged recovery activities   -two month follow up    2. RACH (generalized anxiety disorder)  3. Mood disorder  -no change  -continue with strattera, lexapro, gabapentin, lamictal, zyprexa, vallium as prescribed by psychiatry  -follow up with psychiatry as scheduled     4. Tobacco abuse  -no change  -continue to ask, assess and advise      Jan 29, 2025  - monthly sublocade        Continued Complex Management  The longitudinal plan of care for Opioid Use Disorder (OUD) was addressed during this visit. Due to the added complexity in care, I will continue to support Jennifer in the subsequent management and with ongoing continuity of care.      Last encounter A/P  1. Opioid use disorder, severe, dependence (H) (Primary)  -needs improvement.  Inconsistent dosing.  Cravings improved since started GLP-1 however inconsistent suboxone dosing.    -sublocade plan ordered. All questions answered.  Completed insupport paperwork  -continue with scheduled suboxone until day of sublocade appointment, then discontinue scheduled suboxone and take 0.5-1 film only as needed  - Drugs of Abuse Screen Urine (POC CUPS) POCT  - Drug Confirmation Panel  Urine with Creat - lab collect; Future  - Buprenorphine HCl-Naloxone HCl (SUBOXONE) 12-3 MG FILM per film; Place 1 Film under the tongue daily.  Dispense: 30 Film; Refill: 0  - Drug Confirmation Panel Urine with Creat - lab collect  -encouraged recovery activities   -confirmed access to narcan  -Counseling provided regarding   Opioid warning reviewed.    Risk of overdose following a period of abstinence due to decrease tolerance was discussed including risk of death.                Strongly recommended abstain from alcohol, benzodiazepines, THC, opioids and other drugs of abuse.                Increased risk of return to opioid use after use of these substances discussed.         Increased risk of overdose/death with use of other substances particularly benzodiazepines/alcohol reviewed.  -one month follow up, can be virtual      2. RACH (generalized anxiety disorder)  3. Mood disorder (H)  -showing improvement with fentanyl cessation  -continue with psychiatry follow up and follow all recommendations   -strattera, lexapro, gabapentin, lamotrigine, zyprexa per psychiatry      4. Tobacco abuse  -no change  -not interested in NRT  -continue to ask, assess and advise         Dec 26, 2024  - schedule sublocade  -continue suboxone until sublocade injection, then 0.5-1 film daily only as needed       PDMP Review         Value Time User    State PDMP site checked  Yes 1/29/2025  1:42 PM Vee Wells NP              RTC  Return in about 2 months (around 3/29/2025).      Counseled the patient on the importance of having a recovery program in addition to medication to manage recovery.  Components include avoiding isolating, having willingness to change, avoiding triggers and managing cravings. Encouraged having some type of sober network and practicing honesty with trusted support person(s). Encouraged other services such as counseling, 12 step or other self-help organizations.      Opioid warning reviewed.  Risk of  overdose following a period of abstinence due to decrease tolerance was discussed including risk of death.  Strongly recommended abstain from alcohol, benzodiazepines, THC, opioids and other drugs of abuse.  Increased risk of return to opioid use after use of these substances discussed.  Increased risk of overdose/death with use of other substances particularly benzodiazepines/alcohol reviewed.        SUBJECTIVE                                                    Jennifer Voss is a 39 year old female who presents to clinic today for follow up    Visit performed virtual video       Substance Use History :  Opioids:   Age at first use: 38  Current use: substance: Fentanyl ; quantity 8-10 pills; route: smoking ; timing of last use: 2024;                 IV drug use: Yes:    History of overdose: Yes:   Previous residential or outpatient treatments for addiction : No  Previous medication treatments for addiction: No  Longest period of sobriety:   Medical complications related to substance use:   Hepatitis C: non-reactive; Date of most recent testin23  HIV: non-reactive; Date of most recent testin23     Other Addiction History:  Stimulants (cocaine, methamphetamine, MDMA/ecstasy)   2 years ago  Sedatives/hypnotics/anxiolytics: (benzodiazepines, GHB, Ambien, phenobarbital)  Current valium prescription  Alcohol:      Nicotine: (cigarettes, vaping, chew/snuff)  Daily   Cannabis:      Hallucinogens/Dissociatives: (acid, mushrooms, ketamine)     Eating disorder:     Gambling:                    [place] Prescription Drug Monitoring Program Reviewed:  Yes;         PAST PSYCHIATRIC HISTORY:  Diagnoses- depression, anxiety, panic disorder, PTSD   Suicide Attempts: Yes   Hospitalizations:      Social History  Housing status: alone  Education:   Employment status: works in school system.  Off for summer, returns to work Aug 7  Relationship status: Single  Children:   Legal:     Recent HPI Details:  HPI  "Dec 26, 2024  - Presents with mother today wanting to further discuss Sublocade risks,benefits side effects.  Currently taking 8 mg daily.  Missing and forgetting doses \"I don't think I need them and then skip a dose\" then will experience withdrawal symptoms.  Had a recent slip (shared via [employer] message, wanting to focus visit on sublocade)        TODAY'S VISIT  HPI Jan 29, 2025  - received first sublocade injection, developed cellulitis.  Went to urgent care and prescribed bactrim,   Has had multiple episodes of cellulitis since her IVDU   Pooping ok, has mirilax.  One episode of Hot/cold  that she thought was withdrawal, took additional 4 mg suboxone X 2.    Denies use, cravings controlled   Mood good        OBJECTIVE  PHYSICAL EXAM:  LMP 02/05/2024 (Approximate)     GENERAL: healthy, alert and no distress  EYES: Eyes grossly normal to inspection, PERRL and conjunctivae and sclerae normal  RESP: No respiratory distress  MENTAL STATUS EXAM  Appearance/Behavior: No appearant distress  Speech: Normal  Mood/Affect: normal affect  Insight: Fair      PHQ-9 Score:       11/22/2024     7:47 AM 12/26/2024     8:10 AM 1/29/2025    11:49 AM   PHQ   PHQ-9 Total Score 4  6  7    Q9: Thoughts of better off dead/self-harm past 2 weeks Not at all Not at all Not at all       Patient-reported       RACH-7 Score:      6/27/2024     8:11 AM 9/26/2024     3:55 PM 12/26/2024     8:11 AM   RACH-7 SCORE   Total Score 11 (moderate anxiety) 13 (moderate anxiety) 6 (mild anxiety)   Total Score 11 13 6        Patient-reported       LABS (may not contain today's labs)                                                      Today's lab data  No results found for any visits on 01/29/25.        HISTORY                                                    Problem list reviewed & adjusted, as indicated.  Patient Active Problem List   Diagnosis    Tobacco abuse    Depression with anxiety    Cervical high risk HPV (human papillomavirus) test positive    " History of poor pregnancy outcome    Overweight (BMI 25.0-29.9)    Chronic bilateral low back pain without sciatica    Acute right-sided low back pain with right-sided sciatica    Left leg numbness    Bulging lumbar disc    Schmorl's node    Right hip pain    Lumbosacral plexopathy    Mood disorder    RACH (generalized anxiety disorder)    Panic disorder without agoraphobia    PTSD (post-traumatic stress disorder)    Moderate opioid use disorder (H)    Opioid use disorder, severe, dependence (H)    Carpal tunnel syndrome of right wrist    Left carpal tunnel syndrome    Drug induced constipation    Gastroesophageal reflux disease with esophagitis without hemorrhage    Class 1 obesity due to excess calories without serious comorbidity with body mass index (BMI) of 33.0 to 33.9 in adult         MEDICATION LIST (after visit)  Current Outpatient Medications   Medication Sig Dispense Refill    albuterol (PROAIR HFA/PROVENTIL HFA/VENTOLIN HFA) 108 (90 Base) MCG/ACT inhaler Inhale 1 puff into the lungs 2 times daily as needed for shortness of breath, wheezing or cough. 18 g 0    atomoxetine (STRATTERA) 60 MG capsule Take 1 capsule (60 mg) by mouth daily. 90 capsule 3    Buprenorphine HCl-Naloxone HCl (SUBOXONE) 12-3 MG FILM per film Place 1 Film under the tongue daily. 30 Film 0    buprenorphine HCl-naloxone HCl (SUBOXONE) 8-2 MG per film Take 1 film twice a day and an additional 0.5 to 1 film as needed for breakthrough withdrawal, cravings.  For a total daily dose of 16- 24 mg. 45 Film 0    cyclobenzaprine (FLEXERIL) 10 MG tablet Take 1 tablet by mouth three times daily as needed for muscle spasm 30 tablet 0    diazepam (VALIUM) 5 MG tablet Take 1-2 tablets (5-10 mg) by mouth daily as needed for anxiety. 15 tabs to last 30 days 15 tablet 2    escitalopram (LEXAPRO) 20 MG tablet Take 1 tablet (20 mg) by mouth daily. 90 tablet 3    estradiol (ESTRACE) 0.5 MG tablet Take 1 tablet (0.5 mg) by mouth daily. 90 tablet 1     famotidine (PEPCID) 20 MG tablet Take 1 tablet (20 mg) by mouth daily. 90 tablet 3    gabapentin (NEURONTIN) 800 MG tablet Take one tab by mouth at bedtime and can take half-tab daily as needed for pain and anxiety. 135 tablet 1    ibuprofen (ADVIL/MOTRIN) 800 MG tablet Take 1 tablet (800 mg) by mouth every 6 hours as needed for other (mild and/or inflammatory pain) 30 tablet 0    lamoTRIgine (LAMICTAL) 200 MG tablet Take 1 tablet (200 mg) by mouth daily. 90 tablet 3    naloxone (NARCAN) 4 MG/0.1ML nasal spray Spray 1 spray (4 mg) into one nostril alternating nostrils as needed for opioid reversal every 2-3 minutes until assistance arrives 0.2 mL 11    OLANZapine (ZYPREXA) 10 MG tablet Take 0.5-1 tablets (5-10 mg) by mouth daily as needed (anxiety, sleep). 90 tablet 1    ondansetron (ZOFRAN ODT) 4 MG ODT tab Take 1 tablet (4 mg) by mouth every 8 hours as needed for nausea 30 tablet 1    progesterone (PROMETRIUM) 100 MG capsule Take 1 capsule (100 mg) by mouth daily. 90 capsule 1    Semaglutide-Weight Management (WEGOVY) 1.7 MG/0.75ML pen Inject 1.7 mg subcutaneously once a week. 3 mL 0    senna-docusate (SENOKOT-S/PERICOLACE) 8.6-50 MG tablet Take 1-2 tablets by mouth 2 times daily 30 tablet 0     No current facility-administered medications for this visit.         Allergies   Allergen Reactions    Penicillins Nausea    Fluoxetine Itching         Vee Wells NP  [facility]  [phone]

## 2025-01-30 ENCOUNTER — ANCILLARY PROCEDURE (OUTPATIENT)
Dept: MRI IMAGING | Facility: CLINIC | Age: 40
End: 2025-01-30
Attending: NURSE PRACTITIONER
Payer: COMMERCIAL

## 2025-01-30 PROCEDURE — A9585 GADOBUTROL INJECTION: HCPCS | Performed by: RADIOLOGY

## 2025-01-30 PROCEDURE — 70553 MRI BRAIN STEM W/O & W/DYE: CPT | Performed by: RADIOLOGY

## 2025-01-30 RX ORDER — GADOBUTROL 604.72 MG/ML
8.5 INJECTION INTRAVENOUS ONCE
Status: COMPLETED | OUTPATIENT
Start: 2025-01-30 | End: 2025-01-30

## 2025-01-30 RX ADMIN — GADOBUTROL 8.5 ML: 604.72 INJECTION INTRAVENOUS at 15:29
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
## 2025-02-04 ENCOUNTER — VIRTUAL VISIT (OUTPATIENT)
Dept: PSYCHIATRY | Facility: CLINIC | Age: 40
End: 2025-02-04
Payer: COMMERCIAL

## 2025-02-04 ENCOUNTER — VIRTUAL VISIT (OUTPATIENT)
Dept: BEHAVIORAL HEALTH | Facility: CLINIC | Age: 40
End: 2025-02-04
Payer: COMMERCIAL

## 2025-02-04 DIAGNOSIS — F31.81 BIPOLAR II DISORDER (H): ICD-10-CM

## 2025-02-04 DIAGNOSIS — F11.20 MODERATE OPIOID USE DISORDER (H): ICD-10-CM

## 2025-02-04 DIAGNOSIS — Z79.899 ENCOUNTER FOR LONG-TERM (CURRENT) USE OF MEDICATIONS: Primary | ICD-10-CM

## 2025-02-04 DIAGNOSIS — F41.1 GAD (GENERALIZED ANXIETY DISORDER): ICD-10-CM

## 2025-02-04 DIAGNOSIS — F11.21 OPIOID USE DISORDER, MODERATE, IN SUSTAINED REMISSION (H): ICD-10-CM

## 2025-02-04 DIAGNOSIS — F41.1 GAD (GENERALIZED ANXIETY DISORDER): Primary | ICD-10-CM

## 2025-02-04 DIAGNOSIS — F15.11 METHAMPHETAMINE ABUSE IN REMISSION (H): ICD-10-CM

## 2025-02-04 DIAGNOSIS — F43.10 PTSD (POST-TRAUMATIC STRESS DISORDER): ICD-10-CM

## 2025-02-04 PROCEDURE — 90832 PSYTX W PT 30 MINUTES: CPT | Mod: 95 | Performed by: COUNSELOR

## 2025-02-04 PROCEDURE — 98006 SYNCH AUDIO-VIDEO EST MOD 30: CPT | Performed by: PSYCHIATRY & NEUROLOGY

## 2025-02-04 PROCEDURE — G2211 COMPLEX E/M VISIT ADD ON: HCPCS | Performed by: PSYCHIATRY & NEUROLOGY

## 2025-02-04 NOTE — PROGRESS NOTES
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 medications and psychotherapy options. Recommend therapy for additional support.     Treatment Plan:  Continue olanzapine/Zyprexa to 5-10 mg daily as needed for anxiety, sleep, agitation, bipolar disorder.  Continue lamotrigine 200 mg daily for mood (can take as split dose 100 mg twice daily if preferred).  Continue Valium/diazepam at 5-10 mg once daily as needed for severe anxiety/panic only.  15 tablets to last at least 30 days.  Do not use with any opioids.  Continue gabapentin 800 mg at bedtime and can take 400 mg during day as needed for anxiety or chronic pain.    Continue Lexapro/escitalopram 20 mg daily for mood, anxiety.  Continue Strattera/atomoxetine for attention and concentration, mood: Take 60 mg daily.  Can fax [facility] renewal paperwork to me at fax #: [phone].   Have fasting labs completed at any [facility] lab. Schedule on [place] or call your clinic ahead of time to schedule an appointment for lab.   Continue working with addiction medicine.  Continue all other cares per primary care provider.   Continue all other medications as reviewed per electronic medical record today.   Safety plan reviewed. To the Emergency Department as needed or call after hours crisis line at [phone] or [phone]. [place] Crisis Text Line. Text [place] to 881918 or Suicide LifeLine Chat:  suicidepreventionlifeline.org/chat  Continue therapy as planned.   Schedule an appointment with me in 3 months or sooner as needed. Call [facility] at [phone] to schedule.  Follow up with primary care provider as planned or for acute medical concerns.  Call the psychiatric nurse line with medication questions or concerns at [phone].  MyChart may be used to communicate with your provider, but this is not intended to be used for emergencies.    Risks of benzodiazepine (Ativan, Xanax, Klonopin, Valium, etc) use including, but not limited to, sedation, tolerance, risk for addiction/dependence. Do not drink alcohol while taking benzodiazepines due to risk of trouble breathing and potential death. Do not drive or operate heavy machinery until it is known how the drug affects you. Discuss with physician or pharmacist before ever taking a benzodiazepine with a narcotic/opioid pain medication.     Have previously discussed Lamictal (lamotrigine) can cause serious rashes including Park-Yoshi syndrome which may requiring hospitalization and discontinuation of treatment. If any signs of a rash occur, please see your Primary Care Provider or a dermatologist immediately.     Administrative Billing:   Phone Call/Video Duration: 12 Minutes  Start: 2:31p  Stop: 2:43p    The longitudinal plan of care for the diagnosis(es)/condition(s) as documented were addressed during this visit. Due to the added complexity in care, I will continue to support Jennifer in the subsequent management and with ongoing continuity of care.    Patient Status:  Patient is long-term/continous care patient.     Signed:   Sarah Purcell DO  [facility]iat[facility]    Disclaimer: This note consists of symbols derived from keyboarding, dictation and/or voice recognition software. As a result, there may be errors in the script that have gone undetected. Please consider this when interpreting information found in this chart.

## 2025-02-04 NOTE — NURSING NOTE
Current patient location: [address] [place] [id number]    Is the patient currently in the state of [place]? YES    Visit mode: VIDEO    If the visit is dropped, the patient can be reconnected by:VIDEO VISIT: Text to cell phone:   Telephone Information:   Mobile [phone]       Will anyone else be joining the visit? NO  (If patient encounters technical issues they should call [phone]    Are changes needed to the allergy or medication list? Pt stated no changes to allergies and Pt stated no med changes    Are refills needed on medications prescribed by this physician? Discuss with provider    Rooming Documentation:  Questionnaire(s) completed    Reason for visit: EDWIN PARSONF

## 2025-02-04 NOTE — PATIENT INSTRUCTIONS
Treatment Plan:  Continue olanzapine/Zyprexa to 5-10 mg daily as needed for anxiety, sleep, agitation, bipolar disorder.  Continue lamotrigine 200 mg daily for mood (can take as split dose 100 mg twice daily if preferred).  Continue Valium/diazepam at 5-10 mg once daily as needed for severe anxiety/panic only.  15 tablets to last at least 30 days.  Do not use with any opioids.  Continue gabapentin 800 mg at bedtime and can take 400 mg during day as needed for anxiety or chronic pain.    Continue Lexapro/escitalopram 20 mg daily for mood, anxiety.  Continue Strattera/atomoxetine for attention and concentration, mood: Take 60 mg daily.  Can fax [facility] renewal paperwork to me at fax #: [phone].   Have fasting labs completed at any [facility] lab. Schedule on [place] or call your clinic ahead of time to schedule an appointment for lab.   Continue working with addiction medicine.  Continue all other cares per primary care provider.   Continue all other medications as reviewed per electronic medical record today.   Safety plan reviewed. To the Emergency Department as needed or call after hours crisis line at [phone] or [phone]. [place] Crisis Text Line. Text [place] to 595276 or Suicide LifeLine Chat: suicidepreventionlifeline.org/chat  Continue therapy as planned.   Schedule an appointment with me in 3 months or sooner as needed. Call [facility] at [phone] to schedule.  Follow up with primary care provider as planned or for acute medical concerns.  Call the psychiatric nurse line with medication questions or concerns at [phone].  [employer] may be used to communicate with your provider, but this is not intended to be used for emergencies.    Risks of benzodiazepine (Ativan, Xanax, Klonopin, Valium, etc) use including, but not limited to, sedation, tolerance, risk for addiction/dependence. Do not drink alcohol while taking benzodiazepines due to risk of trouble breathing and  "potential death. Do not drive or operate heavy machinery until it is known how the drug affects you. Discuss with physician or pharmacist before ever taking a benzodiazepine with a narcotic/opioid pain medication.     Have previously discussed Lamictal (lamotrigine) can cause serious rashes including Park-Yoshi syndrome which may requiring hospitalization and discontinuation of treatment. If any signs of a rash occur, please see your Primary Care Provider or a dermatologist immediately.     Patient Education   [facility] Psychiatry Service  What to Expect  Here's what to expect from your Collaborative Care Psychiatry Service (CCPS).   About CCPS  CCPS means 2 people work together to help you get better. You'll meet with a behavioral health clinician and a psychiatric doctor. A behavioral health clinician helps people with mental health problems by talking with them. A psychiatric doctor helps people by giving them medicine.  How it works  At every visit, you'll see the behavioral health clinician (BHC) first. They'll talk with you about how you're doing and teach you how to feel better.   Then you'll see the psychiatric doctor. This doctor can help you deal with troubling thoughts and feelings by giving you medicine. They'll make sure you know the plan for your care.   CCPS usually takes 3 to 6 visits. If you need more visits, we may have you start seeing a different psychiatric doctor for ongoing care.  If you have any questions or concerns, we'll be glad to talk with you.  About visits  Be open  At your visits, please talk openly about your problems. It may feel hard, but it's the best way for us to help you.  Cancelling visits  If you can't come to your visit, please call us right away at [phone]. If you don't cancel at least 24 hours (1 full day) before your visit, that's \"late cancellation.\"  Being late to visits  Being very late is the same as not showing up. You will be a \"no show\" " if:  Your appointment starts with a [facility], and you're more than 15 minutes late for a 30-minute (half hour) visit. This will also cancel your appointment with the psychiatric doctor.  Your appointment is with a psychiatric doctor only, and you're more than 15 minutes late for a 30-minute (half hour) visit.  Your appointment is with a psychiatric doctor only, and you're more than 30 minutes late for a 60-minute (full hour) visit.  If you cancel late or don't show up 2 times within 6 months, we may end your care.   Getting help between visits  If you need help between visits, you can call us Monday to Friday from 8 a.m. to 4:30 p.m. at [phone].  Emergency care  Call 911 or go to the nearest emergency department if your life or someone else's life is in danger.  Call 988 anytime to reach the national Suicide and Crisis hotline.  Medicine refills  To refill your medicine, call your pharmacy. You can also call [facility]'s Behavioral Access at [phone], Monday to Friday, 8 a.m. to 4:30 p.m. It can take 1 to 3 business days to get a refill.   Forms, letters, and tests  You may have papers to fill out, like FMLA, short-term disability, and workability. We can help you with these forms at your visits, but you must have an appointment. You may need more than 1 visit for this, to be in an intensive therapy program, or both.  Before we can give you medicine for ADHD, we may refer you to get tested for it or confirm it another way.  We may not be able to give you an emotional support animal letter.  We don't do mental health checks ordered by the court.   We don't do mental health testing, but we can refer you to get tested.   Thank you for choosing us for your care.  For informational purposes only. Not to replace the advice of your health care provider. Copyright   2022 [facility]. All rights reserved. [employer] 645006 - Rev 11/24.

## 2025-02-05 ENCOUNTER — INFUSION THERAPY VISIT (OUTPATIENT)
Dept: INFUSION THERAPY | Facility: CLINIC | Age: 40
End: 2025-02-05
Payer: COMMERCIAL

## 2025-02-05 VITALS
SYSTOLIC BLOOD PRESSURE: 128 MMHG | HEART RATE: 76 BPM | RESPIRATION RATE: 18 BRPM | TEMPERATURE: 98.1 F | DIASTOLIC BLOOD PRESSURE: 89 MMHG

## 2025-02-05 DIAGNOSIS — F11.20 OPIOID USE DISORDER, SEVERE, DEPENDENCE (H): Primary | ICD-10-CM

## 2025-02-05 PROCEDURE — 96372 THER/PROPH/DIAG INJ SC/IM: CPT

## 2025-02-05 PROCEDURE — 250N000011 HC RX IP 250 OP 636: Mod: JZ

## 2025-02-05 PROCEDURE — 250N000009 HC RX 250

## 2025-02-05 RX ORDER — METHYLPREDNISOLONE SODIUM SUCCINATE 40 MG/ML
40 INJECTION INTRAMUSCULAR; INTRAVENOUS
Start: 2025-03-03

## 2025-02-05 RX ORDER — DIPHENHYDRAMINE HYDROCHLORIDE 50 MG/ML
25 INJECTION INTRAMUSCULAR; INTRAVENOUS
Start: 2025-03-03

## 2025-02-05 RX ORDER — ALBUTEROL SULFATE 0.83 MG/ML
2.5 SOLUTION RESPIRATORY (INHALATION)
OUTPATIENT
Start: 2025-03-03

## 2025-02-05 RX ORDER — MEPERIDINE HYDROCHLORIDE 25 MG/ML
25 INJECTION INTRAMUSCULAR; INTRAVENOUS; SUBCUTANEOUS
OUTPATIENT
Start: 2025-03-03

## 2025-02-05 RX ORDER — ALBUTEROL SULFATE 90 UG/1
1-2 INHALANT RESPIRATORY (INHALATION)
Start: 2025-03-03

## 2025-02-05 RX ORDER — DIPHENHYDRAMINE HYDROCHLORIDE 50 MG/ML
50 INJECTION INTRAMUSCULAR; INTRAVENOUS
Start: 2025-03-03

## 2025-02-05 RX ORDER — LIDOCAINE HYDROCHLORIDE 10 MG/ML
2 INJECTION, SOLUTION EPIDURAL; INFILTRATION; INTRACAUDAL; PERINEURAL ONCE
OUTPATIENT
Start: 2025-03-03 | End: 2025-03-03

## 2025-02-05 RX ORDER — EPINEPHRINE 1 MG/ML
0.3 INJECTION, SOLUTION, CONCENTRATE INTRAVENOUS EVERY 5 MIN PRN
OUTPATIENT
Start: 2025-03-03

## 2025-02-05 RX ORDER — LIDOCAINE HYDROCHLORIDE 10 MG/ML
2 INJECTION, SOLUTION EPIDURAL; INFILTRATION; INTRACAUDAL; PERINEURAL ONCE
Status: COMPLETED | OUTPATIENT
Start: 2025-02-05 | End: 2025-02-05

## 2025-02-05 RX ADMIN — BUPRENORPHINE 300 MG: 300 SOLUTION SUBCUTANEOUS at 14:40

## 2025-02-05 RX ADMIN — LIDOCAINE HYDROCHLORIDE 2 ML: 10 INJECTION, SOLUTION EPIDURAL; INFILTRATION; INTRACAUDAL; PERINEURAL at 14:32

## 2025-02-05 NOTE — PROGRESS NOTES
Infusion Nursing Note:  Jennifer Voss presents today for Sublocade.    Patient seen by provider today: No   present during visit today: Not Applicable.    Note: Patient states she got cellulitis at injection site after her last shot. She says she was put on oral antibiotics.    Intravenous Access:  No Intravenous access/labs at this visit.    Treatment Conditions:  Not Applicable.    Post Infusion Assessment:  Patient tolerated injection without incident.  Site patent and intact, free from redness, edema or discomfort.  No evidence of extravasations.     Discharge Plan:   Discharge instructions reviewed with: Patient.  Patient and/or family verbalized understanding of discharge instructions and all questions answered.  AVS to patient via [email or web address].  Patient will return 3/5/2025 for next appointment.   Patient discharged in stable condition accompanied by: self.  Departure Mode: Ambulatory.    Mariana Jimenez RN

## 2025-02-09 ENCOUNTER — MYC MEDICAL ADVICE (OUTPATIENT)
Dept: FAMILY MEDICINE | Facility: CLINIC | Age: 40
End: 2025-02-09
Payer: COMMERCIAL

## 2025-02-10 DIAGNOSIS — R06.02 SOB (SHORTNESS OF BREATH): ICD-10-CM

## 2025-02-10 RX ORDER — ALBUTEROL SULFATE 90 UG/1
INHALANT RESPIRATORY (INHALATION)
Qty: 18 G | Refills: 0 | Status: SHIPPED | OUTPATIENT
Start: 2025-02-10

## 2025-02-11 ENCOUNTER — OFFICE VISIT (OUTPATIENT)
Dept: NEUROLOGY | Facility: CLINIC | Age: 40
End: 2025-02-11
Attending: NURSE PRACTITIONER
Payer: COMMERCIAL

## 2025-02-11 VITALS
HEART RATE: 77 BPM | WEIGHT: 181 LBS | DIASTOLIC BLOOD PRESSURE: 85 MMHG | BODY MASS INDEX: 30.9 KG/M2 | OXYGEN SATURATION: 96 % | HEIGHT: 64 IN | SYSTOLIC BLOOD PRESSURE: 123 MMHG

## 2025-02-11 DIAGNOSIS — R90.89 ABNORMAL FINDING ON MRI OF BRAIN: ICD-10-CM

## 2025-02-11 PROCEDURE — 99204 OFFICE O/P NEW MOD 45 MIN: CPT | Mod: GC

## 2025-02-11 NOTE — LETTER
2/11/2025      Jennifer Voss  [address]  [place] [address]      Dear Colleague,    Thank you for referring your patient, Jennifer Voss, to the [facility]. Please see a copy of my visit note below.    [facility] Neurology New Patient Visit    Jennifer Voss MRN# [id number]   Age: 39 year old YOB: 1985     Requesting physician: Shannon Cervantes     Reason for Consultation: Abnormal MRI finding    Please note the following note has been dictated, in whole or in part, and may contain minor word substitution or transposition errors.  Please interpret with this in mind.     Assessment and Plan:   Assessment/Plan:  Ms. Voss is a 39 year old female here for neurologic evaluation of an incidental abnormal brain MRI finding. Pt obtained an MRI in September and was found to have a 1.3 x 1.1 x 1.1cm well-circumscribed hyperintense lesion with faint enhancement and w/o diffusion restriction. Repeat brain MRI and MRI perfusion in December and January respectively demonstrated grossly stable findings. Etiology is most suspicious for low grade glioma vs. a demyelinating condition such as MS. Further imaging including a cervical and thoracic MRI is warranted to evaluate for additional hyperintense/enhancing lesions. Additionally, a discussion was held with neuroradiology who recommended surveillance screening in 6 months for the brain lesion. Patient and her family understood our discussion and were in agreement with our plan. Should any acute neurologic symptoms arise, patient was encouraged to message me directly or present to the ED. All questions were answered and patient will be contacted with the results of the spine imaging and otherwise follow up in 6 months after the repeat brain imaging.     Patient seen and discussed with my supervising physician, Dr. Nancy Banegas DO  PGY-1, Neurology Resident     History of  "Presenting Symptoms:   Jennifer Voss is a 39 year old female who presents today for evaluation of an abnormal brain MRI finding. Patient obtained an MRI in September of 2024 as she stopped producing estrogen and was being evaluated for a pituitary cancer.  At that time a lesion in her left parietal lobe that was faintly enhancing and hyperintense was identified. Patient has since obtained two additional MRI's with findings as below.     In terms of neurologic symptom history, patient reports a long-standing issue of numbness of her left anterior shin that has been present for over 3 years. Pt previously had an EMG completed for this concern without any pathological findings. This area of numbness has never been painful or associated with any weakness. When questioned about hx of vision changes, patient endorses episodes of blurriness that resolve quickly. She initially denies episodes of ocular pain associated with the vision changes. However, later in the visit she endorses periods of pain with ocular movement, pain with ocular palpation, and associated vision changes. She otherwise denies episodes of acute sensory changes or weakness in the past. No family history of inflammatory or autoimmune conditions. No family hx of neurologic conditions. No hx of seizures or traumatic brain injuries.    Medications currently include: gabapentin, lamotrigine, esciatlopram, olanazpine, estradil/progresterone, Wegovy, and flexeril and valium prn.     Physical Exam:   Vitals: /85   Pulse 77   Ht 1.626 m (5' 4.02\")   Wt 82.1 kg (181 lb)   LMP 02/05/2024 (Approximate)   SpO2 96%   BMI 31.05 kg/m     General: Seated comfortably in no acute distress. Well groomed, pleasant, engaged.  HEENT: Neck with normal range of motion, no visible abnormality.  Oropharynx pink and moist with no visible lesion.    CV: Extremities warm and well perfused.  Pulmonary: Breathing comfortably on room air, speaking in full sentences, no " cough, no accessory muscle use.  Integumentary: Skin intact, warm, dry.     Neurologic:  Mental Status: Spontaneously alert, attentive, and oriented. No deficits of memory or fund of knowledge apparent to conversation. Language use grossly normal: no word-finding difficulty, speech clear and fluent, with no paraphasic errors.  Cranial Nerves: Pupils directly and consensually reactive bilaterally. No anisocoria. Extraocular movements intact with normal smooth pursuit, no nystagmus.  Facial movements symmetric. Hearing intact to conversation. Tongue movements and soft palate elevation intact, without fasciculations or deviation.  No dysarthria. Shoulder shrug strong.  Motor:    LE: Strength 5/5 and equal bilaterally in hip flexion, extension, and ab/adduction. 5/5 and equal bilaterally in plantar and dorsiflexion of the foot  UE: Strength 5/5 and equal bilaterally in arm flexion/extension, and wrist palmar/dorsiflexion.  Finger spreading strong and equal bilaterally.  Reflexes: 2+ at achillies, patella, biceps, brachioradialis. L slightly more hyper-reflexive.    Sensory:  Decreased sensation over the left anterior shin. Additionally subjective sensory changes of numbness over the left upper arm and left forearm posteriorly. Sensory exam otherwise equal and normal bilaterally.   Movements: No tremors or other abnormal movements observed.  Finger-nose-finger without dysmetria.  No disdiadochokinesia.  No bradykinesia.  Gait: Normal, steady casual gait.    Fundoscopic: Deferred.     Data: Pertinent prior to visit   Imaging:  MR Brain Perfusion 1/30/2025:  IMPRESSION:  1. Stable 1.1 x 1.4 x 1.4 cm nonspecific subcortical white matter  lesion within the left frontal lobe, no new concerning signal  abnormalities or enhancement. This most likely represents sequela from  previous insult, which could be vascular, inflammatory, or infectious  in nature. Alternatively, low-grade glioma could have a similar  appearance.  Attention is recommended on follow-up imaging.  2. No new lesion. No acute intracranial abnormality.  3. Left maxillary sinus and sphenoid locule polypoid mucosal  thickening.    MR Brain w/ and w/o 12/20/2024:  IMPRESSION:    1. Unchanged FLAIR hyperintense enhancing lesion in the left parietal  lobe subcortical white matter. Demyelination could have this  appearance, as well as a low-grade glioma. Given enhancement, gliosis  is unlikely. As before, perfusion imaging may be helpful for further  evaluation.    MR Pituitary w/o 9/27/2024:  IMPRESSION:    1. No pituitary microadenoma.  2. FLAIR hyperintense lesion in the left parietal lobe white matter.  Appearance is nonspecific, although demyelination could have this  appearance. Nonspecific gliosis from prior infectious/inflammatory  etiology is possible as well. Low-grade glioma is considered less  likely but is not fully excluded. Repeat contrast-enhanced MRI of the  brain with perfusion imaging is recommended in 1-2 months to ensure  stability.      Past Medical History:     Patient Active Problem List   Diagnosis     Tobacco abuse     Depression with anxiety     Cervical high risk HPV (human papillomavirus) test positive     History of poor pregnancy outcome     Overweight (BMI 25.0-29.9)     Chronic bilateral low back pain without sciatica     Acute right-sided low back pain with right-sided sciatica     Left leg numbness     Bulging lumbar disc     Schmorl's node     Right hip pain     Lumbosacral plexopathy     Mood disorder     RACH (generalized anxiety disorder)     Panic disorder without agoraphobia     PTSD (post-traumatic stress disorder)     Opioid use disorder, severe, dependence (H)     Carpal tunnel syndrome of right wrist     Left carpal tunnel syndrome     Drug induced constipation     Gastroesophageal reflux disease with esophagitis without hemorrhage     Class 1 obesity due to excess calories without serious comorbidity with body mass index (BMI)  of 33.0 to 33.9 in adult     Past Medical History:   Diagnosis Date     Abnormal Pap smear of cervix 08/2003     Cervical high risk HPV (human papillomavirus) test positive 07/24/2015    see problem list     Chickenpox      Depressive disorder      Ectopic pregnancy     right     GERD (gastroesophageal reflux disease) 07/16/2012     H/O chronic ear infection as a child     H/O colposcopy with cervical biopsy 01/18/2016    see problem list     Left tubal pregnancy without intrauterine pregnancy 01/05/2018     Tonsillitis      Uncomplicated asthma         Past Surgical History:     Past Surgical History:   Procedure Laterality Date     COLONOSCOPY N/A 8/1/2017    Procedure: COMBINED COLONOSCOPY, SINGLE OR MULTIPLE BIOPSY/POLYPECTOMY BY BIOPSY;  Colonoscopy;  Surgeon: Ramakrishna Epstein MD;  Location: [facility]     DILATION AND CURETTAGE, OPERATIVE HYSTEROSCOPY, COMBINED N/A 10/6/2021    Procedure: dilation and curettage, hysteroscopy with polypectomy,Pap Smear;  Surgeon: Fanny Camacho MD;  Location: [facility]     LAPAROSCOPIC EVACUATION ECTOPIC PREGNANCY N/A 7/24/2015    LSC right salpingectomy      LAPAROSCOPY DIAGNOSTIC (GYN) N/A 10/6/2021    Procedure: Diagnostic laparoscopy;  Surgeon: Fanny Camacho MD;  Location: [facility]     LEEP TX, CERVICAL  3/2016    benign     MOUTH SURGERY      wisdom teeth     PE TUBES       TONSILLECTOMY          Social History:     Social History     Tobacco Use     Smoking status: Every Day     Current packs/day: 1.00     Average packs/day: 1 pack/day for 10.0 years (10.0 ttl pk-yrs)     Types: Cigarettes     Smokeless tobacco: Never   Vaping Use     Vaping status: Never Used   Substance Use Topics     Alcohol use: Not Currently     Drug use: No        Family History:     Family History   Problem Relation Age of Onset     Depression Mother      Diabetes Father      Hypertension Father      Substance Abuse Brother         recovered drugs     Depression Brother      Anxiety Disorder Brother       Diabetes Paternal Grandmother      Coronary Artery Disease Paternal Grandmother         MI     Aneurysm Paternal Grandmother         brain     Heart Failure Paternal Grandfather         CHF        Medications:     Current Outpatient Medications   Medication Sig Dispense Refill     albuterol (PROAIR HFA/PROVENTIL HFA/VENTOLIN HFA) 108 (90 Base) MCG/ACT inhaler INHALE 1 PUFF BY MOUTH TWICE DAILY AS NEEDED FOR SHORTNESS OF BREATH ,WHEEZING,  OR  COUGH. 18 g 0     atomoxetine (STRATTERA) 60 MG capsule Take 1 capsule (60 mg) by mouth daily. 90 capsule 3     Buprenorphine HCl-Naloxone HCl (SUBOXONE) 12-3 MG FILM per film Place 1 Film under the tongue daily. 30 Film 0     buprenorphine HCl-naloxone HCl (SUBOXONE) 8-2 MG per film Take 1 film twice a day and an additional 0.5 to 1 film as needed for breakthrough withdrawal, cravings.  For a total daily dose of 16- 24 mg. 45 Film 0     cyclobenzaprine (FLEXERIL) 10 MG tablet Take 1 tablet by mouth three times daily as needed for muscle spasm 30 tablet 0     diazepam (VALIUM) 5 MG tablet Take 1-2 tablets (5-10 mg) by mouth daily as needed for anxiety. 15 tabs to last 30 days 15 tablet 2     escitalopram (LEXAPRO) 20 MG tablet Take 1 tablet (20 mg) by mouth daily. 90 tablet 3     estradiol (ESTRACE) 0.5 MG tablet Take 1 tablet (0.5 mg) by mouth daily. 90 tablet 1     famotidine (PEPCID) 20 MG tablet Take 1 tablet (20 mg) by mouth daily. 90 tablet 3     gabapentin (NEURONTIN) 800 MG tablet Take one tab by mouth at bedtime and can take half-tab daily as needed for pain and anxiety. 135 tablet 1     ibuprofen (ADVIL/MOTRIN) 800 MG tablet Take 1 tablet (800 mg) by mouth every 6 hours as needed for other (mild and/or inflammatory pain) 30 tablet 0     lamoTRIgine (LAMICTAL) 200 MG tablet Take 1 tablet (200 mg) by mouth daily. 90 tablet 3     naloxone (NARCAN) 4 MG/0.1ML nasal spray Spray 1 spray (4 mg) into one nostril alternating nostrils as needed for opioid reversal every 2-3  minutes until assistance arrives 0.2 mL 11     OLANZapine (ZYPREXA) 10 MG tablet Take 0.5-1 tablets (5-10 mg) by mouth daily as needed (anxiety, sleep). 90 tablet 1     ondansetron (ZOFRAN ODT) 4 MG ODT tab Take 1 tablet (4 mg) by mouth every 8 hours as needed for nausea 30 tablet 1     progesterone (PROMETRIUM) 100 MG capsule Take 1 capsule (100 mg) by mouth daily. 90 capsule 1     Semaglutide-Weight Management (WEGOVY) 1.7 MG/0.75ML pen Inject 1.7 mg subcutaneously once a week. 3 mL 0     senna-docusate (SENOKOT-S/PERICOLACE) 8.6-50 MG tablet Take 1-2 tablets by mouth 2 times daily 30 tablet 0     No current facility-administered medications for this visit.      Allergies:     Allergies   Allergen Reactions     Penicillins Nausea     Fluoxetine Itching      Review of Systems:   A focused review of systems was performed and found to be negative except as described in this note.           Attestation signed by Gabriele Cruz MD at 2/11/2025  6:27 PM:  Attending physician attestation.   I have personally reviewed documentation and confirmed pertinent history and physical examination as documented. I agree with assessment and plan with the following comments: Will obtain cervical and thoracic spine MRI with and without contrast to evaluate for additional lesions.  If present, further workup, including lab work and potentially CSF studies should be pursued.  If negative, the plan is to repeat brain MRI with and without contrast in 6 months to assure stability.  Gabriele Cruz MD.      Again, thank you for allowing me to participate in the care of your patient.        Sincerely,        Pa Banegas DO    Electronically signed

## 2025-02-11 NOTE — PROGRESS NOTES
[facility] Neurology New Patient Visit    Jennifer Voss MRN# [id number]   Age: 39 year old YOB: 1985     Requesting physician: Shannon Cervantes     Reason for Consultation: Abnormal MRI finding    Please note the following note has been dictated, in whole or in part, and may contain minor word substitution or transposition errors.  Please interpret with this in mind.     Assessment and Plan:   Assessment/Plan:  Ms. Voss is a 39 year old female here for neurologic evaluation of an incidental abnormal brain MRI finding. Pt obtained an MRI in September and was found to have a 1.3 x 1.1 x 1.1cm well-circumscribed hyperintense lesion with faint enhancement and w/o diffusion restriction. Repeat brain MRI and MRI perfusion in December and January respectively demonstrated grossly stable findings. Etiology is most suspicious for low grade glioma vs. a demyelinating condition such as MS. Further imaging including a cervical and thoracic MRI is warranted to evaluate for additional hyperintense/enhancing lesions. Additionally, a discussion was held with neuroradiology who recommended surveillance screening in 6 months for the brain lesion. Patient and her family understood our discussion and were in agreement with our plan. Should any acute neurologic symptoms arise, patient was encouraged to message me directly or present to the ED. All questions were answered and patient will be contacted with the results of the spine imaging and otherwise follow up in 6 months after the repeat brain imaging.     Patient seen and discussed with my supervising physician, Dr. Nancy Banegas DO  PGY-1, Neurology Resident     History of Presenting Symptoms:   Jennifer Voss is a 39 year old female who presents today for evaluation of an abnormal brain MRI finding. Patient obtained an MRI in September of 2024 as she stopped producing estrogen and was being evaluated for a pituitary  "cancer.  At that time a lesion in her left parietal lobe that was faintly enhancing and hyperintense was identified. Patient has since obtained two additional MRI's with findings as below.     In terms of neurologic symptom history, patient reports a long-standing issue of numbness of her left anterior shin that has been present for over 3 years. Pt previously had an EMG completed for this concern without any pathological findings. This area of numbness has never been painful or associated with any weakness. When questioned about hx of vision changes, patient endorses episodes of blurriness that resolve quickly. She initially denies episodes of ocular pain associated with the vision changes. However, later in the visit she endorses periods of pain with ocular movement, pain with ocular palpation, and associated vision changes. She otherwise denies episodes of acute sensory changes or weakness in the past. No family history of inflammatory or autoimmune conditions. No family hx of neurologic conditions. No hx of seizures or traumatic brain injuries.    Medications currently include: gabapentin, lamotrigine, esciatlopram, olanazpine, estradil/progresterone, Wegovy, and flexeril and valium prn.     Physical Exam:   Vitals: /85   Pulse 77   Ht 1.626 m (5' 4.02\")   Wt 82.1 kg (181 lb)   LMP 02/05/2024 (Approximate)   SpO2 96%   BMI 31.05 kg/m     General: Seated comfortably in no acute distress. Well groomed, pleasant, engaged.  HEENT: Neck with normal range of motion, no visible abnormality.  Oropharynx pink and moist with no visible lesion.    CV: Extremities warm and well perfused.  Pulmonary: Breathing comfortably on room air, speaking in full sentences, no cough, no accessory muscle use.  Integumentary: Skin intact, warm, dry.     Neurologic:  Mental Status: Spontaneously alert, attentive, and oriented. No deficits of memory or fund of knowledge apparent to conversation. Language use grossly normal: no " word-finding difficulty, speech clear and fluent, with no paraphasic errors.  Cranial Nerves: Pupils directly and consensually reactive bilaterally. No anisocoria. Extraocular movements intact with normal smooth pursuit, no nystagmus.  Facial movements symmetric. Hearing intact to conversation. Tongue movements and soft palate elevation intact, without fasciculations or deviation.  No dysarthria. Shoulder shrug strong.  Motor:    LE: Strength 5/5 and equal bilaterally in hip flexion, extension, and ab/adduction. 5/5 and equal bilaterally in plantar and dorsiflexion of the foot  UE: Strength 5/5 and equal bilaterally in arm flexion/extension, and wrist palmar/dorsiflexion.  Finger spreading strong and equal bilaterally.  Reflexes: 2+ at achillies, patella, biceps, brachioradialis. L slightly more hyper-reflexive.    Sensory:  Decreased sensation over the left anterior shin. Additionally subjective sensory changes of numbness over the left upper arm and left forearm posteriorly. Sensory exam otherwise equal and normal bilaterally.   Movements: No tremors or other abnormal movements observed.  Finger-nose-finger without dysmetria.  No disdiadochokinesia.  No bradykinesia.  Gait: Normal, steady casual gait.    Fundoscopic: Deferred.     Data: Pertinent prior to visit   Imaging:  MR Brain Perfusion 1/30/2025:  IMPRESSION:  1. Stable 1.1 x 1.4 x 1.4 cm nonspecific subcortical white matter  lesion within the left frontal lobe, no new concerning signal  abnormalities or enhancement. This most likely represents sequela from  previous insult, which could be vascular, inflammatory, or infectious  in nature. Alternatively, low-grade glioma could have a similar  appearance. Attention is recommended on follow-up imaging.  2. No new lesion. No acute intracranial abnormality.  3. Left maxillary sinus and sphenoid locule polypoid mucosal  thickening.    MR Brain w/ and w/o 12/20/2024:  IMPRESSION:    1. Unchanged FLAIR hyperintense  enhancing lesion in the left parietal  lobe subcortical white matter. Demyelination could have this  appearance, as well as a low-grade glioma. Given enhancement, gliosis  is unlikely. As before, perfusion imaging may be helpful for further  evaluation.    MR Pituitary w/o 9/27/2024:  IMPRESSION:    1. No pituitary microadenoma.  2. FLAIR hyperintense lesion in the left parietal lobe white matter.  Appearance is nonspecific, although demyelination could have this  appearance. Nonspecific gliosis from prior infectious/inflammatory  etiology is possible as well. Low-grade glioma is considered less  likely but is not fully excluded. Repeat contrast-enhanced MRI of the  brain with perfusion imaging is recommended in 1-2 months to ensure  stability.      Past Medical History:     Patient Active Problem List   Diagnosis    Tobacco abuse    Depression with anxiety    Cervical high risk HPV (human papillomavirus) test positive    History of poor pregnancy outcome    Overweight (BMI 25.0-29.9)    Chronic bilateral low back pain without sciatica    Acute right-sided low back pain with right-sided sciatica    Left leg numbness    Bulging lumbar disc    Schmorl's node    Right hip pain    Lumbosacral plexopathy    Mood disorder    RACH (generalized anxiety disorder)    Panic disorder without agoraphobia    PTSD (post-traumatic stress disorder)    Opioid use disorder, severe, dependence (H)    Carpal tunnel syndrome of right wrist    Left carpal tunnel syndrome    Drug induced constipation    Gastroesophageal reflux disease with esophagitis without hemorrhage    Class 1 obesity due to excess calories without serious comorbidity with body mass index (BMI) of 33.0 to 33.9 in adult     Past Medical History:   Diagnosis Date    Abnormal Pap smear of cervix 08/2003    Cervical high risk HPV (human papillomavirus) test positive 07/24/2015    see problem list    Chickenpox     Depressive disorder     Ectopic pregnancy     right    GERD  (gastroesophageal reflux disease) 07/16/2012    H/O chronic ear infection as a child    H/O colposcopy with cervical biopsy 01/18/2016    see problem list    Left tubal pregnancy without intrauterine pregnancy 01/05/2018    Tonsillitis     Uncomplicated asthma         Past Surgical History:     Past Surgical History:   Procedure Laterality Date    COLONOSCOPY N/A 8/1/2017    Procedure: COMBINED COLONOSCOPY, SINGLE OR MULTIPLE BIOPSY/POLYPECTOMY BY BIOPSY;  Colonoscopy;  Surgeon: Ramakrishna Epstein MD;  Location: [facility]    DILATION AND CURETTAGE, OPERATIVE HYSTEROSCOPY, COMBINED N/A 10/6/2021    Procedure: dilation and curettage, hysteroscopy with polypectomy,Pap Smear;  Surgeon: Fanny Camacho MD;  Location: [facility]    LAPAROSCOPIC EVACUATION ECTOPIC PREGNANCY N/A 7/24/2015    LSC right salpingectomy     LAPAROSCOPY DIAGNOSTIC (GYN) N/A 10/6/2021    Procedure: Diagnostic laparoscopy;  Surgeon: Fanny Camacho MD;  Location: [facility]    LEEP TX, CERVICAL  3/2016    benign    MOUTH SURGERY      wisdom teeth    PE TUBES      TONSILLECTOMY          Social History:     Social History     Tobacco Use    Smoking status: Every Day     Current packs/day: 1.00     Average packs/day: 1 pack/day for 10.0 years (10.0 ttl pk-yrs)     Types: Cigarettes    Smokeless tobacco: Never   Vaping Use    Vaping status: Never Used   Substance Use Topics    Alcohol use: Not Currently    Drug use: No        Family History:     Family History   Problem Relation Age of Onset    Depression Mother     Diabetes Father     Hypertension Father     Substance Abuse Brother         recovered drugs    Depression Brother     Anxiety Disorder Brother     Diabetes Paternal Grandmother     Coronary Artery Disease Paternal Grandmother         MI    Aneurysm Paternal Grandmother         brain    Heart Failure Paternal Grandfather         CHF        Medications:     Current Outpatient Medications   Medication Sig Dispense Refill    albuterol (PROAIR  HFA/PROVENTIL HFA/VENTOLIN HFA) 108 (90 Base) MCG/ACT inhaler INHALE 1 PUFF BY MOUTH TWICE DAILY AS NEEDED FOR SHORTNESS OF BREATH ,WHEEZING,  OR  COUGH. 18 g 0    atomoxetine (STRATTERA) 60 MG capsule Take 1 capsule (60 mg) by mouth daily. 90 capsule 3    Buprenorphine HCl-Naloxone HCl (SUBOXONE) 12-3 MG FILM per film Place 1 Film under the tongue daily. 30 Film 0    buprenorphine HCl-naloxone HCl (SUBOXONE) 8-2 MG per film Take 1 film twice a day and an additional 0.5 to 1 film as needed for breakthrough withdrawal, cravings.  For a total daily dose of 16- 24 mg. 45 Film 0    cyclobenzaprine (FLEXERIL) 10 MG tablet Take 1 tablet by mouth three times daily as needed for muscle spasm 30 tablet 0    diazepam (VALIUM) 5 MG tablet Take 1-2 tablets (5-10 mg) by mouth daily as needed for anxiety. 15 tabs to last 30 days 15 tablet 2    escitalopram (LEXAPRO) 20 MG tablet Take 1 tablet (20 mg) by mouth daily. 90 tablet 3    estradiol (ESTRACE) 0.5 MG tablet Take 1 tablet (0.5 mg) by mouth daily. 90 tablet 1    famotidine (PEPCID) 20 MG tablet Take 1 tablet (20 mg) by mouth daily. 90 tablet 3    gabapentin (NEURONTIN) 800 MG tablet Take one tab by mouth at bedtime and can take half-tab daily as needed for pain and anxiety. 135 tablet 1    ibuprofen (ADVIL/MOTRIN) 800 MG tablet Take 1 tablet (800 mg) by mouth every 6 hours as needed for other (mild and/or inflammatory pain) 30 tablet 0    lamoTRIgine (LAMICTAL) 200 MG tablet Take 1 tablet (200 mg) by mouth daily. 90 tablet 3    naloxone (NARCAN) 4 MG/0.1ML nasal spray Spray 1 spray (4 mg) into one nostril alternating nostrils as needed for opioid reversal every 2-3 minutes until assistance arrives 0.2 mL 11    OLANZapine (ZYPREXA) 10 MG tablet Take 0.5-1 tablets (5-10 mg) by mouth daily as needed (anxiety, sleep). 90 tablet 1    ondansetron (ZOFRAN ODT) 4 MG ODT tab Take 1 tablet (4 mg) by mouth every 8 hours as needed for nausea 30 tablet 1    progesterone (PROMETRIUM) 100  MG capsule Take 1 capsule (100 mg) by mouth daily. 90 capsule 1    Semaglutide-Weight Management (WEGOVY) 1.7 MG/0.75ML pen Inject 1.7 mg subcutaneously once a week. 3 mL 0    senna-docusate (SENOKOT-S/PERICOLACE) 8.6-50 MG tablet Take 1-2 tablets by mouth 2 times daily 30 tablet 0     No current facility-administered medications for this visit.      Allergies:     Allergies   Allergen Reactions    Penicillins Nausea    Fluoxetine Itching      Review of Systems:   A focused review of systems was performed and found to be negative except as described in this note.

## 2025-02-11 NOTE — NURSING NOTE
"Jennifer Voss is a 39 year old female who presents for:  Chief Complaint   Patient presents with    abnormal MRI     Abnormal MRI         Initial Vitals:  /85   Pulse 77   Ht 1.626 m (5' 4.02\")   Wt 82.1 kg (181 lb)   LMP 02/05/2024 (Approximate)   SpO2 96%   BMI 31.05 kg/m   Estimated body mass index is 31.05 kg/m  as calculated from the following:    Height as of this encounter: 1.626 m (5' 4.02\").    Weight as of this encounter: 82.1 kg (181 lb).. Body surface area is 1.93 meters squared. BP completed using cuff size: regular    Christina Rathai   "

## 2025-02-11 NOTE — TELEPHONE ENCOUNTER
Called pharmacy to confirm they filled wegovy 1mg 1/10/25 and can accommodate for Wegovy 1.7mg if pt is wanting this dose filled.     RN informed pt of findings above via [email or web address].     Mady Pena RN on 2/11/2025 at 10:00 AM

## 2025-02-17 ENCOUNTER — LAB (OUTPATIENT)
Dept: LAB | Facility: CLINIC | Age: 40
End: 2025-02-17
Payer: COMMERCIAL

## 2025-02-17 DIAGNOSIS — Z79.899 ENCOUNTER FOR LONG-TERM (CURRENT) USE OF MEDICATIONS: ICD-10-CM

## 2025-02-17 LAB
CHOLEST SERPL-MCNC: 215 MG/DL
EST. AVERAGE GLUCOSE BLD GHB EST-MCNC: 117 MG/DL
FASTING STATUS PATIENT QL REPORTED: YES
HBA1C MFR BLD: 5.7 % (ref 0–5.6)
HDLC SERPL-MCNC: 35 MG/DL
LDLC SERPL CALC-MCNC: 126 MG/DL
NONHDLC SERPL-MCNC: 180 MG/DL
TRIGL SERPL-MCNC: 271 MG/DL

## 2025-02-17 PROCEDURE — 83036 HEMOGLOBIN GLYCOSYLATED A1C: CPT

## 2025-02-17 PROCEDURE — 36415 COLL VENOUS BLD VENIPUNCTURE: CPT

## 2025-02-17 PROCEDURE — 80061 LIPID PANEL: CPT

## 2025-02-20 ENCOUNTER — PATIENT OUTREACH (OUTPATIENT)
Dept: CARE COORDINATION | Facility: CLINIC | Age: 40
End: 2025-02-20
Payer: COMMERCIAL

## 2025-02-21 ENCOUNTER — MYC MEDICAL ADVICE (OUTPATIENT)
Dept: PSYCHIATRY | Facility: CLINIC | Age: 40
End: 2025-02-21
Payer: COMMERCIAL

## 2025-02-24 ENCOUNTER — VIRTUAL VISIT (OUTPATIENT)
Dept: FAMILY MEDICINE | Facility: CLINIC | Age: 40
End: 2025-02-24
Payer: COMMERCIAL

## 2025-02-24 DIAGNOSIS — E66.811 CLASS 1 OBESITY DUE TO EXCESS CALORIES WITHOUT SERIOUS COMORBIDITY WITH BODY MASS INDEX (BMI) OF 33.0 TO 33.9 IN ADULT: ICD-10-CM

## 2025-02-24 DIAGNOSIS — E66.09 CLASS 1 OBESITY DUE TO EXCESS CALORIES WITHOUT SERIOUS COMORBIDITY WITH BODY MASS INDEX (BMI) OF 33.0 TO 33.9 IN ADULT: ICD-10-CM

## 2025-02-24 DIAGNOSIS — R20.8 BURNING SENSATION OF SKIN: ICD-10-CM

## 2025-02-24 DIAGNOSIS — M54.50 CHRONIC BILATERAL LOW BACK PAIN WITHOUT SCIATICA: Primary | ICD-10-CM

## 2025-02-24 DIAGNOSIS — F11.90 OPIOID USE DISORDER: ICD-10-CM

## 2025-02-24 DIAGNOSIS — G89.29 CHRONIC BILATERAL LOW BACK PAIN WITHOUT SCIATICA: Primary | ICD-10-CM

## 2025-02-24 PROCEDURE — 98006 SYNCH AUDIO-VIDEO EST MOD 30: CPT | Performed by: NURSE PRACTITIONER

## 2025-02-24 RX ORDER — GABAPENTIN 100 MG/1
CAPSULE ORAL
Qty: 150 CAPSULE | Refills: 1 | Status: SHIPPED | OUTPATIENT
Start: 2025-02-24

## 2025-02-24 RX ORDER — CYCLOBENZAPRINE HCL 10 MG
10 TABLET ORAL 3 TIMES DAILY PRN
Qty: 30 TABLET | Refills: 0 | Status: SHIPPED | OUTPATIENT
Start: 2025-02-24

## 2025-02-24 NOTE — PROGRESS NOTES
"Jennifer is a 39 year old who is being evaluated via a billable video visit.    How would you like to obtain your AVS? MyChart  If the video visit is dropped, the invitation should be resent by: Text to cell phone: [phone]  Will anyone else be joining your video visit? No      Assessment & Plan     Burning sensation of skin  Chronic bilateral low back pain without sciatica  Most recent MRI reviewed.  No red flag symptoms.  Awaiting response from neurologist.  Will plan to start on gabapentin during the day.  Educated on use and possible side effects.  Refill of cyclobenzaprine given.  Encouraged to follow-up early next week if no improvement.  - gabapentin (NEURONTIN) 100 MG capsule; Take 1-3 caps by mouth in AM and mid day  - cyclobenzaprine (FLEXERIL) 10 MG tablet; Take 1 tablet (10 mg) by mouth 3 times daily as needed for muscle spasms.    Class 1 obesity due to excess calories without serious comorbidity with body mass index (BMI) of 33.0 to 33.9 in adult  Medication working well.  Congratulated on continued weight loss.  Continue to be active.  Follow-up in 3 months  - Semaglutide-Weight Management (WEGOVY) 1.7 MG/0.75ML pen; Inject 1.7 mg subcutaneously once a week.    Opioid use disorder  Most recent addiction medicine notes reviewed.  Continue Sublocade as ordered by addiction medicine.  - Semaglutide-Weight Management (WEGOVY) 1.7 MG/0.75ML pen; Inject 1.7 mg subcutaneously once a week.     The longitudinal plan of care for the diagnosis(es)/condition(s) as documented were addressed during this visit. Due to the added complexity in care, I will continue to support Jennifer in the subsequent management and with ongoing continuity of care.    BMI  Estimated body mass index is 31.05 kg/m  as calculated from the following:    Height as of 2/11/25: 1.626 m (5' 4.02\").    Weight as of 2/11/25: 82.1 kg (181 lb).             Subjective   Jennifer is a 39 year old, presenting for the following health issues:  Recheck " Medication        2/24/2025     6:56 AM   Additional Questions   Roomed by Patient answered questionnaires Via [employer]   Accompanied by n/a         2/24/2025     6:56 AM   Patient Reported Additional Medications   Patient reports taking the following new medications n/a     History of Present Illness       Reason for visit:  Check in    She eats 0-1 servings of fruits and vegetables daily.She consumes 0 sweetened beverage(s) daily.She exercises with enough effort to increase her heart rate 60 or more minutes per day.  She exercises with enough effort to increase her heart rate 4 days per week.   She is taking medications regularly.     Has been having a lot of pain.  Has been having ongoing pain to both sides of the lower back and into buttocks for the last couple of weeks but over the last 3 to 4 days pain has gotten a lot worse.  Now is having pain to both arms and into hands.  Is having burning type pain.  Hurts when close rub on skin.  Has not noticed any rash.  No fevers or chills.  No numbness or tingling.  No loss of control of bowels or bladder.  Is taking over-the-counter Tylenol and ibuprofen, Aleve without relief.  Has prescription for muscle relaxer at home, took last night, was not effective.  If close are not rubbing on skin has much less pain.  Currently taking gabapentin 800 mg at night for sleep.  Recently saw neurology for abnormal MRI with a 1 cm hypointense lesion.  Is scheduled for 1 additional MRI scheduled for March 15.    Continues to see addiction medicine routinely.  Is currently getting Sublocade once a month.  Feels like this is working very well.  Has not had any withdrawal symptoms.  Is not currently taking Suboxone but does have available on hand to take if needed for withdrawal symptoms.    Continues on Wegovy 1.7 mg weekly.  Next dose is due on Friday.  Current weight is 176 pounds.  Feels like current dose is helping to decrease appetite.  Current dose also helps to decrease opioid  craving.  Bowels have been moving well.  No other negative side effects.          Objective           Vitals:  No vitals were obtained today due to virtual visit.    Physical Exam  Constitutional:       Appearance: Normal appearance.   HENT:      Nose: No congestion.   Eyes:      General: Lids are normal.   Pulmonary:      Effort: No tachypnea, bradypnea or respiratory distress.   Skin:     Coloration: Skin is not ashen, cyanotic, jaundiced or pale.   Neurological:      Mental Status: She is alert.   Psychiatric:         Mood and Affect: Mood normal.         Speech: Speech normal.         Behavior: Behavior normal.            Video-Visit Details    Type of service:  Video Visit   Originating Location (pt. Location): Home    Distant Location (provider location):  On-site  Platform used for Video Visit: Rojelio  Signed Electronically by: GISEL Johnston CNP

## 2025-02-26 DIAGNOSIS — M79.2 NERVE PAIN: Primary | ICD-10-CM

## 2025-02-26 RX ORDER — GABAPENTIN 300 MG/1
300 CAPSULE ORAL 3 TIMES DAILY PRN
Qty: 90 CAPSULE | Refills: 1 | Status: SHIPPED | OUTPATIENT
Start: 2025-02-26

## 2025-03-05 ENCOUNTER — INFUSION THERAPY VISIT (OUTPATIENT)
Dept: INFUSION THERAPY | Facility: CLINIC | Age: 40
End: 2025-03-05
Payer: COMMERCIAL

## 2025-03-05 VITALS
SYSTOLIC BLOOD PRESSURE: 110 MMHG | OXYGEN SATURATION: 94 % | RESPIRATION RATE: 16 BRPM | DIASTOLIC BLOOD PRESSURE: 75 MMHG | HEART RATE: 77 BPM | TEMPERATURE: 98.1 F

## 2025-03-05 DIAGNOSIS — F11.20 OPIOID USE DISORDER, SEVERE, DEPENDENCE (H): Primary | ICD-10-CM

## 2025-03-05 PROCEDURE — 250N000009 HC RX 250

## 2025-03-05 PROCEDURE — 96372 THER/PROPH/DIAG INJ SC/IM: CPT

## 2025-03-05 PROCEDURE — 250N000011 HC RX IP 250 OP 636: Mod: JZ

## 2025-03-05 RX ORDER — METHYLPREDNISOLONE SODIUM SUCCINATE 40 MG/ML
40 INJECTION INTRAMUSCULAR; INTRAVENOUS
Start: 2025-03-31

## 2025-03-05 RX ORDER — DIPHENHYDRAMINE HYDROCHLORIDE 50 MG/ML
50 INJECTION, SOLUTION INTRAMUSCULAR; INTRAVENOUS
Start: 2025-03-31

## 2025-03-05 RX ORDER — ALBUTEROL SULFATE 90 UG/1
1-2 INHALANT RESPIRATORY (INHALATION)
Start: 2025-03-31

## 2025-03-05 RX ORDER — MEPERIDINE HYDROCHLORIDE 25 MG/ML
25 INJECTION INTRAMUSCULAR; INTRAVENOUS; SUBCUTANEOUS
OUTPATIENT
Start: 2025-03-31

## 2025-03-05 RX ORDER — ALBUTEROL SULFATE 0.83 MG/ML
2.5 SOLUTION RESPIRATORY (INHALATION)
OUTPATIENT
Start: 2025-03-31

## 2025-03-05 RX ORDER — DIPHENHYDRAMINE HYDROCHLORIDE 50 MG/ML
25 INJECTION, SOLUTION INTRAMUSCULAR; INTRAVENOUS
Start: 2025-03-31

## 2025-03-05 RX ORDER — LIDOCAINE HYDROCHLORIDE 10 MG/ML
2 INJECTION, SOLUTION EPIDURAL; INFILTRATION; INTRACAUDAL; PERINEURAL ONCE
Status: COMPLETED | OUTPATIENT
Start: 2025-03-05 | End: 2025-03-05

## 2025-03-05 RX ORDER — LIDOCAINE HYDROCHLORIDE 10 MG/ML
2 INJECTION, SOLUTION EPIDURAL; INFILTRATION; INTRACAUDAL; PERINEURAL ONCE
OUTPATIENT
Start: 2025-03-31 | End: 2025-03-31

## 2025-03-05 RX ORDER — EPINEPHRINE 1 MG/ML
0.3 INJECTION, SOLUTION, CONCENTRATE INTRAVENOUS EVERY 5 MIN PRN
OUTPATIENT
Start: 2025-03-31

## 2025-03-05 RX ADMIN — LIDOCAINE HYDROCHLORIDE 2 ML: 10 INJECTION, SOLUTION EPIDURAL; INFILTRATION; INTRACAUDAL; PERINEURAL at 14:56

## 2025-03-05 RX ADMIN — BUPRENORPHINE 100 MG: 100 SOLUTION SUBCUTANEOUS at 15:46

## 2025-03-05 RX ADMIN — BUPRENORPHINE 100 MG: 100 SOLUTION SUBCUTANEOUS at 15:00

## 2025-03-05 RX ADMIN — LIDOCAINE HYDROCHLORIDE 1 ML: 10 INJECTION, SOLUTION EPIDURAL; INFILTRATION; INTRACAUDAL; PERINEURAL at 15:45

## 2025-03-05 ASSESSMENT — PAIN SCALES - GENERAL: PAINLEVEL_OUTOF10: NO PAIN (0)

## 2025-03-05 NOTE — PROGRESS NOTES
Infusion Nursing Note:  Jennifer Voss presents today for Lidocaine and Sublicade.    Patient seen by provider today: No   present during visit today: Not Applicable.    Note: Patient reported to clinic today with no new complaints or concerns.    Intravenous Access:  No Intravenous access/labs at this visit.    Treatment Conditions:  Not Applicable.      Post Infusion Assessment:  Patient tolerated injection without incident.  Site patent and intact, free from redness, edema or discomfort.       Discharge Plan:   Discharge instructions reviewed with: Patient.  Patient and/or family verbalized understanding of discharge instructions and all questions answered.  AVS to patient via [employer].  Patient will return 3/15/25 for next appointment.   Patient discharged in stable condition accompanied by: self.  Departure Mode: Ambulatory.      Enmanuel Oswald RN

## 2025-03-05 NOTE — PATIENT INSTRUCTIONS
March 2025 Sunday Monday Tuesday Wednesday Thursday Friday Saturday                                 1       2     3     4     5    INJECTION IM/SQ   2:30 PM   (30 min.)   [facility]   [facility] 6  [facility]!     7     8       9     10     11     12     13     14     15    MR CERVICAL SPINE [facility]   9:30 AM   (45 min.)   [address]    MR THORACIC SPINE [facility]  10:30 AM   (45 min.)   [address]   16     17     18     19     20     21     22       23     24     25     26     27     28     29       30     31                                          April 2025 Sunday Monday Tuesday Wednesday Thursday Friday Saturday             1     2    ADDICTION MED RETURN   1:45 PM   (30 min.)   Vee Wells NP   [facility] [place] 3    INJECTION IM/SQ   2:30 PM   (30 min.)   [facility] NURSE   [facility] 4     5       6     7     8     9     10     11     12       13     14     15     16     17     18     19       20     21     22     23     24     25     26       27     28     29     30                                    Lab Results:  No results found for this or any previous visit (from the past 12 hours).

## 2025-03-15 ENCOUNTER — ANCILLARY PROCEDURE (OUTPATIENT)
Dept: MRI IMAGING | Facility: CLINIC | Age: 40
End: 2025-03-15
Attending: PSYCHIATRY & NEUROLOGY
Payer: COMMERCIAL

## 2025-03-15 DIAGNOSIS — R90.89 ABNORMAL FINDING ON MRI OF BRAIN: ICD-10-CM

## 2025-03-15 PROCEDURE — A9585 GADOBUTROL INJECTION: HCPCS | Performed by: RADIOLOGY

## 2025-03-15 PROCEDURE — 72156 MRI NECK SPINE W/O & W/DYE: CPT | Performed by: RADIOLOGY

## 2025-03-15 PROCEDURE — 72157 MRI CHEST SPINE W/O & W/DYE: CPT | Performed by: RADIOLOGY

## 2025-03-15 RX ORDER — GADOBUTROL 604.72 MG/ML
8 INJECTION INTRAVENOUS ONCE
Status: COMPLETED | OUTPATIENT
Start: 2025-03-15 | End: 2025-03-15

## 2025-03-15 RX ADMIN — GADOBUTROL 8 ML: 604.72 INJECTION INTRAVENOUS at 10:35

## 2025-03-19 ENCOUNTER — PATIENT OUTREACH (OUTPATIENT)
Dept: CARE COORDINATION | Facility: CLINIC | Age: 40
End: 2025-03-19
Payer: COMMERCIAL

## 2025-03-20 ENCOUNTER — MYC MEDICAL ADVICE (OUTPATIENT)
Dept: FAMILY MEDICINE | Facility: CLINIC | Age: 40
End: 2025-03-20
Payer: COMMERCIAL

## 2025-03-20 DIAGNOSIS — M54.41 CHRONIC BILATERAL LOW BACK PAIN WITH RIGHT-SIDED SCIATICA: Primary | ICD-10-CM

## 2025-03-20 DIAGNOSIS — R20.0 NUMBNESS: ICD-10-CM

## 2025-03-20 DIAGNOSIS — G89.29 CHRONIC BILATERAL LOW BACK PAIN WITH RIGHT-SIDED SCIATICA: Primary | ICD-10-CM

## 2025-03-26 ENCOUNTER — LAB (OUTPATIENT)
Dept: LAB | Facility: CLINIC | Age: 40
End: 2025-03-26
Payer: COMMERCIAL

## 2025-03-26 DIAGNOSIS — F11.20 OPIOID USE DISORDER, SEVERE, DEPENDENCE (H): ICD-10-CM

## 2025-03-26 DIAGNOSIS — F11.23 OPIOID DEPENDENCE WITH WITHDRAWAL (H): ICD-10-CM

## 2025-03-27 ENCOUNTER — THERAPY VISIT (OUTPATIENT)
Dept: PHYSICAL THERAPY | Facility: CLINIC | Age: 40
End: 2025-03-27
Attending: NURSE PRACTITIONER
Payer: COMMERCIAL

## 2025-03-27 DIAGNOSIS — R20.0 NUMBNESS: ICD-10-CM

## 2025-03-27 DIAGNOSIS — G89.29 CHRONIC BILATERAL LOW BACK PAIN WITH RIGHT-SIDED SCIATICA: ICD-10-CM

## 2025-03-27 DIAGNOSIS — M54.41 CHRONIC BILATERAL LOW BACK PAIN WITH RIGHT-SIDED SCIATICA: ICD-10-CM

## 2025-03-27 NOTE — PROGRESS NOTES
PHYSICAL THERAPY EVALUATION  Type of Visit: Evaluation       Fall Risk Screen:  Fall screen completed by: PT  Have you fallen 2 or more times in the past year?: No  Have you fallen and had an injury in the past year?: No  Is patient a fall risk?: No    Subjective         Presenting condition or subjective complaint: Low back pain. My skin hurts to the touch  Date of onset: 03/20/25 (date of order)    Relevant medical history: Vision problems   Dates & types of surgery:      Prior diagnostic imaging/testing results: Other I have not had any scans for this   Prior therapy history for the same diagnosis, illness or injury: No      Prior Level of Function  Transfers: Independent  Ambulation: Independent  ADL: Independent  IADL: Driving, Finances, Housekeeping, Laundry, Meal preparation, Work, Yard work    Living Environment  Social support: Alone   Type of home: 1 level   Stairs to enter the home: Yes 3     Ramp: No   Stairs inside the home: No       Help at home: None  Equipment owned:       Employment: Yes   Hobbies/Interests:      Patient goals for therapy: Be able to wesr clothes without pain. Be able to not limp sometimes because of my pain    Pain assessment: Pain present    Pt presents today for lower back pain beginning quite a few years ago but the pain has spread and intensified more recently over the past 5-6 months, some numbness on the skin and a lot of hypersensitivity R > L side. Pt reports on her feet all day at work and has a lot of pain by the end of the day and has a lot of pain after sitting and then trying to get up from a seated position. Tingling into the lateral thighs bilaterally to about the level of the knees.      Objective   LUMBAR SPINE EVALUATION  PAIN: Pain Level at Rest: 7/10  Pain Level with Use: 10/10  Pain Location: lumbar spine, hip, and knee  Pain Quality: Aching, Burning, Dull, Sharp, Shooting, Tender, and Tingling  Pain Frequency: intermittent  Pain is Worst:  daytime  Pain is Exacerbated By: standing after sitting. Certain positions, prolonged standing/walking   Pain is Relieved By: rest and stretch  Pain Progression: Worsened  INTEGUMENTARY (edema, incisions): WNL  POSTURE: Standing Posture: Lateral shift, towards the left  GAIT:   Weightbearing Status: WBAT  Assistive Device(s): None  Gait Deviations: Antalgic  Betsey decreased  BALANCE/PROPRIOCEPTION:  WNL on L, significant difficult on R  WEIGHTBEARING ALIGNMENT: Lumbar/Pelvic WB Alignment:Lateral shift L  NON-WEIGHTBEARING ALIGNMENT:    ROM:   (Degrees) Left AROM Left PROM  Right AROM Right PROM   Hip Flexion  WNL end range pain   90 painful into low back    Hip Extension  WNL  0   Hip Abduction       Hip Adduction       Hip Internal Rotation       Hip External Rotation  WNL, with pain end range   WNL pain end range    Knee Flexion       Knee Extension       Lumbar Side bend WNL 25% limited, R side LBP   Lumbar Flexion 50% decreased, back and leg pain    Lumbar Extension 25% decreased right side low back pain -- repeated extension symptoms improved from 7/10 to 5/10 pain    Pain:   End feel:   PELVIC/SI SCREEN: WNL  STRENGTH:  limited through R leg all planes at the hip d/t pain     MYOTOMES:    Left Right   T12-L3 (Hip Flexion)     L2-4 (Quads)  5 4+   L4 (Ankle DF) 5 5-   L5 (Great Toe Ext)     S1 (Toe Raise)       DTR S:   CORD SIGNS:   DERMATOMES:    Left Right   T12      L1     L2 Hyper (light touch) Hyper (light touch)   L3     L4     L5     S1       NEURAL TENSION:    Left Right   SLR     SLR with DF  Positive   Femoral Nerve     Slump  Positive   Piter (Lumbar)     Piter (Thoracic)     Piter (Cervical)     Median     Ulnar     Radial        FLEXIBILITY:   LUMBAR/HIP Special Tests:    Left Right   NATHEN     FADIR/Labrum/LUCAS     Femoral Nerve     Adryan's     Piriformis     Quadrant Testing     SLR  Positive   Slump  Positive   Stork with Extension     Vic             PELVIS/SI SPECIAL TESTS:   FUNCTIONAL  TESTS:   PALPATION:   + Tenderness At Location Left Right   Quadratus Lumborum     Erector Spinae + +   Piriformis  + +   PSIS     ASIS     Iliac Crest     Glut Medius  +   Greater Trochanter  +   Ischial Tuberosity     Hamstrings     Hip Flexors     Vertebral        SPINAL SEGMENTAL CONCLUSIONS:  hypomobile lumbar segments       Assessment & Plan   CLINICAL IMPRESSIONS  Medical Diagnosis: M54.41, G89.29 (ICD-10-CM) - Chronic bilateral low back pain with right-sided sciatica  R20.0 (ICD-10-CM) - Numbness    Treatment Diagnosis: low back pain with R side radiculopathy   Impression/Assessment: Patient is a 40 year old female with low back and R > L leg complaints.  The following significant findings have been identified: Pain, Decreased ROM/flexibility, Decreased joint mobility, Decreased strength, Impaired balance, Impaired sensation, Impaired gait, Impaired muscle performance, Decreased activity tolerance, and Impaired posture. These impairments interfere with their ability to perform self care tasks, work tasks, recreational activities, household chores, driving , and community mobility as compared to previous level of function.     Clinical Decision Making (Complexity):  Clinical Presentation: Stable/Uncomplicated  Clinical Presentation Rationale: based on medical and personal factors listed in PT evaluation  Clinical Decision Making (Complexity): Low complexity    PLAN OF CARE  Treatment Interventions:  Modalities: Cryotherapy, Cupping, Dry Needling, E-stim, Hot Pack, Mechanical Traction, Ultrasound  Interventions: Gait Training, Manual Therapy, Neuromuscular Re-education, Therapeutic Activity, Therapeutic Exercise, Self-Care/Home Management    Long Term Goals     PT Goal 1  Goal Identifier: goal 1  Goal Description: improved walking tolerance to 1/2 mile  Rationale: to maximize safety and independence with performance of ADLs and functional tasks;to maximize safety and independence within the home;to maximize  safety and independence within the community;to maximize safety and independence with transportation;to maximize safety and independence with self cares  Goal Progress: 1/4 mile  Target Date: 04/24/25  PT Goal 2  Goal Identifier: goal 2  Goal Description: improved sitting tolerance to 1 hour  Rationale: to maximize safety and independence within the home;to maximize safety and independence with performance of ADLs and functional tasks;to maximize safety and independence with transportation;to maximize safety and independence within the community;to maximize safety and independence with self cares  Goal Progress: 30 minutes  Target Date: 05/22/25  PT Goal 3  Goal Identifier: goal 3  Goal Description: impoved standing tolerance to 2 hours  Rationale: to maximize safety and independence with performance of ADLs and functional tasks;to maximize safety and independence within the home;to maximize safety and independence within the community;to maximize safety and independence with transportation;to maximize safety and independence with self cares  Goal Progress: 1 hour  Target Date: 05/22/25  PT Goal 4  Goal Identifier: goal 4  Goal Description: improved pain and hypersensitivity with wearing tighter clothes to <3/10  Rationale: to maximize safety and independence with performance of ADLs and functional tasks;to maximize safety and independence within the home;to maximize safety and independence within the community;to maximize safety and independence with transportation;to maximize safety and independence with self cares  Goal Progress: 5-6/10 with jeans, tighter clothes  Target Date: 06/05/25      Frequency of Treatment: 1x/week for 6 weeks then bi weekly  Duration of Treatment: 10 weeks    Recommended Referrals to Other Professionals:   Education Assessment:   Learner/Method: Patient;Demonstration;Pictures/Video;No Barriers to Learning    Risks and benefits of evaluation/treatment have been explained.    Patient/Family/caregiver agrees with Plan of Care.     Evaluation Time:     PT Mariela, Low Complexity Minutes (52349): 15       Signing Clinician: Clement Carl PT

## 2025-04-02 ENCOUNTER — VIRTUAL VISIT (OUTPATIENT)
Dept: ADDICTION MEDICINE | Facility: CLINIC | Age: 40
End: 2025-04-02
Payer: COMMERCIAL

## 2025-04-02 VITALS — WEIGHT: 177 LBS | BODY MASS INDEX: 30.22 KG/M2 | HEIGHT: 64 IN

## 2025-04-02 DIAGNOSIS — F11.20 OPIOID USE DISORDER, SEVERE, DEPENDENCE (H): Primary | ICD-10-CM

## 2025-04-02 DIAGNOSIS — F41.1 GAD (GENERALIZED ANXIETY DISORDER): ICD-10-CM

## 2025-04-02 DIAGNOSIS — F39 MOOD DISORDER: ICD-10-CM

## 2025-04-02 ASSESSMENT — PAIN SCALES - GENERAL: PAINLEVEL_OUTOF10: NO PAIN (0)

## 2025-04-02 NOTE — NURSING NOTE
Current patient location: [address] [place] [address]  [place] [address]    Is the patient currently in the state of [place]? YES    Visit mode: VIDEO    If the visit is dropped, the patient can be reconnected by:VIDEO VISIT: Text to cell phone:   Telephone Information:   Mobile [phone]       Will anyone else be joining the visit? NO  (If patient encounters technical issues they should call [phone])    Are changes needed to the allergy or medication list? No    Are refills needed on medications prescribed by this physician? NO    Rooming Documentation:  Questionnaire(s) completed    Reason for visit: RECHECK    Ibis SWANSON

## 2025-04-02 NOTE — PROGRESS NOTES
Virtual Visit Details    Type of service:  Video Visit     Joined the call at 4/2/2025, 1:59:43 pm.  Left the call at 4/2/2025, 2:14:06 pm.    Originating Location (pt. Location): Home    Distant Location (provider location):  Off-site  Platform used for Video Visit: [email or web address]    A/P                                                    ASSESSMENT/PLAN  1. Opioid use disorder, severe, dependence (H) (Primary)  -showing improvement, no use since starting monthly sublocade.  Early remission  -continue with monthly sublocade  -recent uds negative for non-prescribed substances  -UDS prior to next appointment in two months.  (Unless Insurance requires update)   -continue with therapy  --Counseling provided regarding   Opioid warning reviewed.    Risk of overdose following a period of abstinence due to decrease tolerance was discussed including risk of death.     Strongly recommended abstain from alcohol, benzodiazepines, THC, opioids and other drugs of abuse.     Increased risk of return to opioid use after use of these substances discussed.      Increased risk of overdose/death with use of other substances particularly benzodiazepines/alcohol reviewed.  -2 month follow up      2. RACH (generalized anxiety disorder)  3. Mood disorder  -no change  -continue with strattera, lexapro, gabapentin, lamictal, zyprexa, vallium as prescribed by psychiatry  -follow up with psychiatry as scheduled   -encouraged exercise       Apr 2, 2025  - monthly sublocade         Continued Complex Management  The longitudinal plan of care for Opioid Use Disorder (OUD) was addressed during this visit. Due to the added complexity in care, I will continue to support Jennifer in the subsequent management and with ongoing continuity of care.      Last encounter A/P  1. Opioid use disorder, severe, dependence (H) (Primary)  -controlled, denies use, one episode of perceived withdrawal symptoms where 4 mg suboxone  helpful  -monthly sublocade, next appt Feb 5  -Jacquelin will send GetBack message if experiences cellulitis at sublocade injection site  - Drugs of Abuse Screen Urine (POC CUPS) POCT  - Drug Confirmation Panel Urine with Creat - lab collect; Future  -encouraged recovery activities   -two month follow up     2. RACH (generalized anxiety disorder)  3. Mood disorder  -no change  -continue with strattera, lexapro, gabapentin, lamictal, zyprexa, vallium as prescribed by psychiatry  -follow up with psychiatry as scheduled      4. Tobacco abuse  -no change  -continue to ask, assess and advise       Jan 29, 2025  - monthly sublocade          PDMP Review         Value Time User    State PDMP site checked  Yes 4/2/2025  2:18 PM Vee Wells NP              RTC  Return in about 2 months (around 6/2/2025).      Counseled the patient on the importance of having a recovery program in addition to medication to manage recovery.  Components include avoiding isolating, having willingness to change, avoiding triggers and managing cravings. Encouraged having some type of sober network and practicing honesty with trusted support person(s). Encouraged other services such as counseling, 12 step or other self-help organizations.      Opioid warning reviewed.  Risk of overdose following a period of abstinence due to decrease tolerance was discussed including risk of death.  Strongly recommended abstain from alcohol, benzodiazepines, THC, opioids and other drugs of abuse.  Increased risk of return to opioid use after use of these substances discussed.  Increased risk of overdose/death with use of other substances particularly benzodiazepines/alcohol reviewed.        SUBJECTIVE                                                    Jennifer Voss is a 40 year old female who presents to clinic today for follow up    Visit performed virtual video       Substance Use History :  Opioids:   Age at first use: 38  Current use: substance: Fentanyl ;  quantity 8-10 pills; route: smoking ; timing of last use: 2024;                 IV drug use: Yes:    History of overdose: Yes:   Previous residential or outpatient treatments for addiction : No  Previous medication treatments for addiction: No  Longest period of sobriety:   Medical complications related to substance use:   Hepatitis C: non-reactive; Date of most recent testin23  HIV: non-reactive; Date of most recent testin23     Other Addiction History:  Stimulants (cocaine, methamphetamine, MDMA/ecstasy)   2 years ago  Sedatives/hypnotics/anxiolytics: (benzodiazepines, GHB, Ambien, phenobarbital)  Current valium prescription  Alcohol:      Nicotine: (cigarettes, vaping, chew/snuff)  Daily   Cannabis:      Hallucinogens/Dissociatives: (acid, mushrooms, ketamine)     Eating disorder:     Gambling:                    [place] Prescription Drug Monitoring Program Reviewed:  Yes;         PAST PSYCHIATRIC HISTORY:  Diagnoses- depression, anxiety, panic disorder, PTSD   Suicide Attempts: Yes   Hospitalizations:      Social History  Housing status: alone  Education:   Employment status: works in school system.  Off for summer, returns to work Aug 7  Relationship status: Single  Children:   Legal:     Recent HPI Details:  HPI 2025  - received first sublocade injection, developed cellulitis.  Went to urgent care and prescribed bactrim,   Has had multiple episodes of cellulitis since her IVDU   Pooping ok, has mirilax.  One episode of Hot/cold  that she thought was withdrawal, took additional 4 mg suboxone X 2.    Denies use, cravings controlled   Mood good    TODAY'S VISIT  HPI 2025  - Next sublocade injection scheduled tomorrow.   No use since starting sublocade,  cravings controlled, no side effects, no site reactions.    No additional SL suboxone since starting sublocade and GLP-1      Ozempic now at 1.7weight 177 (from 196) changed diet, cooking instead of eating chicken nuggets  "    Having low back pain working with PT, and neurology, has a bulging disk. Considering getting an injection            OBJECTIVE  PHYSICAL EXAM:  Ht 1.626 m (5' 4\")   Wt 80.3 kg (177 lb)   LMP 02/05/2024 (Approximate)   BMI 30.38 kg/m      GENERAL: healthy, alert and no distress  EYES: Eyes grossly normal to inspection, PERRL and conjunctivae and sclerae normal  RESP: No respiratory distress  MENTAL STATUS EXAM  Appearance/Behavior: No appearant distress  Speech: Normal  Mood/Affect: normal affect  Insight: Adequate      PHQ-9 Score:       1/29/2025    11:49 AM 2/4/2025     1:31 PM 4/2/2025     9:56 AM   PHQ   PHQ-9 Total Score 7  6  7    Q9: Thoughts of better off dead/self-harm past 2 weeks Not at all Not at all Not at all       Patient-reported       RACH-7 Score:      6/27/2024     8:11 AM 9/26/2024     3:55 PM 12/26/2024     8:11 AM   RACH-7 SCORE   Total Score 11 (moderate anxiety) 13 (moderate anxiety) 6 (mild anxiety)   Total Score 11 13 6        Patient-reported       LABS (may not contain today's labs)                                                      Today's lab data  No results found for any visits on 04/02/25.        HISTORY                                                    Problem list reviewed & adjusted, as indicated.  Patient Active Problem List   Diagnosis    Tobacco abuse    Depression with anxiety    Cervical high risk HPV (human papillomavirus) test positive    History of poor pregnancy outcome    Overweight (BMI 25.0-29.9)    Chronic bilateral low back pain without sciatica    Acute right-sided low back pain with right-sided sciatica    Left leg numbness    Bulging lumbar disc    Schmorl's node    Right hip pain    Lumbosacral plexopathy    Mood disorder    RACH (generalized anxiety disorder)    Panic disorder without agoraphobia    PTSD (post-traumatic stress disorder)    Opioid use disorder, severe, dependence (H)    Carpal tunnel syndrome of right wrist    Left carpal tunnel syndrome "    Drug induced constipation    Gastroesophageal reflux disease with esophagitis without hemorrhage    Class 1 obesity due to excess calories without serious comorbidity with body mass index (BMI) of 33.0 to 33.9 in adult         MEDICATION LIST (after visit)  Current Outpatient Medications   Medication Sig Dispense Refill    albuterol (PROAIR HFA/PROVENTIL HFA/VENTOLIN HFA) 108 (90 Base) MCG/ACT inhaler INHALE 1 PUFF BY MOUTH TWICE DAILY AS NEEDED FOR SHORTNESS OF BREATH ,WHEEZING,  OR  COUGH. 18 g 0    atomoxetine (STRATTERA) 60 MG capsule Take 1 capsule (60 mg) by mouth daily. 90 capsule 3    Buprenorphine HCl-Naloxone HCl (SUBOXONE) 12-3 MG FILM per film Place 1 Film under the tongue daily. 30 Film 0    buprenorphine HCl-naloxone HCl (SUBOXONE) 8-2 MG per film Take 1 film twice a day and an additional 0.5 to 1 film as needed for breakthrough withdrawal, cravings.  For a total daily dose of 16- 24 mg. 45 Film 0    cyclobenzaprine (FLEXERIL) 10 MG tablet Take 1 tablet (10 mg) by mouth 3 times daily as needed for muscle spasms. 30 tablet 0    diazepam (VALIUM) 5 MG tablet Take 1-2 tablets (5-10 mg) by mouth daily as needed for anxiety. 15 tabs to last 30 days 15 tablet 2    escitalopram (LEXAPRO) 20 MG tablet Take 1 tablet (20 mg) by mouth daily. 90 tablet 3    estradiol (ESTRACE) 0.5 MG tablet Take 1 tablet (0.5 mg) by mouth daily. 90 tablet 1    famotidine (PEPCID) 20 MG tablet Take 1 tablet (20 mg) by mouth daily. 90 tablet 3    gabapentin (NEURONTIN) 100 MG capsule Take 1-3 caps by mouth in AM and mid day 150 capsule 1    gabapentin (NEURONTIN) 300 MG capsule Take 1 capsule (300 mg) by mouth 3 times daily as needed for neuropathic pain. 90 capsule 1    gabapentin (NEURONTIN) 800 MG tablet Take one tab by mouth at bedtime and can take half-tab daily as needed for pain and anxiety. 135 tablet 1    ibuprofen (ADVIL/MOTRIN) 800 MG tablet Take 1 tablet (800 mg) by mouth every 6 hours as needed for other (mild and/or  inflammatory pain) 30 tablet 0    lamoTRIgine (LAMICTAL) 200 MG tablet Take 1 tablet (200 mg) by mouth daily. 90 tablet 3    naloxone (NARCAN) 4 MG/0.1ML nasal spray Spray 1 spray (4 mg) into one nostril alternating nostrils as needed for opioid reversal every 2-3 minutes until assistance arrives 0.2 mL 11    OLANZapine (ZYPREXA) 10 MG tablet Take 0.5-1 tablets (5-10 mg) by mouth daily as needed (anxiety, sleep). 90 tablet 1    ondansetron (ZOFRAN ODT) 4 MG ODT tab Take 1 tablet (4 mg) by mouth every 8 hours as needed for nausea 30 tablet 1    progesterone (PROMETRIUM) 100 MG capsule Take 1 capsule (100 mg) by mouth daily. 90 capsule 1    Semaglutide-Weight Management (WEGOVY) 1.7 MG/0.75ML pen Inject 1.7 mg subcutaneously once a week. 3 mL 3    senna-docusate (SENOKOT-S/PERICOLACE) 8.6-50 MG tablet Take 1-2 tablets by mouth 2 times daily 30 tablet 0     No current facility-administered medications for this visit.         Allergies   Allergen Reactions    Penicillins Nausea    Fluoxetine Itching       Answers submitted by the patient for this visit:  Patient Health Questionnaire (Submitted on 4/2/2025)  If you checked off any problems, how difficult have these problems made it for you to do your work, take care of things at home, or get along with other people?: Somewhat difficult  PHQ9 TOTAL SCORE: 7      Vee Wells NP  [facility]  [phone]

## 2025-04-03 ENCOUNTER — INFUSION THERAPY VISIT (OUTPATIENT)
Dept: INFUSION THERAPY | Facility: CLINIC | Age: 40
End: 2025-04-03
Payer: COMMERCIAL

## 2025-04-03 VITALS
DIASTOLIC BLOOD PRESSURE: 79 MMHG | TEMPERATURE: 98.3 F | RESPIRATION RATE: 18 BRPM | SYSTOLIC BLOOD PRESSURE: 118 MMHG | HEART RATE: 77 BPM

## 2025-04-03 DIAGNOSIS — F11.20 OPIOID USE DISORDER, SEVERE, DEPENDENCE (H): Primary | ICD-10-CM

## 2025-04-03 DIAGNOSIS — G89.29 CHRONIC BILATERAL LOW BACK PAIN WITHOUT SCIATICA: ICD-10-CM

## 2025-04-03 DIAGNOSIS — M54.50 CHRONIC BILATERAL LOW BACK PAIN WITHOUT SCIATICA: ICD-10-CM

## 2025-04-03 PROCEDURE — 250N000009 HC RX 250

## 2025-04-03 PROCEDURE — 96372 THER/PROPH/DIAG INJ SC/IM: CPT

## 2025-04-03 PROCEDURE — 250N000011 HC RX IP 250 OP 636: Mod: JZ

## 2025-04-03 RX ORDER — DIPHENHYDRAMINE HYDROCHLORIDE 50 MG/ML
25 INJECTION, SOLUTION INTRAMUSCULAR; INTRAVENOUS
Start: 2025-04-30

## 2025-04-03 RX ORDER — LIDOCAINE HYDROCHLORIDE 10 MG/ML
2 INJECTION, SOLUTION EPIDURAL; INFILTRATION; INTRACAUDAL; PERINEURAL ONCE
Status: COMPLETED | OUTPATIENT
Start: 2025-04-03 | End: 2025-04-03

## 2025-04-03 RX ORDER — CYCLOBENZAPRINE HCL 10 MG
10 TABLET ORAL 3 TIMES DAILY PRN
Qty: 30 TABLET | Refills: 2 | Status: SHIPPED | OUTPATIENT
Start: 2025-04-03

## 2025-04-03 RX ORDER — MEPERIDINE HYDROCHLORIDE 25 MG/ML
25 INJECTION INTRAMUSCULAR; INTRAVENOUS; SUBCUTANEOUS
OUTPATIENT
Start: 2025-04-30

## 2025-04-03 RX ORDER — EPINEPHRINE 1 MG/ML
0.3 INJECTION, SOLUTION, CONCENTRATE INTRAVENOUS EVERY 5 MIN PRN
OUTPATIENT
Start: 2025-04-30

## 2025-04-03 RX ORDER — LIDOCAINE HYDROCHLORIDE 10 MG/ML
2 INJECTION, SOLUTION EPIDURAL; INFILTRATION; INTRACAUDAL; PERINEURAL ONCE
OUTPATIENT
Start: 2025-04-30 | End: 2025-04-30

## 2025-04-03 RX ORDER — DIPHENHYDRAMINE HYDROCHLORIDE 50 MG/ML
50 INJECTION, SOLUTION INTRAMUSCULAR; INTRAVENOUS
Start: 2025-04-30

## 2025-04-03 RX ORDER — METHYLPREDNISOLONE SODIUM SUCCINATE 40 MG/ML
40 INJECTION INTRAMUSCULAR; INTRAVENOUS
Start: 2025-04-30

## 2025-04-03 RX ORDER — ALBUTEROL SULFATE 0.83 MG/ML
2.5 SOLUTION RESPIRATORY (INHALATION)
OUTPATIENT
Start: 2025-04-30

## 2025-04-03 RX ORDER — ALBUTEROL SULFATE 90 UG/1
1-2 INHALANT RESPIRATORY (INHALATION)
Start: 2025-04-30

## 2025-04-03 RX ADMIN — LIDOCAINE HYDROCHLORIDE 2 ML: 10 INJECTION, SOLUTION EPIDURAL; INFILTRATION; INTRACAUDAL; PERINEURAL at 15:01

## 2025-04-03 RX ADMIN — BUPRENORPHINE 100 MG: 100 SOLUTION SUBCUTANEOUS at 15:09

## 2025-04-03 NOTE — PROGRESS NOTES
Infusion Nursing Note:  Jennifer Voss presents today for ***.    Patient seen by provider today: {YES (EXPLAIN)/NO:208233}   present during visit today: {UMHLANGUAGE:519347}    Note: {Not Applicable or free text:801234:s}.    Intravenous Access:  {UMHIVACCESS:677698}    Treatment Conditions:  {UMHTXCONDITIONS:042925}    Post Infusion Assessment:  {UMHPOSTINFUSION:874489}     Discharge Plan:   {UMHDISCHARGE:141815}    Mariana Jimenez RN

## 2025-04-03 NOTE — PROGRESS NOTES
Infusion Nursing Note:  Jennifer Voss presents today for Sublocade.    Patient seen by provider today: No   present during visit today: Not Applicable.    Note: N/A.    Intravenous Access:  No Intravenous access/labs at this visit.    Treatment Conditions:  Not Applicable.    Post Infusion Assessment:  Patient tolerated injection without incident.  Site patent and intact, free from redness, edema or discomfort.  No evidence of extravasations.     Discharge Plan:   Discharge instructions reviewed with: Patient.  Patient and/or family verbalized understanding of discharge instructions and all questions answered.  AVS to patient via [employer]  Patient will return 5/1/2025 for next appointment.   Patient discharged in stable condition accompanied by: self.  Departure Mode: Ambulatory.    Mariana Jimenez RN

## 2025-04-13 DIAGNOSIS — E23.7: ICD-10-CM

## 2025-04-13 DIAGNOSIS — N91.1: ICD-10-CM

## 2025-04-14 RX ORDER — PROGESTERONE 100 MG/1
100 CAPSULE ORAL DAILY
Qty: 90 CAPSULE | Refills: 1 | Status: SHIPPED | OUTPATIENT
Start: 2025-04-14

## 2025-04-14 RX ORDER — ESTRADIOL 0.5 MG/1
0.5 TABLET ORAL DAILY
Qty: 90 TABLET | Refills: 1 | Status: SHIPPED | OUTPATIENT
Start: 2025-04-14

## 2025-04-14 NOTE — TELEPHONE ENCOUNTER
Requested Prescriptions   Pending Prescriptions Disp Refills    estradiol (ESTRACE) 0.5 MG tablet [Pharmacy Med Name: Estradiol 0.5 MG Oral Tablet] 90 tablet 0     Sig: Take 1 tablet by mouth once daily       Hormone Replacement Therapy Failed - 4/14/2025 11:21 AM        Failed - Medication is active on med list and the sig matches. RN to manually verify dose and sig if red X/fail.     If the protocol passes (green check), you do not need to verify med dose and sig.    A prescription matches if they are the same clinical intention.    For Example: once daily and every morning are the same.    The protocol can not identify upper and lower case letters as matching and will fail.     For Example: Take 1 tablet (50 mg) by mouth daily     TAKE 1 TABLET (50 MG) BY MOUTH DAILY    For all fails (red x), verify dose and sig.    If the refill does match what is on file, the RN can still proceed to approve the refill request.       If they do not match, route to the appropriate provider.             Failed - Medication indicated for associated diagnosis     The medication is prescribed for one or more of the following conditions:    Menopause   Vulva/Vaginal atrophy   Low Estrogen   Gender Dysphoria   Male to Female transgender          Passed - Most recent blood pressure under 140/90 in past 12 months     BP Readings from Last 3 Encounters:   04/03/25 118/79   03/05/25 110/75   02/11/25 123/85       No data recorded            Passed - Recent (12 mo) or future (90 days) visit within the authorizing provider's specialty     The patient must have completed an in-person or virtual visit within the past 12 months or has a future visit scheduled within the next 90 days with the authorizing provider s specialty.  Urgent care and e-visits do not qualify as an office visit for this protocol.          Passed - Patient is 18 years of age or older        Passed - No active pregnancy on record        Passed - No positive pregnancy test  on record in past 12 months          progesterone (PROMETRIUM) 100 MG capsule [Pharmacy Med Name: Progesterone 100 MG Oral Capsule] 90 capsule 0     Sig: Take 1 capsule by mouth once daily       Hormone Replacement Therapy Failed - 4/14/2025 11:21 AM        Failed - Medication indicated for associated diagnosis     The medication is prescribed for one or more of the following conditions:    Menopause   Vulva/Vaginal atrophy   Low Estrogen   Gender Dysphoria   Male to Female transgender          Passed - Most recent blood pressure under 140/90 in past 12 months     BP Readings from Last 3 Encounters:   04/03/25 118/79   03/05/25 110/75   02/11/25 123/85       No data recorded            Passed - Medication is active on med list and the sig matches. RN to manually verify dose and sig if red X/fail.     If the protocol passes (green check), you do not need to verify med dose and sig.    A prescription matches if they are the same clinical intention.    For Example: once daily and every morning are the same.    The protocol can not identify upper and lower case letters as matching and will fail.     For Example: Take 1 tablet (50 mg) by mouth daily     TAKE 1 TABLET (50 MG) BY MOUTH DAILY    For all fails (red x), verify dose and sig.    If the refill does match what is on file, the RN can still proceed to approve the refill request.       If they do not match, route to the appropriate provider.             Passed - Recent (12 mo) or future (90 days) visit within the authorizing provider's specialty     The patient must have completed an in-person or virtual visit within the past 12 months or has a future visit scheduled within the next 90 days with the authorizing provider s specialty.  Urgent care and e-visits do not qualify as an office visit for this protocol.          Passed - Patient is 18 years of age or older        Passed - No active pregnancy on record        Passed - No positive pregnancy test on record in  "past 12 months           Pt last seen 10/8/2024 for amenorrhea due to hypothalamus: \"Plan follow up in 6 months, sooner if needed. \"    Progesterone last prescribed 10/8/2024 for 90 capsules with 1 refill    Estradiol last prescribed 10/8/2024 for 90 tablets with 1 refill    RN sent mychart to see how pt is doing on above medications.    Mary Grace Alamo RN on 4/14/2025 at 11:23 AM      "

## 2025-04-14 NOTE — TELEPHONE ENCOUNTER
"Pt states: \"No issues with it. My hormones seem to be balanced out. I do have some days here and there where I am overly emotional. Other than that it's been going good. \"    RN routing to provider to advise if more formal follow up is indicated for further refills    Mary Grace Alamo RN on 4/14/2025 at 11:39 AM    "

## 2025-04-15 ENCOUNTER — OFFICE VISIT (OUTPATIENT)
Dept: PALLIATIVE MEDICINE | Facility: OTHER | Age: 40
End: 2025-04-15
Payer: COMMERCIAL

## 2025-04-15 VITALS — SYSTOLIC BLOOD PRESSURE: 120 MMHG | OXYGEN SATURATION: 96 % | DIASTOLIC BLOOD PRESSURE: 74 MMHG | HEART RATE: 76 BPM

## 2025-04-15 DIAGNOSIS — M47.817 LUMBOSACRAL SPONDYLOSIS WITHOUT MYELOPATHY: ICD-10-CM

## 2025-04-15 DIAGNOSIS — M51.369 BULGING LUMBAR DISC: ICD-10-CM

## 2025-04-15 DIAGNOSIS — M79.18 MYOFASCIAL PAIN: Primary | ICD-10-CM

## 2025-04-15 PROCEDURE — 99213 OFFICE O/P EST LOW 20 MIN: CPT | Performed by: STUDENT IN AN ORGANIZED HEALTH CARE EDUCATION/TRAINING PROGRAM

## 2025-04-15 ASSESSMENT — PAIN SCALES - GENERAL: PAINLEVEL_OUTOF10: SEVERE PAIN (8)

## 2025-04-15 NOTE — PATIENT INSTRUCTIONS
Plan:  Procedures:   TPIs of low lumbar paraspinal muscles  Could also consider bilateral lumbar MBBs/RFA in the future  Physical Therapy:   Continue physical therapy and home exercise program  Diagnostic Studies:   Reviewed lumbar MRI from 2022 today in clinic  Medication Management:   No changes  Can trial topical OTC medications such as Voltaren gel, lidocaine cream or patches, Icy Hot, Biofreeze, etc.  Follow up: For TPIs    [facility] Number:  [phone]  Call with any questions about your care and for scheduling assistance.   Calls are returned Monday through Friday between 8 AM and 4:30 PM. We usually get back to you within 2 business days depending on the issue/request.    If we are prescribing your medications:  For opioid medication refills, call the clinic or send a [employer] message 7 days in advance.  Please include:  Name of requested medication  Name of the pharmacy.  For non-opioid medications, call your pharmacy directly to request a refill. Please allow 3-4 days to be processed.   Per [place] State Law:  All controlled substance prescriptions must be filled within 30 days of being written.    For those controlled substances allowing refills, pickup must occur within 30 days of last fill.      We believe regular attendance is key to your success in our program!    Any time you are unable to keep your appointment we ask that you call us at least 24 hours in advance to cancel.This will allow us to offer the appointment time to another patient.   Multiple missed appointments may lead to dismissal from the clinic.

## 2025-04-15 NOTE — PROGRESS NOTES
[facility] Interventional Evaluation    Date of visit: 4/15/2025    Reason for consultation:    Jennifer Voss is a 40 year old female who is seen in consultation today for evaluation of an interventional strategy for pain management.    PCP is Shannon Almeida.    Please see the [facility] health questionnaire which the patient completed and reviewed with me in detail.    Chief Complaint:    Chief Complaint   Patient presents with    Pain    Pain Management     Low back  pain with numbness in middle    Chronic bilateral low back pain with right-sided sciatica and numbness         Pain history:  Jennifer Voss is a 40 year old female presenting with a chief pain complaint of: Chronic bilateral low back pain with right-sided sciatica and numbness       - pain in back is worse than symptoms down leg  - Bulging lumbar disc    Onset: years but worse in last 6 months  Location and Radiation: low back pain + bilateral hypersensitivity - butt, front of thighs, low back,   Provoking: standing at work ()  Palliating: sitting forward, PT  Quality: burning/aching  Severity: 8/10 - worse when lifting things at work  Timing: constant + worse when at work  Numbness: midline upper lumbar spinous process   Weakness: a bit of back weakness (2.5 months ago)      Patient denies red flag symptoms of corresponding bowel/bladder symptoms, fever/chills, saddle anesthesia, profound motor loss, weight loss, or sudden unremitting increase in pain.    Current pain medications include:  Cyclobenzaprine 10 mg TID PRN - H (sleepy)  Gabapentin 300 mg TID PRN? 800 mg at night, sometimes will add 400 mg - NH (for sleep)    Buprenorphine-naloxone, 8 mg-2 mg, 1 film BID - not currently taking  Lamotrigine 200 mg daily - still taking  - for cravings  Ibuprofen/aleve - H    Other meds:  Escitalopram 20 mg daily - still taking    Previous medication  treatments included:  None    Other treatments have included:  Jennifer Voss has not been seen at a pain clinic in the past.    PT: Yes, ongoing - H  Injections: none  Surgery: none  Alternative: theracaine - H    Past Medical History:  Past Medical History:   Diagnosis Date    Abnormal Pap smear of cervix 08/2003    Cervical high risk HPV (human papillomavirus) test positive 07/24/2015    see problem list    Chickenpox     Depressive disorder     Ectopic pregnancy     right    GERD (gastroesophageal reflux disease) 07/16/2012    H/O chronic ear infection as a child    H/O colposcopy with cervical biopsy 01/18/2016    see problem list    Left tubal pregnancy without intrauterine pregnancy 01/05/2018    Tonsillitis     Uncomplicated asthma      Patient Active Problem List    Diagnosis Date Noted    Drug induced constipation 11/20/2024     Priority: Medium    Gastroesophageal reflux disease with esophagitis without hemorrhage 11/20/2024     Priority: Medium    Class 1 obesity due to excess calories without serious comorbidity with body mass index (BMI) of 33.0 to 33.9 in adult 11/20/2024     Priority: Medium    Left carpal tunnel syndrome 10/22/2024     Priority: Medium    Carpal tunnel syndrome of right wrist 10/15/2024     Priority: Medium    Opioid use disorder, severe, dependence (H) 07/16/2024     Priority: Medium    Mood disorder 08/23/2022     Priority: Medium    RACH (generalized anxiety disorder) 08/23/2022     Priority: Medium    Panic disorder without agoraphobia 08/23/2022     Priority: Medium    PTSD (post-traumatic stress disorder) 08/23/2022     Priority: Medium    Lumbosacral plexopathy 04/20/2022     Priority: Medium    Bulging lumbar disc 02/28/2022     Priority: Medium    Schmorl's node 02/28/2022     Priority: Medium    Right hip pain 02/28/2022     Priority: Medium    Acute right-sided low back pain with right-sided sciatica 01/25/2022     Priority: Medium    Left leg numbness 01/25/2022      Priority: Medium    Overweight (BMI 25.0-29.9) 09/20/2021     Priority: Medium    Chronic bilateral low back pain without sciatica 09/20/2021     Priority: Medium    History of poor pregnancy outcome 01/16/2018     Priority: Medium    Cervical high risk HPV (human papillomavirus) test positive 07/24/2015     Priority: Medium     7/24/15: Pap - NIL, + HR HPV 16. Plan colp  1/18/16: Eugene Bx - MARIANO 2. ECC - benign. Plan LEEP  3/28/16: LEEP - benign. Plan cotest in 1 year.   7/19/17: Pap - NIL/neg HPV. Pap+HPV in 1 year.  9/25/18 Pap: NIL/neg HPV. Plan Pap+HPV in 3 years (2021).  10/6/21 NIL pap, neg HPV. Plan: cotest q 3 years x 25 years  9/30/24 NIL pap, neg HPV. Plan cotest in 3 years        Tobacco abuse 07/16/2012     Priority: Medium    Depression with anxiety 07/16/2012     Priority: Medium     Has been on paxil and lorazepam.  Discontinued medication 2013         Past Surgical History:  Past Surgical History:   Procedure Laterality Date    COLONOSCOPY N/A 8/1/2017    Procedure: COMBINED COLONOSCOPY, SINGLE OR MULTIPLE BIOPSY/POLYPECTOMY BY BIOPSY;  Colonoscopy;  Surgeon: Ramakrishna Epstein MD;  Location: [facility]    DILATION AND CURETTAGE, OPERATIVE HYSTEROSCOPY, COMBINED N/A 10/6/2021    Procedure: dilation and curettage, hysteroscopy with polypectomy,Pap Smear;  Surgeon: Fanny Camacho MD;  Location: [facility]    LAPAROSCOPIC EVACUATION ECTOPIC PREGNANCY N/A 7/24/2015    LSC right salpingectomy     LAPAROSCOPY DIAGNOSTIC (GYN) N/A 10/6/2021    Procedure: Diagnostic laparoscopy;  Surgeon: Fanny Camacho MD;  Location: [facility]    LEEP TX, CERVICAL  3/2016    benign    MOUTH SURGERY      wisdom teeth    PE TUBES      TONSILLECTOMY       Medications:  Current Outpatient Medications   Medication Sig Dispense Refill    albuterol (PROAIR HFA/PROVENTIL HFA/VENTOLIN HFA) 108 (90 Base) MCG/ACT inhaler INHALE 1 PUFF BY MOUTH TWICE DAILY AS NEEDED FOR SHORTNESS OF BREATH ,WHEEZING,  OR  COUGH. 18 g 0    atomoxetine (STRATTERA)  60 MG capsule Take 1 capsule (60 mg) by mouth daily. 90 capsule 3    cyclobenzaprine (FLEXERIL) 10 MG tablet Take 1 tablet by mouth three times daily as needed for muscle spasm 30 tablet 2    diazepam (VALIUM) 5 MG tablet Take 1-2 tablets (5-10 mg) by mouth daily as needed for anxiety. 15 tabs to last 30 days 15 tablet 2    escitalopram (LEXAPRO) 20 MG tablet Take 1 tablet (20 mg) by mouth daily. 90 tablet 3    estradiol (ESTRACE) 0.5 MG tablet Take 1 tablet by mouth once daily 90 tablet 1    famotidine (PEPCID) 20 MG tablet Take 1 tablet (20 mg) by mouth daily. 90 tablet 3    gabapentin (NEURONTIN) 100 MG capsule Take 1-3 caps by mouth in AM and mid day 150 capsule 1    gabapentin (NEURONTIN) 300 MG capsule Take 1 capsule (300 mg) by mouth 3 times daily as needed for neuropathic pain. 90 capsule 1    gabapentin (NEURONTIN) 800 MG tablet Take one tab by mouth at bedtime and can take half-tab daily as needed for pain and anxiety. 135 tablet 1    ibuprofen (ADVIL/MOTRIN) 800 MG tablet Take 1 tablet (800 mg) by mouth every 6 hours as needed for other (mild and/or inflammatory pain) 30 tablet 0    lamoTRIgine (LAMICTAL) 200 MG tablet Take 1 tablet (200 mg) by mouth daily. 90 tablet 3    naloxone (NARCAN) 4 MG/0.1ML nasal spray Spray 1 spray (4 mg) into one nostril alternating nostrils as needed for opioid reversal every 2-3 minutes until assistance arrives 0.2 mL 11    OLANZapine (ZYPREXA) 10 MG tablet Take 0.5-1 tablets (5-10 mg) by mouth daily as needed (anxiety, sleep). 90 tablet 1    ondansetron (ZOFRAN ODT) 4 MG ODT tab Take 1 tablet (4 mg) by mouth every 8 hours as needed for nausea 30 tablet 1    progesterone (PROMETRIUM) 100 MG capsule Take 1 capsule by mouth once daily 90 capsule 1    Semaglutide-Weight Management (WEGOVY) 1.7 MG/0.75ML pen Inject 1.7 mg subcutaneously once a week. 3 mL 3    senna-docusate (SENOKOT-S/PERICOLACE) 8.6-50 MG tablet Take 1-2 tablets by mouth 2 times daily 30 tablet 0     Buprenorphine HCl-Naloxone HCl (SUBOXONE) 12-3 MG FILM per film Place 1 Film under the tongue daily. (Patient not taking: Reported on 4/15/2025) 30 Film 0    buprenorphine HCl-naloxone HCl (SUBOXONE) 8-2 MG per film Take 1 film twice a day and an additional 0.5 to 1 film as needed for breakthrough withdrawal, cravings.  For a total daily dose of 16- 24 mg. (Patient not taking: Reported on 4/15/2025) 45 Film 0     Allergies:     Allergies   Allergen Reactions    Penicillins Nausea    Fluoxetine Itching         Family history:  Family History   Problem Relation Age of Onset    Depression Mother     Diabetes Father     Hypertension Father     Substance Abuse Brother         recovered drugs    Depression Brother     Anxiety Disorder Brother     Diabetes Paternal Grandmother     Coronary Artery Disease Paternal Grandmother         MI    Aneurysm Paternal Grandmother         brain    Heart Failure Paternal Grandfather         CHF       Physical Exam:  Vitals:    04/15/25 1411   BP: 120/74   Pulse: 76   SpO2: 96%     Exam:  Constitutional: Well developed, NAD  Head: normocephalic. Atraumatic.  Eyes: no redness or jaundice noted   CV: warm, well perfused extremities   Skin: no suspicious lesions or rashes   Psychiatric: mentation appears normal and affect     Neuromuscular Examination:  Back ROM - pain on flex/ext/lateral ext R/L worse on R>L    - Left leg numbness on anterior leg knee to calf (happened 3 yrs ago, never figured it out, seems separate from back pain)  - NATHEN elicited R lateral back pain   - low lumbar paraspinal tenderness bilaterally R>L   - R SI joint tenderness - equivocal for SI joint tenderness  - no pain on spinous process palpation but baseline numbness      Diagnostic tests:  Narrative & Impression   MRI LUMBAR SPINE WITHOUT CONTRAST January 25, 2022 12:08 PM      HISTORY: Low back pain, no red flags. Acute right-sided low back pain  with right-sided sciatica. Numbness and tingling of left leg.       TECHNIQUE: Multiplanar multisequence MRI of the lumbar spine without  contrast.     COMPARISON: None.      FINDINGS: Alignment is significant for slight levoconvex curvature.  Bone marrow demonstrates degenerative endplate change most conspicuous  surrounding the L2-L3 disc which is predominantly fatty marrow change  with subtle associated degenerative endplate edema. Superior endplate  Schmorl's node at L3. Conus medullaris is unremarkable terminating at  the level of L1-L2 disc. Cauda equina is unremarkable. Bilateral  sacroiliac joint degenerative change. No appreciable extraspinal  abnormality.     Segmental Analysis:      T12-L1: Disc height maintained. No herniation. Normal facet joints. No  foraminal or spinal canal stenosis.      L1-L2: Disc height maintained. No herniation. Normal facet joints. No  foraminal or spinal canal stenosis.       L2-L3: Mild to moderate disc height loss. Disc bulge, slightly  asymmetric to the right with possible subtle right foraminal annular  fissuring. Normal facet joints. Mild right foraminal stenosis. No left  foraminal stenosis. Mild if any spinal canal stenosis.       L3-L4: Disc height maintained. No herniation. Mild bilateral facet  arthropathy. No foraminal or spinal canal stenosis.       L4-L5: Disc height maintained. No herniation. Mild bilateral facet  arthropathy. No foraminal or spinal canal stenosis.       L5-S1: Disc height maintained. No herniation. Mild bilateral facet  arthropathy. No foraminal or spinal canal stenosis.                                                                      IMPRESSION:    1. Moderate degenerative disc disease at L2-L3.  2. Mild lower lumbar spine facet arthropathy.  3. No high-grade stenoses.       Personally reviewed imaging: yes    Other testing (labs, diagnostics) reviewed:  None    [place] Prescription Monitoring Program reviewed:     Assessment:  Myofascial pain of lower lumbar paraspinals   Chronic low back pain with R sided  sciatica/numbness    Jennifer Voss is a 40 year old female who presents with myofascial pain of lower lumbar paraspinals (R>L) and chronic low back pain with R-sided sciatica/numbness that has worsened over the past 6 months. This includes bilateral hypersensitivity that extends to the buttocks, lateral/anterior thighs. Her pain is an 8/10 and is worse on standing for long periods of time and lifting things at work, and better with leaning forward and with exercise, theracaine, cyclobenzaprine, ibuprofen/aleve. Back pain is elicited on back flexion, extension, lateral flexion with worse pain on R>L lateral flexion. She also has bilateral lower lumbar paraspinal tenderness (R>L) which is her greatest concern. She has no pain on palpation of the lower lumbar spinous processes but has baseline numbness over this area. She also was found to have R SI joint tenderness on palpation.     We will proceed with TPIs of low lumbar paraspinals and consider bilateral lumbar MBBs/RFA if this does not produce adequate relief. She will continue her PT/HEP. Patient asked for home therapies and we recommend trying topical OTCs such as voltaren gel, lidocaine cream, or patches (Icy Hot, Biofreeze, etc.).     Plan:  Procedures:   TPIs of low lumbar paraspinal muscles  Could also consider bilateral L3-5 MBBs/RFA in the future  Physical Therapy:   Continue physical therapy and home exercise program  Diagnostic Studies:   Reviewed lumbar MRI from 2022 today in clinic  Medication Management:   No changes  Can trial topical OTC medications such as Voltaren gel, lidocaine cream or patches, Icy Hot, Biofreeze, etc.  Follow up: For TPIs - patient to [email or web address] message us 3 weeks post procedure if needing to proceed with MBB/RFA    Annmarie Mayorga,  [facility], [facility]    Mohit Covarrubias MD  Pain Fellow, [facility]    Physician Attestation   I, Noah Ludwig MD, saw and evaluated this patient and agree  with the findings and plan of care as documented in the note.  Any changes have been made above.    Noah Ludwig MD  Interventional Pain Medicine  [facility]

## 2025-04-15 NOTE — PROGRESS NOTES
Patient presents to the clinic today for a visit  with Noah Ludwig MD            4/15/2025     2:15 PM   PEG Score   PEG Total Score 6.67               QUESTIONS:    Ana VASQUEZ [facility]

## 2025-04-21 ENCOUNTER — TELEPHONE (OUTPATIENT)
Dept: ORTHOPEDICS | Facility: CLINIC | Age: 40
End: 2025-04-21
Payer: COMMERCIAL

## 2025-04-21 NOTE — TELEPHONE ENCOUNTER
Called patient to reschedule surgery with Dr. De La Garza on 7/9. Dr. De La Garza will be out of the office 7/3-7/25. Writer offered 7/30, patient declined due to having to return to work on 8/6. Patient requests to cancel and will call to schedule next Summer since they have the Summer months off.    Patient also has questions regarding their short term disability, would like a note about recovery and restrictions.     Please call patient to go over their questions, thanks.    Grecia Agrawal on 4/21/2025 at 1:40 PM

## 2025-04-24 ENCOUNTER — THERAPY VISIT (OUTPATIENT)
Dept: PHYSICAL THERAPY | Facility: CLINIC | Age: 40
End: 2025-04-24
Payer: COMMERCIAL

## 2025-04-24 DIAGNOSIS — M54.41 CHRONIC BILATERAL LOW BACK PAIN WITH RIGHT-SIDED SCIATICA: Primary | ICD-10-CM

## 2025-04-24 DIAGNOSIS — G89.29 CHRONIC BILATERAL LOW BACK PAIN WITH RIGHT-SIDED SCIATICA: Primary | ICD-10-CM

## 2025-05-01 ENCOUNTER — INFUSION THERAPY VISIT (OUTPATIENT)
Dept: INFUSION THERAPY | Facility: CLINIC | Age: 40
End: 2025-05-01
Payer: COMMERCIAL

## 2025-05-01 VITALS — SYSTOLIC BLOOD PRESSURE: 118 MMHG | DIASTOLIC BLOOD PRESSURE: 79 MMHG

## 2025-05-01 DIAGNOSIS — F11.20 OPIOID USE DISORDER, SEVERE, DEPENDENCE (H): Primary | ICD-10-CM

## 2025-05-01 PROCEDURE — 250N000009 HC RX 250

## 2025-05-01 PROCEDURE — 250N000011 HC RX IP 250 OP 636: Mod: JZ

## 2025-05-01 PROCEDURE — 96372 THER/PROPH/DIAG INJ SC/IM: CPT

## 2025-05-01 RX ORDER — EPINEPHRINE 1 MG/ML
0.3 INJECTION, SOLUTION, CONCENTRATE INTRAVENOUS EVERY 5 MIN PRN
OUTPATIENT
Start: 2025-05-29

## 2025-05-01 RX ORDER — LIDOCAINE HYDROCHLORIDE 10 MG/ML
2 INJECTION, SOLUTION EPIDURAL; INFILTRATION; INTRACAUDAL; PERINEURAL ONCE
OUTPATIENT
Start: 2025-05-29 | End: 2025-05-29

## 2025-05-01 RX ORDER — ALBUTEROL SULFATE 0.83 MG/ML
2.5 SOLUTION RESPIRATORY (INHALATION)
OUTPATIENT
Start: 2025-05-29

## 2025-05-01 RX ORDER — DIPHENHYDRAMINE HYDROCHLORIDE 50 MG/ML
50 INJECTION, SOLUTION INTRAMUSCULAR; INTRAVENOUS
Start: 2025-05-29

## 2025-05-01 RX ORDER — ALBUTEROL SULFATE 90 UG/1
1-2 INHALANT RESPIRATORY (INHALATION)
Start: 2025-05-29

## 2025-05-01 RX ORDER — DIPHENHYDRAMINE HYDROCHLORIDE 50 MG/ML
25 INJECTION, SOLUTION INTRAMUSCULAR; INTRAVENOUS
Start: 2025-05-29

## 2025-05-01 RX ORDER — LIDOCAINE HYDROCHLORIDE 10 MG/ML
2 INJECTION, SOLUTION EPIDURAL; INFILTRATION; INTRACAUDAL; PERINEURAL ONCE
Status: COMPLETED | OUTPATIENT
Start: 2025-05-01 | End: 2025-05-01

## 2025-05-01 RX ORDER — METHYLPREDNISOLONE SODIUM SUCCINATE 40 MG/ML
40 INJECTION INTRAMUSCULAR; INTRAVENOUS
Start: 2025-05-29

## 2025-05-01 RX ORDER — MEPERIDINE HYDROCHLORIDE 25 MG/ML
25 INJECTION INTRAMUSCULAR; INTRAVENOUS; SUBCUTANEOUS
OUTPATIENT
Start: 2025-05-29

## 2025-05-01 RX ADMIN — LIDOCAINE HYDROCHLORIDE 2 ML: 10 INJECTION, SOLUTION EPIDURAL; INFILTRATION; INTRACAUDAL; PERINEURAL at 15:02

## 2025-05-01 RX ADMIN — BUPRENORPHINE 100 MG: 100 SOLUTION SUBCUTANEOUS at 15:08
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
## 2025-05-05 ENCOUNTER — VIRTUAL VISIT (OUTPATIENT)
Dept: PSYCHIATRY | Facility: CLINIC | Age: 40
End: 2025-05-05
Payer: COMMERCIAL

## 2025-05-05 ENCOUNTER — VIRTUAL VISIT (OUTPATIENT)
Dept: BEHAVIORAL HEALTH | Facility: CLINIC | Age: 40
End: 2025-05-05
Payer: COMMERCIAL

## 2025-05-05 DIAGNOSIS — G47.00 INSOMNIA, UNSPECIFIED TYPE: ICD-10-CM

## 2025-05-05 DIAGNOSIS — F31.81 BIPOLAR II DISORDER (H): Primary | ICD-10-CM

## 2025-05-05 DIAGNOSIS — F43.10 PTSD (POST-TRAUMATIC STRESS DISORDER): ICD-10-CM

## 2025-05-05 DIAGNOSIS — F15.11 METHAMPHETAMINE ABUSE IN REMISSION (H): ICD-10-CM

## 2025-05-05 DIAGNOSIS — F41.1 GAD (GENERALIZED ANXIETY DISORDER): ICD-10-CM

## 2025-05-05 DIAGNOSIS — F11.21 OPIOID USE DISORDER, MODERATE, IN SUSTAINED REMISSION (H): ICD-10-CM

## 2025-05-05 DIAGNOSIS — F31.81 BIPOLAR II DISORDER (H): ICD-10-CM

## 2025-05-05 DIAGNOSIS — F43.10 PTSD (POST-TRAUMATIC STRESS DISORDER): Primary | ICD-10-CM

## 2025-05-05 DIAGNOSIS — F11.21 MODERATE OPIOID USE DISORDER, IN SUSTAINED REMISSION (H): ICD-10-CM

## 2025-05-05 PROCEDURE — 98006 SYNCH AUDIO-VIDEO EST MOD 30: CPT | Performed by: PSYCHIATRY & NEUROLOGY

## 2025-05-05 PROCEDURE — 1126F AMNT PAIN NOTED NONE PRSNT: CPT | Performed by: PSYCHIATRY & NEUROLOGY

## 2025-05-05 PROCEDURE — G2211 COMPLEX E/M VISIT ADD ON: HCPCS | Performed by: PSYCHIATRY & NEUROLOGY

## 2025-05-05 PROCEDURE — 90832 PSYTX W PT 30 MINUTES: CPT | Mod: 95 | Performed by: COUNSELOR

## 2025-05-05 RX ORDER — OLANZAPINE 10 MG/1
5-10 TABLET, FILM COATED ORAL DAILY PRN
Qty: 90 TABLET | Refills: 1 | Status: SHIPPED | OUTPATIENT
Start: 2025-05-05

## 2025-05-05 RX ORDER — DIAZEPAM 5 MG/1
5-10 TABLET ORAL DAILY PRN
Qty: 15 TABLET | Refills: 2 | Status: SHIPPED | OUTPATIENT
Start: 2025-05-05

## 2025-05-05 ASSESSMENT — PAIN SCALES - PAIN ENJOYMENT GENERAL ACTIVITY SCALE (PEG)
INTERFERED_ENJOYMENT_LIFE: 8
PEG_TOTALSCORE: 8.33
INTERFERED_GENERAL_ACTIVITY: 8
AVG_PAIN_PASTWEEK: 9

## 2025-05-05 ASSESSMENT — PAIN SCALES - GENERAL: PAINLEVEL_OUTOF10: NO PAIN (0)

## 2025-05-05 NOTE — PROGRESS NOTES
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  Increase psychosocial stressors and increase Lexapro to does seem to have led to a manic episode.  Discussed bipolar diagnosis with patient today.  Both [facility] and myself think bipolar disorder fits.  Patient also thinks bipolar disorder fits with her symptoms.  Even when stable in regards to her other mental health symptoms patient does question possible ADHD diagnosis.  There are a lot of confounding factors that may impact patient's attentional abilities and overlap with ADHD so referral for testing placed today.  Patient also agreeable to increasing olanzapine dose at bedtime up to 10 mg to help with sleep and prevent jackeline.  If continues to get activated, may need to decrease Lexapro.  No acute safety concerns.  No SI today.  No problematic drug or alcohol use.    3/21/2024:  Patient overall struggling quite a bit to manage mood and psychosocial stressors.  Given history of possible ADHD, we will trial Strattera.  Patient with ADHD testing not until October.  We will taper off Lexapro to decrease risks of hypomania/jackeline.  Patient will watch for any hypomanic symptoms or increasing agitation or irritability.  Encouraged to start therapy again.  No acute safety concerns.  No SI.  No problematic drug or alcohol use.    4/18/2024:   Patient overall feeling better since  last visit.  Currently taking both Lexapro and the Strattera.  Patient is agreeable to further optimize Strattera since has not noticed much of an improvement in attention and concentration symptoms on the 40 mg dose.  Tolerating well with no noticeable side effects.  Also wondering if can increase gabapentin slightly at bedtime since remains helpful for anxiety and sleep.  Plans on reaching out to former therapist to reengage in psychotherapy.  ADHD testing in October.  No acute safety concerns.  No SI today.  No problematic drug or alcohol use.    6/3/2024:  Patient overall doing pretty well.  Reports ongoing concerns with attention and focus.  Testing for ADHD in October.  Patient has felt more restless lately possibly due to increase Strattera dose.  Patient encouraged to trial the daytime as needed gabapentin to see if this is helpful.  She has had some significant benefits from increased Strattera and so we will wait on decreasing the dose.  We will also refrain from increasing the dose due to restlessness.  No acute safety concerns.  No SI.  No problematic drug or alcohol use.    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  Was struggling quite a bit.  Now working with recovery clinic and back on track.  Taking Suboxone and also finding semaglutide helpful for cravings.  We will pause Valium for now until clean urine drug screens and fentanyl out of system.  Relapse was triggered by ex.  Ex has not come back to her home since February.  Unclear if he is in prison or [facility].  No other medication changes today.  No problematic current drug or alcohol use.  No acute safety concerns.  No SI.    12/04/2024:  Pt overall doing well. Taking meds as prescribed. Tolerating well. Recent urine drug screen negative for fentanyl. Valium limited supply sent - slightly decreased dose/supply. Encouraged to continue working with supports while back in recovery. Encouraged to move ahead  with plan for Sublocade. No acute safety concerns. No SI. No problematic drug or alcohol use.     2/4/2025:  Patient overall doing quite well.  Did have brief opioid relapse in December prior to starting Sublocade.  Patient commended for bouncing back into recovery after the 1 day relapse and starting Sublocade.  Mental health symptoms remain stable on current medication regimen.  No new side effects.  Patient with lesion on brain on MRI.  Has appointment with neurology next week.  No acute safety concerns.  No SI.  No current problematic drug or alcohol use.  No medication changes today.  Updated fasting lipid panel and A1c ordered since patient is using olanzapine nightly.  Patient on Wegovy and losing weight despite on olanzapine 10 mg nightly.    5/5/2025:  Overall doing well.  Taking medication as prescribed.  Did discontinue Strattera since not helpful and had no worsening or notable change in symptoms after stopping.  Does take olanzapine 10 mg nightly.  Cholesterol and hemoglobin A1c a little high in February.  However, has been on semaglutide and has dropped 20+ pounds since starting the injection.  No abnormal involuntary movements.  Neurology appointment went well with no major concerns at this time.  No medication changes today apart from taking Strattera off the medication list.  Psychotherapy encouraged.  No acute safety concerns.  No SI.  No problematic drug or alcohol use.    Medication side effects and alternatives were reviewed. Health promotion activities recommended and reviewed today. All questions addressed. Education and counseling completed regarding risks and benefits of medications and psychotherapy options. Recommend therapy for additional support.     Treatment Plan:  Continue olanzapine/Zyprexa to 5-10 mg daily as needed for anxiety, sleep, agitation, bipolar disorder.  Okay to continue nightly as you have been doing.  Continue lamotrigine 200 mg daily for mood (can take as split dose  100 mg twice daily if preferred).  Continue Valium/diazepam at 5-10 mg once daily as needed for severe anxiety/panic only.  15 tablets to last at least 30 days.  Do not use with any opioids.  Continue gabapentin 800 mg at bedtime and can take 400 mg during day as needed for anxiety or chronic pain.    Continue Lexapro/escitalopram 20 mg daily for mood, anxiety.  Discontinued Strattera since no longer using  Can fax [place] renewal paperwork to me at fax #: [phone].   Continue working with addiction medicine.  Continue all other cares per primary care provider.   Continue all other medications as reviewed per electronic medical record today.   Safety plan reviewed. To the Emergency Department as needed or call after hours crisis line at [phone] or [phone]. [place] Crisis Text Line. Text [place] to 502528 or Suicide LifeLine Chat: suicidepreventionCrowdfyndline.org/chat  Continue therapy as planned.   Schedule an appointment with me in 3 months or sooner as needed. Call [facility] at [phone] to schedule.  Follow up with primary care provider as planned or for acute medical concerns.  Call the psychiatric nurse line with medication questions or concerns at [phone].  [employer] may be used to communicate with your provider, but this is not intended to be used for emergencies.    Risks of benzodiazepine (Ativan, Xanax, Klonopin, Valium, etc) use including, but not limited to, sedation, tolerance, risk for addiction/dependence. Do not drink alcohol while taking benzodiazepines due to risk of trouble breathing and potential death. Do not drive or operate heavy machinery until it is known how the drug affects you. Discuss with physician or pharmacist before ever taking a benzodiazepine with a narcotic/opioid pain medication.     Have previously discussed Lamictal (lamotrigine) can cause serious rashes including Park-Yoshi syndrome which may requiring hospitalization and discontinuation of  treatment. If any signs of a rash occur, please see your Primary Care Provider or a dermatologist immediately.     Administrative Billing:   Phone Call/Video Duration: 9 Minutes  Start: 1:38p  Stop: 1:47p    The longitudinal plan of care for the diagnosis(es)/condition(s) as documented were addressed during this visit. Due to the added complexity in care, I will continue to support Jennifer in the subsequent management and with ongoing continuity of care.    Patient Status:  Patient is long-term/continous care patient.     Signed:   Sarah Purcell DO  [facility]    Disclaimer: This note consists of symbols derived from keyboarding, dictation and/or voice recognition software. As a result, there may be errors in the script that have gone undetected. Please consider this when interpreting information found in this chart.

## 2025-05-05 NOTE — PATIENT INSTRUCTIONS
Treatment Plan:  Continue olanzapine/Zyprexa to 5-10 mg daily as needed for anxiety, sleep, agitation, bipolar disorder.  Okay to continue nightly as you have been doing.  Continue lamotrigine 200 mg daily for mood (can take as split dose 100 mg twice daily if preferred).  Continue Valium/diazepam at 5-10 mg once daily as needed for severe anxiety/panic only.  15 tablets to last at least 30 days.  Do not use with any opioids.  Continue gabapentin 800 mg at bedtime and can take 400 mg during day as needed for anxiety or chronic pain.    Continue Lexapro/escitalopram 20 mg daily for mood, anxiety.  Discontinued Strattera since no longer using  Can fax [facility] renewal paperwork to me at fax #: [phone].   Continue working with addiction medicine.  Continue all other cares per primary care provider.   Continue all other medications as reviewed per electronic medical record today.   Safety plan reviewed. To the Emergency Department as needed or call after hours crisis line at [phone] or [phone]. [place] Crisis Text Line. Text [place] to 130575 or Suicide LifeLine Chat: suicidepreventionlifeline.org/chat  Continue therapy as planned.   Schedule an appointment with me in 3 months or sooner as needed. Call [facility] at [phone] to schedule.  Follow up with primary care provider as planned or for acute medical concerns.  Call the psychiatric nurse line with medication questions or concerns at [phone].  MyChart may be used to communicate with your provider, but this is not intended to be used for emergencies.    Risks of benzodiazepine (Ativan, Xanax, Klonopin, Valium, etc) use including, but not limited to, sedation, tolerance, risk for addiction/dependence. Do not drink alcohol while taking benzodiazepines due to risk of trouble breathing and potential death. Do not drive or operate heavy machinery until it is known how the drug affects you. Discuss with physician or pharmacist before ever  "taking a benzodiazepine with a narcotic/opioid pain medication.     Have previously discussed Lamictal (lamotrigine) can cause serious rashes including Park-Yoshi syndrome which may requiring hospitalization and discontinuation of treatment. If any signs of a rash occur, please see your Primary Care Provider or a dermatologist immediately.     Patient Education   Collaborative Care Psychiatry Service  What to Expect  Here's what to expect from your [facility] Psychiatry Service (CCPS).   About CCPS  CCPS means 2 people work together to help you get better. You'll meet with a behavioral health clinician and a psychiatric doctor. A behavioral health clinician helps people with mental health problems by talking with them. A psychiatric doctor helps people by giving them medicine.  How it works  At every visit, you'll see the behavioral health clinician (BHC) first. They'll talk with you about how you're doing and teach you how to feel better.   Then you'll see the psychiatric doctor. This doctor can help you deal with troubling thoughts and feelings by giving you medicine. They'll make sure you know the plan for your care.   CCPS usually takes 3 to 6 visits. If you need more visits, we may have you start seeing a different psychiatric doctor for ongoing care.  If you have any questions or concerns, we'll be glad to talk with you.  About visits  Be open  At your visits, please talk openly about your problems. It may feel hard, but it's the best way for us to help you.  Cancelling visits  If you can't come to your visit, please call us right away at [phone]. If you don't cancel at least 24 hours (1 full day) before your visit, that's \"late cancellation.\"  Being late to visits  Being very late is the same as not showing up. You will be a \"no show\" if:  Your appointment starts with a BHC, and you're more than 15 minutes late for a 30-minute (half hour) visit. This will also cancel your appointment with " the psychiatric doctor.  Your appointment is with a psychiatric doctor only, and you're more than 15 minutes late for a 30-minute (half hour) visit.  Your appointment is with a psychiatric doctor only, and you're more than 30 minutes late for a 60-minute (full hour) visit.  If you cancel late or don't show up 2 times within 6 months, we may end your care.   Getting help between visits  If you need help between visits, you can call us Monday to Friday from 8 a.m. to 4:30 p.m. at [phone].  Emergency care  Call 911 or go to the nearest emergency department if your life or someone else's life is in danger.  Call 158 anytime to reach the national Suicide and Crisis hotline.  Medicine refills  To refill your medicine, call your pharmacy. You can also call [facility]'s [facility] at [phone], Monday to Friday, 8 a.m. to 4:30 p.m. It can take 1 to 3 business days to get a refill.   Forms, letters, and tests  You may have papers to fill out, like FMLA, short-term disability, and workability. We can help you with these forms at your visits, but you must have an appointment. You may need more than 1 visit for this, to be in an intensive therapy program, or both.  Before we can give you medicine for ADHD, we may refer you to get tested for it or confirm it another way.  We may not be able to give you an emotional support animal letter.  We don't do mental health checks ordered by the court.   We don't do mental health testing, but we can refer you to get tested.   Thank you for choosing us for your care.  For informational purposes only. Not to replace the advice of your health care provider. Copyright   2022 [facility]. All rights reserved. [employer] 712058 - Rev 11/24.

## 2025-05-05 NOTE — PROGRESS NOTES
"Virtual Visit Details    Type of service:  Video Visit     Originating Location (pt. Location): {video visit patient location:254841::\"Home\"}  {PROVIDER LOCATION On-site should be selected for visits conducted from your clinic location or adjoining [facility] hospital, academic office, or other nearby [facility] building. Off-site should be selected for all other provider locations, including home:498787}  Distant Location (provider location):  {virtual location provider:616922}  Platform used for Video Visit: {Virtual Visit Platforms:235372::\"[employer]\"}        "

## 2025-05-05 NOTE — NURSING NOTE
Is the patient currently in the state of [place]? YES    Current patient location: [address] [place] [id number]    Visit mode:Video    If the visit is dropped, the patient can be reconnected by: VIDEO VISIT: Text to cell phone:   Telephone Information:   Mobile [phone]       Will anyone else be joining the visit? No  (If patient encounters technical issues they should call [phone])    Are changes needed to the allergy or medication list? Pt stated no changes to allergies and Pt stated no med changes    Are refills needed on medications prescribed by this physician? Discuss with Provider    Rooming Documentation: Questionnaire(s) completed.    Reason for visit: EDWIN Pena, BLANKF

## 2025-05-07 ENCOUNTER — THERAPY VISIT (OUTPATIENT)
Dept: PHYSICAL THERAPY | Facility: CLINIC | Age: 40
End: 2025-05-07
Payer: COMMERCIAL

## 2025-05-07 DIAGNOSIS — M54.41 CHRONIC BILATERAL LOW BACK PAIN WITH RIGHT-SIDED SCIATICA: Primary | ICD-10-CM

## 2025-05-07 DIAGNOSIS — G89.29 CHRONIC BILATERAL LOW BACK PAIN WITH RIGHT-SIDED SCIATICA: Primary | ICD-10-CM

## 2025-05-07 PROCEDURE — 97110 THERAPEUTIC EXERCISES: CPT | Mod: GP | Performed by: PHYSICAL THERAPIST

## 2025-05-07 PROCEDURE — 97140 MANUAL THERAPY 1/> REGIONS: CPT | Mod: GP | Performed by: PHYSICAL THERAPIST

## 2025-05-08 ENCOUNTER — OFFICE VISIT (OUTPATIENT)
Dept: PALLIATIVE MEDICINE | Facility: OTHER | Age: 40
End: 2025-05-08
Attending: STUDENT IN AN ORGANIZED HEALTH CARE EDUCATION/TRAINING PROGRAM
Payer: COMMERCIAL

## 2025-05-08 VITALS — OXYGEN SATURATION: 94 % | DIASTOLIC BLOOD PRESSURE: 77 MMHG | HEART RATE: 73 BPM | SYSTOLIC BLOOD PRESSURE: 123 MMHG

## 2025-05-08 DIAGNOSIS — M79.18 MYOFASCIAL PAIN: ICD-10-CM

## 2025-05-08 PROCEDURE — 250N000009 HC RX 250: Performed by: STUDENT IN AN ORGANIZED HEALTH CARE EDUCATION/TRAINING PROGRAM

## 2025-05-08 PROCEDURE — 20553 NJX 1/MLT TRIGGER POINTS 3/>: CPT | Performed by: STUDENT IN AN ORGANIZED HEALTH CARE EDUCATION/TRAINING PROGRAM

## 2025-05-08 PROCEDURE — 250N000011 HC RX IP 250 OP 636: Mod: JW | Performed by: STUDENT IN AN ORGANIZED HEALTH CARE EDUCATION/TRAINING PROGRAM

## 2025-05-08 RX ORDER — LIDOCAINE HYDROCHLORIDE 10 MG/ML
5 INJECTION, SOLUTION EPIDURAL; INFILTRATION; INTRACAUDAL; PERINEURAL ONCE
Status: COMPLETED | OUTPATIENT
Start: 2025-05-08 | End: 2025-05-08

## 2025-05-08 RX ORDER — BUPIVACAINE HYDROCHLORIDE 5 MG/ML
5 INJECTION, SOLUTION EPIDURAL; INTRACAUDAL; PERINEURAL ONCE
Status: COMPLETED | OUTPATIENT
Start: 2025-05-08 | End: 2025-05-08

## 2025-05-08 RX ADMIN — LIDOCAINE HYDROCHLORIDE 5 ML: 10 INJECTION, SOLUTION EPIDURAL; INFILTRATION; INTRACAUDAL; PERINEURAL at 14:38

## 2025-05-08 RX ADMIN — BUPIVACAINE HYDROCHLORIDE 25 MG: 5 INJECTION, SOLUTION EPIDURAL; INTRACAUDAL; PERINEURAL at 14:38

## 2025-05-08 ASSESSMENT — PAIN SCALES - GENERAL
PAINLEVEL_OUTOF10: SEVERE PAIN (8)
PAINLEVEL_OUTOF10: MODERATE PAIN (4)

## 2025-05-08 NOTE — PATIENT INSTRUCTIONS
[facility]  Post Procedure Instructions    Today you saw:  Dr. Ludwig    Today you had:  trigger point injections                              Medications used:  lidocaine   bupivicaine         Go to the emergency room if you develop any shortness of breath  Monitor the injection sites for signs and symptoms of infection-fever, chills, redness, swelling, warmth, or drainage to areas.  You may have soreness at injection sites for up to 24 hours.    You may apply ice to the painful areas to help minimize the discomfort at the injection sites.  Do not apply heat to sites for at least 12 hours.  You may use anti-inflammatory medications or Tylenol for pain control if necessary    Pain Clinic phone number:  [phone]

## 2025-05-08 NOTE — PROGRESS NOTES
S: Patient endorses ongoing pain in bilateral low back  O: /77   Pulse 73   LMP 02/05/2024 (Approximate)   SpO2 94%    MSK - +TTP at following:  Bilateral lumbar paraspinals    A/P:  Myofascial pain: proceed with trigger point injections today in clinic    Pre procedure Diagnosis: myofascial pain   Post procedure Diagnosis: Same  Procedure performed: trigger point injections, CPT code 58961  Anesthesia: none  Complications: none  Operators: Noah Ludwig MD, Bob Allen DO    Indications:   Jennifer Voss is a 40 year old female with a history of myofascial pain.  Exam shows myofascial pain of the muscle groups listed below, and they have tried conservative treatment including medications.    Options/alternatives, benefits and risks were discussed with the patient including bleeding, infection, tissue trauma and pneumothorax.  Questions were answered to her satisfaction and she agrees to proceed. Voluntary informed consent was obtained and signed.     Vitals were reviewed: Yes  Allergies were reviewed:  Yes   Medications were reviewed:  Yes   Pre-procedure pain score: 8/10    Procedure:  After getting informed consent, a Pause for the Cause was performed.    Trigger points were identified by patient, and marked when appropriate.  The area was prepped with Chloroprep.    Using clean technique, injections were completed using a 25G, 1.5 inch needle.  After negative aspiration, injection was completed.  A total of 8 locations were injected.  When possible, tissue was retracted from the chest wall to avoid lung injury.    Muscle groups injected:  Bilateral lumbar paraspinals x4    Injection solution contained:  5ml of 1% lidocaine and 5ml of 0.5% bupivacaine.    Hemostasis was achieved, the area was cleaned, and bandaids were placed when appropriate.  The patient tolerated the procedure well.    Post-procedure pain score: 4/10  Follow-up includes:   -repeat prn  - Consider bilateral SI joint injections in  future    Noah Ludwig MD  Interventional Pain Medicine  [facility]

## 2025-05-08 NOTE — PROGRESS NOTES
Patient presents to the clinic today for trigger point injections  [facility]  [facility] [place]

## 2025-05-09 DIAGNOSIS — R06.02 SOB (SHORTNESS OF BREATH): ICD-10-CM

## 2025-05-10 RX ORDER — ALBUTEROL SULFATE 90 UG/1
INHALANT RESPIRATORY (INHALATION)
Qty: 18 G | Refills: 0 | Status: SHIPPED | OUTPATIENT
Start: 2025-05-10 | End: 2025-05-14

## 2025-05-13 ENCOUNTER — THERAPY VISIT (OUTPATIENT)
Dept: PHYSICAL THERAPY | Facility: CLINIC | Age: 40
End: 2025-05-13
Payer: COMMERCIAL

## 2025-05-13 DIAGNOSIS — G89.29 CHRONIC BILATERAL LOW BACK PAIN WITH RIGHT-SIDED SCIATICA: Primary | ICD-10-CM

## 2025-05-13 DIAGNOSIS — M54.41 CHRONIC BILATERAL LOW BACK PAIN WITH RIGHT-SIDED SCIATICA: Primary | ICD-10-CM

## 2025-05-13 PROCEDURE — 97140 MANUAL THERAPY 1/> REGIONS: CPT | Mod: GP | Performed by: PHYSICAL THERAPIST

## 2025-05-14 ENCOUNTER — OFFICE VISIT (OUTPATIENT)
Dept: FAMILY MEDICINE | Facility: CLINIC | Age: 40
End: 2025-05-14
Payer: COMMERCIAL

## 2025-05-14 VITALS
WEIGHT: 179 LBS | OXYGEN SATURATION: 97 % | HEIGHT: 64 IN | BODY MASS INDEX: 30.56 KG/M2 | SYSTOLIC BLOOD PRESSURE: 117 MMHG | RESPIRATION RATE: 18 BRPM | TEMPERATURE: 98.3 F | HEART RATE: 76 BPM | DIASTOLIC BLOOD PRESSURE: 75 MMHG

## 2025-05-14 DIAGNOSIS — Z01.818 PREOP GENERAL PHYSICAL EXAM: Primary | ICD-10-CM

## 2025-05-14 DIAGNOSIS — G56.01 CARPAL TUNNEL SYNDROME OF RIGHT WRIST: ICD-10-CM

## 2025-05-14 DIAGNOSIS — M54.41 ACUTE RIGHT-SIDED LOW BACK PAIN WITH RIGHT-SIDED SCIATICA: ICD-10-CM

## 2025-05-14 DIAGNOSIS — R06.02 SOB (SHORTNESS OF BREATH): ICD-10-CM

## 2025-05-14 DIAGNOSIS — Z80.3 FAMILY HISTORY OF MALIGNANT NEOPLASM OF BREAST: ICD-10-CM

## 2025-05-14 PROCEDURE — 99214 OFFICE O/P EST MOD 30 MIN: CPT | Performed by: NURSE PRACTITIONER

## 2025-05-14 PROCEDURE — 3078F DIAST BP <80 MM HG: CPT | Performed by: NURSE PRACTITIONER

## 2025-05-14 PROCEDURE — G2211 COMPLEX E/M VISIT ADD ON: HCPCS | Performed by: NURSE PRACTITIONER

## 2025-05-14 PROCEDURE — 3074F SYST BP LT 130 MM HG: CPT | Performed by: NURSE PRACTITIONER

## 2025-05-14 PROCEDURE — 1126F AMNT PAIN NOTED NONE PRSNT: CPT | Performed by: NURSE PRACTITIONER

## 2025-05-14 RX ORDER — ALBUTEROL SULFATE 90 UG/1
1-2 INHALANT RESPIRATORY (INHALATION) EVERY 6 HOURS PRN
Qty: 18 G | Refills: 1 | Status: SHIPPED | OUTPATIENT
Start: 2025-05-14

## 2025-05-14 ASSESSMENT — PAIN SCALES - GENERAL: PAINLEVEL_OUTOF10: NO PAIN (0)

## 2025-05-14 NOTE — PROGRESS NOTES
Preoperative Evaluation  [facility] [place]  [address] [facility]  [place] [address]  Phone: [phone]  Primary Provider: GISEL Johnston CNP  Pre-op Performing Provider: GISEL Johnston CNP  May 14, 2025             5/11/2025   Surgical Information   What procedure is being done? Carpel tunnel   Facility or Hospital where procedure/surgery will be performed: [facility]   Who is doing the procedure / surgery? Holli   Date of surgery / procedure: May 30th   Time of surgery / procedure: I dont know   Where do you plan to recover after surgery? at home with family     Fax number for surgical facility: Note does not need to be faxed, will be available electronically in Epic.    Assessment & Plan     The proposed surgical procedure is considered INTERMEDIATE risk.      SOB (shortness of breath)  Refill of albuterol given to have available if needed.  - albuterol (PROAIR HFA/PROVENTIL HFA/VENTOLIN HFA) 108 (90 Base) MCG/ACT inhaler; Inhale 1-2 puffs into the lungs every 6 hours as needed for shortness of breath, wheezing or cough.    Acute right-sided low back pain with right-sided sciatica  Previous imaging reviewed.  Will plan to obtain imaging of SI joints.  Continue to follow with specialty.  - XR Sacroiliac Joint G/E 3 Views; Future    Preop general physical exam  Carpal tunnel syndrome of right wrist  Previous Ortho note reviewed.  Fully optimized for procedure.  Education given regarding medications and hold times.  Encouraged to reach out to addiction medicine to determine if previous Ortho note reviewed.  Fully optimized for procedure.  Education given regarding medications and hold times.  Encouraged to reach out to addiction medicine to determine if should hold Sublocade infusion until after surgery    Family history of malignant neoplasm of breast  Encouraged to speak with mother regarding genetic testing    The longitudinal plan of care for the  diagnosis(es)/condition(s) as documented were addressed during this visit. Due to the added complexity in care, I will continue to support Jennifer in the subsequent management and with ongoing continuity of care.       - No identified additional risk factors other than previously addressed    Antiplatelet or Anticoagulation Medication Instructions   - Bleeding risk is low for this procedure (e.g. dental, skin, cataract).    Additional Medication Instructions   - ibuprofen (Advil, Motrin): DO NOT TAKE 1 day before surgery.    - naproxen (Aleve, Naprosyn): DO NOT TAKE 4 days before surgery.    - GLP-1 Injectable (exenitide, liraglutide, semaglutide, dulaglutide, etc.): DO NOT TAKE 7 days before surgery     Recommendation  Approval given to proceed with proposed procedure, without further diagnostic evaluation.      Brandyn Rodriguez is a 40 year old, presenting for the following:  Pre-Op Exam (RELEASE, CARPAL TUNNEL, right side)          5/14/2025     2:10 PM   Additional Questions   Roomed by IFTIKHAR Judd   Accompanied by n/a         5/14/2025     2:10 PM   Patient Reported Additional Medications   Patient reports taking the following new medications n/a     HPI: Is going to be having right carpal tunnel surgery.  Needs have left side completed to but plans to wait to do until next summer.  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  Is having Numbmness and tingling to hands when driving.  When writes hands will go numb.  Has times where numbness and tingling gets worse and takes a lot of time to calm back down and resolved.    Continues to have lower back pain with right sided sciatica.  Has been going to physical therapy.  Received injections last week.  Has not noticed much improvement since.            5/11/2025   Pre-Op Questionnaire   Have you ever had a heart attack or stroke? No   Have you ever had surgery on your heart or blood vessels, such as a stent  placement, a coronary artery bypass, or surgery on an artery in your head, neck, heart, or legs? No   Do you have chest pain with activity? No   Do you have a history of heart failure? No   Do you currently have a cold, bronchitis or symptoms of other infection? No   Do you have a cough, shortness of breath, or wheezing? No   Do you or anyone in your family have previous history of blood clots? No   Do you or does anyone in your family have a serious bleeding problem such as prolonged bleeding following surgeries or cuts? No   Have you ever had problems with anemia or been told to take iron pills? No   Have you had any abnormal blood loss such as black, tarry or bloody stools, or abnormal vaginal bleeding? No   Have you ever had a blood transfusion? No   Are you willing to have a blood transfusion if it is medically needed before, during, or after your surgery? Yes   Have you or any of your relatives ever had problems with anesthesia? No   Do you have sleep apnea, excessive snoring or daytime drowsiness? No   Do you have any artifical heart valves or other implanted medical devices like a pacemaker, defibrillator, or continuous glucose monitor? No   Do you have artificial joints? No   Are you allergic to latex? No     Advance Care Planning        Preoperative Review of    reviewed - controlled substances reflected in medication list.      Patient Active Problem List    Diagnosis Date Noted    Chronic bilateral low back pain with right-sided sciatica 04/24/2025     Priority: Medium    Drug induced constipation 11/20/2024     Priority: Medium    Gastroesophageal reflux disease with esophagitis without hemorrhage 11/20/2024     Priority: Medium    Class 1 obesity due to excess calories without serious comorbidity with body mass index (BMI) of 33.0 to 33.9 in adult 11/20/2024     Priority: Medium    Left carpal tunnel syndrome 10/22/2024     Priority: Medium    Carpal tunnel syndrome of right wrist 10/15/2024      Priority: Medium    Opioid use disorder, severe, dependence (H) 07/16/2024     Priority: Medium    Mood disorder 08/23/2022     Priority: Medium    RACH (generalized anxiety disorder) 08/23/2022     Priority: Medium    Panic disorder without agoraphobia 08/23/2022     Priority: Medium    PTSD (post-traumatic stress disorder) 08/23/2022     Priority: Medium    Lumbosacral plexopathy 04/20/2022     Priority: Medium    Bulging lumbar disc 02/28/2022     Priority: Medium    Schmorl's node 02/28/2022     Priority: Medium    Right hip pain 02/28/2022     Priority: Medium    Acute right-sided low back pain with right-sided sciatica 01/25/2022     Priority: Medium    Left leg numbness 01/25/2022     Priority: Medium    Overweight (BMI 25.0-29.9) 09/20/2021     Priority: Medium    Chronic bilateral low back pain without sciatica 09/20/2021     Priority: Medium    History of poor pregnancy outcome 01/16/2018     Priority: Medium    Cervical high risk HPV (human papillomavirus) test positive 07/24/2015     Priority: Medium     7/24/15: Pap - NIL, + HR HPV 16. Plan colp  1/18/16: [facility] Bx - MARIANO 2. ECC - benign. Plan LEEP  3/28/16: LEEP - benign. Plan cotest in 1 year.   7/19/17: Pap - NIL/neg HPV. Pap+HPV in 1 year.  9/25/18 Pap: NIL/neg HPV. Plan Pap+HPV in 3 years (2021).  10/6/21 NIL pap, neg HPV. Plan: cotest q 3 years x 25 years  9/30/24 NIL pap, neg HPV. Plan cotest in 3 years        Tobacco abuse 07/16/2012     Priority: Medium    Depression with anxiety 07/16/2012     Priority: Medium     Has been on paxil and lorazepam.  Discontinued medication 2013        Past Medical History:   Diagnosis Date    Abnormal Pap smear of cervix 08/2003    Cervical high risk HPV (human papillomavirus) test positive 07/24/2015    see problem list    Chickenpox     Depressive disorder     Ectopic pregnancy     right    GERD (gastroesophageal reflux disease) 07/16/2012    H/O chronic ear infection as a child    H/O colposcopy with cervical  biopsy 01/18/2016    see problem list    Left tubal pregnancy without intrauterine pregnancy 01/05/2018    Tonsillitis     Uncomplicated asthma      Past Surgical History:   Procedure Laterality Date    COLONOSCOPY N/A 8/1/2017    Procedure: COMBINED COLONOSCOPY, SINGLE OR MULTIPLE BIOPSY/POLYPECTOMY BY BIOPSY;  Colonoscopy;  Surgeon: Ramakrishna Epstein MD;  Location: [facility]    DILATION AND CURETTAGE, OPERATIVE HYSTEROSCOPY, COMBINED N/A 10/6/2021    Procedure: dilation and curettage, hysteroscopy with polypectomy,Pap Smear;  Surgeon: Fanny Camacho MD;  Location: [facility]    LAPAROSCOPIC EVACUATION ECTOPIC PREGNANCY N/A 7/24/2015    LSC right salpingectomy     LAPAROSCOPY DIAGNOSTIC (GYN) N/A 10/6/2021    Procedure: Diagnostic laparoscopy;  Surgeon: Fanny Camacho MD;  Location: [facility]    LEEP TX, CERVICAL  3/2016    benign    MOUTH SURGERY      wisdom teeth    PE TUBES      TONSILLECTOMY       Current Outpatient Medications   Medication Sig Dispense Refill    albuterol (PROAIR HFA/PROVENTIL HFA/VENTOLIN HFA) 108 (90 Base) MCG/ACT inhaler INHALE 1 PUFF BY MOUTH TWICE DAILY AS NEEDED FOR SHORTNESS OF BREATH, WHEEZING, OR COUGH 18 g 0    cyclobenzaprine (FLEXERIL) 10 MG tablet Take 1 tablet by mouth three times daily as needed for muscle spasm 30 tablet 2    diazepam (VALIUM) 5 MG tablet Take 1-2 tablets (5-10 mg) by mouth daily as needed for anxiety. 15 tabs to last 30 days 15 tablet 2    escitalopram (LEXAPRO) 20 MG tablet Take 1 tablet (20 mg) by mouth daily. 90 tablet 3    estradiol (ESTRACE) 0.5 MG tablet Take 1 tablet by mouth once daily 90 tablet 1    famotidine (PEPCID) 20 MG tablet Take 1 tablet (20 mg) by mouth daily. 90 tablet 3    gabapentin (NEURONTIN) 100 MG capsule Take 1-3 caps by mouth in AM and mid day 150 capsule 1    gabapentin (NEURONTIN) 300 MG capsule Take 1 capsule (300 mg) by mouth 3 times daily as needed for neuropathic pain. 90 capsule 1    gabapentin (NEURONTIN) 800 MG tablet Take one tab  "by mouth at bedtime and can take half-tab daily as needed for pain and anxiety. 135 tablet 1    ibuprofen (ADVIL/MOTRIN) 800 MG tablet Take 1 tablet (800 mg) by mouth every 6 hours as needed for other (mild and/or inflammatory pain) 30 tablet 0    lamoTRIgine (LAMICTAL) 200 MG tablet Take 1 tablet (200 mg) by mouth daily. 90 tablet 3    naloxone (NARCAN) 4 MG/0.1ML nasal spray Spray 1 spray (4 mg) into one nostril alternating nostrils as needed for opioid reversal every 2-3 minutes until assistance arrives 0.2 mL 11    OLANZapine (ZYPREXA) 10 MG tablet Take 0.5-1 tablets (5-10 mg) by mouth daily as needed (anxiety, sleep). 90 tablet 1    ondansetron (ZOFRAN ODT) 4 MG ODT tab Take 1 tablet (4 mg) by mouth every 8 hours as needed for nausea 30 tablet 1    progesterone (PROMETRIUM) 100 MG capsule Take 1 capsule by mouth once daily 90 capsule 1    Semaglutide-Weight Management (WEGOVY) 1.7 MG/0.75ML pen Inject 1.7 mg subcutaneously once a week. 3 mL 3    senna-docusate (SENOKOT-S/PERICOLACE) 8.6-50 MG tablet Take 1-2 tablets by mouth 2 times daily 30 tablet 0       Allergies   Allergen Reactions    Penicillins Nausea    Fluoxetine Itching        Social History     Tobacco Use    Smoking status: Every Day     Current packs/day: 1.00     Average packs/day: 1 pack/day for 10.0 years (10.0 ttl pk-yrs)     Types: Cigarettes    Smokeless tobacco: Never   Substance Use Topics    Alcohol use: Not Currently       History   Drug Use No           Objective    /75   Pulse 76   Temp 98.3  F (36.8  C) (Tympanic)   Resp 18   Ht 1.626 m (5' 4\")   Wt 81.2 kg (179 lb)   LMP 02/05/2024 (Approximate)   SpO2 97%   Breastfeeding No   BMI 30.73 kg/m     Estimated body mass index is 30.73 kg/m  as calculated from the following:    Height as of this encounter: 1.626 m (5' 4\").    Weight as of this encounter: 81.2 kg (179 lb).  Physical Exam  Constitutional:       Appearance: Normal appearance. She is well-developed.   HENT:      " "Head: Normocephalic and atraumatic.      Right Ear: Tympanic membrane and external ear normal. No middle ear effusion.      Left Ear: Tympanic membrane and external ear normal.  No middle ear effusion.      Nose: No mucosal edema.   Neck:      Thyroid: No thyromegaly.      Vascular: No carotid bruit.   Cardiovascular:      Rate and Rhythm: Normal rate and regular rhythm.      Heart sounds: Normal heart sounds.   Pulmonary:      Effort: Pulmonary effort is normal.      Breath sounds: Normal breath sounds.   Abdominal:      General: Bowel sounds are normal.      Palpations: Abdomen is soft.   Skin:     General: Skin is warm and dry.   Neurological:      Mental Status: She is alert.   Psychiatric:         Behavior: Behavior normal.         Lab Results   Component Value Date    WBC 12.2 09/20/2023    WBC 11.5 04/30/2018     Lab Results   Component Value Date    RBC 4.82 09/20/2023    RBC 4.93 04/30/2018     Lab Results   Component Value Date    HGB 13.7 09/20/2023    HGB 13.9 04/30/2018     Lab Results   Component Value Date    HCT 41.6 09/20/2023    HCT 42.6 04/30/2018     No components found for: \"MCT\"  Lab Results   Component Value Date    MCV 86 09/20/2023    MCV 86 04/30/2018     Lab Results   Component Value Date    MCH 28.4 09/20/2023    MCH 28.2 04/30/2018     Lab Results   Component Value Date    MCHC 32.9 09/20/2023    MCHC 32.6 04/30/2018     Lab Results   Component Value Date    RDW 13.2 09/20/2023    RDW 13.9 04/30/2018     Lab Results   Component Value Date     09/20/2023     04/30/2018     Recent Labs   Lab Test 06/27/24  0922 06/18/22  1421    140   POTASSIUM 4.4 3.9   CHLORIDE 102 108   CO2 25 27   ANIONGAP 14 5   GLC 96 80   BUN 13.0 12   CR 0.64 0.62   CARTER 9.6 9.4     Lab Results   Component Value Date    A1C 5.7 02/17/2025       Diagnostics  No labs were ordered during this visit.   No EKG required, no history of coronary heart disease, significant arrhythmia, peripheral arterial " disease or other structural heart disease.    Revised Cardiac Risk Index (RCRI)  The patient has the following serious cardiovascular risks for perioperative complications:   - No serious cardiac risks = 0 points     RCRI Interpretation: 0 points: Class I (very low risk - 0.4% complication rate)         Signed Electronically by: GISEL Johnston CNP  A copy of this evaluation report is provided to the requesting physician.

## 2025-05-14 NOTE — PATIENT INSTRUCTIONS
How to Take Your Medication Before Surgery  Preoperative Medication Instructions   Antiplatelet or Anticoagulation Medication Instructions   - We reviewed the medication list and the patient is not on an antiplatelet or anticoagulation medications.    Additional Medication Instructions  Take all scheduled medications on the day of surgery   - ibuprofen (Advil, Motrin): DO NOT TAKE 1 day before surgery.    - naproxen (Aleve, Naprosyn): DO NOT TAKE 4 days before surgery.    - GLP-1 Injectable (exenitide, liraglutide, semaglutide, dulaglutide, etc.): DO NOT TAKE 7 days before surgery      Please check with your addiction medicine provider to determine if you need to hold Sublocade until after your surgery.    You can call imaging scheduling to set up appointment date, time, and location that works best for you to have x-ray [phone]        Patient Education   Preparing for Your Surgery  For Adults  Getting started  In most cases, a nurse will call to review your health history and instructions. They will give you an arrival time based on your scheduled surgery time. Please be ready to share:  Your doctor's clinic name and phone number  Your medical, surgical, and anesthesia history  A list of allergies and sensitivities  A list of medicines, including herbal treatments and over-the-counter drugs  Whether the patient has a legal guardian (ask how to send us the papers in advance)  Note: You may not receive a call if you were seen at our PAC (Preoperative Assessment Center).  Please tell us if you're pregnant--or if there's any chance you might be pregnant. Some surgeries may injure a fetus (unborn baby), so they require a pregnancy test. Surgeries that are safe for a fetus don't always need a test, and you can choose whether to have one.   Preparing for surgery  Within 10 to 30 days of surgery: Have a pre-op exam (sometimes called an H&P, or History and Physical). This can be done at a clinic or pre-operative  center.  If you're having a , you may not need this exam. Talk to your care team.  At your pre-op exam, talk to your care team about all medicines you take. (This includes CBD oil and any drugs, such as THC, marijuana, and other forms of cannabis.) If you need to stop any medicine before surgery, ask when to start taking it again.  This is for your safety. Many medicines and drugs can make you bleed too much during surgery. Some change how well surgery (anesthesia) drugs work.  Call your insurance company to let them know you're having surgery. (If you don't have insurance, call [phone].)  Call your clinic if there's any change in your health. This includes a scrape or scratch near the surgery site, or any signs of a cold (sore throat, runny nose, cough, rash, fever).  Eating and drinking guidelines  For your safety: Unless your surgeon tells you otherwise, follow the guidelines below.  Eat and drink as normal until 8 hours before you arrive for surgery. After that, no food or milk. You can spit out gum when you arrive.  Drink clear liquids until 2 hours before you arrive. These are liquids you can see through, like water, Gatorade, and Propel Water. They also include plain black coffee and tea (no cream or milk).  No alcohol for 24 hours before you arrive. The night before surgery, stop any drinks that contain THC.  If your care team tells you to take medicine on the morning of surgery, it's okay to take it with a sip of water. No other medicines or drugs are allowed (including CBD oil)--follow your care team's instructions.  If you have questions the day of surgery, call your hospital or surgery center.   Preventing infection  Shower or bathe the night before and the morning of surgery. Follow the instructions your clinic gave you. (If no instructions, use regular soap.)  Don't shave or clip hair near your surgery site. We'll remove the hair if needed.  Don't smoke or vape the morning of surgery. No  chewing tobacco for 6 hours before you arrive. A nicotine patch is okay. You may spit out nicotine gum when you arrive.  For some surgeries, the surgeon will tell you to fully quit smoking and nicotine.  We will make every effort to keep you safe from infection. We will:  Clean our hands often with soap and water (or an alcohol-based hand rub).  Clean the skin at your surgery site with a special soap that kills germs.  Give you a special gown to keep you warm. (Cold raises the risk of infection.)  Wear hair covers, masks, gowns, and gloves during surgery.  Give antibiotic medicine, if prescribed. Not all surgeries need this medicine.  What to bring on the day of surgery  Photo ID and insurance card  Copy of your health care directive, if you have one  Glasses and hearing aids (bring cases)  You can't wear contacts during surgery  Inhaler and eye drops, if you use them (tell us about these when you arrive)  CPAP machine or breathing device, if you use them  A few personal items, if spending the night  If you have . . .  A pacemaker, ICD (cardiac defibrillator), or other implant: Bring the ID card.  An implanted stimulator: Bring the remote control.  A legal guardian: Bring a copy of the certified (court-stamped) guardianship papers.  Please remove any jewelry, including body piercings. Leave jewelry and other valuables at home.  If you're going home the day of surgery  You must have a responsible adult drive you home. They should stay with you overnight as well.  If you don't have someone to stay with you, and you aren't safe to go home alone, we may keep you overnight. Insurance often won't pay for this.  After surgery  If it's hard to control your pain or you need more pain medicine, please call your surgeon's office.  Questions?   If you have any questions for your care team, list them here:    ____________________________________________________________________________________________________________________________________________________________________________________________________________________________________________________________  For informational purposes only. Not to replace the advice of your health care provider. Copyright   2003, 2019 [facility]. All rights reserved. Clinically reviewed by Aaron Callahan MD. [employer] [id number] - REV 08/24.

## 2025-05-15 ENCOUNTER — MYC MEDICAL ADVICE (OUTPATIENT)
Dept: ADDICTION MEDICINE | Facility: CLINIC | Age: 40
End: 2025-05-15
Payer: COMMERCIAL

## 2025-05-19 ENCOUNTER — ANCILLARY PROCEDURE (OUTPATIENT)
Dept: GENERAL RADIOLOGY | Facility: CLINIC | Age: 40
End: 2025-05-19
Attending: NURSE PRACTITIONER
Payer: COMMERCIAL

## 2025-05-19 DIAGNOSIS — M54.41 ACUTE RIGHT-SIDED LOW BACK PAIN WITH RIGHT-SIDED SCIATICA: ICD-10-CM

## 2025-05-19 PROCEDURE — 72202 X-RAY EXAM SI JOINTS 3/> VWS: CPT | Mod: TC | Performed by: STUDENT IN AN ORGANIZED HEALTH CARE EDUCATION/TRAINING PROGRAM

## 2025-05-20 ENCOUNTER — RESULTS FOLLOW-UP (OUTPATIENT)
Dept: FAMILY MEDICINE | Facility: CLINIC | Age: 40
End: 2025-05-20

## 2025-05-20 DIAGNOSIS — M53.3 SACROILIAC JOINT DYSFUNCTION OF BOTH SIDES: Primary | ICD-10-CM

## 2025-05-22 ENCOUNTER — TELEPHONE (OUTPATIENT)
Dept: PALLIATIVE MEDICINE | Facility: OTHER | Age: 40
End: 2025-05-22
Payer: COMMERCIAL

## 2025-05-22 NOTE — TELEPHONE ENCOUNTER
"Screening Questions for Radiology Injections:    Injection to be done at which interventional clinic site? [facility]    If choosing [facility] for location, please inform patient:  \"[facility] is a Hospital based clinic. Before your visit, you should check with your insurance about how it covers the charges for facility services in a hospital-based clinic.     Procedure ordered by Dr. Ludwig    Procedure ordered? Bilateral SI joint injections   Transforaminal Cervical BRUNILDA - Send to [facility]) - No Counts include 234 beds at the [facility] providers perform this procedure    What insurance would patient like us to bill for this procedure? [employer]  IF SCHEDULING IN [place] [facility] OR [facility]INE PLEASE SCHEDULE AT LEAST 7-10 BUSINESS DAYS OUT SO A PA CAN BE OBTAINED  Worker's comp or MVA (motor vehicle accident) -Any injection DO NOT SCHEDULE and route to Robbie Rushing    [employer] insurance - For ALL INJECTIONS DO NOT SCHEDULE and route to Roseann Wong.     ALL BCBS, [employer] and [employer] - DO NOT SCHEDULE and route to Roseann Wong  MEDICA- ALL INJECTIONS- route to Roseann Marvin    Is patient scheduled at [facility]? NO   If YES, route every encounter to [facility] SPINE CENTER CARE NAVIGATION POOL [[phone]]    Is an  needed? No     Patient has a  home? (Review Grid) YES: OK    Any chance of pregnancy? NO   If YES, do NOT schedule and route to RN pool  - Dr. Garcaí route to PM&R Nurse  [[id number]]      Is patient actively being treated for cancer or immunocompromised? No  If YES, do NOT schedule and route to RN pool/ Dr. García's Team    Does the patient have a bleeding or clotting disorder? No   If YES, okay to schedule AND route to RN nurse / Dr. García's Team   (For any patients with platelet count <100, RN must forward to provider)    Is patient taking any Blood Thinners OR Antiplatelet medication?  No   If hold needed, do NOT schedule, route to RN pool/ Dr. García's Team  Examples:   Blood Thinners: " (Coumadin, Warfarin, Jantoven, Pradaxa, Xarelto, Eliquis, Edoxaban, Enoxaparin, Lovenox, Heparin, Arixtra, Fondaparinux or Fragmin)  Antiplatelet Medications: (Plavix, Brilinta or Effient)     Is patient taking any aspirin products (includes: Aspirin 81 mg, Excedrin, and Fiorinal)? No.    If yes route to RN pool/ Dr. García's Team - Do not schedule    Is patient taking any GLP-1 Antagonist (hold needed for sedation patients only) Yes - WEGOVY   (semaglutide (Ozempic, Wegovy), dulaglutide (Trulicity), exenatide ER (Bydureon), tirzepatide (Mounjaro), Liraglutide (Saxenda, Victoza), semaglutide (Rybelsus), Terzepatide (Zepbound)  If YES, okay to schedule AND route to RN nurse / Dr. García's Team      Any allergies to contrast dye, iodine, shellfish, or numbing and steroid medications? No  If YES, schedule and add allergy information to appointment notes AND route to the RN pool/ Dr. García's Team  If BRUNILDA and Contrast Dye / Iodine Allergy? DO NOT SCHEDULE, route to RN pool/ Dr. García's Team  Allergies: Penicillins and Fluoxetine     Does patient have an active infection or treated for one within the past week? No  Is patient currently taking any antibiotics or steroid medications?  No   For patients on chronic, preventative, or prophylactic antibiotics, procedures may be scheduled.   For patients on antibiotics for active or recent infection, schedule 4 days after completed.  For patients on steroid medications, schedule 4 days after completed.     Has the patient had a flu shot or any other vaccinations within the past 7 days? No  If yes, explain that for the vaccine to work best they need to:     wait 1 week before and 1 week after getting any Vaccine  wait 1 week before and 2 weeks after getting any Covid Vaccine   If patient has concerns about the timing, send to RN pool/ Dr. García's Team    Does patient have an MRI/CT?  Not Applicable Include Date and Check Procedure Scheduling Grid to see if required.  Was the  MRI/CT done within the last 3 years?  NA   If no route to RN Pool/ Dr. García's Team  If yes, where was the MRI/CT done?    Refer to PACS Transmissions list for approved external locations and route to RN Pool High Priority/ Dr. Whites Team  If MRI was not done at approved external location do NOT schedule and route to RN pool/ Dr. García's Team    If patient has an imaging disc, the injection MAY be scheduled but patient must bring disc to appt or appt will be cancelled.    Is patient able to transfer to a procedure table with minimal or no assistance? Yes   If no, do NOT schedule and route to RN Pool/ Dr. García's Team    Procedure Specific Instructions:  If celiac plexus block, informed patient NPO for 6 hours and that it is okay to take medications with sips of water, especially blood pressure medications Not Applicable       If this is for a cervical procedure, informed patient that aspirin needs to be held for 6 days.   Not Applicable    Sedation, If Sedation is ordered for any procedure, patient must be NPO for 6 hours prior to procedure Not Applicable    If IV needed:  Do not schedule procedures requiring IV placement in the first appointment of the day or first appointment after lunch. Do NOT schedule at 0745, 0815 or 1245.     Instructed patient to arrive 30 minutes early for IV start if required. (Check Procedure Scheduling Grid)  Not Applicable    Reminders:  If you are started on any steroids or antibiotics between now and your appointment, you must contact us because the procedure may need to be cancelled.  Yes    As a reminder, receiving steroids can decrease your body's ability to fight infection.   Would you still like to move forward with scheduling the injection?  Yes    IV Sedation is not provided for procedures. If oral anti-anxiety medication is needed, the patient should request this from their referring provider.    Instruct patient to arrive as directed prior to the scheduled appointment  time:  If IV needed 30 minutes before appointment time     For patients 85 or older we recommend having an adult stay w/ them for the remainder of the day.     If the patient is Diabetic, remind them to bring their glucometer.    Dr. Madrigal Pt's - Imaging Orders Needed   Please send all injections to RN Pool Not Applicable   Red Flags? Not Applicable    Does the patient have any questions?  NO  Jackie Grider  [facility]

## 2025-05-27 ENCOUNTER — TELEPHONE (OUTPATIENT)
Dept: FAMILY MEDICINE | Facility: CLINIC | Age: 40
End: 2025-05-27
Payer: COMMERCIAL

## 2025-05-27 NOTE — TELEPHONE ENCOUNTER
Prior Authorization Retail Medication Request    Medication/Dose: semaglutide-weight management (WEGOVY) 1.7 mg/ 0.75 mg  Diagnosis and ICD code (if different than what is on RX):    Class 1 obesity due to excess calories without serious comorbidity with body mass index (BMI) of 33.0 to 33.9 in adult [E66.811, E66.09, Z68.33]      Opioid use disorder [F11.90]        New/renewal/insurance change PA/secondary ins. PA:  Previously Tried and Failed:    Rationale:      Insurance [employer]  Primary:   Insurance ID:  [id number]     Secondary (if applicable):  Insurance ID:      Pharmacy Information (if different than what is on RX)  Name:  [facility]  Phone:  [phone]  Fax:[phone]    Clinic Information  Preferred routing pool for dept communication:      Pt arrived with abdominal pain and rectal bleeding from home. Pt has had rectal bleeding before from ulcers. Pt has hx of HTN and asthma. Pt is ao x 4.

## 2025-05-29 ENCOUNTER — INFUSION THERAPY VISIT (OUTPATIENT)
Dept: INFUSION THERAPY | Facility: CLINIC | Age: 40
End: 2025-05-29
Payer: COMMERCIAL

## 2025-05-29 VITALS — RESPIRATION RATE: 16 BRPM | DIASTOLIC BLOOD PRESSURE: 76 MMHG | SYSTOLIC BLOOD PRESSURE: 116 MMHG | HEART RATE: 82 BPM

## 2025-05-29 DIAGNOSIS — F11.20 OPIOID USE DISORDER, SEVERE, DEPENDENCE (H): Primary | ICD-10-CM

## 2025-05-29 PROCEDURE — 96372 THER/PROPH/DIAG INJ SC/IM: CPT

## 2025-05-29 PROCEDURE — 250N000009 HC RX 250

## 2025-05-29 PROCEDURE — 250N000011 HC RX IP 250 OP 636: Mod: JZ

## 2025-05-29 RX ORDER — DIPHENHYDRAMINE HYDROCHLORIDE 50 MG/ML
25 INJECTION, SOLUTION INTRAMUSCULAR; INTRAVENOUS
Start: 2025-06-26

## 2025-05-29 RX ORDER — METHYLPREDNISOLONE SODIUM SUCCINATE 40 MG/ML
40 INJECTION INTRAMUSCULAR; INTRAVENOUS
Start: 2025-06-26

## 2025-05-29 RX ORDER — DIPHENHYDRAMINE HYDROCHLORIDE 50 MG/ML
50 INJECTION, SOLUTION INTRAMUSCULAR; INTRAVENOUS
Start: 2025-06-26

## 2025-05-29 RX ORDER — LIDOCAINE HYDROCHLORIDE 10 MG/ML
2 INJECTION, SOLUTION EPIDURAL; INFILTRATION; INTRACAUDAL; PERINEURAL ONCE
OUTPATIENT
Start: 2025-06-26 | End: 2025-06-26

## 2025-05-29 RX ORDER — LIDOCAINE HYDROCHLORIDE 10 MG/ML
2 INJECTION, SOLUTION EPIDURAL; INFILTRATION; INTRACAUDAL; PERINEURAL ONCE
Status: COMPLETED | OUTPATIENT
Start: 2025-05-29 | End: 2025-05-29

## 2025-05-29 RX ORDER — EPINEPHRINE 1 MG/ML
0.3 INJECTION, SOLUTION, CONCENTRATE INTRAVENOUS EVERY 5 MIN PRN
OUTPATIENT
Start: 2025-06-26

## 2025-05-29 RX ORDER — ALBUTEROL SULFATE 90 UG/1
1-2 INHALANT RESPIRATORY (INHALATION)
Start: 2025-06-26

## 2025-05-29 RX ORDER — MEPERIDINE HYDROCHLORIDE 25 MG/ML
25 INJECTION INTRAMUSCULAR; INTRAVENOUS; SUBCUTANEOUS
OUTPATIENT
Start: 2025-06-26

## 2025-05-29 RX ORDER — ALBUTEROL SULFATE 0.83 MG/ML
2.5 SOLUTION RESPIRATORY (INHALATION)
OUTPATIENT
Start: 2025-06-26

## 2025-05-29 RX ADMIN — LIDOCAINE HYDROCHLORIDE 2 ML: 10 INJECTION, SOLUTION EPIDURAL; INFILTRATION; INTRACAUDAL; PERINEURAL at 15:11

## 2025-05-29 RX ADMIN — BUPRENORPHINE 100 MG: 100 SOLUTION SUBCUTANEOUS at 15:12

## 2025-05-29 NOTE — TELEPHONE ENCOUNTER
Note: Due to record-high volumes, our turn-around time is taking longer than usual . We are currently 3  business days behind in the pools.   We are working diligently to submit all requests in a timely manner and in the order they are received. Please only flag TRUE URGENT requests as high priority to the pool at this time.   If you have questions on status of PA's,  please send a note/message in the active PA encounter and send back to the [facility] PA pool [[id number]].    If you have questions about the turn-around time or about our process, please reach out to our supervisor Maria Montesinos.   Thank you!   RPPA (Retail Pharmacy Prior Authorization) team    Prior Authorization Approval    Authorization Effective Date: 5/29/2025  Authorization Expiration Date: 5/29/2026  Medication: Wegovy-APPROVED  Reference #:     Insurance Company: [employer] - Phone [phone] Fax [phone]  Which Pharmacy is filling the prescription (Not needed for infusion/clinic administered): [facility] [address] [place] [address]  Pharmacy Notified: Yes  Patient Notified: Instructed pharmacy to notify patient when script is ready to /ship.

## 2025-05-29 NOTE — PROGRESS NOTES
Infusion Nursing Note:  Jennifer VASQUEZ Shanika presents today for Sublocade injection.    Patient seen by provider today: No   present during visit today: Not Applicable.    Note: Pt denies any new health changes or concerns.      Intravenous Access:  N/A.    Treatment Conditions:  Not Applicable.      Post Infusion Assessment:  Patient tolerated injection without incident.       Discharge Plan:   Discharge instructions reviewed with: Patient.  Patient and/or family verbalized understanding of discharge instructions and all questions answered.  Patient discharged in stable condition accompanied by: self.  Departure Mode: Ambulatory.      Kirstin Khanna RN

## 2025-05-29 NOTE — TELEPHONE ENCOUNTER
Retail Pharmacy Prior Authorization Team   Phone: [phone]    PA Initiation    Medication: WEGOVY 1.7 MG/0.75ML SC SOAJ  Insurance Company: [employer] - Phone [phone] Fax [phone]  Pharmacy Filling the Rx: [facility] [address] [place] [address]  Filling Pharmacy Phone: [phone]  Filling Pharmacy Fax:    Start Date: 5/29/2025

## 2025-05-30 ENCOUNTER — HOSPITAL ENCOUNTER (OUTPATIENT)
Facility: AMBULATORY SURGERY CENTER | Age: 40
Discharge: HOME | End: 2025-05-30
Attending: PLASTIC SURGERY | Admitting: PLASTIC SURGERY
Payer: COMMERCIAL

## 2025-05-30 VITALS
SYSTOLIC BLOOD PRESSURE: 119 MMHG | DIASTOLIC BLOOD PRESSURE: 78 MMHG | TEMPERATURE: 97.5 F | HEART RATE: 83 BPM | WEIGHT: 177 LBS | OXYGEN SATURATION: 96 % | RESPIRATION RATE: 16 BRPM | BODY MASS INDEX: 30.38 KG/M2

## 2025-05-30 DIAGNOSIS — G56.01 CARPAL TUNNEL SYNDROME OF RIGHT WRIST: Primary | ICD-10-CM

## 2025-05-30 PROCEDURE — 64721 CARPAL TUNNEL SURGERY: CPT | Mod: RT

## 2025-05-30 PROCEDURE — G8907 PT DOC NO EVENTS ON DISCHARG: HCPCS

## 2025-05-30 PROCEDURE — 64721 CARPAL TUNNEL SURGERY: CPT | Mod: RT | Performed by: PLASTIC SURGERY

## 2025-05-30 PROCEDURE — G8918 PT W/O PREOP ORDER IV AB PRO: HCPCS

## 2025-05-30 RX ORDER — SODIUM CHLORIDE, SODIUM LACTATE, POTASSIUM CHLORIDE, CALCIUM CHLORIDE 600; 310; 30; 20 MG/100ML; MG/100ML; MG/100ML; MG/100ML
INJECTION, SOLUTION INTRAVENOUS CONTINUOUS
Status: DISCONTINUED | OUTPATIENT
Start: 2025-05-30 | End: 2025-05-31 | Stop reason: HOSPADM

## 2025-05-30 RX ORDER — ONDANSETRON 4 MG/1
4 TABLET, ORALLY DISINTEGRATING ORAL EVERY 8 HOURS PRN
Qty: 4 TABLET | Refills: 0 | Status: SHIPPED | OUTPATIENT
Start: 2025-05-30

## 2025-05-30 RX ORDER — FENTANYL CITRATE 50 UG/ML
25 INJECTION, SOLUTION INTRAMUSCULAR; INTRAVENOUS EVERY 5 MIN PRN
Status: DISCONTINUED | OUTPATIENT
Start: 2025-05-30 | End: 2025-05-31 | Stop reason: HOSPADM

## 2025-05-30 RX ORDER — DEXAMETHASONE SODIUM PHOSPHATE 4 MG/ML
4 INJECTION, SOLUTION INTRA-ARTICULAR; INTRALESIONAL; INTRAMUSCULAR; INTRAVENOUS; SOFT TISSUE
Status: DISCONTINUED | OUTPATIENT
Start: 2025-05-30 | End: 2025-05-31 | Stop reason: HOSPADM

## 2025-05-30 RX ORDER — AMOXICILLIN 250 MG
1-2 CAPSULE ORAL 2 TIMES DAILY
Qty: 30 TABLET | Refills: 0 | Status: SHIPPED | OUTPATIENT
Start: 2025-05-30

## 2025-05-30 RX ORDER — ONDANSETRON 4 MG/1
4 TABLET, ORALLY DISINTEGRATING ORAL EVERY 30 MIN PRN
Status: DISCONTINUED | OUTPATIENT
Start: 2025-05-30 | End: 2025-05-31 | Stop reason: HOSPADM

## 2025-05-30 RX ORDER — ACETAMINOPHEN 325 MG/1
975 TABLET ORAL ONCE
Status: COMPLETED | OUTPATIENT
Start: 2025-05-30 | End: 2025-05-30

## 2025-05-30 RX ORDER — NALOXONE HYDROCHLORIDE 0.4 MG/ML
0.1 INJECTION, SOLUTION INTRAMUSCULAR; INTRAVENOUS; SUBCUTANEOUS
Status: DISCONTINUED | OUTPATIENT
Start: 2025-05-30 | End: 2025-05-31 | Stop reason: HOSPADM

## 2025-05-30 RX ORDER — LIDOCAINE 40 MG/G
CREAM TOPICAL
Status: DISCONTINUED | OUTPATIENT
Start: 2025-05-30 | End: 2025-05-31 | Stop reason: HOSPADM

## 2025-05-30 RX ORDER — OXYCODONE HYDROCHLORIDE 5 MG/1
5-10 TABLET ORAL EVERY 6 HOURS PRN
Qty: 5 TABLET | Refills: 0 | Status: SHIPPED | OUTPATIENT
Start: 2025-05-30

## 2025-05-30 RX ORDER — ONDANSETRON 2 MG/ML
4 INJECTION INTRAMUSCULAR; INTRAVENOUS EVERY 30 MIN PRN
Status: DISCONTINUED | OUTPATIENT
Start: 2025-05-30 | End: 2025-05-31 | Stop reason: HOSPADM

## 2025-05-30 RX ORDER — FENTANYL CITRATE 50 UG/ML
50 INJECTION, SOLUTION INTRAMUSCULAR; INTRAVENOUS EVERY 5 MIN PRN
Status: DISCONTINUED | OUTPATIENT
Start: 2025-05-30 | End: 2025-05-31 | Stop reason: HOSPADM

## 2025-05-30 RX ADMIN — ACETAMINOPHEN 975 MG: 325 TABLET ORAL at 09:20

## 2025-05-30 NOTE — OP NOTE
PREOPERATIVE DIAGNOSIS:  Right carpal tunnel syndrome.       POSTOPERATIVE DIAGNOSIS: Right carpal tunnel syndrome.       PROCEDURES: Right open carpal tunnel release.       SURGEON:  Marta De La Garza MD.     RESIDENT: Contreras Sykes MD      ANESTHESIA:  Local MAC      COMPLICATIONS:  Nil.       DRAINS:  Nil.       SPECIMENS:  Nil.       BLOOD LOSS:  1 mL.       DESCRIPTION OF PROCEDURE:  After informed consent was obtained from the patient, the proper site was discussed with and appropriately marked in the procedure room.  The patient was placed in supine position with the knees comfortably flexed and pillows underneath them. MAC was administered.  The hand was then surgically prepped and draped in a standard surgical fashion. I injected 1% lidocaine mixed 1:100,000 epinephrine 20 mL in the wrist area. An Esmarch was used to exsanguinate the hand and forearm and the tourniquet was elevated to 250 mmHg pressure.  I then made an incision over the proximal palm in line with the radial aspect of the ring finger.  I then bluntly dissected down to the palmar fascia, opened the palmar fascia and then identified the transverse carpal ligament.  I looked for any nerve-like structure passing superficial to the transcarpal ligament, which I did not see.  I then made an incision with a 15 blade through the transcarpal ligament, again in line with the ulnar aspect of the ring finger, in an effort to leave part of the transcarpal ligament over the underlying median nerve.  I then went ahead and distally cut the transcarpal ligament with a sharp pair of scissors until the deep fat of the hand was identified and the entire transcarpal ligament distally was released.  Then proximally under direct vision, I released the transcarpal ligament and the flexor retinaculum, again releasing the entire median nerve in the process.  Within the carpal tunnel,  I did not see any masses.  I then let the tourniquet down.  Minimal bleeding was  seen superficially.  No deep bleeding was seen.  It was controlled with bipolar cautery.  I then closed the incision with 3-0 nylon suture in an interrupted horizontal mattress fashion.  A sterile dressing and an ACE wrap were placed.  The patient tolerated the procedure well.  All counts were correct at the end of the case.  The patient was sent to recovery room in stable condition.

## 2025-05-30 NOTE — DISCHARGE INSTRUCTIONS
You had 975 mg of Tylenol at 9:30 AM. You may repeat this after 3:30 PM. Maximum amount of Tylenol/Acetaminophen in a 24 hour period is 4,000 mg.      CARPAL AND ULNAR NERVE RELEASE POST-OPERATIVE INSTRUCTIONS    Instructions      Have someone drive you home after surgery and help you at home for 1-2      days.     Get plenty of rest.     Follow balanced diet.     Decreased activity may promote constipation, so you may want to add      more raw fruit to your diet, and be sure to increase fluid intake.     Take pain medication as prescribed. Do not take aspirin or any products      containing aspirin.     Do not drink alcohol when taking pain medications.     Even when not taking pain medications, no alcohol for 3 weeks as it      causes fluid retention.     If you are taking vitamins with iron, resume these as tolerated.     Do not smoke, as smoking delays healing and increases the risk of      complications.    Activities     Do not drive until you are no longer taking any pain medications      (narcotics).     Start walking as soon as possible, this helps to reduce swelling and       lowers the chance of blood clots.     Resume normal activities gradually.     Return to work in 1-2 days, depending upon extent of surgery     No strenuous work or stress on wound for 2-3 weeks.    Incision Care     Keep ACE wrap on for 3 days then discontinue. Keep dry until then with plastic bag. After 3 days may get wet. Every hour raise your hand above your head and pump your fist 10 times. Rewrap the ACE if it gets loose or too tight.      Avoid exposing scars to sun for at least 12 months.     Always use a strong sunblock, if sun exposure is unavoidable (SPF 50 or      greater).     Inspect daily for signs of infection.     No tub soaking, bathing, or swimming while sutures or drains are in place.     Keep area clean and dry for first 24 hours.     What to Expect     Some swelling and bruising.     May have slight bleeding from  incision.  Apply 4 x 4 gauze with slight pressure to       control bleeding.     Skin grafts and flaps may take several weeks or months to heal; a support garment or      bandage may be necessary for up to a year.    Appearance     Final results of surgery may take a year or more.    Follow-Up Care     If external sutures have been used, they will be removed in 5-14 days.    Please note my office will call you 1-2 business days after your procedure to check up on how you're doing and to schedule your post-operative appointment.        When to Call     If you have increased swelling or bruising.     If swelling and redness persist after a few days.     If you have increased redness along the incision.     If you have severe or increased pain not relieved by medication.     If you have any side effects to medications; such as, rash, nausea,      headache, vomiting.     If you have an oral temperature over 100.4 degrees.     If you have any yellowish or greenish drainage from the incisions or      notice a foul odor.     If you have bleeding from the incisions that is difficult to control with      light pressure.     If you have loss of feeling or motion.    For Medical Questions, Please Call:     [phone], Monday - Friday, 8 a.m. - 4:30 p.m.     After hours and on weekends, call Hospital Paging at [phone] and      ask for the Plastic Surgeon on call.

## 2025-05-30 NOTE — BRIEF OP NOTE
[facility]    Brief Operative Note    Pre-operative diagnosis: Carpal tunnel syndrome of right wrist [G56.01]  Post-operative diagnosis Same as pre-operative diagnosis    Procedure: RELEASE, CARPAL TUNNEL, right side, Right - Wrist    Surgeon: Surgeons and Role:     * PEDRO De La Garza MD - Primary  Anesthesia: MAC with Local   Estimated Blood Loss: 1 mL    Drains: None  Specimens: * No specimens in log *  Findings:   Refer to Op Note.  Complications: None.  Implants: * No implants in log *      Contreras Evans MD  Plastic Surgery Resident, PGY-1

## 2025-06-04 ENCOUNTER — VIRTUAL VISIT (OUTPATIENT)
Dept: ADDICTION MEDICINE | Facility: CLINIC | Age: 40
End: 2025-06-04
Payer: COMMERCIAL

## 2025-06-04 DIAGNOSIS — F39 MOOD DISORDER: ICD-10-CM

## 2025-06-04 DIAGNOSIS — F41.1 GAD (GENERALIZED ANXIETY DISORDER): ICD-10-CM

## 2025-06-04 DIAGNOSIS — F11.20 OPIOID USE DISORDER, SEVERE, DEPENDENCE (H): Primary | ICD-10-CM

## 2025-06-04 ASSESSMENT — PAIN SCALES - GENERAL: PAINLEVEL_OUTOF10: NO PAIN (0)

## 2025-06-04 NOTE — NURSING NOTE
Is the patient currently in the state of [place]? YES    Current patient location: [address] [place] [id number]    Visit mode:Video    If the visit is dropped, the patient can be reconnected by: VIDEO VISIT: Text to cell phone:   Telephone Information:   Mobile [phone]       Will anyone else be joining the visit? No  (If patient encounters technical issues they should call [phone])    Are changes needed to the allergy or medication list? Pt stated no changes to allergies and Pt stated no med changes    Are refills needed on medications prescribed by this physician? No    Rooming Documentation: Questionnaire(s) completed.    Reason for visit: EDWIN Pena, VVF

## 2025-06-04 NOTE — PROGRESS NOTES
Virtual Visit Details    Type of service:  Video Visit     Joined the call at 6/4/2025, 1:56:54 pm.  Left the call at 6/4/2025, 2:03:19 pm.    Originating Location (pt. Location): Home    Distant Location (provider location):  Off-site  Platform used for Video Visit: [facility]    A/P                                                    ASSESSMENT/PLAN  1. Opioid use disorder, severe, dependence (H) (Primary)  -controlled in early remission.  No cravings.  Recent post op prescription for oxy without issue or triggers.  Post op pain managed.    -continue monthly sublocade   - Healing well post-surgery for carpal tunnel syndrome. No issues with numbness or pain in the wrist.  - Continue current course. Next follow-up scheduled for July 30, 2025.  - Monthly Sublocade injection is ongoing. No issues with anxiety or mood changes. No triggering with oxycodone use.  - Next Sublocade injection due on June 26, 2025. Schedule labs for the end of next week to ensure insurance compliance.  -Counseling provided regarding   Opioid warning reviewed.    Risk of overdose following a period of abstinence due to decrease tolerance was discussed including risk of death.     Strongly recommended abstain from alcohol, benzodiazepines, THC, opioids and other drugs of abuse.     Increased risk of return to opioid use after use of these substances discussed.     Increased risk of overdose/death with use of other substances particularly benzodiazepines/alcohol reviewed.  -2 month follow up    2. RACH (generalized anxiety disorder)  3. Mood disorder  -no change   -continue with strattera, lexapro, gabapentin, lamictal, zyprexa, vallium as prescribed by psychiatry  -follow up with psychiatry as scheduled       Jun 4, 2025  - monthly sublocade      Continued Complex Management  The longitudinal plan of care for Opioid Use Disorder (OUD) was addressed during this visit. Due to the added complexity  in care, I will continue to support Jennifer in the subsequent management and with ongoing continuity of care.      Last encounter A/P  1. Opioid use disorder, severe, dependence (H) (Primary)  -showing improvement, no use since starting monthly sublocade.  Early remission  -continue with monthly sublocade  -recent uds negative for non-prescribed substances  -UDS prior to next appointment in two months.  (Unless Insurance requires update)   -continue with therapy  --Counseling provided regarding   Opioid warning reviewed.    Risk of overdose following a period of abstinence due to decrease tolerance was discussed including risk of death.                Strongly recommended abstain from alcohol, benzodiazepines, THC, opioids and other drugs of abuse.                Increased risk of return to opioid use after use of these substances discussed.                     Increased risk of overdose/death with use of other substances particularly benzodiazepines/alcohol reviewed.  -2 month follow up        2. RACH (generalized anxiety disorder)  3. Mood disorder  -no change  -continue with strattera, lexapro, gabapentin, lamictal, zyprexa, vallium as prescribed by psychiatry  -follow up with psychiatry as scheduled   -encouraged exercise         Apr 2, 2025  - monthly sublocade      PDMP Review         Value Time User    State PDMP site checked  Yes 6/4/2025  2:05 PM Vee Wells NP              RTC  Return in about 2 months (around 8/4/2025).      Counseled the patient on the importance of having a recovery program in addition to medication to manage recovery.  Components include avoiding isolating, having willingness to change, avoiding triggers and managing cravings. Encouraged having some type of sober network and practicing honesty with trusted support person(s). Encouraged other services such as counseling, 12 step or other self-help organizations.      Opioid warning reviewed.  Risk of overdose following a period of  abstinence due to decrease tolerance was discussed including risk of death.  Strongly recommended abstain from alcohol, benzodiazepines, THC, opioids and other drugs of abuse.  Increased risk of return to opioid use after use of these substances discussed.  Increased risk of overdose/death with use of other substances particularly benzodiazepines/alcohol reviewed.        SUBJECTIVE                                                    Jennifer Voss is a 40 year old female who presents to clinic today for follow up    Visit performed In virtual video       Substance Use History :  Opioids:   Age at first use: 38  Current use: substance: Fentanyl ; quantity 8-10 pills; route: smoking ; timing of last use: 2024;                 IV drug use: Yes:    History of overdose: Yes:   Previous residential or outpatient treatments for addiction : No  Previous medication treatments for addiction: No  Longest period of sobriety:   Medical complications related to substance use:   Hepatitis C: non-reactive; Date of most recent testin23  HIV: non-reactive; Date of most recent testin23     Other Addiction History:  Stimulants (cocaine, methamphetamine, MDMA/ecstasy)   2 years ago  Sedatives/hypnotics/anxiolytics: (benzodiazepines, GHB, Ambien, phenobarbital)  Current valium prescription  Alcohol:      Nicotine: (cigarettes, vaping, chew/snuff)  Daily   Cannabis:      Hallucinogens/Dissociatives: (acid, mushrooms, ketamine)     Eating disorder:     Gambling:                    [place] Prescription Drug Monitoring Program Reviewed:  Yes;         PAST PSYCHIATRIC HISTORY:  Diagnoses- depression, anxiety, panic disorder, PTSD   Suicide Attempts: Yes   Hospitalizations:      Social History  Housing status: alone  Education:   Employment status: works in school system.  Off for summer, returns to work Aug 7  Relationship status: Single  Children:   Legal:     Recent HPI Details:  HPI 2025  - Next  sublocade injection scheduled tomorrow.   No use since starting sublocade,  cravings controlled, no side effects, no site reactions.    No additional SL suboxone since starting sublocade and GLP-1       Ozempic now at 1.7weight 177 (from 196) changed diet, cooking instead of eating chicken nuggets      Having low back pain working with PT, and neurology, has a bulging disk. Considering getting an injection    TODAY'S VISIT  HPI Jun 4, 2025  History of Present Illness-  - Jennifer Voss, 40-year-old female.  - Recently underwent surgery for carpal tunnel syndrome on the right hand.  - Surgery aimed to relieve numbness in the hand.  - No issues with mood or anxiety reported.  - Last dose of prescribed oxycodone taken on Sunday prior to the encounter.  - Monthly Sublocade injection received recently, next due on June 26, 2025.  - No additional pain medication needed post-surgery.  - Psychosocial details: Jennifer has completed her school year and is ready for a nap after driving home from the cabin. She plans to work out in the yard. She has cats that stay indoors.        OBJECTIVE  PHYSICAL EXAM:  [facility] 02/05/2024 (Approximate)     GENERAL: healthy, alert and no distress  EYES: Eyes grossly normal to inspection, PERRL and conjunctivae and sclerae normal  RESP: No respiratory distress  MENTAL STATUS EXAM  Appearance/Behavior: No appearant distress  Speech: Normal  Mood/Affect: normal affect  Insight: Adequate      PHQ-9 Score:       2/4/2025     1:31 PM 4/2/2025     9:56 AM 5/5/2025     5:56 AM   PHQ   PHQ-9 Total Score 6  7  6    Q9: Thoughts of better off dead/self-harm past 2 weeks Not at all Not at all Not at all       Patient-reported       RACH-7 Score:      6/27/2024     8:11 AM 9/26/2024     3:55 PM 12/26/2024     8:11 AM   RCAH-7 SCORE   Total Score 11 (moderate anxiety) 13 (moderate anxiety) 6 (mild anxiety)   Total Score 11 13 6        Patient-reported       LABS (may not contain today's labs)                                                       Today's lab data  No results found for any visits on 06/04/25.        HISTORY                                                    Problem list reviewed & adjusted, as indicated.  Patient Active Problem List   Diagnosis    Tobacco abuse    Depression with anxiety    Cervical high risk HPV (human papillomavirus) test positive    History of poor pregnancy outcome    Overweight (BMI 25.0-29.9)    Chronic bilateral low back pain without sciatica    Acute right-sided low back pain with right-sided sciatica    Left leg numbness    Bulging lumbar disc    Schmorl's node    Right hip pain    Lumbosacral plexopathy    Mood disorder    RACH (generalized anxiety disorder)    Panic disorder without agoraphobia    PTSD (post-traumatic stress disorder)    Opioid use disorder, severe, dependence (H)    Carpal tunnel syndrome of right wrist    Left carpal tunnel syndrome    Drug induced constipation    Gastroesophageal reflux disease with esophagitis without hemorrhage    Class 1 obesity due to excess calories without serious comorbidity with body mass index (BMI) of 33.0 to 33.9 in adult    Chronic bilateral low back pain with right-sided sciatica    Family history of malignant neoplasm of breast         MEDICATION LIST (after visit)  Current Outpatient Medications   Medication Sig Dispense Refill    albuterol (PROAIR HFA/PROVENTIL HFA/VENTOLIN HFA) 108 (90 Base) MCG/ACT inhaler Inhale 1-2 puffs into the lungs every 6 hours as needed for shortness of breath, wheezing or cough. 18 g 1    Buprenorphine ER 64 MG/0.18ML SOSY Inject subcutaneously.      cyclobenzaprine (FLEXERIL) 10 MG tablet Take 1 tablet by mouth three times daily as needed for muscle spasm 30 tablet 2    diazepam (VALIUM) 5 MG tablet Take 1-2 tablets (5-10 mg) by mouth daily as needed for anxiety. 15 tabs to last 30 days 15 tablet 2    escitalopram (LEXAPRO) 20 MG tablet Take 1 tablet (20 mg) by mouth daily. 90 tablet 3    estradiol  (ESTRACE) 0.5 MG tablet Take 1 tablet by mouth once daily 90 tablet 1    famotidine (PEPCID) 20 MG tablet Take 1 tablet (20 mg) by mouth daily. 90 tablet 3    gabapentin (NEURONTIN) 100 MG capsule Take 1-3 caps by mouth in AM and mid day 150 capsule 1    gabapentin (NEURONTIN) 300 MG capsule Take 1 capsule (300 mg) by mouth 3 times daily as needed for neuropathic pain. 90 capsule 1    gabapentin (NEURONTIN) 800 MG tablet Take one tab by mouth at bedtime and can take half-tab daily as needed for pain and anxiety. 135 tablet 1    ibuprofen (ADVIL/MOTRIN) 800 MG tablet Take 1 tablet (800 mg) by mouth every 6 hours as needed for other (mild and/or inflammatory pain) 30 tablet 0    lamoTRIgine (LAMICTAL) 200 MG tablet Take 1 tablet (200 mg) by mouth daily. 90 tablet 3    naloxone (NARCAN) 4 MG/0.1ML nasal spray Spray 1 spray (4 mg) into one nostril alternating nostrils as needed for opioid reversal every 2-3 minutes until assistance arrives 0.2 mL 11    OLANZapine (ZYPREXA) 10 MG tablet Take 0.5-1 tablets (5-10 mg) by mouth daily as needed (anxiety, sleep). 90 tablet 1    ondansetron (ZOFRAN ODT) 4 MG ODT tab Take 1 tablet (4 mg) by mouth every 8 hours as needed for nausea. 4 tablet 0    oxyCODONE (ROXICODONE) 5 MG tablet Take 1-2 tablets (5-10 mg) by mouth every 6 hours as needed for moderate to severe pain. 5 tablet 0    progesterone (PROMETRIUM) 100 MG capsule Take 1 capsule by mouth once daily 90 capsule 1    Semaglutide-Weight Management (WEGOVY) 1.7 MG/0.75ML pen Inject 1.7 mg subcutaneously once a week. 3 mL 3    senna-docusate (SENOKOT-S/PERICOLACE) 8.6-50 MG tablet Take 1-2 tablets by mouth 2 times daily. 30 tablet 0     No current facility-administered medications for this visit.         Allergies   Allergen Reactions    Morphine And Codeine Nausea     Ok with vicodin    Penicillins Nausea    Fluoxetine Itching       Additional MDM Details:  none      Vee Wells NP  [facility]  [phone]

## 2025-06-06 ENCOUNTER — TELEPHONE (OUTPATIENT)
Dept: ORTHOPEDICS | Facility: CLINIC | Age: 40
End: 2025-06-06
Payer: COMMERCIAL

## 2025-06-06 NOTE — TELEPHONE ENCOUNTER
FMLA forms received via fax from [employer].    Faxed to ortho department [phone].    Ivon Montano LPN

## 2025-06-11 ENCOUNTER — THERAPY VISIT (OUTPATIENT)
Dept: PHYSICAL THERAPY | Facility: CLINIC | Age: 40
End: 2025-06-11
Payer: COMMERCIAL

## 2025-06-11 DIAGNOSIS — M54.41 CHRONIC BILATERAL LOW BACK PAIN WITH RIGHT-SIDED SCIATICA: Primary | ICD-10-CM

## 2025-06-11 DIAGNOSIS — G89.29 CHRONIC BILATERAL LOW BACK PAIN WITH RIGHT-SIDED SCIATICA: Primary | ICD-10-CM

## 2025-06-11 PROCEDURE — 97140 MANUAL THERAPY 1/> REGIONS: CPT | Mod: GP | Performed by: PHYSICAL THERAPIST

## 2025-06-12 ENCOUNTER — LAB (OUTPATIENT)
Dept: LAB | Facility: CLINIC | Age: 40
End: 2025-06-12
Payer: COMMERCIAL

## 2025-06-12 DIAGNOSIS — F11.23 OPIOID DEPENDENCE WITH WITHDRAWAL (H): ICD-10-CM

## 2025-06-12 DIAGNOSIS — F11.20 OPIOID USE DISORDER, SEVERE, DEPENDENCE (H): ICD-10-CM

## 2025-06-17 ENCOUNTER — OFFICE VISIT (OUTPATIENT)
Dept: ORTHOPEDICS | Facility: CLINIC | Age: 40
End: 2025-06-17
Payer: COMMERCIAL

## 2025-06-17 DIAGNOSIS — G56.01 CARPAL TUNNEL SYNDROME OF RIGHT WRIST: Primary | ICD-10-CM

## 2025-06-17 PROCEDURE — 99024 POSTOP FOLLOW-UP VISIT: CPT | Performed by: PLASTIC SURGERY

## 2025-06-17 NOTE — PROGRESS NOTES
PRESENTING COMPLAINT:  Post-operative visit s/p right carpal tunnel release done on 5/30/2025     HISTORY OF PRESENTING COMPLAINT: The patient is here for post-operative visit.  The patient is being seen in the presence of my nurse.     Doing well.  Symptoms improved.  No issues.    Healed.     ASSESSMENT AND PLAN:  Based upon the above findings, the patient is here for post-operative visit.     Advise aggressive moisturization and refrain from any heavy activities for a total of 6 weeks.  Will plan the left carpal tunnel release in the coming months.    All questions were answered.  The patient was happy with the visit.

## 2025-06-17 NOTE — LETTER
6/17/2025      Jennifer Voss  [address]  [place] [address]      Dear Colleague,    Thank you for referring your patient, Jennifer Voss, to the [facility]. Please see a copy of my visit note below.    PRESENTING COMPLAINT:  Post-operative visit s/p right carpal tunnel release done on 5/30/2025     HISTORY OF PRESENTING COMPLAINT: The patient is here for post-operative visit.  The patient is being seen in the presence of my nurse.     Doing well.  Symptoms improved.  No issues.    Healed.     ASSESSMENT AND PLAN:  Based upon the above findings, the patient is here for post-operative visit.     Advise aggressive moisturization and refrain from any heavy activities for a total of 6 weeks.  Will plan the left carpal tunnel release in the coming months.    All questions were answered.  The patient was happy with the visit.        Again, thank you for allowing me to participate in the care of your patient.        Sincerely,        PEDRO De La Garza MD    Electronically signed

## 2025-06-18 ENCOUNTER — PREP FOR PROCEDURE (OUTPATIENT)
Dept: ORTHOPEDICS | Facility: CLINIC | Age: 40
End: 2025-06-18
Payer: COMMERCIAL

## 2025-06-18 ENCOUNTER — TELEPHONE (OUTPATIENT)
Dept: ORTHOPEDICS | Facility: CLINIC | Age: 40
End: 2025-06-18
Payer: COMMERCIAL

## 2025-06-18 NOTE — TELEPHONE ENCOUNTER
Outgoing call to schedule surgery with Dr. De La Garza.    Spoke with: Jennifer (patient)    Reason for Surgical Date: Patient requested end of May 2026.     Date of Surgery: 5/29/2026    Estimated Arrival time Discussed with Patient:  No    Location of surgery: [facility]     Pre-Op H&P: Will schedule with PCP - Shannon Almeida, GISEL CNP.     Imaging: No     Additional Pre-Op Appointments: No     Post-Op Appt Date w/ NP/PA:  Juana Campoverde PA-C  6/9/2026 at 10:00AM    Additional Post-Op Appointments: No     Discussed with patient pre-op RN will call 2-3 days prior to surgery with arrival time and instructions:  Yes     Standard Surgery Packet Sent: Yes 06/18/25  via [employer]      Additional Comments: Patient requested to be asleep during surgery. Informed patient that I will pass that message to Dr. De La Garza and his team.       Tena Hopkins on 6/18/2025 at 11:22 AM

## 2025-06-18 NOTE — TELEPHONE ENCOUNTER
Procedure: Left open carpal tunnel release  Facility: [facility]  Length: 20 minutes  Anesthesia: Local  Post-op appointments needed: 2 weeks with PA, 6 weeks with surgeon  Surgery packet/instructions given to patient?  to be mailed or sent via [facility]

## 2025-06-21 NOTE — PROGRESS NOTES
"SUBJECTIVE:                                                    Jennifer Voss is a 32 year old female who presents to clinic today for the following health issues:    *  Follow up from ED, recheck labs    Has had miscarriage in past and ectopic pregnancy in past  She denies cramping. Several days of vaginal spotting, no heavy bleeding/clots. She has had \"morning sickness\" the past week but denies signs of dehydration. No lower abdominal pain.  LMP around 11/16/17      Problem list and histories reviewed & adjusted, as indicated.  Additional history: none    ROS:  Other than noted above, general, HEENT, respiratory, cardiac, MS, and gastrointestinal systems are negative.     OBJECTIVE:                                                    /86 (BP Location: Right arm, Patient Position: Sitting, Cuff Size: Adult Regular)  Pulse 85  Temp 98.8  F (37.1  C) (Tympanic)  Wt 176 lb 14.4 oz (80.2 kg)  LMP 11/13/2017  BMI 30.36 kg/m2 Body mass index is 30.36 kg/(m^2).   GENERAL: healthy, alert, well nourished, well hydrated, no distress  RESP: lungs clear to auscultation - no rales, no rhonchi, no wheezes  CV: regular rates and rhythm, normal S1 S2, no S3 or S4 and no murmur, no click or rub -  ABDOMEN: soft, no tenderness, no  hepatosplenomegaly, no masses, normal bowel sounds       ASSESSMENT/PLAN:                                                      ASSESSMENT/PLAN:      ICD-10-CM    1. Vaginal bleeding in pregnancy O46.90 HCG quantitative pregnancy     Discussed likely miscarriage/non-progressing pregnancy. Will recheck HCG Monday.  Red flag signs to be seen urgently were discussed.    Stacia Ferrer PA-C   [facility]        " D/C papers printed and reviewed with patient. Heart monitor removed, cleaned and placed in designated drawer.

## 2025-06-22 DIAGNOSIS — E66.09 CLASS 1 OBESITY DUE TO EXCESS CALORIES WITHOUT SERIOUS COMORBIDITY WITH BODY MASS INDEX (BMI) OF 33.0 TO 33.9 IN ADULT: ICD-10-CM

## 2025-06-22 DIAGNOSIS — E66.811 CLASS 1 OBESITY DUE TO EXCESS CALORIES WITHOUT SERIOUS COMORBIDITY WITH BODY MASS INDEX (BMI) OF 33.0 TO 33.9 IN ADULT: ICD-10-CM

## 2025-06-22 DIAGNOSIS — F11.90 OPIOID USE DISORDER: ICD-10-CM

## 2025-06-23 RX ORDER — SEMAGLUTIDE 1.7 MG/.75ML
INJECTION, SOLUTION SUBCUTANEOUS
Qty: 6 ML | Refills: 0 | Status: SHIPPED | OUTPATIENT
Start: 2025-06-23

## 2025-06-24 ENCOUNTER — TELEPHONE (OUTPATIENT)
Dept: PALLIATIVE MEDICINE | Facility: OTHER | Age: 40
End: 2025-06-24
Payer: COMMERCIAL

## 2025-06-25 ENCOUNTER — RADIOLOGY INJECTION OFFICE VISIT (OUTPATIENT)
Dept: PALLIATIVE MEDICINE | Facility: OTHER | Age: 40
End: 2025-06-25
Attending: STUDENT IN AN ORGANIZED HEALTH CARE EDUCATION/TRAINING PROGRAM
Payer: COMMERCIAL

## 2025-06-25 VITALS — SYSTOLIC BLOOD PRESSURE: 129 MMHG | HEART RATE: 73 BPM | OXYGEN SATURATION: 93 % | DIASTOLIC BLOOD PRESSURE: 82 MMHG

## 2025-06-25 DIAGNOSIS — E66.09 CLASS 1 OBESITY DUE TO EXCESS CALORIES WITHOUT SERIOUS COMORBIDITY WITH BODY MASS INDEX (BMI) OF 33.0 TO 33.9 IN ADULT: ICD-10-CM

## 2025-06-25 DIAGNOSIS — E66.811 CLASS 1 OBESITY DUE TO EXCESS CALORIES WITHOUT SERIOUS COMORBIDITY WITH BODY MASS INDEX (BMI) OF 33.0 TO 33.9 IN ADULT: ICD-10-CM

## 2025-06-25 DIAGNOSIS — F11.90 OPIOID USE DISORDER: ICD-10-CM

## 2025-06-25 DIAGNOSIS — M46.1 SACROILIITIS: ICD-10-CM

## 2025-06-25 PROCEDURE — 250N000011 HC RX IP 250 OP 636: Performed by: STUDENT IN AN ORGANIZED HEALTH CARE EDUCATION/TRAINING PROGRAM

## 2025-06-25 PROCEDURE — 27096 INJECT SACROILIAC JOINT: CPT | Mod: RT | Performed by: STUDENT IN AN ORGANIZED HEALTH CARE EDUCATION/TRAINING PROGRAM

## 2025-06-25 PROCEDURE — 255N000002 HC RX 255 OP 636: Performed by: STUDENT IN AN ORGANIZED HEALTH CARE EDUCATION/TRAINING PROGRAM

## 2025-06-25 RX ORDER — TRIAMCINOLONE ACETONIDE 40 MG/ML
40 INJECTION, SUSPENSION INTRA-ARTICULAR; INTRAMUSCULAR ONCE
Status: COMPLETED | OUTPATIENT
Start: 2025-06-25 | End: 2025-06-25

## 2025-06-25 RX ORDER — ROPIVACAINE HYDROCHLORIDE 2 MG/ML
4 INJECTION, SOLUTION EPIDURAL; INFILTRATION; PERINEURAL ONCE
Status: COMPLETED | OUTPATIENT
Start: 2025-06-25 | End: 2025-06-25

## 2025-06-25 RX ORDER — SEMAGLUTIDE 1.7 MG/.75ML
INJECTION, SOLUTION SUBCUTANEOUS
Qty: 4 ML | Refills: 0 | OUTPATIENT
Start: 2025-06-25

## 2025-06-25 RX ADMIN — IOHEXOL 1 ML: 180 INJECTION INTRAVENOUS at 12:18

## 2025-06-25 RX ADMIN — ROPIVACAINE HYDROCHLORIDE 4 ML: 2 INJECTION EPIDURAL; INFILTRATION; PERINEURAL at 13:22

## 2025-06-25 RX ADMIN — TRIAMCINOLONE ACETONIDE 40 MG: 40 INJECTION, SUSPENSION INTRA-ARTICULAR; INTRAMUSCULAR at 13:22

## 2025-06-25 ASSESSMENT — PAIN SCALES - GENERAL
PAINLEVEL_OUTOF10: SEVERE PAIN (7)
PAINLEVEL_OUTOF10: SEVERE PAIN (8)

## 2025-06-25 NOTE — PATIENT INSTRUCTIONS
[facility]   Procedure Discharge Instructions    Today you saw:    Dr. Ludwig    You had a Right  sacroiliac joint injection       Medications used:  Lidocaine  Omnipaque  Ropivicaine   Kenalog             If you were holding your blood thinning medication, please restart taking it: N/A  Be cautious when walking. Numbness and/or weakness in the lower extremities may occur for up to 6-8 hours after the procedure due to effect of the local anesthetic  Do not drive for 6 hours. The effect of the local anesthetic could slow your reflexes.   You may resume your regular activities after 24 hours  Avoid strenuous activity for the first 24 hours  You may shower, however avoid swimming, tub baths or hot tubs for 24 hours following your procedure  You may have a mild to moderate increase in pain for several days following the injection.  It may take up to 14 days for the steroid medication to start working although you may feel the effect as early as a few days after the procedure.     You may use ice packs for 10-15 minutes, 3 to 4 times a day at the injection site for comfort  Do not use heat to painful areas for 6 to 8 hours. This will give the local anesthetic time to wear off and prevent you from accidentally burning your skin.   Unless you have been directed to avoid the use of anti-inflammatory medications (NSAIDS), you may use medications such as ibuprofen, Aleve or Tylenol for pain control if needed.   If you were fasting, you may resume your normal diet and medications after the procedure  If you have diabetes, check your blood sugar more frequently than usual as your blood sugar may be higher than normal for 10-14 days following a steroid injection. Contact your doctor who manages your diabetes if your blood sugar is higher than usual  Possible side effects of steroids that you may experience include flushing, elevated blood pressure, increased appetite, mild headaches and  restlessness.  All of these symptoms will get better with time.  If you experience any of the following, call the Pain Clinic at [phone]:  -Fever over 100 degree F  -Swelling, bleeding, redness, drainage, warmth at the injection site  -Progressive weakness or numbness in your legs or arms  -Loss of bowel or bladder function  -Unusual headache that is not relieved by Tylenol or other pain reliever  -Unusual new onset of pain that is not improving

## 2025-06-25 NOTE — PROGRESS NOTES
Pre procedure Diagnosis: Right SI joint dysfunction    Post procedure Diagnosis: Same  Procedure performed: Right SI joint injection, CPT code 96793  Anesthesia: none  Complications: none immediately  Operators: Noah Ludwig MD, Gisselle Kendrick MD    Indications:   Jennifer Voss is a 40 year old female was sent by Dr. Ludwig right-sided sacroiliitis.    Options/alternatives, benefits and risks were discussed with the patient including bleeding, infection, tissue trauma, exposure to radiation, reaction to medications including seizure, nerve injury, weakness, and numbness.  Questions were answered to her satisfaction and she agrees to proceed. Voluntary informed consent was obtained and signed.     Vitals were reviewed: Yes  Allergies were reviewed:  Yes   Medications were reviewed:  Yes   Pre-procedure pain score: 8/10    Procedure:  After getting informed consent, patient was brought into the procedure suite and was placed in a prone position on the procedure table.   A Pause for Cause was performed.  Patient was prepped and draped in sterile fashion.     After identifying the right SI joint, the C-arm was rotated to a obliquely to obtain the best view of the inferior angle of the joint.  A total of 1 ml of Lidocaine 1%  was used to anesthetize the skin at a skin entry site coaxial with the fluoroscopy beam at this location.  A 22gauge 3.5 inch needle was advanced under intermittent fluoroscopy until it was felt to enter the SI joint.    A total of 2ml of Omnipaque-180 was injected, confirming appropriate position, with spread into the intraarticular space, with no intravascular uptake noted. Location was verified in lateral view.    2 ml of 0.2% ropivacaine with 40mg of kenalog was injected.  The needle was flushed with lidocaine and removed.     Hemostasis was achieved, the area was cleaned, and bandaids were placed when appropriate.  The patient tolerated the procedure well, and was taken to the  recovery room.    Images were saved to PACS.    Post-procedure pain score: 7/10  Follow-up includes:   -f/u with referring provider    Gisselle Kendrick MD  Chronic Pain Fellow PGY-6     Physician Attestation   I, Noah Ludwig MD, was present for all key and critical portions of the procedure, and I was immediately available during the remainder of the procedure.  Any changes to the documentation have been made above.    Noah Ludwig MD  Interventional Pain Medicine  [facility]

## 2025-06-25 NOTE — NURSING NOTE
Discharge Information    IV Discontiued Time:  NA    Amount of Fluid Infused:  NA    Discharge Criteria = When patient returns to baseline or as per MD order    Consciousness:  Pt is fully awake    Circulation:  BP +/- 20% of pre-procedure level    Respiration:  Patient is able to breathe deeply    O2 Sat:  Patient is able to maintain O2 Sat >92% on room air    Activity:  Moves 4 extremities on command    Ambulation:  Patient is able to stand and walk or stand and pivot into wheelchair    Dressing:  Clean/dry or No Dressing    Notes:   Discharge instructions and AVS given to patient    Patient meets criteria for discharge?  YES    Admitted to PCU?  No    Responsible adult present to accompany patient home?  Yes    Signature/Title:    Tali Purcell RN  RN Care Coordinator  [facility] [place]

## 2025-06-26 ENCOUNTER — INFUSION THERAPY VISIT (OUTPATIENT)
Dept: INFUSION THERAPY | Facility: CLINIC | Age: 40
End: 2025-06-26
Payer: COMMERCIAL

## 2025-06-26 VITALS
RESPIRATION RATE: 16 BRPM | TEMPERATURE: 98.1 F | SYSTOLIC BLOOD PRESSURE: 124 MMHG | OXYGEN SATURATION: 95 % | HEART RATE: 76 BPM | DIASTOLIC BLOOD PRESSURE: 83 MMHG

## 2025-06-26 DIAGNOSIS — F11.20 OPIOID USE DISORDER, SEVERE, DEPENDENCE (H): Primary | ICD-10-CM

## 2025-06-26 PROCEDURE — 250N000009 HC RX 250

## 2025-06-26 PROCEDURE — 96372 THER/PROPH/DIAG INJ SC/IM: CPT

## 2025-06-26 PROCEDURE — 250N000011 HC RX IP 250 OP 636: Mod: JZ

## 2025-06-26 RX ORDER — MEPERIDINE HYDROCHLORIDE 25 MG/ML
25 INJECTION INTRAMUSCULAR; INTRAVENOUS; SUBCUTANEOUS
OUTPATIENT
Start: 2025-07-24

## 2025-06-26 RX ORDER — LIDOCAINE HYDROCHLORIDE 10 MG/ML
2 INJECTION, SOLUTION EPIDURAL; INFILTRATION; INTRACAUDAL; PERINEURAL ONCE
Status: COMPLETED | OUTPATIENT
Start: 2025-06-26 | End: 2025-06-26

## 2025-06-26 RX ORDER — ALBUTEROL SULFATE 0.83 MG/ML
2.5 SOLUTION RESPIRATORY (INHALATION)
OUTPATIENT
Start: 2025-07-24

## 2025-06-26 RX ORDER — LIDOCAINE HYDROCHLORIDE 10 MG/ML
2 INJECTION, SOLUTION EPIDURAL; INFILTRATION; INTRACAUDAL; PERINEURAL ONCE
OUTPATIENT
Start: 2025-07-24 | End: 2025-07-24

## 2025-06-26 RX ORDER — EPINEPHRINE 1 MG/ML
0.3 INJECTION, SOLUTION, CONCENTRATE INTRAVENOUS EVERY 5 MIN PRN
OUTPATIENT
Start: 2025-07-24

## 2025-06-26 RX ORDER — DIPHENHYDRAMINE HYDROCHLORIDE 50 MG/ML
25 INJECTION, SOLUTION INTRAMUSCULAR; INTRAVENOUS
Start: 2025-07-24

## 2025-06-26 RX ORDER — DIPHENHYDRAMINE HYDROCHLORIDE 50 MG/ML
50 INJECTION, SOLUTION INTRAMUSCULAR; INTRAVENOUS
Start: 2025-07-24

## 2025-06-26 RX ORDER — ALBUTEROL SULFATE 90 UG/1
1-2 INHALANT RESPIRATORY (INHALATION)
Start: 2025-07-24

## 2025-06-26 RX ORDER — METHYLPREDNISOLONE SODIUM SUCCINATE 40 MG/ML
40 INJECTION INTRAMUSCULAR; INTRAVENOUS
Start: 2025-07-24

## 2025-06-26 RX ADMIN — LIDOCAINE HYDROCHLORIDE 2 ML: 10 INJECTION, SOLUTION EPIDURAL; INFILTRATION; INTRACAUDAL; PERINEURAL at 14:58

## 2025-06-26 RX ADMIN — BUPRENORPHINE 100 MG: 100 SOLUTION SUBCUTANEOUS at 14:58

## 2025-06-26 NOTE — PROGRESS NOTES
Infusion Nursing Note:  Jennifre Voss presents today for Sublocade injection..    Patient seen by provider today: No   present during visit today: Not Applicable.    Note: Tolerated injection without difficulty.  Right lower abdomen used. Pt denies any new medical concerns.        Intravenous Access:  No Intravenous access/labs at this visit.    Treatment Conditions:  Not Applicable.      Post Infusion Assessment:  Patient tolerated injection without incident.  Site patent and intact, free from redness, edema or discomfort.  No evidence of extravasations.       Discharge Plan:   Discharge instructions reviewed with: Patient.  Patient and/or family verbalized understanding of discharge instructions and all questions answered.  AVS to patient via [employer].  Patient will return 7/24/25 for next appointment.   Patient discharged in stable condition accompanied by: self.  Departure Mode: Ambulatory.      Viridiana Harding RN

## 2025-07-12 ENCOUNTER — MYC MEDICAL ADVICE (OUTPATIENT)
Dept: FAMILY MEDICINE | Facility: CLINIC | Age: 40
End: 2025-07-12
Payer: COMMERCIAL

## 2025-07-17 ENCOUNTER — THERAPY VISIT (OUTPATIENT)
Dept: PHYSICAL THERAPY | Facility: CLINIC | Age: 40
End: 2025-07-17
Payer: COMMERCIAL

## 2025-07-17 DIAGNOSIS — G89.29 CHRONIC BILATERAL LOW BACK PAIN WITH RIGHT-SIDED SCIATICA: Primary | ICD-10-CM

## 2025-07-17 DIAGNOSIS — M54.41 CHRONIC BILATERAL LOW BACK PAIN WITH RIGHT-SIDED SCIATICA: Primary | ICD-10-CM

## 2025-07-24 ENCOUNTER — ANCILLARY PROCEDURE (OUTPATIENT)
Dept: GENERAL RADIOLOGY | Facility: CLINIC | Age: 40
End: 2025-07-24
Attending: PHYSICIAN ASSISTANT
Payer: COMMERCIAL

## 2025-07-24 ENCOUNTER — INFUSION THERAPY VISIT (OUTPATIENT)
Dept: INFUSION THERAPY | Facility: CLINIC | Age: 40
End: 2025-07-24
Payer: COMMERCIAL

## 2025-07-24 VITALS — SYSTOLIC BLOOD PRESSURE: 105 MMHG | HEART RATE: 72 BPM | TEMPERATURE: 98.3 F | DIASTOLIC BLOOD PRESSURE: 70 MMHG

## 2025-07-24 DIAGNOSIS — D58.2 ELEVATED HEMOGLOBIN: ICD-10-CM

## 2025-07-24 DIAGNOSIS — F11.20 OPIOID USE DISORDER, SEVERE, DEPENDENCE (H): Primary | ICD-10-CM

## 2025-07-24 DIAGNOSIS — D72.829 LEUKOCYTOSIS, UNSPECIFIED TYPE: ICD-10-CM

## 2025-07-24 DIAGNOSIS — R61 DIAPHORESIS: ICD-10-CM

## 2025-07-24 PROCEDURE — 96372 THER/PROPH/DIAG INJ SC/IM: CPT

## 2025-07-24 PROCEDURE — 250N000009 HC RX 250

## 2025-07-24 PROCEDURE — 250N000011 HC RX IP 250 OP 636: Mod: JZ

## 2025-07-24 RX ORDER — DIPHENHYDRAMINE HYDROCHLORIDE 50 MG/ML
50 INJECTION, SOLUTION INTRAMUSCULAR; INTRAVENOUS
Start: 2025-08-21

## 2025-07-24 RX ORDER — LIDOCAINE HYDROCHLORIDE 10 MG/ML
2 INJECTION, SOLUTION EPIDURAL; INFILTRATION; INTRACAUDAL; PERINEURAL ONCE
OUTPATIENT
Start: 2025-08-21 | End: 2025-08-21

## 2025-07-24 RX ORDER — METHYLPREDNISOLONE SODIUM SUCCINATE 40 MG/ML
40 INJECTION INTRAMUSCULAR; INTRAVENOUS
Start: 2025-08-21

## 2025-07-24 RX ORDER — LIDOCAINE HYDROCHLORIDE 10 MG/ML
2 INJECTION, SOLUTION EPIDURAL; INFILTRATION; INTRACAUDAL; PERINEURAL ONCE
Status: COMPLETED | OUTPATIENT
Start: 2025-07-24 | End: 2025-07-24

## 2025-07-24 RX ORDER — ALBUTEROL SULFATE 0.83 MG/ML
2.5 SOLUTION RESPIRATORY (INHALATION)
OUTPATIENT
Start: 2025-08-21

## 2025-07-24 RX ORDER — EPINEPHRINE 1 MG/ML
0.3 INJECTION, SOLUTION, CONCENTRATE INTRAVENOUS EVERY 5 MIN PRN
OUTPATIENT
Start: 2025-08-21

## 2025-07-24 RX ORDER — ALBUTEROL SULFATE 90 UG/1
1-2 INHALANT RESPIRATORY (INHALATION)
Start: 2025-08-21

## 2025-07-24 RX ORDER — DIPHENHYDRAMINE HYDROCHLORIDE 50 MG/ML
25 INJECTION, SOLUTION INTRAMUSCULAR; INTRAVENOUS
Start: 2025-08-21

## 2025-07-24 RX ORDER — MEPERIDINE HYDROCHLORIDE 25 MG/ML
25 INJECTION INTRAMUSCULAR; INTRAVENOUS; SUBCUTANEOUS
OUTPATIENT
Start: 2025-08-21

## 2025-07-24 RX ADMIN — LIDOCAINE HYDROCHLORIDE 2 ML: 10 INJECTION, SOLUTION EPIDURAL; INFILTRATION; INTRACAUDAL; PERINEURAL at 14:42

## 2025-07-24 RX ADMIN — BUPRENORPHINE 100 MG: 100 SOLUTION SUBCUTANEOUS at 14:46

## 2025-07-24 NOTE — PROGRESS NOTES
Infusion Nursing Note:  Jennifer Voss presents today for Sublocade.    Patient seen by provider today: No   present during visit today: Not Applicable.    Note: N/A.    Intravenous Access:  No Intravenous access/labs at this visit.    Treatment Conditions:  Not Applicable.    Post Infusion Assessment:  Patient tolerated injection without incident.     Discharge Plan:   Patient and/or family verbalized understanding of discharge instructions and all questions answered.  Patient discharged in stable condition accompanied by: self.  Departure Mode: Ambulatory.    Ranadll Narayanan RN

## 2025-07-30 ENCOUNTER — VIRTUAL VISIT (OUTPATIENT)
Dept: ADDICTION MEDICINE | Facility: CLINIC | Age: 40
End: 2025-07-30
Payer: COMMERCIAL

## 2025-07-30 DIAGNOSIS — F41.1 GAD (GENERALIZED ANXIETY DISORDER): ICD-10-CM

## 2025-07-30 DIAGNOSIS — F11.20 OPIOID USE DISORDER, SEVERE, DEPENDENCE (H): Primary | ICD-10-CM

## 2025-07-30 DIAGNOSIS — F39 MOOD DISORDER: ICD-10-CM

## 2025-07-30 DIAGNOSIS — Z72.0 TOBACCO ABUSE: ICD-10-CM

## 2025-07-30 PROCEDURE — G2211 COMPLEX E/M VISIT ADD ON: HCPCS | Mod: 95

## 2025-07-30 PROCEDURE — 1126F AMNT PAIN NOTED NONE PRSNT: CPT | Mod: 95

## 2025-07-30 PROCEDURE — 98006 SYNCH AUDIO-VIDEO EST MOD 30: CPT

## 2025-07-30 ASSESSMENT — PAIN SCALES - GENERAL: PAINLEVEL_OUTOF10: NO PAIN (0)

## 2025-07-30 NOTE — PROGRESS NOTES
Virtual Visit Details    Type of service:  Video Visit     Originating Location (pt. Location): Home    Distant Location (provider location):  Off-site  Platform used for Video Visit: [facility]    A/P                                                    ASSESSMENT/PLAN  Assessment & Plan  Smoking:      Sublocade treatment:  - No issues with Sublocade treatment since initial reaction. Liver labs from primary care a couple of weeks ago are fine. Patient had a previous site reaction/infection after the first injection but has had no further issues. Discussed the importance of monitoring liver function with ongoing sublocade use; recent labs were normal. Patient concern about scheduling was addressed, and a virtual follow-up is planned.  - Continue Sublocade treatment. Schedule virtual follow-up on October 29, 2025, at 3:30 PM. No new medications were ordered during this encounter.      1. Opioid use disorder, severe, dependence (H) (Primary)  -controlled in early remission  - No issues with Sublocade treatment since initial reaction. Liver labs from primary care a couple of weeks ago are WNL.  Patient had a previous site reaction/infection after the first injection but has had no further issues. Discussed the importance of monitoring liver function with ongoing sublocade use; recent labs were normal. Patient concern about scheduling was addressed, and a virtual follow-up is planned.  - Continue monthly  Sublocade treatment. Schedule virtual follow-up on October 29, 2025, at 3:30 PM. No new medications were ordered during this encounter.    2. RACH (generalized anxiety disorder)  3. Mood disorder  -no change   -continue with strattera, lexapro, gabapentin, lamictal, zyprexa, vallium as prescribed by psychiatry  -follow up with psychiatry as scheduled   -continue with therapy    4. Tobacco abuse  - No change in smoking habits. Patient is not interested in nicotine replacement therapy  at this time.        Jul 30, 2025  - monthly sublocade  Follow with psychiatry Mariana Hayward therapist        Continued Complex Management  The longitudinal plan of care for Opioid Use Disorder (OUD) was addressed during this visit. Due to the added complexity in care, I will continue to support Jennifer in the subsequent management and with ongoing continuity of care.      Last encounter A/P    1. Opioid use disorder, severe, dependence (H) (Primary)  -controlled in early remission.  No cravings.  Recent post op prescription for oxy without issue or triggers.  Post op pain managed.    -continue monthly sublocade   - Healing well post-surgery for carpal tunnel syndrome. No issues with numbness or pain in the wrist.  - Continue current course. Next follow-up scheduled for July 30, 2025.  - Monthly Sublocade injection is ongoing. No issues with anxiety or mood changes. No triggering with oxycodone use.  - Next Sublocade injection due on June 26, 2025. Schedule labs for the end of next week to ensure insurance compliance.  -Counseling provided regarding   Opioid warning reviewed.    Risk of overdose following a period of abstinence due to decrease tolerance was discussed including risk of death.                Strongly recommended abstain from alcohol, benzodiazepines, THC, opioids and other drugs of abuse.                Increased risk of return to opioid use after use of these substances discussed.         Increased risk of overdose/death with use of other substances particularly benzodiazepines/alcohol reviewed.  -2 month follow up     2. RACH (generalized anxiety disorder)  3. Mood disorder  -no change   -continue with strattera, lexapro, gabapentin, lamictal, zyprexa, vallium as prescribed by psychiatry  -follow up with psychiatry as scheduled         Jun 4, 2025  - monthly sublocade       PDMP Review         Value Time User    State PDMP site checked  Yes 7/30/2025 12:31 PM Vee Wells, MATTHEW               RTC  Return in about 3 months (around 10/30/2025).      Counseled the patient on the importance of having a recovery program in addition to medication to manage recovery.  Components include avoiding isolating, having willingness to change, avoiding triggers and managing cravings. Encouraged having some type of sober network and practicing honesty with trusted support person(s). Encouraged other services such as counseling, 12 step or other self-help organizations.      Opioid warning reviewed.  Risk of overdose following a period of abstinence due to decrease tolerance was discussed including risk of death.  Strongly recommended abstain from alcohol, benzodiazepines, THC, opioids and other drugs of abuse.  Increased risk of return to opioid use after use of these substances discussed.  Increased risk of overdose/death with use of other substances particularly benzodiazepines/alcohol reviewed.        SUBJECTIVE                                                    Jennifer Voss is a 40 year old female who presents to clinic today for follow up    Visit performed virtual video       Substance Use History :  Opioids:   Age at first use: 38  Current use: substance: Fentanyl ; quantity 8-10 pills; route: smoking ; timing of last use: 2024;                 IV drug use: Yes:    History of overdose: Yes:   Previous residential or outpatient treatments for addiction : No  Previous medication treatments for addiction: No  Longest period of sobriety:   Medical complications related to substance use:   Hepatitis C: non-reactive; Date of most recent testin23  HIV: non-reactive; Date of most recent testin23     Other Addiction History:  Stimulants (cocaine, methamphetamine, MDMA/ecstasy)   2 years ago  Sedatives/hypnotics/anxiolytics: (benzodiazepines, GHB, Ambien, phenobarbital)  Current valium prescription  Alcohol:      Nicotine: (cigarettes, vaping, chew/snuff)  Daily   Cannabis:       Hallucinogens/Dissociatives: (acid, mushrooms, ketamine)     Eating disorder:     Gambling:                    [place] Prescription Drug Monitoring Program Reviewed:  Yes;         PAST PSYCHIATRIC HISTORY:  Diagnoses- depression, anxiety, panic disorder, PTSD   Suicide Attempts: Yes   Hospitalizations:      Social History  Housing status: alone  Education:   Employment status: works in school system.  Off for summer, returns to work Aug 7  Relationship status: Single  Children:   Legal:     Recent HPI Details:  HPI Jun 4, 2025  History of Present Illness-  - Jennifer Voss, 40-year-old female.  - Recently underwent surgery for carpal tunnel syndrome on the right hand.  - Surgery aimed to relieve numbness in the hand.  - No issues with mood or anxiety reported.  - Last dose of prescribed oxycodone taken on Sunday prior to the encounter.  - Monthly Sublocade injection received recently, next due on June 26, 2025.  - No additional pain medication needed post-surgery.  - Psychosocial details: Jennifer has completed her school year and is ready for a nap after driving home from the cabin. She plans to work out in the yard. She has cats that stay indoors.    TODAY'S VISIT  HPI Jul 30, 2025  History of Present Illness-Jennifer Voss, age 40, female. Receiving monthly Sublocade injections, currently on treatment number 9. No changes in cravings or side effects reported. No site reactions since an initial reaction or infection after the first injection. No change in smoking status; not interested in nicotine replacement at this time. Liver labs completed a couple of weeks ago at primary care due to a rash; results were normal. Urine screen completed in June 2025. Has access to Narcan.     Patient reports her summer went well, spent time at the cabin, and is preparing to return to work next week. She describes being in her routine and notes that while unemployment helped, it was not fully enough financially. She  discussed her pets and some of the challenges with them. No mention of alcohol use. Mood appears stable, and she is adjusting to returning to work; no specific concerns about anxiety were expressed. No information about sleep was discussed.      OBJECTIVE  PHYSICAL EXAM:  LMP 02/05/2024 (Approximate)     GENERAL: healthy, alert and no distress  EYES: Eyes grossly normal to inspection, PERRL and conjunctivae and sclerae normal  RESP: No respiratory distress  MENTAL STATUS EXAM  Appearance/Behavior: No appearant distress  Speech: Normal  Mood/Affect: normal affect  Insight: Adequate      PHQ-9 Score:       5/5/2025     5:56 AM 7/11/2025    11:10 AM 7/30/2025     8:53 AM   PHQ   PHQ-9 Total Score 6  6  5    Q9: Thoughts of better off dead/self-harm past 2 weeks Not at all Not at all Not at all       Patient-reported       RACH-7 Score:      6/27/2024     8:11 AM 9/26/2024     3:55 PM 12/26/2024     8:11 AM   RACH-7 SCORE   Total Score 11 (moderate anxiety) 13 (moderate anxiety) 6 (mild anxiety)   Total Score 11 13 6        Patient-reported       LABS (may not contain today's labs)                                                      Today's lab data  No results found for any visits on 07/30/25.        HISTORY                                                    Problem list reviewed & adjusted, as indicated.  Patient Active Problem List   Diagnosis    Tobacco abuse    Depression with anxiety    Cervical high risk HPV (human papillomavirus) test positive    History of poor pregnancy outcome    Overweight (BMI 25.0-29.9)    Chronic bilateral low back pain without sciatica    Acute right-sided low back pain with right-sided sciatica    Left leg numbness    Bulging lumbar disc    Schmorl's node    Right hip pain    Lumbosacral plexopathy    Mood disorder    RACH (generalized anxiety disorder)    Panic disorder without agoraphobia    PTSD (post-traumatic stress disorder)    Opioid use disorder, severe, dependence (H)    Carpal  tunnel syndrome of right wrist    Left carpal tunnel syndrome    Drug induced constipation    Gastroesophageal reflux disease with esophagitis without hemorrhage    Class 1 obesity due to excess calories without serious comorbidity with body mass index (BMI) of 33.0 to 33.9 in adult    Chronic bilateral low back pain with right-sided sciatica    Family history of malignant neoplasm of breast         MEDICATION LIST (after visit)  Current Outpatient Medications   Medication Sig Dispense Refill    albuterol (PROAIR HFA/PROVENTIL HFA/VENTOLIN HFA) 108 (90 Base) MCG/ACT inhaler Inhale 1-2 puffs into the lungs every 6 hours as needed for shortness of breath, wheezing or cough. 18 g 1    Buprenorphine ER 64 MG/0.18ML SOSY Inject subcutaneously.      clotrimazole (LOTRIMIN) 1 % external cream Apply topically 2 times daily. 113 g 0    cyclobenzaprine (FLEXERIL) 10 MG tablet Take 1 tablet by mouth three times daily as needed for muscle spasm 30 tablet 2    diazepam (VALIUM) 5 MG tablet Take 1-2 tablets (5-10 mg) by mouth daily as needed for anxiety. 15 tabs to last 30 days 15 tablet 2    escitalopram (LEXAPRO) 20 MG tablet Take 1 tablet (20 mg) by mouth daily. 90 tablet 3    estradiol (ESTRACE) 0.5 MG tablet Take 1 tablet by mouth once daily 90 tablet 1    famotidine (PEPCID) 20 MG tablet Take 1 tablet (20 mg) by mouth daily. 90 tablet 3    gabapentin (NEURONTIN) 100 MG capsule Take 1-3 caps by mouth in AM and mid day 150 capsule 1    gabapentin (NEURONTIN) 300 MG capsule Take 1 capsule (300 mg) by mouth 3 times daily as needed for neuropathic pain. 90 capsule 1    gabapentin (NEURONTIN) 800 MG tablet Take one tab by mouth at bedtime and can take half-tab daily as needed for pain and anxiety. 135 tablet 1    ibuprofen (ADVIL/MOTRIN) 800 MG tablet Take 1 tablet (800 mg) by mouth every 6 hours as needed for other (mild and/or inflammatory pain) 30 tablet 0    lamoTRIgine (LAMICTAL) 200 MG tablet Take 1 tablet (200 mg) by mouth  daily. 90 tablet 3    naloxone (NARCAN) 4 MG/0.1ML nasal spray Spray 1 spray (4 mg) into one nostril alternating nostrils as needed for opioid reversal every 2-3 minutes until assistance arrives 0.2 mL 11    OLANZapine (ZYPREXA) 10 MG tablet Take 0.5-1 tablets (5-10 mg) by mouth daily as needed (anxiety, sleep). 90 tablet 1    ondansetron (ZOFRAN ODT) 4 MG ODT tab Take 1 tablet (4 mg) by mouth every 8 hours as needed for nausea. 4 tablet 0    oxyCODONE (ROXICODONE) 5 MG tablet Take 1-2 tablets (5-10 mg) by mouth every 6 hours as needed for moderate to severe pain. 5 tablet 0    progesterone (PROMETRIUM) 100 MG capsule Take 1 capsule by mouth once daily 90 capsule 1    Semaglutide-Weight Management (WEGOVY) 1.7 MG/0.75ML pen INJECT 1 SYRINGE SUBCUTANEOUSLY ONCE A WEEK 6 mL 0    senna-docusate (SENOKOT-S/PERICOLACE) 8.6-50 MG tablet Take 1-2 tablets by mouth 2 times daily. 30 tablet 0     No current facility-administered medications for this visit.         Allergies   Allergen Reactions    Morphine And Codeine Nausea     Ok with vicodin    Penicillins Nausea    Fluoxetine Itching       Additional MDM Details:  none      Vee Wells NP  [facility]  [phone]    Consent was obtained from the patient to use an AI documentation tool in the creation of this note.                Answers submitted by the patient for this visit:  Patient Health Questionnaire (Submitted on 7/30/2025)  If you checked off any problems, how difficult have these problems made it for you to do your work, take care of things at home, or get along with other people?: Somewhat difficult  PHQ9 TOTAL SCORE: 5

## 2025-07-30 NOTE — NURSING NOTE
Is the patient currently in the state of [place]? YES    Current patient location: [place] house, [place].    Visit mode:Video    If the visit is dropped, the patient can be reconnected by: VIDEO VISIT: Text to cell phone:   Telephone Information:   Mobile [phone]       Will anyone else be joining the visit? No  (If patient encounters technical issues they should call [phone])    Are changes needed to the allergy or medication list? Pt stated no changes to allergies and Pt stated no med changes    Are refills needed on medications prescribed by this physician? No    Rooming Documentation: Questionnaire(s) completed.    Reason for visit: RECHECK     Renae Pena, BLANKF

## 2025-08-04 ENCOUNTER — VIRTUAL VISIT (OUTPATIENT)
Dept: BEHAVIORAL HEALTH | Facility: CLINIC | Age: 40
End: 2025-08-04
Payer: COMMERCIAL

## 2025-08-04 ENCOUNTER — VIRTUAL VISIT (OUTPATIENT)
Dept: PSYCHIATRY | Facility: CLINIC | Age: 40
End: 2025-08-04
Payer: COMMERCIAL

## 2025-08-04 DIAGNOSIS — F43.9 TRAUMA AND STRESSOR-RELATED DISORDER: ICD-10-CM

## 2025-08-04 DIAGNOSIS — F43.10 PTSD (POST-TRAUMATIC STRESS DISORDER): ICD-10-CM

## 2025-08-04 DIAGNOSIS — F41.1 GAD (GENERALIZED ANXIETY DISORDER): ICD-10-CM

## 2025-08-04 DIAGNOSIS — F31.81 BIPOLAR II DISORDER (H): Primary | ICD-10-CM

## 2025-08-04 DIAGNOSIS — F15.11 METHAMPHETAMINE ABUSE IN REMISSION (H): ICD-10-CM

## 2025-08-04 DIAGNOSIS — F11.21 OPIOID USE DISORDER, MODERATE, IN SUSTAINED REMISSION (H): ICD-10-CM

## 2025-08-04 DIAGNOSIS — G89.29 OTHER CHRONIC PAIN: ICD-10-CM

## 2025-08-04 DIAGNOSIS — F11.21 MODERATE OPIOID USE DISORDER, IN SUSTAINED REMISSION (H): ICD-10-CM

## 2025-08-04 DIAGNOSIS — F43.21 GRIEF: ICD-10-CM

## 2025-08-04 DIAGNOSIS — Z79.899 ENCOUNTER FOR LONG-TERM (CURRENT) USE OF MEDICATIONS: ICD-10-CM

## 2025-08-04 PROCEDURE — 90832 PSYTX W PT 30 MINUTES: CPT | Mod: 95 | Performed by: COUNSELOR

## 2025-08-04 PROCEDURE — 1126F AMNT PAIN NOTED NONE PRSNT: CPT | Performed by: PSYCHIATRY & NEUROLOGY

## 2025-08-04 PROCEDURE — 98006 SYNCH AUDIO-VIDEO EST MOD 30: CPT | Performed by: PSYCHIATRY & NEUROLOGY

## 2025-08-04 RX ORDER — LAMOTRIGINE 200 MG/1
200 TABLET ORAL DAILY
Qty: 90 TABLET | Refills: 3 | Status: SHIPPED | OUTPATIENT
Start: 2025-08-04

## 2025-08-04 RX ORDER — ESCITALOPRAM OXALATE 20 MG/1
20 TABLET ORAL DAILY
Qty: 90 TABLET | Refills: 3 | Status: SHIPPED | OUTPATIENT
Start: 2025-08-04

## 2025-08-04 RX ORDER — DIAZEPAM 5 MG/1
5-10 TABLET ORAL DAILY PRN
Qty: 15 TABLET | Refills: 2 | Status: SHIPPED | OUTPATIENT
Start: 2025-08-28

## 2025-08-04 RX ORDER — OLANZAPINE 10 MG/1
5-10 TABLET, FILM COATED ORAL DAILY PRN
Qty: 90 TABLET | Refills: 1 | Status: SHIPPED | OUTPATIENT
Start: 2025-08-04

## 2025-08-04 RX ORDER — GABAPENTIN 800 MG/1
TABLET ORAL
Qty: 135 TABLET | Refills: 3 | Status: SHIPPED | OUTPATIENT
Start: 2025-08-04

## 2025-08-04 ASSESSMENT — PAIN SCALES - GENERAL: PAINLEVEL_OUTOF10: NO PAIN (0)

## 2025-08-13 ENCOUNTER — MYC MEDICAL ADVICE (OUTPATIENT)
Dept: PSYCHIATRY | Facility: CLINIC | Age: 40
End: 2025-08-13
Payer: COMMERCIAL

## 2025-08-13 DIAGNOSIS — F41.1 GAD (GENERALIZED ANXIETY DISORDER): Primary | ICD-10-CM

## 2025-08-13 RX ORDER — DIAZEPAM 2 MG/1
2 TABLET ORAL DAILY PRN
Qty: 10 TABLET | Refills: 0 | Status: SHIPPED | OUTPATIENT
Start: 2025-08-13

## 2025-08-20 DIAGNOSIS — F11.90 OPIOID USE DISORDER: ICD-10-CM

## 2025-08-20 DIAGNOSIS — G89.29 CHRONIC BILATERAL LOW BACK PAIN WITHOUT SCIATICA: ICD-10-CM

## 2025-08-20 DIAGNOSIS — E66.09 CLASS 1 OBESITY DUE TO EXCESS CALORIES WITHOUT SERIOUS COMORBIDITY WITH BODY MASS INDEX (BMI) OF 33.0 TO 33.9 IN ADULT: ICD-10-CM

## 2025-08-20 DIAGNOSIS — M54.50 CHRONIC BILATERAL LOW BACK PAIN WITHOUT SCIATICA: ICD-10-CM

## 2025-08-20 DIAGNOSIS — E66.811 CLASS 1 OBESITY DUE TO EXCESS CALORIES WITHOUT SERIOUS COMORBIDITY WITH BODY MASS INDEX (BMI) OF 33.0 TO 33.9 IN ADULT: ICD-10-CM

## 2025-08-20 RX ORDER — SEMAGLUTIDE 1.7 MG/.75ML
1.7 INJECTION, SOLUTION SUBCUTANEOUS WEEKLY
Qty: 3 ML | Refills: 0 | Status: SHIPPED | OUTPATIENT
Start: 2025-08-20

## 2025-08-21 ENCOUNTER — INFUSION THERAPY VISIT (OUTPATIENT)
Dept: INFUSION THERAPY | Facility: CLINIC | Age: 40
End: 2025-08-21
Attending: NURSE PRACTITIONER
Payer: COMMERCIAL

## 2025-08-21 VITALS
RESPIRATION RATE: 16 BRPM | HEART RATE: 63 BPM | OXYGEN SATURATION: 93 % | TEMPERATURE: 98.2 F | DIASTOLIC BLOOD PRESSURE: 89 MMHG | SYSTOLIC BLOOD PRESSURE: 128 MMHG

## 2025-08-21 DIAGNOSIS — F11.20 OPIOID USE DISORDER, SEVERE, DEPENDENCE (H): Primary | ICD-10-CM

## 2025-08-21 PROCEDURE — 96372 THER/PROPH/DIAG INJ SC/IM: CPT

## 2025-08-21 PROCEDURE — 250N000011 HC RX IP 250 OP 636: Mod: JZ

## 2025-08-21 PROCEDURE — 250N000009 HC RX 250

## 2025-08-21 RX ORDER — ALBUTEROL SULFATE 90 UG/1
1-2 INHALANT RESPIRATORY (INHALATION)
Start: 2025-09-18

## 2025-08-21 RX ORDER — ALBUTEROL SULFATE 0.83 MG/ML
2.5 SOLUTION RESPIRATORY (INHALATION)
OUTPATIENT
Start: 2025-09-18

## 2025-08-21 RX ORDER — DIPHENHYDRAMINE HYDROCHLORIDE 50 MG/ML
25 INJECTION, SOLUTION INTRAMUSCULAR; INTRAVENOUS
Start: 2025-09-18

## 2025-08-21 RX ORDER — LIDOCAINE HYDROCHLORIDE 10 MG/ML
2 INJECTION, SOLUTION EPIDURAL; INFILTRATION; INTRACAUDAL; PERINEURAL ONCE
Status: COMPLETED | OUTPATIENT
Start: 2025-08-21 | End: 2025-08-21

## 2025-08-21 RX ORDER — LIDOCAINE HYDROCHLORIDE 10 MG/ML
2 INJECTION, SOLUTION EPIDURAL; INFILTRATION; INTRACAUDAL; PERINEURAL ONCE
OUTPATIENT
Start: 2025-09-18 | End: 2025-09-18

## 2025-08-21 RX ORDER — EPINEPHRINE 1 MG/ML
0.3 INJECTION, SOLUTION, CONCENTRATE INTRAVENOUS EVERY 5 MIN PRN
OUTPATIENT
Start: 2025-09-18

## 2025-08-21 RX ORDER — CYCLOBENZAPRINE HCL 10 MG
10 TABLET ORAL 3 TIMES DAILY PRN
Qty: 30 TABLET | Refills: 0 | Status: SHIPPED | OUTPATIENT
Start: 2025-08-21

## 2025-08-21 RX ORDER — METHYLPREDNISOLONE SODIUM SUCCINATE 40 MG/ML
40 INJECTION INTRAMUSCULAR; INTRAVENOUS
Start: 2025-09-18

## 2025-08-21 RX ORDER — MEPERIDINE HYDROCHLORIDE 25 MG/ML
25 INJECTION INTRAMUSCULAR; INTRAVENOUS; SUBCUTANEOUS
OUTPATIENT
Start: 2025-09-18

## 2025-08-21 RX ORDER — DIPHENHYDRAMINE HYDROCHLORIDE 50 MG/ML
50 INJECTION, SOLUTION INTRAMUSCULAR; INTRAVENOUS
Start: 2025-09-18

## 2025-08-21 RX ADMIN — BUPRENORPHINE 100 MG: 100 SOLUTION SUBCUTANEOUS at 15:06

## 2025-08-21 RX ADMIN — LIDOCAINE HYDROCHLORIDE 2 ML: 10 INJECTION, SOLUTION EPIDURAL; INFILTRATION; INTRACAUDAL; PERINEURAL at 15:03

## 2025-08-21 ASSESSMENT — PAIN SCALES - GENERAL: PAINLEVEL_OUTOF10: NO PAIN (0)

## 2025-09-04 ENCOUNTER — MYC MEDICAL ADVICE (OUTPATIENT)
Dept: FAMILY MEDICINE | Facility: CLINIC | Age: 40
End: 2025-09-04
Payer: COMMERCIAL

## (undated) DEVICE — GOWN LG DISP 9515

## (undated) DEVICE — ADH SKIN CLOSURE PREMIERPRO EXOFIN 1.0ML 3470

## (undated) DEVICE — PACK HAND WRIST SOP15HWFSP

## (undated) DEVICE — ENDO TROCAR SLEEVE KII ADV FIXATION 05X100MM CFS02

## (undated) DEVICE — NDL INSUFFLATION 13GA 120MM C2201

## (undated) DEVICE — PREP SKIN SCRUB TRAY 4461A

## (undated) DEVICE — DRAPE STERI TOWEL SM 1000

## (undated) DEVICE — BNDG ELASTIC 4"X5YDS UNSTERILE 6611-40

## (undated) DEVICE — SU MONOCRYL 4-0 PS-2 18" UND Y496G

## (undated) DEVICE — SOL NACL 0.9% IRRIG 1000ML BOTTLE 07138-09

## (undated) DEVICE — ANTIFOG SOLUTION W/FOAM PAD 31142527

## (undated) DEVICE — BLADE KNIFE SURG 11 371111

## (undated) DEVICE — SOL WATER IRRIG 1000ML BOTTLE 07139-09

## (undated) DEVICE — PREP CHLORAPREP CLEAR 3ML 260400

## (undated) DEVICE — DRSG XEROFORM 1X8"

## (undated) DEVICE — GLOVE PROTEXIS MICRO 5.5  2D73PM55

## (undated) DEVICE — PAD FLOOR SURGISAFE

## (undated) DEVICE — GLOVE BIOGEL PI MICRO INDICATOR UNDERGLOVE SZ 7.5 48975

## (undated) DEVICE — GLOVE BIOGEL PI MICRO SZ 7.0 48570

## (undated) DEVICE — SEAL SET MYOSURE ROD LENS SCOPE SINGLE USE 40-902

## (undated) DEVICE — PACK LAPAROSCOPY/PELVISCOPY STD

## (undated) DEVICE — DRAPE POUCH INSTRUMENT 3 POCKET 1018L

## (undated) DEVICE — DRSG TEGADERM 2 3/8X2 3/4" 1624W

## (undated) DEVICE — ENDO TROCAR FIRST ENTRY KII FIOS ADV FIX 05X100MM CFF03

## (undated) DEVICE — GLOVE PROTEXIS BLUE W/NEU-THERA 6.0  2D73EB60

## (undated) DEVICE — SYR 05ML LL W/O NDL

## (undated) DEVICE — UTERINE MANIPULATOR HUMI KRONER 6003

## (undated) DEVICE — DRSG GAUZE 2X2" 8042

## (undated) DEVICE — KIT PROCEDURE FLUENT IN/OUT FLOWPAK TISS TRAP FLT-112S

## (undated) DEVICE — PAD PERI INDIV WRAP 11" 2022

## (undated) DEVICE — SYR 10ML LL W/O NDL

## (undated) DEVICE — LUBRICATING JELLY 4.25OZ

## (undated) DEVICE — DECANTER VIAL 2006S

## (undated) DEVICE — SUCTION CURETTE 8" ENDOCERVICAL MX111

## (undated) DEVICE — NDL SPINAL 22GA 3.5" QUINCKE 405181

## (undated) DEVICE — NDL 19GA 1.5"

## (undated) DEVICE — DILATOR CERVICAL OS FINDER TAPER 2-4MMX21.5CM 1176

## (undated) DEVICE — TUBING C02 INSUFFLATION W/FILTER

## (undated) DEVICE — DRSG TELFA 3X8" 1238

## (undated) RX ORDER — ACETAMINOPHEN 325 MG/1
TABLET ORAL
Status: DISPENSED
Start: 2025-05-30

## (undated) RX ORDER — ACETAMINOPHEN 325 MG/1
TABLET ORAL
Status: DISPENSED
Start: 2021-10-06

## (undated) RX ORDER — FENTANYL CITRATE 50 UG/ML
INJECTION, SOLUTION INTRAMUSCULAR; INTRAVENOUS
Status: DISPENSED
Start: 2021-10-06

## (undated) RX ORDER — LIDOCAINE HYDROCHLORIDE 20 MG/ML
JELLY TOPICAL
Status: DISPENSED
Start: 2021-10-06

## (undated) RX ORDER — PROPOFOL 10 MG/ML
INJECTION, EMULSION INTRAVENOUS
Status: DISPENSED
Start: 2021-10-06

## (undated) RX ORDER — FENTANYL CITRATE 50 UG/ML
INJECTION, SOLUTION INTRAMUSCULAR; INTRAVENOUS
Status: DISPENSED
Start: 2025-05-30

## (undated) RX ORDER — HYDROXYZINE HYDROCHLORIDE 50 MG/1
TABLET, FILM COATED ORAL
Status: DISPENSED
Start: 2021-10-06

## (undated) RX ORDER — KETOROLAC TROMETHAMINE 30 MG/ML
INJECTION, SOLUTION INTRAMUSCULAR; INTRAVENOUS
Status: DISPENSED
Start: 2021-10-06

## (undated) RX ORDER — LIDOCAINE HYDROCHLORIDE 10 MG/ML
INJECTION, SOLUTION INFILTRATION; PERINEURAL
Status: DISPENSED
Start: 2021-10-06

## (undated) RX ORDER — DEXAMETHASONE SODIUM PHOSPHATE 10 MG/ML
INJECTION, SOLUTION INTRAMUSCULAR; INTRAVENOUS
Status: DISPENSED
Start: 2021-10-06

## (undated) RX ORDER — ONDANSETRON 2 MG/ML
INJECTION INTRAMUSCULAR; INTRAVENOUS
Status: DISPENSED
Start: 2021-10-06

## (undated) RX ORDER — BUPIVACAINE HYDROCHLORIDE AND EPINEPHRINE 2.5; 5 MG/ML; UG/ML
INJECTION, SOLUTION EPIDURAL; INFILTRATION; INTRACAUDAL; PERINEURAL
Status: DISPENSED
Start: 2021-10-06